# Patient Record
Sex: MALE | Race: WHITE | Employment: OTHER | URBAN - NONMETROPOLITAN AREA
[De-identification: names, ages, dates, MRNs, and addresses within clinical notes are randomized per-mention and may not be internally consistent; named-entity substitution may affect disease eponyms.]

---

## 2017-01-11 ENCOUNTER — TELEPHONE (OUTPATIENT)
Dept: UROLOGY | Age: 27
End: 2017-01-11

## 2017-01-11 ENCOUNTER — TELEPHONE (OUTPATIENT)
Dept: PHYSICAL MEDICINE AND REHAB | Age: 27
End: 2017-01-11

## 2017-01-11 DIAGNOSIS — Z01.818 PRE-OP TESTING: ICD-10-CM

## 2017-01-11 DIAGNOSIS — N31.9 NEUROGENIC BLADDER: Primary | ICD-10-CM

## 2017-01-12 ENCOUNTER — TELEPHONE (OUTPATIENT)
Dept: UROLOGY | Age: 27
End: 2017-01-12

## 2017-01-26 ENCOUNTER — OFFICE VISIT (OUTPATIENT)
Dept: UROLOGY | Age: 27
End: 2017-01-26

## 2017-01-26 VITALS
SYSTOLIC BLOOD PRESSURE: 130 MMHG | WEIGHT: 160 LBS | DIASTOLIC BLOOD PRESSURE: 74 MMHG | HEIGHT: 71 IN | BODY MASS INDEX: 22.4 KG/M2

## 2017-01-26 DIAGNOSIS — N31.9 NEUROGENIC BLADDER DISORDER: Primary | ICD-10-CM

## 2017-01-26 DIAGNOSIS — N39.0 RECURRENT UTI: ICD-10-CM

## 2017-01-26 PROCEDURE — 99024 POSTOP FOLLOW-UP VISIT: CPT | Performed by: UROLOGY

## 2017-01-26 ASSESSMENT — ENCOUNTER SYMPTOMS
VOMITING: 0
CHEST TIGHTNESS: 0
SHORTNESS OF BREATH: 0
BACK PAIN: 0
COLOR CHANGE: 0
EYE REDNESS: 0
EYE PAIN: 0
DIARRHEA: 0

## 2017-02-13 ENCOUNTER — NURSE TRIAGE (OUTPATIENT)
Dept: ADMINISTRATIVE | Age: 27
End: 2017-02-13

## 2017-02-16 ENCOUNTER — OFFICE VISIT (OUTPATIENT)
Dept: UROLOGY | Age: 27
End: 2017-02-16

## 2017-02-16 VITALS
WEIGHT: 176 LBS | SYSTOLIC BLOOD PRESSURE: 112 MMHG | DIASTOLIC BLOOD PRESSURE: 62 MMHG | HEIGHT: 71 IN | BODY MASS INDEX: 24.64 KG/M2

## 2017-02-16 DIAGNOSIS — N31.9 NEUROGENIC BLADDER: Primary | ICD-10-CM

## 2017-02-16 PROCEDURE — 99024 POSTOP FOLLOW-UP VISIT: CPT | Performed by: UROLOGY

## 2017-02-16 RX ORDER — BACLOFEN 10 MG/1
20 TABLET ORAL 4 TIMES DAILY
COMMUNITY

## 2017-02-23 ENCOUNTER — OFFICE VISIT (OUTPATIENT)
Dept: PHYSICAL MEDICINE AND REHAB | Age: 27
End: 2017-02-23

## 2017-02-23 VITALS
DIASTOLIC BLOOD PRESSURE: 76 MMHG | SYSTOLIC BLOOD PRESSURE: 120 MMHG | HEART RATE: 94 BPM | WEIGHT: 176 LBS | BODY MASS INDEX: 24.64 KG/M2 | HEIGHT: 71 IN

## 2017-02-23 DIAGNOSIS — R56.9 PARTIAL SEIZURE (HCC): Primary | ICD-10-CM

## 2017-02-23 PROCEDURE — 99213 OFFICE O/P EST LOW 20 MIN: CPT | Performed by: PSYCHIATRY & NEUROLOGY

## 2017-02-23 RX ORDER — LAMOTRIGINE 150 MG/1
150 TABLET ORAL DAILY
Qty: 30 TABLET | Refills: 5 | Status: SHIPPED | OUTPATIENT
Start: 2017-02-23 | End: 2018-02-20 | Stop reason: CLARIF

## 2017-05-25 ENCOUNTER — OFFICE VISIT (OUTPATIENT)
Dept: PHYSICAL MEDICINE AND REHAB | Age: 27
End: 2017-05-25

## 2017-05-25 VITALS
SYSTOLIC BLOOD PRESSURE: 132 MMHG | DIASTOLIC BLOOD PRESSURE: 85 MMHG | BODY MASS INDEX: 24.38 KG/M2 | HEART RATE: 90 BPM | WEIGHT: 174.16 LBS | HEIGHT: 71 IN

## 2017-05-25 DIAGNOSIS — R56.9 PARTIAL SEIZURE (HCC): Primary | ICD-10-CM

## 2017-05-25 PROCEDURE — 99213 OFFICE O/P EST LOW 20 MIN: CPT | Performed by: PSYCHIATRY & NEUROLOGY

## 2017-05-25 RX ORDER — AMOXICILLIN 500 MG/1
500 CAPSULE ORAL 3 TIMES DAILY
COMMUNITY
Start: 2017-05-23 | End: 2017-11-23

## 2017-05-25 RX ORDER — TRAZODONE HYDROCHLORIDE 50 MG/1
50 TABLET ORAL NIGHTLY
Refills: 0 | COMMUNITY
Start: 2017-04-19 | End: 2017-08-23 | Stop reason: ALTCHOICE

## 2017-06-29 ENCOUNTER — OFFICE VISIT (OUTPATIENT)
Dept: CARDIOLOGY | Age: 27
End: 2017-06-29

## 2017-06-29 VITALS — DIASTOLIC BLOOD PRESSURE: 75 MMHG | SYSTOLIC BLOOD PRESSURE: 135 MMHG | HEART RATE: 106 BPM

## 2017-06-29 DIAGNOSIS — R00.0 TACHYCARDIA: Primary | ICD-10-CM

## 2017-06-29 PROCEDURE — 99213 OFFICE O/P EST LOW 20 MIN: CPT | Performed by: NUCLEAR MEDICINE

## 2017-08-23 ENCOUNTER — HOSPITAL ENCOUNTER (OUTPATIENT)
Dept: WOUND CARE | Age: 27
Discharge: HOME | End: 2017-08-23
Payer: COMMERCIAL

## 2017-08-23 VITALS
WEIGHT: 200 LBS | RESPIRATION RATE: 20 BRPM | DIASTOLIC BLOOD PRESSURE: 72 MMHG | OXYGEN SATURATION: 97 % | HEART RATE: 73 BPM | TEMPERATURE: 98 F | BODY MASS INDEX: 27.89 KG/M2 | SYSTOLIC BLOOD PRESSURE: 134 MMHG

## 2017-08-23 PROCEDURE — 17250 CHEM CAUT OF GRANLTJ TISSUE: CPT

## 2017-08-23 PROCEDURE — 6370000000 HC RX 637 (ALT 250 FOR IP): Performed by: INTERNAL MEDICINE

## 2017-08-23 RX ADMIN — SILVER NITRATE APPLICATORS 1 EACH: 25; 75 STICK TOPICAL at 09:49

## 2017-08-23 ASSESSMENT — PAIN DESCRIPTION - LOCATION: LOCATION: BACK

## 2017-08-23 ASSESSMENT — PAIN SCALES - GENERAL
PAINLEVEL_OUTOF10: 9
PAINLEVEL_OUTOF10: 0

## 2017-08-23 ASSESSMENT — PAIN DESCRIPTION - PAIN TYPE: TYPE: CHRONIC PAIN

## 2017-09-06 ENCOUNTER — HOSPITAL ENCOUNTER (OUTPATIENT)
Age: 27
Discharge: HOME | End: 2017-09-06
Payer: COMMERCIAL

## 2017-09-06 LAB
ALBUMIN SERPL-MCNC: 4.3 G/DL (ref 3.5–5.1)
ALP BLD-CCNC: 191 U/L (ref 38–126)
ALT SERPL-CCNC: 37 U/L (ref 11–66)
ANION GAP SERPL CALCULATED.3IONS-SCNC: 13 MEQ/L (ref 8–16)
AST SERPL-CCNC: 20 U/L (ref 5–40)
BASOPHILS # BLD: 0.5 %
BASOPHILS ABSOLUTE: 0.1 THOU/MM3 (ref 0–0.1)
BILIRUB SERPL-MCNC: 0.2 MG/DL (ref 0.3–1.2)
BUN BLDV-MCNC: 15 MG/DL (ref 7–22)
CALCIUM SERPL-MCNC: 9.6 MG/DL (ref 8.5–10.5)
CHLORIDE BLD-SCNC: 101 MEQ/L (ref 98–111)
CO2: 25 MEQ/L (ref 23–33)
CREAT SERPL-MCNC: 0.9 MG/DL (ref 0.4–1.2)
EOSINOPHIL # BLD: 1.7 %
EOSINOPHILS ABSOLUTE: 0.2 THOU/MM3 (ref 0–0.4)
GFR SERPL CREATININE-BSD FRML MDRD: > 90 ML/MIN/1.73M2
GLUCOSE BLD-MCNC: 80 MG/DL (ref 70–108)
HCT VFR BLD CALC: 48.9 % (ref 42–52)
HEMOGLOBIN: 16.3 GM/DL (ref 14–18)
LYMPHOCYTES # BLD: 18.2 %
LYMPHOCYTES ABSOLUTE: 2 THOU/MM3 (ref 1–4.8)
MCH RBC QN AUTO: 29.3 PG (ref 27–31)
MCHC RBC AUTO-ENTMCNC: 33.2 GM/DL (ref 33–37)
MCV RBC AUTO: 88.1 FL (ref 80–94)
MONOCYTES # BLD: 9.5 %
MONOCYTES ABSOLUTE: 1 THOU/MM3 (ref 0.4–1.3)
NUCLEATED RED BLOOD CELLS: 0 /100 WBC
PDW BLD-RTO: 14.5 % (ref 11.5–14.5)
PLATELET # BLD: 272 THOU/MM3 (ref 130–400)
PMV BLD AUTO: 10.1 MCM (ref 7.4–10.4)
POTASSIUM SERPL-SCNC: 4.3 MEQ/L (ref 3.5–5.2)
RBC # BLD: 5.55 MILL/MM3 (ref 4.7–6.1)
RBC # BLD: NORMAL 10*6/UL
SEG NEUTROPHILS: 70.1 %
SEGMENTED NEUTROPHILS ABSOLUTE COUNT: 7.6 THOU/MM3 (ref 1.8–7.7)
SODIUM BLD-SCNC: 139 MEQ/L (ref 135–145)
TOTAL PROTEIN: 7.7 G/DL (ref 6.1–8)
TSH SERPL DL<=0.05 MIU/L-ACNC: 1.39 UIU/ML (ref 0.4–4.2)
WBC # BLD: 10.8 THOU/MM3 (ref 4.8–10.8)

## 2017-09-06 PROCEDURE — 36415 COLL VENOUS BLD VENIPUNCTURE: CPT

## 2017-09-06 PROCEDURE — 80050 GENERAL HEALTH PANEL: CPT

## 2017-09-07 ENCOUNTER — HOSPITAL ENCOUNTER (OUTPATIENT)
Dept: WOUND CARE | Age: 27
Discharge: HOME | End: 2017-09-07
Payer: MEDICAID

## 2017-09-07 VITALS
TEMPERATURE: 97.9 F | SYSTOLIC BLOOD PRESSURE: 128 MMHG | OXYGEN SATURATION: 98 % | HEART RATE: 81 BPM | WEIGHT: 203 LBS | BODY MASS INDEX: 28.42 KG/M2 | RESPIRATION RATE: 16 BRPM | DIASTOLIC BLOOD PRESSURE: 59 MMHG | HEIGHT: 71 IN

## 2017-09-07 DIAGNOSIS — Z43.3 COLOSTOMY CARE (HCC): ICD-10-CM

## 2017-09-07 PROCEDURE — 99204 OFFICE O/P NEW MOD 45 MIN: CPT | Performed by: NURSE PRACTITIONER

## 2017-09-07 PROCEDURE — 99214 OFFICE O/P EST MOD 30 MIN: CPT

## 2017-10-13 ENCOUNTER — TELEPHONE (OUTPATIENT)
Dept: SURGERY | Age: 27
End: 2017-10-13

## 2017-10-13 NOTE — TELEPHONE ENCOUNTER
Relayed message to pt's mom. If anything of concern, such as blood in the stool or if ostomy not functioning she will call the office back.

## 2017-10-20 ENCOUNTER — HOSPITAL ENCOUNTER (OUTPATIENT)
Age: 27
Discharge: HOME | End: 2017-10-20
Payer: MEDICAID

## 2017-10-20 ENCOUNTER — HOSPITAL ENCOUNTER (OUTPATIENT)
Dept: GENERAL RADIOLOGY | Age: 27
Discharge: HOME | End: 2017-10-20
Payer: MEDICAID

## 2017-10-20 DIAGNOSIS — T14.90XA INJURY: ICD-10-CM

## 2017-10-20 PROCEDURE — 72072 X-RAY EXAM THORAC SPINE 3VWS: CPT

## 2017-10-25 ENCOUNTER — HOSPITAL ENCOUNTER (OUTPATIENT)
Dept: WOUND CARE | Age: 27
Discharge: HOME | End: 2017-10-25
Payer: MEDICAID

## 2017-10-25 VITALS
BODY MASS INDEX: 35.98 KG/M2 | OXYGEN SATURATION: 98 % | HEIGHT: 71 IN | DIASTOLIC BLOOD PRESSURE: 55 MMHG | HEART RATE: 99 BPM | WEIGHT: 257 LBS | TEMPERATURE: 98.1 F | RESPIRATION RATE: 18 BRPM | SYSTOLIC BLOOD PRESSURE: 116 MMHG

## 2017-10-25 DIAGNOSIS — L89.154 DECUBITUS ULCER OF COCCYX, STAGE IV (HCC): ICD-10-CM

## 2017-10-25 DIAGNOSIS — L89.153 DECUBITUS ULCER OF COCCYGEAL REGION, STAGE 3 (HCC): ICD-10-CM

## 2017-10-25 DIAGNOSIS — L89.154 DECUBITUS ULCER OF COCCYGEAL REGION, STAGE 4 (HCC): Primary | ICD-10-CM

## 2017-10-25 PROCEDURE — 99213 OFFICE O/P EST LOW 20 MIN: CPT

## 2017-10-25 ASSESSMENT — PAIN SCALES - GENERAL: PAINLEVEL_OUTOF10: 0

## 2017-10-25 NOTE — PLAN OF CARE
Problem: Wound:  Goal: Will show signs of wound healing; wound closure and no evidence of infection  Will show signs of wound healing; wound closure and no evidence of infection  Outcome: Ongoing  Pt presents to clinic with ongoing care of coccyx wound. NO s/s of infection, afebrile. Wound measures about the same size. Pt accompanied by mother. Pt mother stated pt had a spurt of liquid stool oozing from anus recently. Dr. Jeff Alonso states that can happen. Pt wound packed with gauze per Dr. Jeff Alonso. Pt to continue collagen, gauze to wound bed daily. Order for low air loss mattress and lab work given. Pt to have lab work done ASAP so low airloss mattress can be ordered. Pt to follow up in 3 months. Comments: Care plan reviewed with patient and pt mother. Patient and pt mother verbalize understanding of the plan of care and contribute to goal setting.

## 2017-10-25 NOTE — PROGRESS NOTES
100 10/25/2017  8:31 AM   Red%Wound Bed 100 6/28/2017  7:53 AM   Yellow%Wound Bed 10 12/7/2016 10:16 AM   Black%Wound Bed 10 10/26/2016 10:54 AM   Drainage Amount Scant 10/25/2017  8:31 AM   Drainage Description Tan 10/25/2017  8:31 AM   Odor None 10/25/2017  8:31 AM   Op First Treatment Date 09/14/16 9/14/2016 11:40 AM   Number of days: 405             LABS      Lab Results   Component Value Date    BC No growth-preliminary  No growth   12/25/2016       Assessment:   Paraplegic. Stage IV coccyx wound the wound is slowly getting better  Will schedule patient with Maeve to discuss about his colostomy  Follow-up will be scheduled with me in 4 months. I discuss it with his mother to call if there are any other issues         Patient Active Problem List   Diagnosis Code    Urinary tract infection associated with catheterization of urinary tract ([address] T83.511A, N39.0    Moderate single current episode of major depressive disorder ([address] F32.1    Severe malnutrition ([address] E43    Sepsis with metabolic encephalopathy ([address] A41.9, R65.20, G93.41    Decubitus ulcer of coccygeal region, stage 3 (HCC) L89.153    Adjustment Disorder with Mixed Anxiety and Depressed Mood     Colostomy status ([address] Z93.3    Urinary tract infection N39.0    Sepsis ([address] A41.9    Neurogenic bladder N31.9    Colostomy care ([address] Z43.3    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 Low air loss mattress  Wound is stable: follow up will be scheduled in three months. Plan:     Patient examined and evaluated    Treatment: No orders of the defined types were placed in this encounter. Please see attached Discharge Instructions    Written patient dismissal instructions given to patient and signed by patient or POA.          Discharge Instructions       Visit Discharge/Physician Orders: Order for low air loss mattress sent through [address] [place] [address].    - Have serum

## 2017-11-01 ENCOUNTER — HOSPITAL ENCOUNTER (OUTPATIENT)
Age: 27
Discharge: HOME | End: 2017-11-01
Payer: MEDICAID

## 2017-11-01 DIAGNOSIS — L89.153 DECUBITUS ULCER OF COCCYGEAL REGION, STAGE 3 (HCC): ICD-10-CM

## 2017-11-01 LAB
ALBUMIN SERPL-MCNC: 4.2 G/DL (ref 3.5–5.1)
ALP BLD-CCNC: 177 U/L (ref 38–126)
ALT SERPL-CCNC: 30 U/L (ref 11–66)
AST SERPL-CCNC: 25 U/L (ref 5–40)
BASOPHILS # BLD: 0.9 %
BASOPHILS ABSOLUTE: 0.1 THOU/MM3 (ref 0–0.1)
BILIRUB SERPL-MCNC: 0.3 MG/DL (ref 0.3–1.2)
BILIRUBIN DIRECT: < 0.2 MG/DL (ref 0–0.3)
EOSINOPHIL # BLD: 0.7 %
EOSINOPHILS ABSOLUTE: 0 THOU/MM3 (ref 0–0.4)
HCT VFR BLD CALC: 49.2 % (ref 42–52)
HEMOGLOBIN: 16.7 GM/DL (ref 14–18)
LYMPHOCYTES # BLD: 24.2 %
LYMPHOCYTES ABSOLUTE: 1.5 THOU/MM3 (ref 1–4.8)
MCH RBC QN AUTO: 29.6 PG (ref 27–31)
MCHC RBC AUTO-ENTMCNC: 34 GM/DL (ref 33–37)
MCV RBC AUTO: 87.2 FL (ref 80–94)
MONOCYTES # BLD: 8.9 %
MONOCYTES ABSOLUTE: 0.5 THOU/MM3 (ref 0.4–1.3)
NUCLEATED RED BLOOD CELLS: 0 /100 WBC
PDW BLD-RTO: 13.5 % (ref 11.5–14.5)
PLATELET # BLD: 240 THOU/MM3 (ref 130–400)
PMV BLD AUTO: 10.6 MCM (ref 7.4–10.4)
PREALBUMIN: 47 MG/DL (ref 20–40)
RBC # BLD: 5.64 MILL/MM3 (ref 4.7–6.1)
SEG NEUTROPHILS: 65.3 %
SEGMENTED NEUTROPHILS ABSOLUTE COUNT: 4 THOU/MM3 (ref 1.8–7.7)
TOTAL PROTEIN: 7.6 G/DL (ref 6.1–8)
WBC # BLD: 6.1 THOU/MM3 (ref 4.8–10.8)

## 2017-11-01 PROCEDURE — 80076 HEPATIC FUNCTION PANEL: CPT

## 2017-11-01 PROCEDURE — 36415 COLL VENOUS BLD VENIPUNCTURE: CPT

## 2017-11-01 PROCEDURE — 85025 COMPLETE CBC W/AUTO DIFF WBC: CPT

## 2017-11-01 PROCEDURE — 84134 ASSAY OF PREALBUMIN: CPT

## 2017-11-10 ENCOUNTER — TELEPHONE (OUTPATIENT)
Dept: WOUND CARE | Age: 27
End: 2017-11-10

## 2017-11-28 ENCOUNTER — HOSPITAL ENCOUNTER (OUTPATIENT)
Dept: PHYSICAL THERAPY | Age: 27
Setting detail: THERAPIES SERIES
Discharge: HOME | End: 2017-11-28
Payer: MEDICAID

## 2017-11-28 PROCEDURE — 97163 PT EVAL HIGH COMPLEX 45 MIN: CPT

## 2017-11-28 ASSESSMENT — PAIN SCALES - GENERAL: PAINLEVEL_OUTOF10: 5

## 2017-11-28 ASSESSMENT — PAIN DESCRIPTION - LOCATION: LOCATION: SHOULDER

## 2017-11-28 ASSESSMENT — PAIN DESCRIPTION - ORIENTATION: ORIENTATION: LEFT

## 2017-11-28 NOTE — PROGRESS NOTES
I certify that I have examined the patient below and determined that Physical Medicine and Rehabilitation service is necessary; that the secondary diagnosis for the provision of rehabilitation services is consistent with identified needs; that service will be furnished on an outpatient basis while the patient is in my care; that I approve the above plan of care for up to 90 days or as specifically noted above and will review it within that time frame or more often if the patients condition requires. Attestation, signature or co-signature of physician indicates approval of certification requirements.    ________________________ ____________ __________  Physician Signature   Date   Time       [address]     Time In: 1115  Time Out: 6349  Minutes: 55  Timed Code Treatment Minutes: 0 Minutes     Date: 2017  Patient Name: Faiza Hwang,  Gender:  male        CSN: [id number]   : 1990  (33 y.o.)  Referral Date : 10/17/17     Referring Practitioner: Dr. Jonathan Ramirez      Diagnosis: complete spinal cord injury at T1-6, complete lesion of T2-6  Treatment Diagnosis: complete SCI, difficulty with transfers, difficulty with use of wheelchair, difficulty with bed mobility  Additional Pertinent Hx: See Medical History Questionnaire. Complete T1-T6 SCI with complete paralysis. General:  PT Visit Information  Onset Date: 17  PT Insurance Information: Medicaid- pays at 100%- unlimited PT/OC, pays at 100%, modalities covered except MHP/CP not covered  Total # of Visits to Date: 1  Plan of Care/Certification Expiration Date: 18  Progress Note Due Date: 18  Progress Note Counter: 1/10                        See Medical History Questionnaire for information related to allergies and medications.     Subjective:  Chart Reviewed: Yes  Patient assessed for rehabilitation services?: Yes  Response To Previous Treatment: Propulsion 1  Propulsion: Manual  Level: Level Tile  Level of Assistance: Modified independent     Balance  Comments: Sitting balance difficulty reaching 4 inches to left or right                                      Activity Tolerance:  Activity Tolerance: Patient Tolerated treatment well    Assessment: Body structures, Functions, Activity limitations: Decreased functional mobility , Decreased ROM, Decreased strength, Decreased safe awareness  Assessment: Patient demostrates high complexity evaluation due to complete SCI T2-6 level, catheter, colostomy, pressure ulcer currently recieving wound care, MRSA  Prognosis: Good  REQUIRES PT FOLLOW UP: Yes    Patient Education:  Patient Education: PT feels that patient would be best served in [facility] clinic which has wider mat tables for safety with transfers and to work on bed mobility, which has variety of surfaces to work on wheelchair mobility  Barriers to Learning: none                   Plan:  Times per week: 2 x per week  Plan weeks: 8 weeks  Specific instructions for Next Treatment: Will consult CM as patient will need transportation to/from [facility] clinic, PT to progress with transfers, bed mobility, wheelchair mobility and progress with AROM neck, shoulder, strengthening, LE PROM and HEP  Current Treatment Recommendations: Strengthening, ROM, Transfer Training, Balance Training, Wheelchair Mobility Training, Home Exercise Program, Equipment Evaluation, Education, & procurement, Safety Education & Training    History: Personal factors or comorbidities that impact plan of care - High Complexity: 3 or more personal factors or comorbidities. See history section above for details. Examination: Body structures and functions, activity limitations, participation restrictions; using standardized tests and measures - High Complexity: 4 or more body structures and functional, activity limitations and/or participation restrictions.   See restrictions and objective section above for details. Clinical Presentation: High - Unstable with Unpredictable Characteristics:  complete SCI T2-6 level, catheter, colostomy, pressure ulcer currently recieving wound care, MRSA:    Decision Making: High Complexity due to  complete SCI T2-6 level, catheter, colostomy, pressure ulcer currently recieving wound care, MRSA. Decision making was based on patient assessment and decision making process in determining plan of care and establishing reasonable expectations for measurable functional outcomes. Evaluation Complexity: Based on the findings of patient history, examination, clinical presentation, and decision making during this evaluation, the evaluation of [place]  is of high complexity. Goals:  Patient goals : I do not want to lift weights, I want practice sitting balance, exercises I would like to do, I want to be able to work on sliding board transfers- I want to be able to I want to be able to transfer without slide board transfer. I want to learn floor transfers.     Short term goals  Time Frame for Short term goals: 4 weeks  Short term goal 1: increase AROM B hamstring flexibility to 80 degrees, DF to 10 degrees to improve mobility for transfers  Short term goal 2: increase L shoulder AROM flexion and abduction to 170 degrees to reduce pain getting dressed in shoulder to 3/10  Short term goal 3: patient to transfer mod I with slide board on various surface heights with proper technique and safety without verbal cues through session  Short term goal 4: bed mobility supine <->sit with proper technique SBA x 1 without use of HR to allow improved I with getting in/out of bed at home  Short term goal 5: I with HEP as prescribed to allow patient mod I with all bed mobility, rolling to assist with getting in/out of bed    Long term goals  Time Frame for Long term goals : 8 weeks  Long term goal 1: Mod I with use of wheelchair for community distances with use of wheelies, on

## 2017-12-05 NOTE — PROGRESS NOTES
CM has been working with patient to obtain transportation to and from therapy. Patient given resource information for [employer] and [employer]. Patient is contacting agencies to research best option for him. Today CM discussed family assistance for transportation until agencies are able to transport in order to allow patient to begin therapy sooner. Patient stated he will need later appts if family brings him. Will continue to follow up. Patient is paraplegic and requires some assistance with transportation - at this time [address] is not an option.   Luda Yarbrough RN 12/5/2017

## 2017-12-13 ENCOUNTER — HOSPITAL ENCOUNTER (OUTPATIENT)
Dept: PHYSICAL THERAPY | Age: 27
Setting detail: THERAPIES SERIES
Discharge: HOME | End: 2017-12-13
Payer: MEDICAID

## 2017-12-13 PROCEDURE — 97110 THERAPEUTIC EXERCISES: CPT

## 2017-12-13 PROCEDURE — 97112 NEUROMUSCULAR REEDUCATION: CPT

## 2017-12-13 PROCEDURE — 97530 THERAPEUTIC ACTIVITIES: CPT

## 2017-12-13 ASSESSMENT — PAIN DESCRIPTION - LOCATION: LOCATION: BACK

## 2017-12-13 ASSESSMENT — PAIN DESCRIPTION - PAIN TYPE: TYPE: CHRONIC PAIN

## 2017-12-13 ASSESSMENT — PAIN SCALES - GENERAL: PAINLEVEL_OUTOF10: 8

## 2017-12-13 NOTE — PROGRESS NOTES
Kishan Hancock              Date: 2017  Patient Name: Sury Farrell,  Gender:  male        CSN: [id number]   : 1990  (33 y.o.)       Referring Practitioner: Dr. Kyra Milton      Diagnosis: complete spinal cord injury at T1-6, complete lesion of T2-6  Treatment Diagnosis: complete SCI, difficulty with transfers, difficulty with use of wheelchair, difficulty with bed mobility   Additional Pertinent Hx: See Medical History Questionnaire. Complete T1-T6 SCI with complete paralysis. General:  PT Visit Information  PT Insurance Information: Medicaid- pays at 100%- unlimited PT/OC, pays at 100%, modalities covered except MHP/CP not covered  Total # of Visits to Date: 2  Plan of Care/Certification Expiration Date: 18  Progress Note Counter: 2/10               Subjective:  Chart Reviewed: Yes  Patient assessed for rehabilitation services?: Yes  Family / Caregiver Present: No  Comments: Cert due  .  f/u with Dr. Canda Goodell 18. no f/u with family MD, Dr. Altagracia Huggins     Subjective: Patient reports ready to start therapy to work on transfers. Reports uses [employer] to figure out how to do things around his home.  has a very hard time getting into his grandfather's truck due to the height of it.  smokes medical marijuana for his pain and just stopped drinking a month or so ago. Pain:  Patient Currently in Pain: Yes  Pain Assessment: 0-10  Pain Level: 8  Pain Type: Chronic pain  Pain Location: Back      Objective    Exercises  Exercise 1: Patient likes to do things on his own. States does not mind having gait belt on but does not like people holding on to it - it throws off his balance. Exercise 2: Practiced transfers, without sliding board, from wheelchair to mat table from both diretions. Patient with smoother motion going to his right.  Patient tends to hurry up and just thrown himself

## 2017-12-21 ENCOUNTER — HOSPITAL ENCOUNTER (OUTPATIENT)
Dept: PHYSICAL THERAPY | Age: 27
Setting detail: THERAPIES SERIES
Discharge: HOME | End: 2017-12-21
Payer: MEDICAID

## 2017-12-21 PROCEDURE — 97110 THERAPEUTIC EXERCISES: CPT

## 2017-12-21 PROCEDURE — 97530 THERAPEUTIC ACTIVITIES: CPT

## 2017-12-21 NOTE — PROGRESS NOTES
and moves onto his butt and then onto his back them throws both if his legs down onto the table. States that if he lowers them slowly or one by one they get caught and he cannot get them straight. Exercise 4: Supine to sit mod I - educated patient on trying to use log rolling technique to protect his back. Patient was open to the idea of trying but states its very time comsuming for him. Exercise 8: Pushups seated in wheelchair x10 for UE strength and pressure relief. Exercise 9: Seated EOB working on sitting balance: Sitting with erect posture and trying to engage core muscles x4-5 minutes. Bilateral shoulder flexion up to 90 degrees x10 working on maintaining upright posture. Trunk rotation holing small ball x5. Exercise 10: PROM LE stretches: SKTC, hamstring, LTR, piriformis, Hip IR/ER, calf 3x 20 seconds bilateral   Exercise 11: Seated EOB for Cervical AROM: rotation and lateral flexion x10 each        Activity Tolerance:  Activity Tolerance: Patient Tolerated treatment well    Assessment: Body structures, Functions, Activity limitations: Decreased functional mobility , Decreased ROM, Decreased strength, Decreased safe awareness  Assessment: Discussed importance with patient of working on smoother transfers, slowing down with mobility, and improved technique for safety, less back pain, and to decrease stress on his body. Patient was open to new ideas with encouragement and thorough explanation. Patient admits fatigue at end of session. Difficulty noted with maintaining core stability sitting edge of bed. Prognosis: Good  Discharge Recommendations: Continue to assess pending progress    Patient Education:  Patient Education: Should be doing pressure relief at home, safety and protecting back, work on sitting posture.      Plan:  Times per week: 2 x per week  Plan weeks: 8 weeks  Specific instructions for Next Treatment: PT to progress with transfers, bed mobility, wheelchair mobility and progress with allow patient to improved independence and compliance with pressure relief and transfers  Long term goal 5: I with HEP as prescribed to allow patient to propel wheelchair community distance, up and down ramp in/out of therapy clinic without assistance       Mary Cates.  Rommel Mcdermott, [address], 12/21/2017

## 2017-12-28 ENCOUNTER — HOSPITAL ENCOUNTER (OUTPATIENT)
Dept: PHYSICAL THERAPY | Age: 27
Setting detail: THERAPIES SERIES
Discharge: HOME | End: 2017-12-28
Payer: MEDICAID

## 2017-12-28 ENCOUNTER — HOSPITAL ENCOUNTER (OUTPATIENT)
Age: 27
End: 2017-12-28
Payer: MEDICAID

## 2018-01-08 ENCOUNTER — HOSPITAL ENCOUNTER (OUTPATIENT)
Dept: PHYSICAL THERAPY | Age: 28
Setting detail: THERAPIES SERIES
Discharge: HOME | End: 2018-01-08
Payer: MEDICAID

## 2018-01-08 PROCEDURE — 97110 THERAPEUTIC EXERCISES: CPT

## 2018-01-08 PROCEDURE — 97530 THERAPEUTIC ACTIVITIES: CPT

## 2018-01-08 PROCEDURE — 97112 NEUROMUSCULAR REEDUCATION: CPT

## 2018-01-08 ASSESSMENT — PAIN DESCRIPTION - PAIN TYPE: TYPE: CHRONIC PAIN

## 2018-01-08 ASSESSMENT — PAIN SCALES - GENERAL: PAINLEVEL_OUTOF10: 8

## 2018-01-08 ASSESSMENT — PAIN DESCRIPTION - LOCATION: LOCATION: BACK

## 2018-01-11 ENCOUNTER — HOSPITAL ENCOUNTER (OUTPATIENT)
Dept: PHYSICAL THERAPY | Age: 28
Setting detail: THERAPIES SERIES
Discharge: HOME | End: 2018-01-11
Payer: MEDICAID

## 2018-01-11 PROCEDURE — 97530 THERAPEUTIC ACTIVITIES: CPT

## 2018-01-11 PROCEDURE — 97110 THERAPEUTIC EXERCISES: CPT

## 2018-01-11 NOTE — PROGRESS NOTES
[place]     Time In: 0161  Time Out: 1801  Minutes: 50  Timed Code Treatment Minutes: 50 Minutes     Date: 2018  Patient Name: Radha Queen,  Gender:  male        CSN: [id number]   : 1990  (33 y.o.)       Referring Practitioner: Dr. Verla Gottron      Diagnosis: complete spinal cord injury at T1-6, complete lesion of T2-6  Treatment Diagnosis: complete SCI, difficulty with transfers, difficulty with use of wheelchair, difficulty with bed mobility   Additional Pertinent Hx: See Medical History Questionnaire. Complete T1-T6 SCI with complete paralysis. General:  PT Visit Information  PT Insurance Information: Medicaid- pays at 100%- unlimited PT/OC, pays at 100%, modalities covered except MHP/CP not covered  Total # of Visits to Date: 5  Plan of Care/Certification Expiration Date: 18  Progress Note Counter: 5/10 for PN             Subjective:  Chart Reviewed: Yes  Patient assessed for rehabilitation services?: Yes  Family / Caregiver Present: No  Comments: Cert due  3/20/17.  f/u with Dr. Rossi Perez 18. no f/u with family Dr. NEGRITO Governor Angle: Patient came in without arm rest on wheelchair and states he hasn't had them on his chair for a few days. Pain:  Patient Currently in Pain: Denies     Objective         Exercises  Exercise 2: Patient transferring from wheelchair to mat table mod I and no use of sliding board. Patient rolling and laying onto back with much smoother motion but still throwing legs out to get them straight. Exercise 3: Sitting EOM for core strengthening and balance (tech bracing from behind and PTA bracing in front): Sitting EOM for 5 minutes working on upright posture and keeping hands in lap then x1 minute with hands clasped in front of abdomin. Abdominal bracing 10x5 seconds.  Shoulder flexion 1 UE at a time with opposite hand on lap x5 bilateral working on holding abdominal brace. Bilateral shoulder flexion working on maintaining abdominal brace x5 but patient making quick UE movements and unable to maintain abdominal brace. Leaning to each side 5x. Exercise 7: NuStep level 2 x6 minutes (new machine, seat 10/arms 10) with feet strapped in, towel roll at lower back, and gait belt around knees to prevent legs from falling into hip abduction. Patient mod I to transfer wheelchair to/from [facility]. Patient encouraged to hold abdominal brace throughout. Activity Tolerance:  Activity Tolerance: Patient Tolerated treatment well    Assessment: Body structures, Functions, Activity limitations: Decreased functional mobility , Decreased ROM, Decreased strength, Decreased safe awareness  Assessment: Patient continues to demonstrate significant weakness at core. Discussed safety issues with not having arm rests on wheelchair. Added NuStep today for cardio endurance and core muscle strength. Mild fatigue noted at end of session. Prognosis: Good  Discharge Recommendations: Continue to assess pending progress    Patient Education:  Patient Education: Needs to be working on sitting upright in chair without back support and doing abdominal bracing throughout the day.     Plan:  Times per week: 2 x per week  Plan weeks: 8 weeks  Specific instructions for Next Treatment: PT to progress with transfers, bed mobility, wheelchair mobility and progress with AROM neck, shoulder, strengthening, LE PROM and HEP  Current Treatment Recommendations: Strengthening, ROM, Transfer Training, Balance Training, Wheelchair Mobility Training, Home Exercise Program, Equipment Evaluation, Education, & procurement, Safety Education & Training  Plan Comment: Continue with HEP    Goals:  Patient goals : I do not want to lift weights, I want practice sitting balance, exercises I would like to do, I want to be able to work on sliding board transfers- I want to be able to I want to be able to transfer without slide board transfer. I want to learn floor transfers. Short term goals  Time Frame for Short term goals: 4 weeks  Short term goal 1: increase AROM B hamstring flexibility to 80 degrees, DF to 10 degrees to improve mobility for transfers  Short term goal 2: increase L shoulder AROM flexion and abduction to 170 degrees to reduce pain getting dressed in shoulder to 3/10  Short term goal 3: patient to transfer mod I with slide board on various surface heights with proper technique and safety without verbal cues through session  Short term goal 4: bed mobility supine <->sit with proper technique SBA x 1 without use of HR to allow improved I with getting in/out of bed at home  Short term goal 5: I with HEP as prescribed to allow patient mod I with all bed mobility, rolling to assist with getting in/out of bed    Long term goals  Time Frame for Long term goals : 8 weeks  Long term goal 1: Mod I with use of wheelchair for community distances with use of wheelies, on uneven surfaces to allow patient to manuever in community without assistance  Long term goal 2: CGA for floor transfer to allow patient to safely get on/off floor in case of fall to improve safety  Long term goal 3: sit pivot transfer mod I without use of slide board to allow patient to transfer without equipment in variety of enviroments for community re-integration  Long term goal 4: increase strength L shoulder to 4/5 to allow patient to improved independence and compliance with pressure relief and transfers  Long term goal 5: I with HEP as prescribed to allow patient to propel wheelchair community distance, up and down ramp in/out of therapy clinic without assistance       Shante Sprague.  Christopher, [address], 1/11/2018

## 2018-01-15 ENCOUNTER — HOSPITAL ENCOUNTER (OUTPATIENT)
Dept: PHYSICAL THERAPY | Age: 28
Setting detail: THERAPIES SERIES
Discharge: HOME | End: 2018-01-15
Payer: MEDICAID

## 2018-01-15 PROCEDURE — 97112 NEUROMUSCULAR REEDUCATION: CPT

## 2018-01-15 PROCEDURE — 97110 THERAPEUTIC EXERCISES: CPT

## 2018-01-15 ASSESSMENT — PAIN SCALES - GENERAL: PAINLEVEL_OUTOF10: 8

## 2018-01-15 ASSESSMENT — PAIN DESCRIPTION - PAIN TYPE: TYPE: CHRONIC PAIN

## 2018-01-15 ASSESSMENT — PAIN DESCRIPTION - LOCATION: LOCATION: BACK

## 2018-01-15 NOTE — PROGRESS NOTES
[facility] [facility]     Time In: 1472  Time Out: 4546  Minutes: 50  Timed Code Treatment Minutes: 50 Minutes     Date: 1/15/2018  Patient Name: Svetlana Novoa,  Gender:  male        CSN: [id number]   : 1990  (33 y.o.)       Referring Practitioner: Dr. Gerardo Self      Diagnosis: complete spinal cord injury at T1-6, complete lesion of T2-6  Treatment Diagnosis: complete SCI, difficulty with transfers, difficulty with use of wheelchair, difficulty with bed mobility   Additional Pertinent Hx: See Medical History Questionnaire. Complete T1-T6 SCI with complete paralysis. General:  PT Visit Information  PT Insurance Information: Medicaid- pays at 100%- unlimited PT/OC, pays at 100%, modalities covered except MHP/CP not covered  Total # of Visits to Date: 6  Plan of Care/Certification Expiration Date: 18  Progress Note Counter: 6/10 for PN               Subjective:  Chart Reviewed: Yes  Patient assessed for rehabilitation services?: Yes  Family / Caregiver Present: No  Comments: Cert due  .  f/u with Dr. Chon Armando 18. no f/u with family MD, Dr. Lakeisha Ernst: Patient reports going fine without arm rests on his wheelchair but makes him nervous when he gets to the end of his ramp at home because he had the ramp built to steep. States doing well today. Pain:  Patient Currently in Pain: Yes  Pain Assessment: 0-10  Pain Level: 8  Pain Type: Chronic pain  Pain Location: Back      Objective    Exercises  Exercise 1: Supine in hooklying: PROM for LTR 10x each way then 5x pelvic tilts then 10x pulling on knees to get patient to try and do a bridge. Crunches 10x. Exercise 2: Patient transferring from wheelchair to mat table mod I and no use of sliding board. Patient rolling and laying onto back with much smoother motion but still throwing legs out to get them straight.  Patient practicing transfer allow patient to propel wheelchair community distance, up and down ramp in/out of therapy clinic without assistance         [address], [address]

## 2018-01-18 ENCOUNTER — HOSPITAL ENCOUNTER (OUTPATIENT)
Dept: NEUROLOGY | Age: 28
Discharge: HOME | End: 2018-01-18
Payer: MEDICAID

## 2018-01-18 ENCOUNTER — HOSPITAL ENCOUNTER (OUTPATIENT)
Dept: PHYSICAL THERAPY | Age: 28
Setting detail: THERAPIES SERIES
Discharge: HOME | End: 2018-01-18
Payer: MEDICAID

## 2018-01-18 ENCOUNTER — APPOINTMENT (OUTPATIENT)
Dept: PHYSICAL THERAPY | Age: 28
End: 2018-01-18
Payer: MEDICAID

## 2018-01-18 ENCOUNTER — HOSPITAL ENCOUNTER (OUTPATIENT)
Dept: MRI IMAGING | Age: 28
Discharge: HOME | End: 2018-01-18
Payer: MEDICAID

## 2018-01-18 DIAGNOSIS — S06.9X3S TRAUMATIC BRAIN INJURY, WITH LOSS OF CONSCIOUSNESS OF 1 HOUR TO 5 HOURS 59 MINUTES, SEQUELA (HCC): ICD-10-CM

## 2018-01-18 PROCEDURE — 95819 EEG AWAKE AND ASLEEP: CPT

## 2018-01-18 PROCEDURE — 70551 MRI BRAIN STEM W/O DYE: CPT

## 2018-01-18 NOTE — PROGRESS NOTES
Certification of necessity for Transportation by wheelchair [place] completed, signed by physician , and faxed to [employer] @ [phone]. Per [employer], patient is to notify them 48 hours prior to needed transportation and patient is eligible to begin use of their service. Patient called and notified of above - patient voiced understanding.   Margaux Cash RN 1/18/2018

## 2018-01-22 ENCOUNTER — HOSPITAL ENCOUNTER (OUTPATIENT)
Dept: PHYSICAL THERAPY | Age: 28
Setting detail: THERAPIES SERIES
Discharge: HOME | End: 2018-01-22
Payer: MEDICAID

## 2018-01-22 PROCEDURE — 97110 THERAPEUTIC EXERCISES: CPT

## 2018-01-22 PROCEDURE — 97112 NEUROMUSCULAR REEDUCATION: CPT

## 2018-01-22 ASSESSMENT — PAIN DESCRIPTION - PAIN TYPE: TYPE: CHRONIC PAIN

## 2018-01-22 ASSESSMENT — PAIN SCALES - GENERAL: PAINLEVEL_OUTOF10: 8

## 2018-01-22 ASSESSMENT — PAIN DESCRIPTION - LOCATION: LOCATION: BACK

## 2018-01-22 NOTE — PROGRESS NOTES
could come pick him up. Also discussed use of leg slings to help with getting his legs out straight without throwing them out and he states he has some at home in a drawer and he does not like them and he does not use them. Exercise 3: Sitting EOM for core strengthening and balance (tech bracing from behind and PT standing in front): Sitting EOM for 5 minutes working on upright posture and not arching back and keeping hands to sides then progressing to on lap. Shoulder flexion 1 UE at a time with opposite hand on lap 2x5 bilateral working on holding abdominal brace. Leaning to each side 10x. Leaning back and sitting up 10x. Exercise 4: Patient with questions on how to strengthen back of arms and shoulders. PT explaining to patient how you need to have good trunk control to be able to focus on those muscles. Patient demonstrated how he wants to just lay his chest on his lap and do the exercises from there and PT explained on how much stress that is on the back. Patient reported does not lay on his stomach so cannot use the end of the bed. Had patient demonstrate putting a chair in front of him and bracing with one arm while worked on rhomboids of the other. Patient needed a lot of cueing to make sure working the correct muscle group. Patient's arms fatigued quickly. Tried supporting patient's back and using thera-band but he did not have the trunk stability to kepp himslef upright and focus on the correct muscles. Activity Tolerance:  Activity Tolerance: Patient Tolerated treatment well    Assessment: Body structures, Functions, Activity limitations: Decreased functional mobility , Decreased ROM, Decreased strength, Decreased safe awareness  Assessment: Patient still very stubborn and does not like to hear therapist and tech's ideas for activities because he wants to follow everything he sees on You Tube. Patient does not have a good support system at home to help him.  Patient is overusing his shoulders,

## 2018-01-24 ENCOUNTER — HOSPITAL ENCOUNTER (OUTPATIENT)
Dept: WOUND CARE | Age: 28
Discharge: HOME | End: 2018-01-24
Payer: MEDICAID

## 2018-01-24 VITALS
RESPIRATION RATE: 16 BRPM | SYSTOLIC BLOOD PRESSURE: 118 MMHG | BODY MASS INDEX: 26.96 KG/M2 | WEIGHT: 192.6 LBS | TEMPERATURE: 97.7 F | DIASTOLIC BLOOD PRESSURE: 54 MMHG | OXYGEN SATURATION: 95 % | HEIGHT: 71 IN | HEART RATE: 99 BPM

## 2018-01-24 PROCEDURE — 17250 CHEM CAUT OF GRANLTJ TISSUE: CPT

## 2018-01-24 PROCEDURE — 6370000000 HC RX 637 (ALT 250 FOR IP): Performed by: INTERNAL MEDICINE

## 2018-01-24 RX ADMIN — SILVER NITRATE APPLICATORS 1 EACH: 25; 75 STICK TOPICAL at 08:28

## 2018-01-24 NOTE — PROGRESS NOTES
Ang    COLOSTOMY  07/16/2016    Debridement of sacral ulcer and diverting colostomy by Dr Capo Aguirre  01/16/2017    by Dr Ankita Higgins, [address]      back, right hand, left shoulder    HAND SURGERY Right 06/2016    [facility]    MYRINGOTOMY AND TYMPANOSTOMY TUBE PLACEMENT Bilateral     as child    SHOULDER SURGERY Left 06/2016    Dr. Zehra Pennington, [facility]     FAMILY HISTORY         Family History   Problem Relation Age of Onset    Other Sister      cystic fibrosis    Asthma Sister     Diabetes Maternal Grandfather     High Blood Pressure Maternal Grandfather     Kidney Disease Neg Hx     Stroke Neg Hx      SOCIAL HISTORY       Social History   Substance Use Topics    Smoking status: Former Smoker     Packs/day: 1.00     Types: Cigarettes    Smokeless tobacco: Former User     Types: Chew    Alcohol use 0.6 oz/week     1 Cans of beer per week      Comment: rarely     ALLERGIES         Allergies   Allergen Reactions    Bee Venom Swelling    Doxycycline Calcium Rash     MEDICATIONS         Current Outpatient Prescriptions on File Prior to Encounter   Medication Sig Dispense Refill    sertraline (ZOLOFT) 50 MG tablet Take 50 mg by mouth daily  0    lamoTRIgine (LAMICTAL) 150 MG tablet Take 1 tablet by mouth daily 30 tablet 5    baclofen (LIORESAL) 10 MG tablet Take 20 mg by mouth every morning (before breakfast) 20 mg AM  15 mg  lunch and supper   20 mg HS      ibuprofen (ADVIL;MOTRIN) 400 MG tablet Take 400 mg by mouth every 8 hours as needed for Pain      omeprazole (PRILOSEC) 20 MG delayed release capsule Take 20 mg by mouth daily      gabapentin (NEURONTIN) 300 MG capsule Take 300 mg by mouth 3 times daily      aspirin 325 MG tablet Take 325 mg by mouth daily      oxyCODONE-acetaminophen (PERCOCET) 5-325 MG per tablet Take 1 tablet by mouth every 4 hours as needed for Pain .       acetaminophen (TYLENOL) 325 MG tablet Take 650 mg by mouth every 4 hours as needed for Pain       No current facility-administered medications on file prior to encounter. REVIEW OF SYSTEMS:     No new issues. PHYSICAL EXAM      height is 5' 11\" (1.803 m) and weight is 192 lb 9.6 oz (87.4 kg). His oral temperature is 97.7 °F (36.5 °C). His blood pressure is 118/54 (abnormal) and his pulse is 99. His respiration is 16 and oxygen saturation is 95%. Wt Readings from Last 3 Encounters:   01/24/18 192 lb 9.6 oz (87.4 kg)   10/25/17 257 lb (116.6 kg)   09/07/17 203 lb (92.1 kg)        General Appearance  Awake, alert, oriented,  not  In acute distress  HEENT - normocephalic, atraumatic, pink conjunctiva,  anicteric sclera  Neck - Supple, no mass   Abdomen - he has suprapubic catheter And colostomy bag.   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 (Active)   Wound Type Wound 10/25/2017  8:31 AM   Dressing Status Intact 1/24/2018  8:05 AM   Dressing Changed Changed/New 1/24/2018  8:05 AM   Dressing/Treatment Packing 4/26/2017  8:34 AM   Wound Cleansed Rinsed/Irrigated with saline 1/24/2018  8:05 AM   Wound Length (cm) 0.4 cm 1/24/2018  8:05 AM   Wound Width (cm) 0.2 cm 1/24/2018  8:05 AM   Wound Depth (cm)  .5 1/24/2018  8:05 AM   Calculated Wound Size (cm^2) (l*w) 0.08 cm^2 1/24/2018  8:05 AM   Change in Wound Size % (l*w) 99.8 1/24/2018  8:05 AM   Tunneling Position ___ O'Clock 3 oclock 10/26/2016 10:54 AM   Undermining Starts ___ O'Clock 9 4/26/2017  8:34 AM   Undermining Ends___ O'Clock 12 4/26/2017  8:34 AM   Undermining Maxium Distance (cm) 2.7 4/26/2017  8:34 AM   Wound Assessment Pink 10/25/2017  8:31 AM   Drainage Amount Scant 1/24/2018  8:05 AM   Drainage Description Yellow;Serous 1/24/2018  8:05 AM   Odor None 1/24/2018  8:05 AM   Margins Attached edges 1/24/2018  8:05 AM   Exposed structure Bone 10/26/2016 10:54 AM   Julianne-wound Assessment Clean;Dry;Pink

## 2018-01-24 NOTE — PLAN OF CARE
Problem: Wound:  Goal: Will show signs of wound healing; wound closure and no evidence of infection  Will show signs of wound healing; wound closure and no evidence of infection   Outcome: Ongoing  Follow up visit. Wound improving - measures smaller. Afebrile. No s/s infection noted. Supra pubic cath in place but clamped closed with end left open to air. Pt states he is beginning to self cath regularly. Advised per MD to keep system closed due to infection risk, discussed in detail. Pt voices understanding. Silver nitrate applied to coccyx wound per MD. Dry dressing applied per MD. Low air mattress ordered at last visit, states he does not want / need it at this time. Advised to call clinic before next appointment for any concerns. Comments: Care plan reviewed with patient. Patient verbalize understanding of the plan of care and contribute to goal setting.

## 2018-01-25 ENCOUNTER — HOSPITAL ENCOUNTER (OUTPATIENT)
Dept: PHYSICAL THERAPY | Age: 28
Setting detail: THERAPIES SERIES
Discharge: HOME | End: 2018-01-25
Payer: MEDICAID

## 2018-01-30 ENCOUNTER — HOSPITAL ENCOUNTER (OUTPATIENT)
Dept: PHYSICAL THERAPY | Age: 28
Setting detail: THERAPIES SERIES
Discharge: HOME | End: 2018-01-30
Payer: MEDICAID

## 2018-01-30 PROCEDURE — 97110 THERAPEUTIC EXERCISES: CPT

## 2018-01-30 ASSESSMENT — PAIN DESCRIPTION - PAIN TYPE: TYPE: CHRONIC PAIN;ACUTE PAIN

## 2018-01-30 ASSESSMENT — PAIN DESCRIPTION - LOCATION: LOCATION: BACK

## 2018-01-30 ASSESSMENT — PAIN SCALES - GENERAL: PAINLEVEL_OUTOF10: 8

## 2018-01-30 NOTE — PROGRESS NOTES
well which is affecting how he is able to function the next day.  forgot his glasses today. Reports has not been a good last 4 days.  does not feel therapy is making a huge difference but since his bed broke it is worse becuase it hurts to practice balance at home.  feels if can get his balance better than everything will improve so is agreeable to a few mor weeks of therapy to work on transfers and practicing getting up onto a curb.  gets a new back for his chair this week. Pain:  Patient Currently in Pain: Yes  Pain Assessment: 0-10  Pain Level: 8  Pain Type: Chronic pain, Acute pain  Pain Location: Back      Objective    Exercises  Exercise 2: Patient transferring back and forth from wheelchair to mat table. No use of sliding board. Patient trying new method out, that saw on You Tube, by changing the angle of his head for more momentum and to help him getting better lift so as to get his rear end further back into his chair. Exercise 6: Patient performing sit to supine by rolling onto his back and then throwing his legs out in front. Tried discussing several different ways that patient could get his legs out straight but not uhurt his low back by throwing his legs. Exercise 12: Spent part of session discussing patient's plans for the future with changing his wheelchair, how plans on getting it in and out of a car, the importance of having core strength for activities he would like to perform in the future and what he is doing as far as his catheter is concerned. Activity Tolerance:  Activity Tolerance: Patient Tolerated treatment well    Assessment: Body structures, Functions, Activity limitations: Decreased functional mobility , Decreased ROM, Decreased strength, Decreased safe awareness  Assessment: Patient very stubborn when it comes to taking advice from therapists regarding proper methods for transfers and activities.  Patient likes to watch You Tube videos and do his

## 2018-02-09 ENCOUNTER — HOSPITAL ENCOUNTER (OUTPATIENT)
Dept: PHYSICAL THERAPY | Age: 28
Setting detail: THERAPIES SERIES
Discharge: HOME | End: 2018-02-09
Payer: MEDICAID

## 2018-02-09 PROCEDURE — 97110 THERAPEUTIC EXERCISES: CPT

## 2018-02-09 PROCEDURE — 97530 THERAPEUTIC ACTIVITIES: CPT

## 2018-02-09 ASSESSMENT — PAIN SCALES - GENERAL: PAINLEVEL_OUTOF10: 5

## 2018-02-13 ENCOUNTER — HOSPITAL ENCOUNTER (OUTPATIENT)
Dept: PHYSICAL THERAPY | Age: 28
Setting detail: THERAPIES SERIES
Discharge: HOME | End: 2018-02-13
Payer: MEDICAID

## 2018-02-13 PROCEDURE — 97530 THERAPEUTIC ACTIVITIES: CPT

## 2018-02-13 PROCEDURE — 97542 WHEELCHAIR MNGMENT TRAINING: CPT

## 2018-02-13 PROCEDURE — 97110 THERAPEUTIC EXERCISES: CPT

## 2018-02-13 ASSESSMENT — PAIN DESCRIPTION - LOCATION: LOCATION: BACK

## 2018-02-13 ASSESSMENT — PAIN SCALES - GENERAL: PAINLEVEL_OUTOF10: 5

## 2018-02-15 ENCOUNTER — OFFICE VISIT (OUTPATIENT)
Dept: UROLOGY | Age: 28
End: 2018-02-15
Payer: MEDICAID

## 2018-02-15 VITALS
DIASTOLIC BLOOD PRESSURE: 64 MMHG | BODY MASS INDEX: 26.6 KG/M2 | SYSTOLIC BLOOD PRESSURE: 94 MMHG | HEIGHT: 71 IN | WEIGHT: 190 LBS

## 2018-02-15 DIAGNOSIS — G82.20 PARAPLEGIA (HCC): ICD-10-CM

## 2018-02-15 DIAGNOSIS — N31.9 NEUROGENIC BLADDER: Primary | ICD-10-CM

## 2018-02-15 DIAGNOSIS — Z87.440 HISTORY OF RECURRENT UTI (URINARY TRACT INFECTION): ICD-10-CM

## 2018-02-15 PROCEDURE — 99214 OFFICE O/P EST MOD 30 MIN: CPT | Performed by: NURSE PRACTITIONER

## 2018-02-15 ASSESSMENT — ENCOUNTER SYMPTOMS
VOMITING: 0
NAUSEA: 0
ABDOMINAL PAIN: 0

## 2018-02-16 ENCOUNTER — HOSPITAL ENCOUNTER (OUTPATIENT)
Dept: PHYSICAL THERAPY | Age: 28
Setting detail: THERAPIES SERIES
Discharge: HOME | End: 2018-02-16
Payer: MEDICAID

## 2018-02-16 PROCEDURE — 97110 THERAPEUTIC EXERCISES: CPT

## 2018-02-16 PROCEDURE — 97530 THERAPEUTIC ACTIVITIES: CPT

## 2018-02-16 NOTE — PROGRESS NOTES
[facility]     Time In: 1300  Time Out: 1400 [facility]  Minutes: 45  Timed Code Treatment Minutes: 45 Minutes     Date: 2018  Patient Name: Sincere Gramajo,  Gender:  male        CSN: [id number]   : 1990  (33 y.o.)       Referring Practitioner: Dr. Carbajal       Diagnosis: complete spinal cord injury at T1-6, complete lesion of T2-6  Treatment Diagnosis: complete SCI, difficulty with transfers, difficulty with use of wheelchair, difficulty with bed mobility   Additional Pertinent Hx: See Medical History Questionnaire. Complete T1-T6 SCI with complete paralysis. General:  PT Visit Information  PT Insurance Information: Medicaid- pays at 100%- unlimited PT/OC, pays at 100%, modalities covered except MHP/CP not covered  Total # of Visits to Date: 6  Plan of Care/Certification Expiration Date: 18  Progress Note Counter: 3/4 for PN              Subjective:  Chart Reviewed: Yes  Patient assessed for rehabilitation services?: Yes  Family / Caregiver Present: No  Comments: Cert due 95.  f/u with Dr. Ozzie Corona 18. no f/u with family MD, Dr. Alvarez Leblanc     Subjective: Patient reports he is having discomfort in his low back today. States he has been working on somethings at home. Pain:  Patient Currently in Pain: Denies     Objective         Exercises  Exercise 4: Practiced popping wheelies with wheelchair and also putting front two wheels onto 2\", 4\" and 6\" steps. PT held gait belt strap attached under chair to control patient from going backwards. Had patient practice balancing on 2 wheels. Patient not able to fully control chair and leaned too far backwards multiple times needing therapist to catch him. Exercise 8: Pushups seated in wheelchair x10 for UE strength and pressure relief.  Triceps overhead dropping behind and extending up, seated tricep extensions, and tricep push downs all with 5# hand weights

## 2018-02-20 ENCOUNTER — TELEPHONE (OUTPATIENT)
Dept: NEUROLOGY | Age: 28
End: 2018-02-20

## 2018-02-20 ENCOUNTER — HOSPITAL ENCOUNTER (OUTPATIENT)
Age: 28
Discharge: HOME | End: 2018-02-20
Payer: MEDICAID

## 2018-02-20 DIAGNOSIS — R56.9 PARTIAL SEIZURE (HCC): Primary | ICD-10-CM

## 2018-02-20 DIAGNOSIS — R56.9 PARTIAL SEIZURE (HCC): ICD-10-CM

## 2018-02-20 PROCEDURE — 36415 COLL VENOUS BLD VENIPUNCTURE: CPT

## 2018-02-20 PROCEDURE — 80175 DRUG SCREEN QUAN LAMOTRIGINE: CPT

## 2018-02-20 RX ORDER — LAMOTRIGINE 100 MG/1
200 TABLET ORAL DAILY
Qty: 60 TABLET | Refills: 3 | Status: SHIPPED | OUTPATIENT
Start: 2018-02-20 | End: 2018-02-28

## 2018-02-21 ENCOUNTER — HOSPITAL ENCOUNTER (OUTPATIENT)
Dept: PHYSICAL THERAPY | Age: 28
Setting detail: THERAPIES SERIES
Discharge: HOME | End: 2018-02-21
Payer: MEDICAID

## 2018-02-21 PROCEDURE — 97110 THERAPEUTIC EXERCISES: CPT

## 2018-02-21 ASSESSMENT — PAIN DESCRIPTION - LOCATION: LOCATION: BACK;SHOULDER

## 2018-02-21 ASSESSMENT — PAIN SCALES - GENERAL: PAINLEVEL_OUTOF10: 5

## 2018-02-21 ASSESSMENT — PAIN DESCRIPTION - ORIENTATION: ORIENTATION: LEFT;LOWER

## 2018-02-21 NOTE — PROGRESS NOTES
I certify that I have examined the patient below and determined that Physical Medicine and Rehabilitation service is necessary; that the secondary diagnosis for the provision of rehabilitation services is consistent with identified needs; that service will be furnished on an outpatient basis while the patient is in my care; that I approve the above plan of care for up to 90 days or as specifically noted above and will review it within that time frame or more often if the patients condition requires. Attestation, signature or co-signature of physician indicates approval of certification requirements.    ________________________ ____________ __________  Physician Signature   Date   Time     [facility]     Time In: 1300  Time Out: 1350  Minutes: 50  Timed Code Treatment Minutes: 50 Minutes     Date: 2018  Patient Name: Bishop Butcher,  Gender:  male        CSN: [id number]   : 1990  (33 y.o.)       Referring Practitioner: Dr. Jackson aGrces      Diagnosis: complete spinal cord injury at T1-6, complete lesion of T2-6  Treatment Diagnosis: complete SCI, difficulty with transfers, difficulty with use of wheelchair, difficulty with bed mobility   Additional Pertinent Hx: See Medical History Questionnaire. Complete T1-T6 SCI with complete paralysis. General:  PT Visit Information  PT Insurance Information: Medicaid- pays at 100%- unlimited PT/OC, pays at 100%, modalities covered except MHP/CP not covered  Total # of Visits to Date: 15  Plan of Care/Certification Expiration Date: 18  Progress Note Counter: 4/4 for PN on 18. Start 0/1 next session.                Subjective:  Chart Reviewed: Yes  Patient assessed for rehabilitation services?: Yes  Family / Caregiver Present: No     Subjective: Patient reports saw the neurologist yesterday and was told has some nerve damage from incision in left shoulder which is causing his shoulder and back pain along with his tingling in his toes. States they are looking at doing nerve blocks and a possible nerve ablation. 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 States feels like needs to keep working on making transfers stonger and working on floor transfers. States is kind of bummed out today due to all the bad news. Pain:  Patient Currently in Pain: Yes  Pain Assessment: 0-10  Pain Level: 5  Pain Location: Back, Shoulder  Pain Orientation: Left, Lower      Objective    Exercises  Exercise 13: Spent time discussing what has been achieved in therapy and what needs to continue to be worked on. Educated patient on what therapy could offer and what patient has to do for his part ot make things go more smoothly. Exercise 14: Patient transferred from w/c to mat table independently without sliding board. Placed sliding baord under rear end and hand holds on top of dycem on sliding board and patient performed lifts for tricep strength with PT behind due to patient losing balance backwards  Exercise 15: Educated patient on strengthening that can do at home for upper body and core. Activity Tolerance:  Activity Tolerance: Patient Tolerated treatment well    Assessment: Body structures, Functions, Activity limitations: Decreased functional mobility , Decreased ROM, Decreased strength, Decreased safe awareness  Assessment: Therapists have had difficulty working with patient due to patient very stubborn and does not want to accept any education or advice on how to properly perform things. Patient has made it difficult to work on and progress things due to his obstinance and refusal to follow through with instructions.  Patient continued to watch You tube videos and wanst to perform his transfers how they show him and not how is the safest and better way to save his body as he ages. Patient is very weak with his core and upper body and needs to build more strength plus be more open to instructions before can continue with any more therapy. Patient would benefit from one month of strengthening at home with re-asses by PT to see if ready to progress with activity. Prognosis: Good  Discharge Recommendations: Continue to assess pending progress    Patient Education:  Patient Education: Importance of working on core. Continued POC. Working on tricep and arm strength but making sure has good trunk support. Plan:  Plan Comment: One month re-check. Goals:  Patient goals : I do not want to lift weights, I want practice sitting balance, exercises I would like to do, I want to be able to work on sliding board transfers- I want to be able to I want to be able to transfer without slide board transfer. I want to learn floor transfers. Short term goals  Time Frame for Short term goals: 6 weeks  Short term goal 1: See LTG    Long term goals  Time Frame for Long term goals : 6 weeks  Long term goal 1: Mod I with use of wheelchair for community distances with use of wheelies, on uneven surfaces to allow patient to manuever in community without assistance - MET  Long term goal 2: CGA for floor transfer to allow patient to safely get on/off floor in case of fall to improve safety - NOT MET Patient needing max assist x2.   Long term goal 3: sit pivot transfer mod I without use of slide board to allow patient to transfer without equipment in variety of enviroments for community re-integration - MET  Long term goal 4: increase strength L shoulder to 4/5 to allow patient to improved independence and compliance with pressure relief and transfers - NOT MET  Long term goal 5: I with HEP as prescribed to allow patient to propel wheelchair community distance, up and down ramp in/out of therapy clinic without assistance - NOT MET  Long term goal 6: increase AROM B hamstring

## 2018-02-22 LAB — LAMOTRIGINE LEVEL: 4.7 UG/ML (ref 2.5–15)

## 2018-02-28 ENCOUNTER — OFFICE VISIT (OUTPATIENT)
Dept: NEUROLOGY | Age: 28
End: 2018-02-28
Payer: MEDICAID

## 2018-02-28 VITALS
WEIGHT: 175 LBS | BODY MASS INDEX: 25.05 KG/M2 | HEART RATE: 80 BPM | HEIGHT: 70 IN | DIASTOLIC BLOOD PRESSURE: 62 MMHG | SYSTOLIC BLOOD PRESSURE: 122 MMHG

## 2018-02-28 DIAGNOSIS — R56.9 PARTIAL SEIZURE (HCC): Primary | ICD-10-CM

## 2018-02-28 PROCEDURE — 99213 OFFICE O/P EST LOW 20 MIN: CPT | Performed by: PSYCHIATRY & NEUROLOGY

## 2018-02-28 RX ORDER — LAMOTRIGINE 150 MG/1
150 TABLET ORAL 2 TIMES DAILY
Qty: 60 TABLET | Refills: 3 | Status: SHIPPED | OUTPATIENT
Start: 2018-02-28 | End: 2018-03-08 | Stop reason: SDUPTHER

## 2018-02-28 NOTE — PROGRESS NOTES
HUAN notified patient of his scheduled PT apt on 3/21/18 @1300  @ [email or web address] Patient informed to notify [facility] for transportation arrangements.   Aurelio Gorman RN 2/28/2018

## 2018-02-28 NOTE — PROGRESS NOTES
NEUROLOGY OUT PATIENT FOLLOW UP NOTE:  2/28/20189:25 AM    Melanie Schofield is here for follow up for  Partial seizure. He is doing well. He denies any seizures. His mood is better per his family. He is less irritable. No reported seizure. He is on Lamictal 150 mg daily tolerated well. He is doing better since being started on Zoloft 50 mg daily. No side effects. He had MRI brain. He had testing performed and is here to go over the results. He comes in with family. He feels there is room for improvement. He did not see neuropsychology. ROS:  Respiratory : no cough, no hemoptysis. Cardiac: no chest pain. No palpitations. Renal : no flank pain, no hematuria    Skin: no rash  Reviewed labs since last evaluation and discussed with patient. Allergies   Allergen Reactions    Bee Venom Swelling    Doxycycline Calcium Rash       Current Outpatient Prescriptions:     lamoTRIgine (LAMICTAL) 100 MG tablet, Take 2 tablets by mouth daily, Disp: 60 tablet, Rfl: 3    sertraline (ZOLOFT) 50 MG tablet, Take 50 mg by mouth daily, Disp: , Rfl: 0    aspirin 325 MG tablet, Take 325 mg by mouth daily, Disp: , Rfl:     baclofen (LIORESAL) 10 MG tablet, Take 20 mg by mouth every morning (before breakfast) 20 mg AM 15 mg  lunch and supper  20 mg HS, Disp: , Rfl:     omeprazole (PRILOSEC) 20 MG delayed release capsule, Take 20 mg by mouth daily, Disp: , Rfl:     gabapentin (NEURONTIN) 300 MG capsule, Take 300 mg by mouth 3 times daily, Disp: , Rfl:       PE:   Vitals:    02/28/18 0851   BP: 122/62   Site: Left Arm   Position: Sitting   Cuff Size: Medium Adult   Pulse: 80   Weight: 175 lb (79.4 kg)   Height: 5' 10\" (1.778 m)     General Appearance:  alert and cooperative  Gen: AO3, NAD, Language is Intact. Head: PERRL, EOMI, no icterus  Neck: There is no carotid bruits. The Neck is supple.   Neuro: Cranial nerve XII: Tongue midline   neck supple without rigidity  DTR's are +2 in the upper extremities, he has absent reflexes in

## 2018-03-08 ENCOUNTER — TELEPHONE (OUTPATIENT)
Dept: NEUROLOGY | Age: 28
End: 2018-03-08

## 2018-03-08 RX ORDER — LAMOTRIGINE 100 MG/1
100 TABLET ORAL 2 TIMES DAILY
Qty: 60 TABLET | Refills: 3 | Status: SHIPPED | OUTPATIENT
Start: 2018-03-08 | End: 2018-08-03 | Stop reason: SDUPTHER

## 2018-03-23 ENCOUNTER — TELEPHONE (OUTPATIENT)
Dept: UROLOGY | Age: 28
End: 2018-03-23

## 2018-03-23 ENCOUNTER — OFFICE VISIT (OUTPATIENT)
Dept: UROLOGY | Age: 28
End: 2018-03-23
Payer: MEDICAID

## 2018-03-23 VITALS
WEIGHT: 200 LBS | HEIGHT: 71 IN | DIASTOLIC BLOOD PRESSURE: 70 MMHG | BODY MASS INDEX: 28 KG/M2 | SYSTOLIC BLOOD PRESSURE: 114 MMHG

## 2018-03-23 DIAGNOSIS — N31.9 NEUROGENIC BLADDER: Primary | ICD-10-CM

## 2018-03-23 DIAGNOSIS — N32.89 BLADDER SPASM: ICD-10-CM

## 2018-03-23 PROCEDURE — 99213 OFFICE O/P EST LOW 20 MIN: CPT | Performed by: NURSE PRACTITIONER

## 2018-03-23 ASSESSMENT — ENCOUNTER SYMPTOMS
VOMITING: 0
ABDOMINAL PAIN: 0
NAUSEA: 0

## 2018-03-23 NOTE — TELEPHONE ENCOUNTER
SURGERY [address]  [address] [place] [address] [address] [facility] [address] [place], [address]      Phone *[phone] *[phone]   Surgical Scheduling Direct Line Phone *[phone] Fax *[phone]      Chris Baltazar 1990 male    [address]  [place]   Marital Status: Single         Home Phone: [phone]      Cell Phone:    Telephone Information:   Mobile [phone]          Surgeon: Dr. Esperanza Moreno  Surgery Date: 04/24/2018  Time: 12:30pm    Procedure: Cystoscopy with injection Botox 200 units    Diagnosis: Neurogenic bladder; paraplagia     Important Medical History: In Epic    Special Inst/Equip:     CPT Codes:    93453  Latex Allergy:  No     Cardiac Device:  No     Anesthesia:  No sedation necessary          Admission Type:  Same Day                             Admit Prior to Day of Surgery: No    Case Location:  Main OR           Preadmission Testing:Phone Call              PAT Date and Time:______________________________________________________    PAT Confirmation #: ______________________________________________________    Post Op Visit: ___________________________________________________________    Need Preop Cardiac Clearance: No    Does Patient have Cardiologist/physician?      No    Surgery Confirmation #: __________________________________________________    : ________________________   Date: __________________________     [employer] Name: Medicaid

## 2018-03-28 NOTE — PROGRESS NOTES
[address] [facility]    Patient Name: Shae Emmanuel        CSN: [id number]   YOB: 1990  Gender: male  Referring Practitioner: Dr. Jocelin Del Rosario  Diagnosis: complete spinal cord injury at T1-6, complete lesion of T2-6    Patient is discharged from Physical Therapy services at this time. See last note for details related to results of therapy and goal achievement. Reason for discharge:  Patient placed on hold by therapist to work on strengthening program and then was to have a one month follow up. Patient cancelled follow up due to have a block done for his back and wanted to see how did with it.  spoke with patient today and he reported waiting on MRI to be done and so will no be able to come back to therapy for awhile.  informed patient that would need to discharge him at this time and then he can get a new script if would like to return to therapy in the future. See last PN for goal update. Patient discharged as of 3-28-18[place] [id number]: 3/28/2018

## 2018-03-30 ENCOUNTER — HOSPITAL ENCOUNTER (OUTPATIENT)
Dept: MRI IMAGING | Age: 28
Discharge: HOME | End: 2018-03-30
Payer: MEDICAID

## 2018-03-30 DIAGNOSIS — S24.112D: ICD-10-CM

## 2018-03-30 PROCEDURE — A9579 GAD-BASE MR CONTRAST NOS,1ML: HCPCS | Performed by: PHYSICAL MEDICINE & REHABILITATION

## 2018-03-30 PROCEDURE — 72157 MRI CHEST SPINE W/O & W/DYE: CPT

## 2018-03-30 PROCEDURE — 6360000004 HC RX CONTRAST MEDICATION: Performed by: PHYSICAL MEDICINE & REHABILITATION

## 2018-03-30 RX ADMIN — GADOTERIDOL 15 ML: 279.3 INJECTION, SOLUTION INTRAVENOUS at 16:52

## 2018-04-04 ENCOUNTER — HOSPITAL ENCOUNTER (OUTPATIENT)
Age: 28
Discharge: HOME | End: 2018-04-04
Payer: MEDICAID

## 2018-04-04 ENCOUNTER — TELEPHONE (OUTPATIENT)
Dept: UROLOGY | Age: 28
End: 2018-04-04

## 2018-04-04 LAB
ALBUMIN SERPL-MCNC: 4.4 G/DL (ref 3.5–5.1)
ALP BLD-CCNC: 164 U/L (ref 38–126)
ALT SERPL-CCNC: 14 U/L (ref 11–66)
ANION GAP SERPL CALCULATED.3IONS-SCNC: 14 MEQ/L (ref 8–16)
ANISOCYTOSIS: ABNORMAL
AST SERPL-CCNC: 14 U/L (ref 5–40)
BACTERIA: ABNORMAL /HPF
BASOPHILS # BLD: 0.8 %
BASOPHILS ABSOLUTE: 0.1 THOU/MM3 (ref 0–0.1)
BILIRUB SERPL-MCNC: 0.3 MG/DL (ref 0.3–1.2)
BILIRUBIN URINE: NEGATIVE
BLOOD, URINE: ABNORMAL
BUN BLDV-MCNC: 9 MG/DL (ref 7–22)
CALCIUM SERPL-MCNC: 9.9 MG/DL (ref 8.5–10.5)
CASTS UA: ABNORMAL /LPF
CHARACTER, URINE: ABNORMAL
CHLORIDE BLD-SCNC: 98 MEQ/L (ref 98–111)
CLOSTRIDIUM DIFFICILE, PCR: NORMAL
CO2: 28 MEQ/L (ref 23–33)
COLOR: YELLOW
CREAT SERPL-MCNC: 0.9 MG/DL (ref 0.4–1.2)
CRYSTALS, UA: ABNORMAL
EOSINOPHIL # BLD: 0.5 %
EOSINOPHILS ABSOLUTE: 0.1 THOU/MM3 (ref 0–0.4)
EPITHELIAL CELLS, UA: ABNORMAL /HPF
GFR SERPL CREATININE-BSD FRML MDRD: > 90 ML/MIN/1.73M2
GLUCOSE BLD-MCNC: 92 MG/DL (ref 70–108)
GLUCOSE URINE: NEGATIVE MG/DL
HCT VFR BLD CALC: 47.8 % (ref 42–52)
HEMOGLOBIN: 16.3 GM/DL (ref 14–18)
KETONES, URINE: NEGATIVE
LEUKOCYTE ESTERASE, URINE: ABNORMAL
LYMPHOCYTES # BLD: 17.8 %
LYMPHOCYTES ABSOLUTE: 1.9 THOU/MM3 (ref 1–4.8)
MCH RBC QN AUTO: 29.8 PG (ref 27–31)
MCHC RBC AUTO-ENTMCNC: 34 GM/DL (ref 33–37)
MCV RBC AUTO: 87.6 FL (ref 80–94)
MONOCYTES # BLD: 6.9 %
MONOCYTES ABSOLUTE: 0.8 THOU/MM3 (ref 0.4–1.3)
NITRITE, URINE: NEGATIVE
NUCLEATED RED BLOOD CELLS: 0 /100 WBC
PDW BLD-RTO: 14.6 % (ref 11.5–14.5)
PH UA: 6
PLATELET # BLD: 330 THOU/MM3 (ref 130–400)
PMV BLD AUTO: 9.8 FL (ref 7.4–10.4)
POTASSIUM SERPL-SCNC: 4.4 MEQ/L (ref 3.5–5.2)
PROTEIN UA: ABNORMAL
RBC # BLD: 5.46 MILL/MM3 (ref 4.7–6.1)
RBC URINE: ABNORMAL /HPF
SEG NEUTROPHILS: 74 %
SEGMENTED NEUTROPHILS ABSOLUTE COUNT: 8.1 THOU/MM3 (ref 1.8–7.7)
SODIUM BLD-SCNC: 140 MEQ/L (ref 135–145)
SPECIFIC GRAVITY, URINE: 1.02 (ref 1–1.03)
TOTAL PROTEIN: 8.3 G/DL (ref 6.1–8)
TSH SERPL DL<=0.05 MIU/L-ACNC: 0.79 UIU/ML (ref 0.4–4.2)
UROBILINOGEN, URINE: 0.2 EU/DL
WBC # BLD: 10.9 THOU/MM3 (ref 4.8–10.8)
WBC UA: ABNORMAL /HPF

## 2018-04-04 PROCEDURE — 87081 CULTURE SCREEN ONLY: CPT

## 2018-04-04 PROCEDURE — 87077 CULTURE AEROBIC IDENTIFY: CPT

## 2018-04-04 PROCEDURE — 85025 COMPLETE CBC W/AUTO DIFF WBC: CPT

## 2018-04-04 PROCEDURE — 84443 ASSAY THYROID STIM HORMONE: CPT

## 2018-04-04 PROCEDURE — 87086 URINE CULTURE/COLONY COUNT: CPT

## 2018-04-04 PROCEDURE — 81001 URINALYSIS AUTO W/SCOPE: CPT

## 2018-04-04 PROCEDURE — 36415 COLL VENOUS BLD VENIPUNCTURE: CPT

## 2018-04-04 PROCEDURE — 87070 CULTURE OTHR SPECIMN AEROBIC: CPT

## 2018-04-04 PROCEDURE — 87338 HPYLORI STOOL AG IA: CPT

## 2018-04-04 PROCEDURE — 87147 CULTURE TYPE IMMUNOLOGIC: CPT

## 2018-04-04 PROCEDURE — 80053 COMPREHEN METABOLIC PANEL: CPT

## 2018-04-04 PROCEDURE — 87493 C DIFF AMPLIFIED PROBE: CPT

## 2018-04-04 PROCEDURE — 87045 FECES CULTURE AEROBIC BACT: CPT

## 2018-04-04 PROCEDURE — 87186 SC STD MICRODIL/AGAR DIL: CPT

## 2018-04-04 PROCEDURE — 87427 SHIGA-LIKE TOXIN AG IA: CPT

## 2018-04-04 PROCEDURE — 87205 SMEAR GRAM STAIN: CPT

## 2018-04-04 PROCEDURE — 87184 SC STD DISK METHOD PER PLATE: CPT

## 2018-04-06 LAB
AEROBIC CULTURE: NORMAL
CULTURE, STOOL: NORMAL
GRAM STAIN RESULT: NORMAL
HELICOBACTER PYLORI ANTIGEN, STOOL: NORMAL

## 2018-04-07 LAB
MRSA SCREEN: NORMAL
ORGANISM: ABNORMAL
URINE CULTURE REFLEX: ABNORMAL

## 2018-04-19 ENCOUNTER — HOSPITAL ENCOUNTER (OUTPATIENT)
Dept: GENERAL RADIOLOGY | Age: 28
Discharge: HOME | End: 2018-04-19
Payer: MEDICAID

## 2018-04-19 VITALS
SYSTOLIC BLOOD PRESSURE: 133 MMHG | RESPIRATION RATE: 16 BRPM | DIASTOLIC BLOOD PRESSURE: 70 MMHG | OXYGEN SATURATION: 99 % | HEART RATE: 109 BPM

## 2018-04-19 RX ORDER — TRAZODONE HYDROCHLORIDE 50 MG/1
50 TABLET ORAL NIGHTLY
COMMUNITY
End: 2018-11-09

## 2018-04-19 RX ORDER — ONDANSETRON 4 MG/1
4 TABLET, ORALLY DISINTEGRATING ORAL EVERY 8 HOURS PRN
COMMUNITY
End: 2018-11-09

## 2018-04-19 RX ORDER — M-VIT,TX,IRON,MINS/CALC/FOLIC 27MG-0.4MG
1 TABLET ORAL DAILY
Status: ON HOLD | COMMUNITY
End: 2019-11-29 | Stop reason: ALTCHOICE

## 2018-04-20 ENCOUNTER — HOSPITAL ENCOUNTER (OUTPATIENT)
Age: 28
Discharge: HOME | End: 2018-04-20
Payer: MEDICAID

## 2018-04-20 LAB
BACTERIA: ABNORMAL /HPF
BILIRUBIN URINE: NEGATIVE
BLOOD, URINE: ABNORMAL
CASTS 2: ABNORMAL /LPF
CASTS UA: ABNORMAL /LPF
CHARACTER, URINE: ABNORMAL
COLOR: YELLOW
CRYSTALS, UA: ABNORMAL
EPITHELIAL CELLS, UA: ABNORMAL /HPF
GLUCOSE URINE: NEGATIVE MG/DL
KETONES, URINE: NEGATIVE
LEUKOCYTE ESTERASE, URINE: ABNORMAL
MISCELLANEOUS 2: ABNORMAL
NITRITE, URINE: NEGATIVE
PH UA: 6
PROTEIN UA: 30
RBC URINE: ABNORMAL /HPF
RENAL EPITHELIAL, UA: ABNORMAL
SPECIFIC GRAVITY, URINE: 1.02 (ref 1–1.03)
UROBILINOGEN, URINE: 0.2 EU/DL
WBC UA: ABNORMAL /HPF
YEAST: ABNORMAL

## 2018-04-20 PROCEDURE — 87147 CULTURE TYPE IMMUNOLOGIC: CPT

## 2018-04-20 PROCEDURE — 87086 URINE CULTURE/COLONY COUNT: CPT

## 2018-04-20 PROCEDURE — 87186 SC STD MICRODIL/AGAR DIL: CPT

## 2018-04-20 PROCEDURE — 87077 CULTURE AEROBIC IDENTIFY: CPT

## 2018-04-20 PROCEDURE — 81001 URINALYSIS AUTO W/SCOPE: CPT

## 2018-04-22 LAB
ORGANISM: ABNORMAL
URINE CULTURE REFLEX: ABNORMAL

## 2018-04-23 ENCOUNTER — TELEPHONE (OUTPATIENT)
Dept: UROLOGY | Age: 28
End: 2018-04-23

## 2018-04-24 ENCOUNTER — HOSPITAL ENCOUNTER (OUTPATIENT)
Age: 28
Setting detail: OUTPATIENT SURGERY
Discharge: HOME | End: 2018-04-24
Attending: UROLOGY | Admitting: UROLOGY
Payer: MEDICAID

## 2018-04-24 VITALS
TEMPERATURE: 98.5 F | DIASTOLIC BLOOD PRESSURE: 87 MMHG | OXYGEN SATURATION: 98 % | SYSTOLIC BLOOD PRESSURE: 135 MMHG | HEART RATE: 80 BPM | RESPIRATION RATE: 16 BRPM

## 2018-04-24 PROCEDURE — 6360000002 HC RX W HCPCS

## 2018-04-24 PROCEDURE — 52287 CYSTOSCOPY CHEMODENERVATION: CPT | Performed by: UROLOGY

## 2018-04-24 PROCEDURE — 2580000003 HC RX 258: Performed by: UROLOGY

## 2018-04-24 PROCEDURE — 3600000003 HC SURGERY LEVEL 3 BASE: Performed by: UROLOGY

## 2018-04-24 PROCEDURE — 7100000011 HC PHASE II RECOVERY - ADDTL 15 MIN: Performed by: UROLOGY

## 2018-04-24 PROCEDURE — 7100000010 HC PHASE II RECOVERY - FIRST 15 MIN: Performed by: UROLOGY

## 2018-04-24 PROCEDURE — 3600000013 HC SURGERY LEVEL 3 ADDTL 15MIN: Performed by: UROLOGY

## 2018-04-24 RX ORDER — SODIUM CHLORIDE 9 MG/ML
INJECTION, SOLUTION INTRAVENOUS CONTINUOUS
Status: DISCONTINUED | OUTPATIENT
Start: 2018-04-24 | End: 2018-04-24 | Stop reason: HOSPADM

## 2018-04-24 RX ORDER — CIPROFLOXACIN 2 MG/ML
400 INJECTION, SOLUTION INTRAVENOUS
Status: DISCONTINUED | OUTPATIENT
Start: 2018-04-24 | End: 2018-04-24 | Stop reason: HOSPADM

## 2018-04-24 RX ORDER — CIPROFLOXACIN 500 MG/1
500 TABLET, FILM COATED ORAL 2 TIMES DAILY
Qty: 6 TABLET | Refills: 0 | Status: SHIPPED | OUTPATIENT
Start: 2018-04-24 | End: 2018-04-27

## 2018-04-24 RX ADMIN — SODIUM CHLORIDE: 9 INJECTION, SOLUTION INTRAVENOUS at 11:50

## 2018-04-24 ASSESSMENT — PAIN SCALES - GENERAL: PAINLEVEL_OUTOF10: 0

## 2018-04-25 ENCOUNTER — HOSPITAL ENCOUNTER (OUTPATIENT)
Dept: WOUND CARE | Age: 28
Discharge: HOME | End: 2018-04-25
Payer: MEDICAID

## 2018-04-25 VITALS
TEMPERATURE: 98 F | WEIGHT: 182.2 LBS | SYSTOLIC BLOOD PRESSURE: 129 MMHG | HEIGHT: 71 IN | BODY MASS INDEX: 25.51 KG/M2 | OXYGEN SATURATION: 96 % | RESPIRATION RATE: 18 BRPM | HEART RATE: 102 BPM | DIASTOLIC BLOOD PRESSURE: 55 MMHG

## 2018-04-25 PROCEDURE — 17250 CHEM CAUT OF GRANLTJ TISSUE: CPT

## 2018-04-25 PROCEDURE — 6370000000 HC RX 637 (ALT 250 FOR IP): Performed by: INTERNAL MEDICINE

## 2018-04-25 RX ADMIN — SILVER NITRATE APPLICATORS 1 EACH: 25; 75 STICK TOPICAL at 08:46

## 2018-04-25 ASSESSMENT — PAIN DESCRIPTION - FREQUENCY: FREQUENCY: CONTINUOUS

## 2018-04-25 ASSESSMENT — PAIN DESCRIPTION - PROGRESSION: CLINICAL_PROGRESSION: NOT CHANGED

## 2018-04-25 ASSESSMENT — PAIN DESCRIPTION - PAIN TYPE: TYPE: CHRONIC PAIN

## 2018-04-25 ASSESSMENT — PAIN DESCRIPTION - ONSET: ONSET: ON-GOING

## 2018-04-25 ASSESSMENT — PAIN SCALES - GENERAL: PAINLEVEL_OUTOF10: 7

## 2018-04-25 ASSESSMENT — PAIN DESCRIPTION - ORIENTATION: ORIENTATION: LOWER

## 2018-04-25 ASSESSMENT — PAIN DESCRIPTION - DESCRIPTORS: DESCRIPTORS: CONSTANT

## 2018-04-25 ASSESSMENT — PAIN DESCRIPTION - LOCATION: LOCATION: BACK

## 2018-04-26 RX ORDER — SODIUM CHLORIDE 450 MG/100ML
INJECTION, SOLUTION INTRAVENOUS CONTINUOUS
Status: CANCELLED | OUTPATIENT
Start: 2018-04-26

## 2018-04-27 ENCOUNTER — HOSPITAL ENCOUNTER (OUTPATIENT)
Dept: CT IMAGING | Age: 28
Discharge: HOME | End: 2018-04-27
Payer: MEDICAID

## 2018-04-27 ENCOUNTER — TELEPHONE (OUTPATIENT)
Dept: UROLOGY | Age: 28
End: 2018-04-27

## 2018-04-27 ENCOUNTER — HOSPITAL ENCOUNTER (OUTPATIENT)
Dept: GENERAL RADIOLOGY | Age: 28
Discharge: HOME | End: 2018-04-27
Payer: MEDICAID

## 2018-04-27 VITALS
DIASTOLIC BLOOD PRESSURE: 72 MMHG | TEMPERATURE: 98.2 F | BODY MASS INDEX: 25.48 KG/M2 | HEIGHT: 71 IN | SYSTOLIC BLOOD PRESSURE: 106 MMHG | WEIGHT: 182 LBS | OXYGEN SATURATION: 99 % | RESPIRATION RATE: 16 BRPM | HEART RATE: 82 BPM

## 2018-04-27 PROCEDURE — 62303 MYELOGRAPHY LUMBAR INJECTION: CPT

## 2018-04-27 PROCEDURE — 2580000003 HC RX 258: Performed by: RADIOLOGY

## 2018-04-27 PROCEDURE — 6360000004 HC RX CONTRAST MEDICATION: Performed by: PHYSICAL MEDICINE & REHABILITATION

## 2018-04-27 PROCEDURE — 72129 CT CHEST SPINE W/DYE: CPT

## 2018-04-27 RX ORDER — SODIUM CHLORIDE 450 MG/100ML
INJECTION, SOLUTION INTRAVENOUS CONTINUOUS
Status: DISCONTINUED | OUTPATIENT
Start: 2018-04-27 | End: 2018-04-28 | Stop reason: HOSPADM

## 2018-04-27 RX ORDER — OXYBUTYNIN CHLORIDE 5 MG/1
5 TABLET ORAL 3 TIMES DAILY PRN
Qty: 30 TABLET | Refills: 2 | Status: SHIPPED | OUTPATIENT
Start: 2018-04-27 | End: 2018-11-09

## 2018-04-27 RX ADMIN — SODIUM CHLORIDE: 4.5 INJECTION, SOLUTION INTRAVENOUS at 09:16

## 2018-04-27 RX ADMIN — IOHEXOL 10 ML: 240 INJECTION, SOLUTION INTRATHECAL; INTRAVASCULAR; INTRAVENOUS; ORAL at 10:09

## 2018-04-27 ASSESSMENT — PAIN SCALES - GENERAL: PAINLEVEL_OUTOF10: 4

## 2018-04-27 ASSESSMENT — PAIN DESCRIPTION - DESCRIPTORS: DESCRIPTORS: CONSTANT

## 2018-04-27 ASSESSMENT — PAIN - FUNCTIONAL ASSESSMENT: PAIN_FUNCTIONAL_ASSESSMENT: 0-10

## 2018-04-27 ASSESSMENT — PAIN DESCRIPTION - PAIN TYPE: TYPE: CHRONIC PAIN

## 2018-05-04 ENCOUNTER — OFFICE VISIT (OUTPATIENT)
Dept: UROLOGY | Age: 28
End: 2018-05-04
Payer: MEDICAID

## 2018-05-04 VITALS
BODY MASS INDEX: 25.48 KG/M2 | DIASTOLIC BLOOD PRESSURE: 64 MMHG | WEIGHT: 182 LBS | SYSTOLIC BLOOD PRESSURE: 102 MMHG | HEIGHT: 71 IN

## 2018-05-04 DIAGNOSIS — N32.89 BLADDER SPASM: ICD-10-CM

## 2018-05-04 DIAGNOSIS — N31.9 NEUROGENIC BLADDER: Primary | ICD-10-CM

## 2018-05-04 PROCEDURE — 99213 OFFICE O/P EST LOW 20 MIN: CPT | Performed by: NURSE PRACTITIONER

## 2018-05-04 ASSESSMENT — ENCOUNTER SYMPTOMS
ABDOMINAL DISTENTION: 0
ABDOMINAL PAIN: 0
NAUSEA: 0
VOMITING: 0

## 2018-05-14 ENCOUNTER — HOSPITAL ENCOUNTER (OUTPATIENT)
Dept: INTERVENTIONAL RADIOLOGY/VASCULAR | Age: 28
Discharge: HOME | End: 2018-05-14
Payer: MEDICAID

## 2018-05-14 ENCOUNTER — HOSPITAL ENCOUNTER (OUTPATIENT)
Dept: CT IMAGING | Age: 28
Discharge: HOME | End: 2018-05-14
Payer: MEDICAID

## 2018-05-14 VITALS
SYSTOLIC BLOOD PRESSURE: 117 MMHG | DIASTOLIC BLOOD PRESSURE: 62 MMHG | OXYGEN SATURATION: 98 % | HEART RATE: 73 BPM | WEIGHT: 182 LBS | TEMPERATURE: 98.3 F | BODY MASS INDEX: 25.48 KG/M2 | HEIGHT: 71 IN | RESPIRATION RATE: 16 BRPM

## 2018-05-14 DIAGNOSIS — S24.112D: ICD-10-CM

## 2018-05-14 PROCEDURE — 2580000003 HC RX 258: Performed by: RADIOLOGY

## 2018-05-14 PROCEDURE — 6360000004 HC RX CONTRAST MEDICATION: Performed by: RADIOLOGY

## 2018-05-14 PROCEDURE — 72129 CT CHEST SPINE W/DYE: CPT

## 2018-05-14 PROCEDURE — 2500000003 HC RX 250 WO HCPCS

## 2018-05-14 PROCEDURE — 61055 INJECTION INTO BRAIN CANAL: CPT | Performed by: RADIOLOGY

## 2018-05-14 PROCEDURE — 6370000000 HC RX 637 (ALT 250 FOR IP): Performed by: RADIOLOGY

## 2018-05-14 PROCEDURE — 72126 CT NECK SPINE W/DYE: CPT

## 2018-05-14 RX ORDER — SODIUM CHLORIDE 450 MG/100ML
INJECTION, SOLUTION INTRAVENOUS CONTINUOUS
Status: DISCONTINUED | OUTPATIENT
Start: 2018-05-14 | End: 2018-05-15 | Stop reason: HOSPADM

## 2018-05-14 RX ORDER — HYDROCODONE BITARTRATE AND ACETAMINOPHEN 5; 325 MG/1; MG/1
1 TABLET ORAL EVERY 6 HOURS PRN
Status: COMPLETED | OUTPATIENT
Start: 2018-05-14 | End: 2018-05-14

## 2018-05-14 RX ADMIN — SODIUM CHLORIDE: 4.5 INJECTION, SOLUTION INTRAVENOUS at 09:49

## 2018-05-14 RX ADMIN — HYDROCODONE BITARTRATE AND ACETAMINOPHEN 1 TABLET: 5; 325 TABLET ORAL at 13:29

## 2018-05-14 RX ADMIN — IOHEXOL 15 ML: 240 INJECTION, SOLUTION INTRATHECAL; INTRAVASCULAR; INTRAVENOUS; ORAL at 11:12

## 2018-05-14 ASSESSMENT — PAIN DESCRIPTION - LOCATION
LOCATION: BACK

## 2018-05-14 ASSESSMENT — PAIN DESCRIPTION - ORIENTATION: ORIENTATION: LOWER

## 2018-05-14 ASSESSMENT — PAIN SCALES - GENERAL
PAINLEVEL_OUTOF10: 8
PAINLEVEL_OUTOF10: 8
PAINLEVEL_OUTOF10: 9
PAINLEVEL_OUTOF10: 9

## 2018-05-14 ASSESSMENT — PAIN DESCRIPTION - PAIN TYPE
TYPE: CHRONIC PAIN

## 2018-05-18 ENCOUNTER — APPOINTMENT (OUTPATIENT)
Dept: CT IMAGING | Age: 28
End: 2018-05-18
Payer: MEDICAID

## 2018-05-18 ENCOUNTER — APPOINTMENT (OUTPATIENT)
Dept: GENERAL RADIOLOGY | Age: 28
End: 2018-05-18
Payer: MEDICAID

## 2018-05-18 ENCOUNTER — HOSPITAL ENCOUNTER (EMERGENCY)
Age: 28
Discharge: HOME | End: 2018-05-18
Payer: MEDICAID

## 2018-05-18 VITALS
DIASTOLIC BLOOD PRESSURE: 78 MMHG | SYSTOLIC BLOOD PRESSURE: 118 MMHG | HEART RATE: 80 BPM | TEMPERATURE: 97.9 F | RESPIRATION RATE: 17 BRPM | OXYGEN SATURATION: 99 %

## 2018-05-18 DIAGNOSIS — G82.20 PARAPLEGIA FOLLOWING SPINAL CORD INJURY (HCC): ICD-10-CM

## 2018-05-18 DIAGNOSIS — M54.50 ACUTE EXACERBATION OF CHRONIC LOW BACK PAIN: Primary | ICD-10-CM

## 2018-05-18 DIAGNOSIS — R10.84 GENERALIZED ABDOMINAL PAIN: ICD-10-CM

## 2018-05-18 DIAGNOSIS — G89.29 ACUTE EXACERBATION OF CHRONIC LOW BACK PAIN: Primary | ICD-10-CM

## 2018-05-18 DIAGNOSIS — G44.209 TENSION HEADACHE: ICD-10-CM

## 2018-05-18 LAB
ALBUMIN SERPL-MCNC: 4.5 G/DL (ref 3.5–5.1)
ALP BLD-CCNC: 135 U/L (ref 38–126)
ALT SERPL-CCNC: 11 U/L (ref 11–66)
AMPHETAMINE+METHAMPHETAMINE URINE SCREEN: NEGATIVE
ANION GAP SERPL CALCULATED.3IONS-SCNC: 13 MEQ/L (ref 8–16)
AST SERPL-CCNC: 12 U/L (ref 5–40)
BACTERIA: ABNORMAL /HPF
BARBITURATE QUANTITATIVE URINE: NEGATIVE
BASOPHILS # BLD: 0.7 %
BASOPHILS ABSOLUTE: 0.1 THOU/MM3 (ref 0–0.1)
BENZODIAZEPINE QUANTITATIVE URINE: NEGATIVE
BILIRUB SERPL-MCNC: 1 MG/DL (ref 0.3–1.2)
BILIRUBIN URINE: NEGATIVE
BLOOD, URINE: ABNORMAL
BUN BLDV-MCNC: 12 MG/DL (ref 7–22)
C-REACTIVE PROTEIN: 1.31 MG/DL (ref 0–1)
CALCIUM SERPL-MCNC: 9.9 MG/DL (ref 8.5–10.5)
CANNABINOID QUANTITATIVE URINE: POSITIVE
CASTS 2: ABNORMAL /LPF
CASTS UA: ABNORMAL /LPF
CHARACTER, URINE: CLEAR
CHLORIDE BLD-SCNC: 98 MEQ/L (ref 98–111)
CO2: 28 MEQ/L (ref 23–33)
COCAINE METABOLITE QUANTITATIVE URINE: NEGATIVE
COLOR: ABNORMAL
CREAT SERPL-MCNC: 0.9 MG/DL (ref 0.4–1.2)
CRYSTALS, UA: ABNORMAL
EOSINOPHIL # BLD: 0.9 %
EOSINOPHILS ABSOLUTE: 0.1 THOU/MM3 (ref 0–0.4)
EPITHELIAL CELLS, UA: ABNORMAL /HPF
GFR SERPL CREATININE-BSD FRML MDRD: > 90 ML/MIN/1.73M2
GLUCOSE BLD-MCNC: 88 MG/DL (ref 70–108)
GLUCOSE URINE: NEGATIVE MG/DL
HCT VFR BLD CALC: 47.9 % (ref 42–52)
HEMOGLOBIN: 16.1 GM/DL (ref 14–18)
KETONES, URINE: ABNORMAL
LACTIC ACID: 1.1 MMOL/L (ref 0.5–2.2)
LEUKOCYTE ESTERASE, URINE: ABNORMAL
LYMPHOCYTES # BLD: 20.9 %
LYMPHOCYTES ABSOLUTE: 1.7 THOU/MM3 (ref 1–4.8)
MCH RBC QN AUTO: 29.4 PG (ref 27–31)
MCHC RBC AUTO-ENTMCNC: 33.6 GM/DL (ref 33–37)
MCV RBC AUTO: 87.5 FL (ref 80–94)
MISCELLANEOUS 2: ABNORMAL
MONOCYTES # BLD: 10.5 %
MONOCYTES ABSOLUTE: 0.8 THOU/MM3 (ref 0.4–1.3)
NITRITE, URINE: POSITIVE
NUCLEATED RED BLOOD CELLS: 0 /100 WBC
OPIATES, URINE: NEGATIVE
OSMOLALITY CALCULATION: 276.7 MOSMOL/KG (ref 275–300)
OXYCODONE: NEGATIVE
PDW BLD-RTO: 14.4 % (ref 11.5–14.5)
PH UA: 6.5
PHENCYCLIDINE QUANTITATIVE URINE: NEGATIVE
PLATELET # BLD: 253 THOU/MM3 (ref 130–400)
PMV BLD AUTO: 9.6 FL (ref 7.4–10.4)
POTASSIUM SERPL-SCNC: 4.2 MEQ/L (ref 3.5–5.2)
PROTEIN UA: NEGATIVE
RBC # BLD: 5.48 MILL/MM3 (ref 4.7–6.1)
RBC URINE: ABNORMAL /HPF
RENAL EPITHELIAL, UA: ABNORMAL
SEDIMENTATION RATE, ERYTHROCYTE: 8 MM/HR (ref 0–10)
SEG NEUTROPHILS: 67 %
SEGMENTED NEUTROPHILS ABSOLUTE COUNT: 5.3 THOU/MM3 (ref 1.8–7.7)
SODIUM BLD-SCNC: 139 MEQ/L (ref 135–145)
SPECIFIC GRAVITY, URINE: 1.01 (ref 1–1.03)
TOTAL PROTEIN: 8 G/DL (ref 6.1–8)
UROBILINOGEN, URINE: 0.2 EU/DL
WBC # BLD: 7.9 THOU/MM3 (ref 4.8–10.8)
WBC UA: ABNORMAL /HPF
YEAST: ABNORMAL

## 2018-05-18 PROCEDURE — 80053 COMPREHEN METABOLIC PANEL: CPT

## 2018-05-18 PROCEDURE — 87184 SC STD DISK METHOD PER PLATE: CPT

## 2018-05-18 PROCEDURE — 6360000002 HC RX W HCPCS: Performed by: PHYSICIAN ASSISTANT

## 2018-05-18 PROCEDURE — 86140 C-REACTIVE PROTEIN: CPT

## 2018-05-18 PROCEDURE — 87077 CULTURE AEROBIC IDENTIFY: CPT

## 2018-05-18 PROCEDURE — 70450 CT HEAD/BRAIN W/O DYE: CPT

## 2018-05-18 PROCEDURE — 85651 RBC SED RATE NONAUTOMATED: CPT

## 2018-05-18 PROCEDURE — 74177 CT ABD & PELVIS W/CONTRAST: CPT

## 2018-05-18 PROCEDURE — 2580000003 HC RX 258: Performed by: PHYSICIAN ASSISTANT

## 2018-05-18 PROCEDURE — 83605 ASSAY OF LACTIC ACID: CPT

## 2018-05-18 PROCEDURE — 87186 SC STD MICRODIL/AGAR DIL: CPT

## 2018-05-18 PROCEDURE — 99285 EMERGENCY DEPT VISIT HI MDM: CPT

## 2018-05-18 PROCEDURE — 71046 X-RAY EXAM CHEST 2 VIEWS: CPT

## 2018-05-18 PROCEDURE — 85025 COMPLETE CBC W/AUTO DIFF WBC: CPT

## 2018-05-18 PROCEDURE — 96361 HYDRATE IV INFUSION ADD-ON: CPT

## 2018-05-18 PROCEDURE — 80307 DRUG TEST PRSMV CHEM ANLYZR: CPT

## 2018-05-18 PROCEDURE — 81001 URINALYSIS AUTO W/SCOPE: CPT

## 2018-05-18 PROCEDURE — 6360000004 HC RX CONTRAST MEDICATION: Performed by: PHYSICIAN ASSISTANT

## 2018-05-18 PROCEDURE — 36415 COLL VENOUS BLD VENIPUNCTURE: CPT

## 2018-05-18 PROCEDURE — 96374 THER/PROPH/DIAG INJ IV PUSH: CPT

## 2018-05-18 PROCEDURE — 87086 URINE CULTURE/COLONY COUNT: CPT

## 2018-05-18 RX ORDER — 0.9 % SODIUM CHLORIDE 0.9 %
1000 INTRAVENOUS SOLUTION INTRAVENOUS ONCE
Status: COMPLETED | OUTPATIENT
Start: 2018-05-18 | End: 2018-05-18

## 2018-05-18 RX ORDER — METOCLOPRAMIDE 10 MG/1
10 TABLET ORAL 4 TIMES DAILY
Qty: 15 TABLET | Refills: 0 | Status: ON HOLD | OUTPATIENT
Start: 2018-05-18 | End: 2019-11-29 | Stop reason: ALTCHOICE

## 2018-05-18 RX ORDER — KETOROLAC TROMETHAMINE 10 MG/1
10 TABLET, FILM COATED ORAL EVERY 6 HOURS PRN
Qty: 10 TABLET | Refills: 0 | Status: SHIPPED | OUTPATIENT
Start: 2018-05-18 | End: 2018-11-09

## 2018-05-18 RX ORDER — METOCLOPRAMIDE HYDROCHLORIDE 5 MG/ML
10 INJECTION INTRAMUSCULAR; INTRAVENOUS ONCE
Status: COMPLETED | OUTPATIENT
Start: 2018-05-18 | End: 2018-05-18

## 2018-05-18 RX ORDER — KETOROLAC TROMETHAMINE 30 MG/ML
30 INJECTION, SOLUTION INTRAMUSCULAR; INTRAVENOUS ONCE
Status: COMPLETED | OUTPATIENT
Start: 2018-05-18 | End: 2018-05-18

## 2018-05-18 RX ADMIN — KETOROLAC TROMETHAMINE 30 MG: 30 INJECTION, SOLUTION INTRAMUSCULAR at 14:55

## 2018-05-18 RX ADMIN — SODIUM CHLORIDE 1000 ML: 9 INJECTION, SOLUTION INTRAVENOUS at 13:00

## 2018-05-18 RX ADMIN — METOCLOPRAMIDE 10 MG: 5 INJECTION, SOLUTION INTRAMUSCULAR; INTRAVENOUS at 14:55

## 2018-05-18 RX ADMIN — IOPAMIDOL 80 ML: 755 INJECTION, SOLUTION INTRAVENOUS at 14:03

## 2018-05-18 ASSESSMENT — PAIN SCALES - GENERAL: PAINLEVEL_OUTOF10: 10

## 2018-05-18 ASSESSMENT — ENCOUNTER SYMPTOMS
EYE DISCHARGE: 0
SORE THROAT: 0
ABDOMINAL PAIN: 1
COUGH: 1
VOMITING: 1
SHORTNESS OF BREATH: 0
BACK PAIN: 1
WHEEZING: 0
EYE REDNESS: 0
DIARRHEA: 1
RHINORRHEA: 0
NAUSEA: 1

## 2018-05-18 ASSESSMENT — PAIN DESCRIPTION - PAIN TYPE: TYPE: ACUTE PAIN

## 2018-05-18 ASSESSMENT — PAIN DESCRIPTION - LOCATION: LOCATION: BACK

## 2018-05-20 LAB
ORGANISM: ABNORMAL
URINE CULTURE REFLEX: ABNORMAL

## 2018-08-03 RX ORDER — LAMOTRIGINE 100 MG/1
TABLET ORAL
Qty: 60 TABLET | Refills: 3 | Status: SHIPPED | OUTPATIENT
Start: 2018-08-03 | End: 2018-12-04 | Stop reason: SDUPTHER

## 2018-11-09 ENCOUNTER — OFFICE VISIT (OUTPATIENT)
Dept: UROLOGY | Age: 28
End: 2018-11-09
Payer: MEDICARE

## 2018-11-09 VITALS
DIASTOLIC BLOOD PRESSURE: 62 MMHG | HEIGHT: 72 IN | WEIGHT: 182 LBS | BODY MASS INDEX: 24.65 KG/M2 | SYSTOLIC BLOOD PRESSURE: 118 MMHG

## 2018-11-09 DIAGNOSIS — N32.89 BLADDER SPASM: ICD-10-CM

## 2018-11-09 DIAGNOSIS — R33.9 URINARY RETENTION: ICD-10-CM

## 2018-11-09 DIAGNOSIS — N31.9 NEUROGENIC BLADDER: Primary | ICD-10-CM

## 2018-11-09 PROCEDURE — G8484 FLU IMMUNIZE NO ADMIN: HCPCS | Performed by: NURSE PRACTITIONER

## 2018-11-09 PROCEDURE — G8427 DOCREV CUR MEDS BY ELIG CLIN: HCPCS | Performed by: NURSE PRACTITIONER

## 2018-11-09 PROCEDURE — G8420 CALC BMI NORM PARAMETERS: HCPCS | Performed by: NURSE PRACTITIONER

## 2018-11-09 PROCEDURE — 99213 OFFICE O/P EST LOW 20 MIN: CPT | Performed by: NURSE PRACTITIONER

## 2018-11-09 PROCEDURE — 1036F TOBACCO NON-USER: CPT | Performed by: NURSE PRACTITIONER

## 2018-11-09 RX ORDER — TIZANIDINE 2 MG/1
2 TABLET ORAL EVERY 6 HOURS PRN
Status: ON HOLD | COMMUNITY
End: 2019-11-29 | Stop reason: ALTCHOICE

## 2018-11-09 RX ORDER — METHOCARBAMOL 100 MG/ML
1500 INJECTION, SOLUTION INTRAMUSCULAR; INTRAVENOUS ONCE
Status: ON HOLD | COMMUNITY
End: 2019-11-29 | Stop reason: ALTCHOICE

## 2018-11-09 RX ORDER — METHENAMINE HIPPURATE 1000 MG/1
1 TABLET ORAL 2 TIMES DAILY WITH MEALS
COMMUNITY

## 2018-11-09 ASSESSMENT — ENCOUNTER SYMPTOMS
NAUSEA: 0
ABDOMINAL DISTENTION: 0
VOMITING: 0
ABDOMINAL PAIN: 0

## 2018-12-04 DIAGNOSIS — G40.909 SEIZURE DISORDER (HCC): Primary | ICD-10-CM

## 2018-12-05 RX ORDER — LAMOTRIGINE 100 MG/1
TABLET ORAL
Qty: 60 TABLET | Refills: 3 | Status: SHIPPED | OUTPATIENT
Start: 2018-12-05

## 2018-12-05 NOTE — TELEPHONE ENCOUNTER
Spoke with patient regarding need to schedule appointment. Patient stated he will call office back to reschedule.

## 2019-01-08 ENCOUNTER — HOSPITAL ENCOUNTER (OUTPATIENT)
Age: 29
Setting detail: SPECIMEN
Discharge: HOME | End: 2019-01-08
Payer: MEDICARE

## 2019-01-08 LAB
BACTERIA: ABNORMAL /HPF
BILIRUBIN URINE: NEGATIVE
BLOOD, URINE: ABNORMAL
CASTS 2: ABNORMAL /LPF
CASTS UA: ABNORMAL /LPF
CHARACTER, URINE: CLEAR
COLOR: YELLOW
CRYSTALS, UA: ABNORMAL
EPITHELIAL CELLS, UA: ABNORMAL /HPF
GLUCOSE URINE: NEGATIVE MG/DL
KETONES, URINE: NEGATIVE
LEUKOCYTE ESTERASE, URINE: ABNORMAL
MISCELLANEOUS 2: ABNORMAL
NITRITE, URINE: POSITIVE
PH UA: 7
PROTEIN UA: NEGATIVE
RBC URINE: ABNORMAL /HPF
RENAL EPITHELIAL, UA: ABNORMAL
SPECIFIC GRAVITY, URINE: 1.02 (ref 1–1.03)
UROBILINOGEN, URINE: 0.2 EU/DL
WBC UA: ABNORMAL /HPF
YEAST: ABNORMAL

## 2019-01-08 PROCEDURE — 87186 SC STD MICRODIL/AGAR DIL: CPT

## 2019-01-08 PROCEDURE — 87184 SC STD DISK METHOD PER PLATE: CPT

## 2019-01-08 PROCEDURE — 81001 URINALYSIS AUTO W/SCOPE: CPT

## 2019-01-08 PROCEDURE — 87086 URINE CULTURE/COLONY COUNT: CPT

## 2019-01-08 PROCEDURE — 87077 CULTURE AEROBIC IDENTIFY: CPT

## 2019-01-10 LAB
ORGANISM: ABNORMAL
URINE CULTURE REFLEX: ABNORMAL

## 2019-01-25 ENCOUNTER — TELEPHONE (OUTPATIENT)
Dept: UROLOGY | Age: 29
End: 2019-01-25

## 2019-05-24 ENCOUNTER — HOSPITAL ENCOUNTER (OUTPATIENT)
Age: 29
Discharge: HOME | End: 2019-05-24
Payer: MEDICARE

## 2019-05-24 LAB
ALBUMIN SERPL-MCNC: 3.8 G/DL (ref 3.5–5.1)
ALP BLD-CCNC: 100 U/L (ref 38–126)
ALT SERPL-CCNC: 16 U/L (ref 11–66)
ANION GAP SERPL CALCULATED.3IONS-SCNC: 14 MEQ/L (ref 8–16)
AST SERPL-CCNC: 14 U/L (ref 5–40)
BASOPHILS # BLD: 0.6 %
BASOPHILS ABSOLUTE: 0.1 THOU/MM3 (ref 0–0.1)
BILIRUB SERPL-MCNC: 0.3 MG/DL (ref 0.3–1.2)
BUN BLDV-MCNC: 10 MG/DL (ref 7–22)
C-REACTIVE PROTEIN: 4.43 MG/DL (ref 0–1)
CALCIUM SERPL-MCNC: 10 MG/DL (ref 8.5–10.5)
CHLORIDE BLD-SCNC: 101 MEQ/L (ref 98–111)
CO2: 28 MEQ/L (ref 23–33)
CREAT SERPL-MCNC: 0.8 MG/DL (ref 0.4–1.2)
EOSINOPHIL # BLD: 0.7 %
EOSINOPHILS ABSOLUTE: 0.1 THOU/MM3 (ref 0–0.4)
ERYTHROCYTE [DISTWIDTH] IN BLOOD BY AUTOMATED COUNT: 13.5 % (ref 11.5–14.5)
ERYTHROCYTE [DISTWIDTH] IN BLOOD BY AUTOMATED COUNT: 43.9 FL (ref 35–45)
GFR SERPL CREATININE-BSD FRML MDRD: > 90 ML/MIN/1.73M2
GLUCOSE BLD-MCNC: 95 MG/DL (ref 70–108)
HCT VFR BLD CALC: 42.3 % (ref 42–52)
HEMOGLOBIN: 13.5 GM/DL (ref 14–18)
IMMATURE GRANS (ABS): 0.1 THOU/MM3 (ref 0–0.07)
IMMATURE GRANULOCYTES: 1.2 %
LYMPHOCYTES # BLD: 23.6 %
LYMPHOCYTES ABSOLUTE: 2 THOU/MM3 (ref 1–4.8)
MCH RBC QN AUTO: 28.5 PG (ref 26–33)
MCHC RBC AUTO-ENTMCNC: 31.9 GM/DL (ref 32.2–35.5)
MCV RBC AUTO: 89.2 FL (ref 80–94)
MONOCYTES # BLD: 6.9 %
MONOCYTES ABSOLUTE: 0.6 THOU/MM3 (ref 0.4–1.3)
NUCLEATED RED BLOOD CELLS: 0 /100 WBC
PLATELET # BLD: 481 THOU/MM3 (ref 130–400)
PMV BLD AUTO: 10.5 FL (ref 9.4–12.4)
POTASSIUM SERPL-SCNC: 4.1 MEQ/L (ref 3.5–5.2)
RBC # BLD: 4.74 MILL/MM3 (ref 4.7–6.1)
SEDIMENTATION RATE, ERYTHROCYTE: 70 MM/HR (ref 0–10)
SEG NEUTROPHILS: 67 %
SEGMENTED NEUTROPHILS ABSOLUTE COUNT: 5.7 THOU/MM3 (ref 1.8–7.7)
SODIUM BLD-SCNC: 143 MEQ/L (ref 135–145)
TOTAL PROTEIN: 8.4 G/DL (ref 6.1–8)
WBC # BLD: 8.5 THOU/MM3 (ref 4.8–10.8)

## 2019-05-24 PROCEDURE — 36415 COLL VENOUS BLD VENIPUNCTURE: CPT

## 2019-05-24 PROCEDURE — 85651 RBC SED RATE NONAUTOMATED: CPT

## 2019-05-24 PROCEDURE — 80053 COMPREHEN METABOLIC PANEL: CPT

## 2019-05-24 PROCEDURE — 86140 C-REACTIVE PROTEIN: CPT

## 2019-05-24 PROCEDURE — 85025 COMPLETE CBC W/AUTO DIFF WBC: CPT

## 2019-07-26 ENCOUNTER — HOSPITAL ENCOUNTER (OUTPATIENT)
Age: 29
Discharge: HOME | End: 2019-07-26
Payer: MEDICARE

## 2019-07-26 LAB
BACTERIA: ABNORMAL /HPF
BASOPHILS # BLD: 0.7 %
BASOPHILS ABSOLUTE: 0.1 THOU/MM3 (ref 0–0.1)
BILIRUBIN URINE: NEGATIVE
BLOOD, URINE: ABNORMAL
CASTS 2: ABNORMAL /LPF
CASTS UA: ABNORMAL /LPF
CHARACTER, URINE: CLEAR
COLOR: YELLOW
CRYSTALS, UA: ABNORMAL
EOSINOPHIL # BLD: 0.7 %
EOSINOPHILS ABSOLUTE: 0.1 THOU/MM3 (ref 0–0.4)
EPITHELIAL CELLS, UA: ABNORMAL /HPF
ERYTHROCYTE [DISTWIDTH] IN BLOOD BY AUTOMATED COUNT: 15.8 % (ref 11.5–14.5)
ERYTHROCYTE [DISTWIDTH] IN BLOOD BY AUTOMATED COUNT: 50.4 FL (ref 35–45)
GLUCOSE URINE: NEGATIVE MG/DL
HCT VFR BLD CALC: 39.7 % (ref 42–52)
HEMOGLOBIN: 12.1 GM/DL (ref 14–18)
IMMATURE GRANS (ABS): 0.1 THOU/MM3 (ref 0–0.07)
IMMATURE GRANULOCYTES: 1 %
KETONES, URINE: NEGATIVE
LEUKOCYTE ESTERASE, URINE: ABNORMAL
LYMPHOCYTES # BLD: 17.4 %
LYMPHOCYTES ABSOLUTE: 1.7 THOU/MM3 (ref 1–4.8)
MCH RBC QN AUTO: 27.1 PG (ref 26–33)
MCHC RBC AUTO-ENTMCNC: 30.5 GM/DL (ref 32.2–35.5)
MCV RBC AUTO: 88.8 FL (ref 80–94)
MISCELLANEOUS 2: ABNORMAL
MONOCYTES # BLD: 4.5 %
MONOCYTES ABSOLUTE: 0.4 THOU/MM3 (ref 0.4–1.3)
NITRITE, URINE: POSITIVE
NUCLEATED RED BLOOD CELLS: 0 /100 WBC
PH UA: 6.5 (ref 5–9)
PLATELET # BLD: 364 THOU/MM3 (ref 130–400)
PMV BLD AUTO: 10.9 FL (ref 9.4–12.4)
PROTEIN UA: NEGATIVE
RBC # BLD: 4.47 MILL/MM3 (ref 4.7–6.1)
RBC URINE: ABNORMAL /HPF
RENAL EPITHELIAL, UA: ABNORMAL
SEG NEUTROPHILS: 75.7 %
SEGMENTED NEUTROPHILS ABSOLUTE COUNT: 7.3 THOU/MM3 (ref 1.8–7.7)
SPECIFIC GRAVITY, URINE: 1.01 (ref 1–1.03)
UROBILINOGEN, URINE: 0.2 EU/DL (ref 0–1)
WBC # BLD: 9.6 THOU/MM3 (ref 4.8–10.8)
WBC UA: ABNORMAL /HPF
YEAST: ABNORMAL

## 2019-07-26 PROCEDURE — 85025 COMPLETE CBC W/AUTO DIFF WBC: CPT

## 2019-07-26 PROCEDURE — 87077 CULTURE AEROBIC IDENTIFY: CPT

## 2019-07-26 PROCEDURE — 36415 COLL VENOUS BLD VENIPUNCTURE: CPT

## 2019-07-26 PROCEDURE — 87086 URINE CULTURE/COLONY COUNT: CPT

## 2019-07-26 PROCEDURE — 81001 URINALYSIS AUTO W/SCOPE: CPT

## 2019-07-26 PROCEDURE — 87186 SC STD MICRODIL/AGAR DIL: CPT

## 2019-07-26 PROCEDURE — 87106 FUNGI IDENTIFICATION YEAST: CPT

## 2019-07-29 LAB
ORGANISM: ABNORMAL
ORGANISM: ABNORMAL
URINE CULTURE REFLEX: ABNORMAL
URINE CULTURE REFLEX: ABNORMAL

## 2019-10-17 ENCOUNTER — HOSPITAL ENCOUNTER (OUTPATIENT)
Dept: MRI IMAGING | Age: 29
Discharge: HOME | End: 2019-10-17
Payer: MEDICARE

## 2019-10-17 DIAGNOSIS — L02.31 ABSCESS OF BUTTOCK, LEFT: ICD-10-CM

## 2019-10-17 PROCEDURE — 6360000004 HC RX CONTRAST MEDICATION: Performed by: NURSE PRACTITIONER

## 2019-10-17 PROCEDURE — 72197 MRI PELVIS W/O & W/DYE: CPT

## 2019-10-17 PROCEDURE — A9579 GAD-BASE MR CONTRAST NOS,1ML: HCPCS | Performed by: NURSE PRACTITIONER

## 2019-10-17 RX ADMIN — GADOTERIDOL 15 ML: 279.3 INJECTION, SOLUTION INTRAVENOUS at 13:34

## 2019-10-23 ENCOUNTER — APPOINTMENT (OUTPATIENT)
Dept: GENERAL RADIOLOGY | Age: 29
End: 2019-10-23
Payer: MEDICARE

## 2019-10-23 ENCOUNTER — HOSPITAL ENCOUNTER (EMERGENCY)
Age: 29
Discharge: HOME | End: 2019-10-23
Attending: EMERGENCY MEDICINE
Payer: MEDICARE

## 2019-10-23 VITALS
RESPIRATION RATE: 18 BRPM | BODY MASS INDEX: 23.8 KG/M2 | HEIGHT: 71 IN | HEART RATE: 86 BPM | SYSTOLIC BLOOD PRESSURE: 120 MMHG | WEIGHT: 170 LBS | OXYGEN SATURATION: 99 % | TEMPERATURE: 97.7 F | DIASTOLIC BLOOD PRESSURE: 71 MMHG

## 2019-10-23 DIAGNOSIS — N39.0 URINARY TRACT INFECTION IN MALE: ICD-10-CM

## 2019-10-23 DIAGNOSIS — M62.838 SPASM OF MUSCLE: Primary | ICD-10-CM

## 2019-10-23 DIAGNOSIS — G82.20 PARAPLEGIA (HCC): ICD-10-CM

## 2019-10-23 LAB
ALBUMIN SERPL-MCNC: 3.8 G/DL (ref 3.5–5.1)
ALP BLD-CCNC: 122 U/L (ref 38–126)
ALT SERPL-CCNC: 15 U/L (ref 11–66)
ANION GAP SERPL CALCULATED.3IONS-SCNC: 15 MEQ/L (ref 8–16)
APTT: 36.8 SECONDS (ref 22–38)
AST SERPL-CCNC: 15 U/L (ref 5–40)
BACTERIA: ABNORMAL /HPF
BASOPHILS # BLD: 0.6 %
BASOPHILS ABSOLUTE: 0.1 THOU/MM3 (ref 0–0.1)
BILIRUB SERPL-MCNC: 0.3 MG/DL (ref 0.3–1.2)
BILIRUBIN URINE: NEGATIVE
BLOOD, URINE: NEGATIVE
BUN BLDV-MCNC: 19 MG/DL (ref 7–22)
CALCIUM IONIZED: 1.14 MMOL/L (ref 1.12–1.32)
CALCIUM SERPL-MCNC: 9.6 MG/DL (ref 8.5–10.5)
CASTS 2: ABNORMAL /LPF
CASTS UA: ABNORMAL /LPF
CHARACTER, URINE: ABNORMAL
CHLORIDE BLD-SCNC: 99 MEQ/L (ref 98–111)
CO2: 25 MEQ/L (ref 23–33)
COLOR: YELLOW
CREAT SERPL-MCNC: 1 MG/DL (ref 0.4–1.2)
CRYSTALS, UA: ABNORMAL
EOSINOPHIL # BLD: 0.7 %
EOSINOPHILS ABSOLUTE: 0.1 THOU/MM3 (ref 0–0.4)
EPITHELIAL CELLS, UA: ABNORMAL /HPF
ERYTHROCYTE [DISTWIDTH] IN BLOOD BY AUTOMATED COUNT: 15.9 % (ref 11.5–14.5)
ERYTHROCYTE [DISTWIDTH] IN BLOOD BY AUTOMATED COUNT: 48.7 FL (ref 35–45)
GFR SERPL CREATININE-BSD FRML MDRD: 89 ML/MIN/1.73M2
GLUCOSE BLD-MCNC: 74 MG/DL (ref 70–108)
GLUCOSE URINE: NEGATIVE MG/DL
HCT VFR BLD CALC: 46.3 % (ref 42–52)
HEMOGLOBIN: 13.9 GM/DL (ref 14–18)
IMMATURE GRANS (ABS): 0.06 THOU/MM3 (ref 0–0.07)
IMMATURE GRANULOCYTES: 0.6 %
INR BLD: 1.11 (ref 0.85–1.13)
KETONES, URINE: NEGATIVE
LACTIC ACID: 0.8 MMOL/L (ref 0.5–2.2)
LEUKOCYTE ESTERASE, URINE: ABNORMAL
LYMPHOCYTES # BLD: 11.6 %
LYMPHOCYTES ABSOLUTE: 1.2 THOU/MM3 (ref 1–4.8)
MAGNESIUM: 1.8 MG/DL (ref 1.6–2.4)
MCH RBC QN AUTO: 25.6 PG (ref 26–33)
MCHC RBC AUTO-ENTMCNC: 30 GM/DL (ref 32.2–35.5)
MCV RBC AUTO: 85.1 FL (ref 80–94)
MISCELLANEOUS 2: ABNORMAL
MONOCYTES # BLD: 6.9 %
MONOCYTES ABSOLUTE: 0.7 THOU/MM3 (ref 0.4–1.3)
NITRITE, URINE: POSITIVE
NUCLEATED RED BLOOD CELLS: 0 /100 WBC
OSMOLALITY CALCULATION: 278.4 MOSMOL/KG (ref 275–300)
PH UA: 6 (ref 5–9)
PLATELET # BLD: 373 THOU/MM3 (ref 130–400)
PMV BLD AUTO: 10.4 FL (ref 9.4–12.4)
POTASSIUM SERPL-SCNC: 4.1 MEQ/L (ref 3.5–5.2)
PROTEIN UA: NEGATIVE
RBC # BLD: 5.44 MILL/MM3 (ref 4.7–6.1)
RBC URINE: ABNORMAL /HPF
RENAL EPITHELIAL, UA: ABNORMAL
SEG NEUTROPHILS: 79.6 %
SEGMENTED NEUTROPHILS ABSOLUTE COUNT: 8.4 THOU/MM3 (ref 1.8–7.7)
SODIUM BLD-SCNC: 139 MEQ/L (ref 135–145)
SPECIFIC GRAVITY, URINE: 1.02 (ref 1–1.03)
TOTAL CK: 137 U/L (ref 55–170)
TOTAL PROTEIN: 8.8 G/DL (ref 6.1–8)
UROBILINOGEN, URINE: 0.2 EU/DL (ref 0–1)
WBC # BLD: 10.5 THOU/MM3 (ref 4.8–10.8)
WBC UA: > 100 /HPF
YEAST: PRESENT

## 2019-10-23 PROCEDURE — 99283 EMERGENCY DEPT VISIT LOW MDM: CPT

## 2019-10-23 PROCEDURE — 87086 URINE CULTURE/COLONY COUNT: CPT

## 2019-10-23 PROCEDURE — 85025 COMPLETE CBC W/AUTO DIFF WBC: CPT

## 2019-10-23 PROCEDURE — 81001 URINALYSIS AUTO W/SCOPE: CPT

## 2019-10-23 PROCEDURE — 82330 ASSAY OF CALCIUM: CPT

## 2019-10-23 PROCEDURE — 2580000003 HC RX 258: Performed by: EMERGENCY MEDICINE

## 2019-10-23 PROCEDURE — 83735 ASSAY OF MAGNESIUM: CPT

## 2019-10-23 PROCEDURE — 6370000000 HC RX 637 (ALT 250 FOR IP): Performed by: EMERGENCY MEDICINE

## 2019-10-23 PROCEDURE — 71045 X-RAY EXAM CHEST 1 VIEW: CPT

## 2019-10-23 PROCEDURE — 87077 CULTURE AEROBIC IDENTIFY: CPT

## 2019-10-23 PROCEDURE — 85610 PROTHROMBIN TIME: CPT

## 2019-10-23 PROCEDURE — 83605 ASSAY OF LACTIC ACID: CPT

## 2019-10-23 PROCEDURE — 87186 SC STD MICRODIL/AGAR DIL: CPT

## 2019-10-23 PROCEDURE — 82550 ASSAY OF CK (CPK): CPT

## 2019-10-23 PROCEDURE — 36415 COLL VENOUS BLD VENIPUNCTURE: CPT

## 2019-10-23 PROCEDURE — 87040 BLOOD CULTURE FOR BACTERIA: CPT

## 2019-10-23 PROCEDURE — 80053 COMPREHEN METABOLIC PANEL: CPT

## 2019-10-23 PROCEDURE — 85730 THROMBOPLASTIN TIME PARTIAL: CPT

## 2019-10-23 RX ORDER — SULFAMETHOXAZOLE AND TRIMETHOPRIM 800; 160 MG/1; MG/1
1 TABLET ORAL 2 TIMES DAILY
Qty: 20 TABLET | Refills: 0 | Status: SHIPPED | OUTPATIENT
Start: 2019-10-23 | End: 2019-11-02

## 2019-10-23 RX ORDER — DIAZEPAM 5 MG/1
5 TABLET ORAL ONCE
Status: COMPLETED | OUTPATIENT
Start: 2019-10-23 | End: 2019-10-23

## 2019-10-23 RX ORDER — SODIUM CHLORIDE 9 MG/ML
INJECTION, SOLUTION INTRAVENOUS CONTINUOUS
Status: DISCONTINUED | OUTPATIENT
Start: 2019-10-23 | End: 2019-10-23 | Stop reason: HOSPADM

## 2019-10-23 RX ORDER — DIAZEPAM 5 MG/1
5 TABLET ORAL EVERY 8 HOURS PRN
Qty: 10 TABLET | Refills: 0 | Status: SHIPPED | OUTPATIENT
Start: 2019-10-23 | End: 2019-10-28

## 2019-10-23 RX ADMIN — SODIUM CHLORIDE: 9 INJECTION, SOLUTION INTRAVENOUS at 18:29

## 2019-10-23 RX ADMIN — DIAZEPAM 5 MG: 5 TABLET ORAL at 19:12

## 2019-10-23 ASSESSMENT — ENCOUNTER SYMPTOMS
EYE DISCHARGE: 0
SHORTNESS OF BREATH: 0
EYE ITCHING: 0
NAUSEA: 0
RHINORRHEA: 0
DIARRHEA: 0
WHEEZING: 0
COUGH: 0
VOMITING: 0
ABDOMINAL DISTENTION: 0
ABDOMINAL PAIN: 0

## 2019-10-23 ASSESSMENT — PAIN DESCRIPTION - PAIN TYPE: TYPE: ACUTE PAIN

## 2019-10-23 ASSESSMENT — PAIN DESCRIPTION - LOCATION: LOCATION: BACK

## 2019-10-23 ASSESSMENT — PAIN SCALES - GENERAL: PAINLEVEL_OUTOF10: 7

## 2019-10-23 ASSESSMENT — PAIN DESCRIPTION - ORIENTATION: ORIENTATION: LOWER

## 2019-10-29 LAB
BLOOD CULTURE, ROUTINE: NORMAL
BLOOD CULTURE, ROUTINE: NORMAL

## 2019-11-07 ENCOUNTER — HOSPITAL ENCOUNTER (OUTPATIENT)
Dept: GENERAL RADIOLOGY | Age: 29
Discharge: HOME | End: 2019-11-07
Payer: MEDICARE

## 2019-11-07 ENCOUNTER — HOSPITAL ENCOUNTER (OUTPATIENT)
Dept: OTHER | Age: 29
Discharge: HOME | End: 2019-11-07
Payer: MEDICARE

## 2019-11-07 VITALS
WEIGHT: 160 LBS | BODY MASS INDEX: 22.32 KG/M2 | OXYGEN SATURATION: 100 % | HEART RATE: 88 BPM | RESPIRATION RATE: 18 BRPM | TEMPERATURE: 98.2 F | SYSTOLIC BLOOD PRESSURE: 137 MMHG | DIASTOLIC BLOOD PRESSURE: 73 MMHG

## 2019-11-07 LAB
CREAT SERPL-MCNC: 0.9 MG/DL (ref 0.4–1.2)
GFR SERPL CREATININE-BSD FRML MDRD: > 90 ML/MIN/1.73M2

## 2019-11-07 PROCEDURE — 36569 INSJ PICC 5 YR+ W/O IMAGING: CPT

## 2019-11-07 PROCEDURE — C1751 CATH, INF, PER/CENT/MIDLINE: HCPCS

## 2019-11-07 PROCEDURE — 82565 ASSAY OF CREATININE: CPT

## 2019-11-07 PROCEDURE — 71045 X-RAY EXAM CHEST 1 VIEW: CPT

## 2019-11-07 PROCEDURE — 36415 COLL VENOUS BLD VENIPUNCTURE: CPT

## 2019-11-07 PROCEDURE — 76937 US GUIDE VASCULAR ACCESS: CPT

## 2019-11-07 RX ORDER — SODIUM CHLORIDE 0.9 % (FLUSH) 0.9 %
10 SYRINGE (ML) INJECTION EVERY 12 HOURS SCHEDULED
Status: DISCONTINUED | OUTPATIENT
Start: 2019-11-07 | End: 2019-11-08 | Stop reason: HOSPADM

## 2019-11-07 RX ORDER — SODIUM CHLORIDE 0.9 % (FLUSH) 0.9 %
10 SYRINGE (ML) INJECTION PRN
Status: DISCONTINUED | OUTPATIENT
Start: 2019-11-07 | End: 2019-11-08 | Stop reason: HOSPADM

## 2019-11-07 RX ORDER — LIDOCAINE HYDROCHLORIDE 10 MG/ML
5 INJECTION, SOLUTION EPIDURAL; INFILTRATION; INTRACAUDAL; PERINEURAL ONCE
Status: DISCONTINUED | OUTPATIENT
Start: 2019-11-07 | End: 2019-11-08 | Stop reason: HOSPADM

## 2019-11-07 ASSESSMENT — PAIN - FUNCTIONAL ASSESSMENT: PAIN_FUNCTIONAL_ASSESSMENT: 0-10

## 2019-11-29 ENCOUNTER — HOSPITAL ENCOUNTER (INPATIENT)
Age: 29
LOS: 1 days | Discharge: HOME HEALTH | DRG: 539 | End: 2019-12-02
Attending: FAMILY MEDICINE | Admitting: INTERNAL MEDICINE
Payer: MEDICARE

## 2019-11-29 ENCOUNTER — APPOINTMENT (OUTPATIENT)
Dept: GENERAL RADIOLOGY | Age: 29
DRG: 539 | End: 2019-11-29
Payer: MEDICARE

## 2019-11-29 DIAGNOSIS — D70.9 NEUTROPENIA, UNSPECIFIED TYPE (HCC): Primary | ICD-10-CM

## 2019-11-29 DIAGNOSIS — M86.9 OSTEOMYELITIS OF OTHER SITE, UNSPECIFIED TYPE (HCC): ICD-10-CM

## 2019-11-29 LAB
ALBUMIN SERPL-MCNC: 4 G/DL (ref 3.5–5.1)
ALP BLD-CCNC: 107 U/L (ref 38–126)
ALT SERPL-CCNC: 18 U/L (ref 11–66)
ANION GAP SERPL CALCULATED.3IONS-SCNC: 15 MEQ/L (ref 8–16)
AST SERPL-CCNC: 20 U/L (ref 5–40)
BACTERIA: ABNORMAL /HPF
BASOPHILS # BLD: 0.9 %
BASOPHILS ABSOLUTE: 0 THOU/MM3 (ref 0–0.1)
BILIRUB SERPL-MCNC: 0.3 MG/DL (ref 0.3–1.2)
BILIRUBIN DIRECT: < 0.2 MG/DL (ref 0–0.3)
BILIRUBIN URINE: NEGATIVE
BLOOD, URINE: ABNORMAL
BUN BLDV-MCNC: 9 MG/DL (ref 7–22)
CALCIUM SERPL-MCNC: 9.2 MG/DL (ref 8.5–10.5)
CASTS 2: ABNORMAL /LPF
CASTS UA: ABNORMAL /LPF
CHARACTER, URINE: CLEAR
CHLORIDE BLD-SCNC: 99 MEQ/L (ref 98–111)
CO2: 24 MEQ/L (ref 23–33)
COLOR: YELLOW
CREAT SERPL-MCNC: 1 MG/DL (ref 0.4–1.2)
CRYSTALS, UA: ABNORMAL
EKG ATRIAL RATE: 87 BPM
EKG P AXIS: 13 DEGREES
EKG P-R INTERVAL: 146 MS
EKG Q-T INTERVAL: 342 MS
EKG QRS DURATION: 78 MS
EKG QTC CALCULATION (BAZETT): 411 MS
EKG R AXIS: 41 DEGREES
EKG T AXIS: 26 DEGREES
EKG VENTRICULAR RATE: 87 BPM
EOSINOPHIL # BLD: 1.9 %
EOSINOPHILS ABSOLUTE: 0 THOU/MM3 (ref 0–0.4)
EPITHELIAL CELLS, UA: ABNORMAL /HPF
ERYTHROCYTE [DISTWIDTH] IN BLOOD BY AUTOMATED COUNT: 17.5 % (ref 11.5–14.5)
ERYTHROCYTE [DISTWIDTH] IN BLOOD BY AUTOMATED COUNT: 53.4 FL (ref 35–45)
GFR SERPL CREATININE-BSD FRML MDRD: 89 ML/MIN/1.73M2
GLUCOSE BLD-MCNC: 119 MG/DL (ref 70–108)
GLUCOSE URINE: NEGATIVE MG/DL
HCT VFR BLD CALC: 41.4 % (ref 42–52)
HEMOGLOBIN: 12.8 GM/DL (ref 14–18)
IMMATURE GRANS (ABS): 0.02 THOU/MM3 (ref 0–0.07)
IMMATURE GRANULOCYTES: 0.9 %
KETONES, URINE: NEGATIVE
LACTIC ACID: 1.4 MMOL/L (ref 0.5–2.2)
LEUKOCYTE ESTERASE, URINE: ABNORMAL
LIPASE: 15.9 U/L (ref 5.6–51.3)
LYMPHOCYTES # BLD: 36.3 %
LYMPHOCYTES ABSOLUTE: 0.8 THOU/MM3 (ref 1–4.8)
MAGNESIUM: 1.7 MG/DL (ref 1.6–2.4)
MCH RBC QN AUTO: 25.9 PG (ref 26–33)
MCHC RBC AUTO-ENTMCNC: 30.9 GM/DL (ref 32.2–35.5)
MCV RBC AUTO: 83.6 FL (ref 80–94)
MISCELLANEOUS 2: ABNORMAL
MONOCYTES # BLD: 13.2 %
MONOCYTES ABSOLUTE: 0.3 THOU/MM3 (ref 0.4–1.3)
NITRITE, URINE: NEGATIVE
NUCLEATED RED BLOOD CELLS: 0 /100 WBC
OSMOLALITY CALCULATION: 275.5 MOSMOL/KG (ref 275–300)
PH UA: 6 (ref 5–9)
PLATELET # BLD: 199 THOU/MM3 (ref 130–400)
PLATELET ESTIMATE: ADEQUATE
PMV BLD AUTO: 9.9 FL (ref 9.4–12.4)
POTASSIUM SERPL-SCNC: 3.2 MEQ/L (ref 3.5–5.2)
PROCALCITONIN: 0.16 NG/ML (ref 0.01–0.09)
PROTEIN UA: ABNORMAL
RBC # BLD: 4.95 MILL/MM3 (ref 4.7–6.1)
RBC URINE: ABNORMAL /HPF
RENAL EPITHELIAL, UA: ABNORMAL
SCAN OF BLOOD SMEAR: NORMAL
SEG NEUTROPHILS: 46.8 %
SEGMENTED NEUTROPHILS ABSOLUTE COUNT: 1 THOU/MM3 (ref 1.8–7.7)
SODIUM BLD-SCNC: 138 MEQ/L (ref 135–145)
SPECIFIC GRAVITY, URINE: 1.02 (ref 1–1.03)
TOTAL PROTEIN: 7.8 G/DL (ref 6.1–8)
TROPONIN T: < 0.01 NG/ML
UROBILINOGEN, URINE: 0.2 EU/DL (ref 0–1)
WBC # BLD: 2.1 THOU/MM3 (ref 4.8–10.8)
WBC UA: ABNORMAL /HPF
YEAST: ABNORMAL

## 2019-11-29 PROCEDURE — 80053 COMPREHEN METABOLIC PANEL: CPT

## 2019-11-29 PROCEDURE — G0378 HOSPITAL OBSERVATION PER HR: HCPCS

## 2019-11-29 PROCEDURE — 71045 X-RAY EXAM CHEST 1 VIEW: CPT

## 2019-11-29 PROCEDURE — 87040 BLOOD CULTURE FOR BACTERIA: CPT

## 2019-11-29 PROCEDURE — 87086 URINE CULTURE/COLONY COUNT: CPT

## 2019-11-29 PROCEDURE — 87106 FUNGI IDENTIFICATION YEAST: CPT

## 2019-11-29 PROCEDURE — 84484 ASSAY OF TROPONIN QUANT: CPT

## 2019-11-29 PROCEDURE — 83690 ASSAY OF LIPASE: CPT

## 2019-11-29 PROCEDURE — 83735 ASSAY OF MAGNESIUM: CPT

## 2019-11-29 PROCEDURE — 81001 URINALYSIS AUTO W/SCOPE: CPT

## 2019-11-29 PROCEDURE — 82248 BILIRUBIN DIRECT: CPT

## 2019-11-29 PROCEDURE — 99284 EMERGENCY DEPT VISIT MOD MDM: CPT

## 2019-11-29 PROCEDURE — 83605 ASSAY OF LACTIC ACID: CPT

## 2019-11-29 PROCEDURE — 85025 COMPLETE CBC W/AUTO DIFF WBC: CPT

## 2019-11-29 PROCEDURE — 99223 1ST HOSP IP/OBS HIGH 75: CPT | Performed by: FAMILY MEDICINE

## 2019-11-29 PROCEDURE — 36415 COLL VENOUS BLD VENIPUNCTURE: CPT

## 2019-11-29 PROCEDURE — 84145 PROCALCITONIN (PCT): CPT

## 2019-11-29 PROCEDURE — 93005 ELECTROCARDIOGRAM TRACING: CPT | Performed by: NURSE PRACTITIONER

## 2019-11-29 RX ORDER — QUETIAPINE FUMARATE 25 MG/1
25 TABLET, FILM COATED ORAL NIGHTLY
Status: DISCONTINUED | OUTPATIENT
Start: 2019-11-29 | End: 2019-12-02 | Stop reason: HOSPADM

## 2019-11-29 RX ORDER — POTASSIUM CHLORIDE 20 MEQ/1
40 TABLET, EXTENDED RELEASE ORAL ONCE
Status: COMPLETED | OUTPATIENT
Start: 2019-11-29 | End: 2019-11-30

## 2019-11-29 RX ORDER — METHOCARBAMOL 500 MG/1
500 TABLET, FILM COATED ORAL 4 TIMES DAILY
Status: DISCONTINUED | OUTPATIENT
Start: 2019-11-30 | End: 2019-12-02 | Stop reason: HOSPADM

## 2019-11-29 RX ORDER — ONDANSETRON 2 MG/ML
4 INJECTION INTRAMUSCULAR; INTRAVENOUS EVERY 6 HOURS PRN
Status: DISCONTINUED | OUTPATIENT
Start: 2019-11-29 | End: 2019-12-02 | Stop reason: HOSPADM

## 2019-11-29 RX ORDER — PANTOPRAZOLE SODIUM 40 MG/1
40 TABLET, DELAYED RELEASE ORAL
Status: DISCONTINUED | OUTPATIENT
Start: 2019-11-30 | End: 2019-12-02 | Stop reason: HOSPADM

## 2019-11-29 RX ORDER — 0.9 % SODIUM CHLORIDE 0.9 %
30 INTRAVENOUS SOLUTION INTRAVENOUS ONCE
Status: DISCONTINUED | OUTPATIENT
Start: 2019-11-29 | End: 2019-11-29

## 2019-11-29 RX ORDER — SODIUM CHLORIDE 0.9 % (FLUSH) 0.9 %
10 SYRINGE (ML) INJECTION PRN
Status: DISCONTINUED | OUTPATIENT
Start: 2019-11-29 | End: 2019-12-02 | Stop reason: HOSPADM

## 2019-11-29 RX ORDER — LAMOTRIGINE 100 MG/1
100 TABLET ORAL 2 TIMES DAILY
Status: DISCONTINUED | OUTPATIENT
Start: 2019-11-30 | End: 2019-12-02 | Stop reason: HOSPADM

## 2019-11-29 RX ORDER — SODIUM CHLORIDE 0.9 % (FLUSH) 0.9 %
10 SYRINGE (ML) INJECTION EVERY 12 HOURS SCHEDULED
Status: DISCONTINUED | OUTPATIENT
Start: 2019-11-29 | End: 2019-12-02 | Stop reason: HOSPADM

## 2019-11-29 RX ORDER — DIAZEPAM 5 MG/1
5 TABLET ORAL EVERY 6 HOURS PRN
Status: DISCONTINUED | OUTPATIENT
Start: 2019-11-29 | End: 2019-11-30

## 2019-11-29 RX ORDER — BACLOFEN 10 MG/1
20 TABLET ORAL 4 TIMES DAILY
Status: DISCONTINUED | OUTPATIENT
Start: 2019-11-29 | End: 2019-12-02 | Stop reason: HOSPADM

## 2019-11-29 RX ORDER — METHENAMINE HIPPURATE 1000 MG/1
1 TABLET ORAL 2 TIMES DAILY WITH MEALS
Status: DISCONTINUED | OUTPATIENT
Start: 2019-11-30 | End: 2019-12-02 | Stop reason: HOSPADM

## 2019-11-29 RX ORDER — GABAPENTIN 300 MG/1
900 CAPSULE ORAL 4 TIMES DAILY
Status: DISCONTINUED | OUTPATIENT
Start: 2019-11-29 | End: 2019-12-02 | Stop reason: HOSPADM

## 2019-11-29 RX ORDER — 0.9 % SODIUM CHLORIDE 0.9 %
1000 INTRAVENOUS SOLUTION INTRAVENOUS ONCE
Status: DISCONTINUED | OUTPATIENT
Start: 2019-11-29 | End: 2019-11-29

## 2019-11-29 RX ORDER — METHOCARBAMOL 500 MG/1
500 TABLET, FILM COATED ORAL 4 TIMES DAILY
COMMUNITY

## 2019-11-29 ASSESSMENT — ENCOUNTER SYMPTOMS
SHORTNESS OF BREATH: 0
SORE THROAT: 0
VOMITING: 0
NAUSEA: 0
EYE DISCHARGE: 0
EYE REDNESS: 0
WHEEZING: 0
RHINORRHEA: 0
ABDOMINAL PAIN: 0
DIARRHEA: 0
COUGH: 0
BACK PAIN: 0

## 2019-11-29 ASSESSMENT — PAIN DESCRIPTION - DESCRIPTORS
DESCRIPTORS: CONSTANT
DESCRIPTORS: SQUEEZING;TIGHTNESS

## 2019-11-29 ASSESSMENT — PAIN DESCRIPTION - ORIENTATION: ORIENTATION: MID

## 2019-11-29 ASSESSMENT — PAIN SCALES - GENERAL
PAINLEVEL_OUTOF10: 7
PAINLEVEL_OUTOF10: 7

## 2019-11-29 ASSESSMENT — PAIN DESCRIPTION - LOCATION: LOCATION: BUTTOCKS

## 2019-11-29 ASSESSMENT — PAIN DESCRIPTION - PROGRESSION: CLINICAL_PROGRESSION: NOT CHANGED

## 2019-11-29 ASSESSMENT — PAIN DESCRIPTION - PAIN TYPE
TYPE: ACUTE PAIN
TYPE: ACUTE PAIN

## 2019-11-29 ASSESSMENT — PAIN DESCRIPTION - FREQUENCY: FREQUENCY: CONTINUOUS

## 2019-11-29 ASSESSMENT — PAIN - FUNCTIONAL ASSESSMENT: PAIN_FUNCTIONAL_ASSESSMENT: ACTIVITIES ARE NOT PREVENTED

## 2019-11-30 ENCOUNTER — APPOINTMENT (OUTPATIENT)
Dept: MRI IMAGING | Age: 29
DRG: 539 | End: 2019-11-30
Payer: MEDICARE

## 2019-11-30 LAB
ANION GAP SERPL CALCULATED.3IONS-SCNC: 13 MEQ/L (ref 8–16)
ANION GAP SERPL CALCULATED.3IONS-SCNC: 19 MEQ/L (ref 8–16)
BASOPHILS # BLD: 1.6 %
BASOPHILS ABSOLUTE: 0 THOU/MM3 (ref 0–0.1)
BUN BLDV-MCNC: 7 MG/DL (ref 7–22)
BUN BLDV-MCNC: 9 MG/DL (ref 7–22)
CALCIUM SERPL-MCNC: 9.3 MG/DL (ref 8.5–10.5)
CALCIUM SERPL-MCNC: 9.5 MG/DL (ref 8.5–10.5)
CHLORIDE BLD-SCNC: 102 MEQ/L (ref 98–111)
CHLORIDE BLD-SCNC: 107 MEQ/L (ref 98–111)
CO2: 20 MEQ/L (ref 23–33)
CO2: 25 MEQ/L (ref 23–33)
CREAT SERPL-MCNC: 1 MG/DL (ref 0.4–1.2)
CREAT SERPL-MCNC: 1 MG/DL (ref 0.4–1.2)
DIFFERENTIAL TYPE: ABNORMAL
EOSINOPHIL # BLD: 3.2 %
EOSINOPHILS ABSOLUTE: 0.1 THOU/MM3 (ref 0–0.4)
ERYTHROCYTE [DISTWIDTH] IN BLOOD BY AUTOMATED COUNT: 17.8 % (ref 11.5–14.5)
ERYTHROCYTE [DISTWIDTH] IN BLOOD BY AUTOMATED COUNT: 54 FL (ref 35–45)
FLU A ANTIGEN: NEGATIVE
FLU B ANTIGEN: NEGATIVE
FOLATE: > 20 NG/ML (ref 4.8–24.2)
GFR SERPL CREATININE-BSD FRML MDRD: 89 ML/MIN/1.73M2
GFR SERPL CREATININE-BSD FRML MDRD: 89 ML/MIN/1.73M2
GLUCOSE BLD-MCNC: 101 MG/DL (ref 70–108)
GLUCOSE BLD-MCNC: 105 MG/DL (ref 70–108)
HCT VFR BLD CALC: 41 % (ref 42–52)
HEMOGLOBIN: 12.8 GM/DL (ref 14–18)
IMMATURE GRANS (ABS): 0.01 THOU/MM3 (ref 0–0.07)
IMMATURE GRANULOCYTES: 0.5 %
LYMPHOCYTES # BLD: 58.1 %
LYMPHOCYTES ABSOLUTE: 1.1 THOU/MM3 (ref 1–4.8)
MCH RBC QN AUTO: 26.2 PG (ref 26–33)
MCHC RBC AUTO-ENTMCNC: 31.2 GM/DL (ref 32.2–35.5)
MCV RBC AUTO: 84 FL (ref 80–94)
MONOCYTES # BLD: 17.2 %
MONOCYTES ABSOLUTE: 0.3 THOU/MM3 (ref 0.4–1.3)
NUCLEATED RED BLOOD CELLS: 0 /100 WBC
PATHOLOGIST REVIEW: ABNORMAL
PLATELET # BLD: 188 THOU/MM3 (ref 130–400)
PLATELET ESTIMATE: ADEQUATE
PMV BLD AUTO: 10 FL (ref 9.4–12.4)
POTASSIUM REFLEX MAGNESIUM: 4 MEQ/L (ref 3.5–5.2)
POTASSIUM SERPL-SCNC: 3.6 MEQ/L (ref 3.5–5.2)
PREALBUMIN: 23.5 MG/DL (ref 20–40)
RBC # BLD: 4.88 MILL/MM3 (ref 4.7–6.1)
SCAN OF BLOOD SMEAR: NORMAL
SEG NEUTROPHILS: 19.4 %
SEGMENTED NEUTROPHILS ABSOLUTE COUNT: 0.4 THOU/MM3 (ref 1.8–7.7)
SODIUM BLD-SCNC: 140 MEQ/L (ref 135–145)
SODIUM BLD-SCNC: 146 MEQ/L (ref 135–145)
VITAMIN B-12: 671 PG/ML (ref 211–911)
WBC # BLD: 1.9 THOU/MM3 (ref 4.8–10.8)

## 2019-11-30 PROCEDURE — 96367 TX/PROPH/DG ADDL SEQ IV INF: CPT

## 2019-11-30 PROCEDURE — 72197 MRI PELVIS W/O & W/DYE: CPT

## 2019-11-30 PROCEDURE — 2580000003 HC RX 258: Performed by: FAMILY MEDICINE

## 2019-11-30 PROCEDURE — 6360000002 HC RX W HCPCS: Performed by: NURSE PRACTITIONER

## 2019-11-30 PROCEDURE — G0378 HOSPITAL OBSERVATION PER HR: HCPCS

## 2019-11-30 PROCEDURE — 96365 THER/PROPH/DIAG IV INF INIT: CPT

## 2019-11-30 PROCEDURE — 6370000000 HC RX 637 (ALT 250 FOR IP): Performed by: FAMILY MEDICINE

## 2019-11-30 PROCEDURE — 80175 DRUG SCREEN QUAN LAMOTRIGINE: CPT

## 2019-11-30 PROCEDURE — 80048 BASIC METABOLIC PNL TOTAL CA: CPT

## 2019-11-30 PROCEDURE — 96366 THER/PROPH/DIAG IV INF ADDON: CPT

## 2019-11-30 PROCEDURE — 6360000002 HC RX W HCPCS: Performed by: FAMILY MEDICINE

## 2019-11-30 PROCEDURE — 82746 ASSAY OF FOLIC ACID SERUM: CPT

## 2019-11-30 PROCEDURE — 2580000003 HC RX 258: Performed by: NURSE PRACTITIONER

## 2019-11-30 PROCEDURE — 96375 TX/PRO/DX INJ NEW DRUG ADDON: CPT

## 2019-11-30 PROCEDURE — A9579 GAD-BASE MR CONTRAST NOS,1ML: HCPCS | Performed by: INTERNAL MEDICINE

## 2019-11-30 PROCEDURE — 87804 INFLUENZA ASSAY W/OPTIC: CPT

## 2019-11-30 PROCEDURE — 82607 VITAMIN B-12: CPT

## 2019-11-30 PROCEDURE — 97161 PT EVAL LOW COMPLEX 20 MIN: CPT

## 2019-11-30 PROCEDURE — 84134 ASSAY OF PREALBUMIN: CPT

## 2019-11-30 PROCEDURE — 36415 COLL VENOUS BLD VENIPUNCTURE: CPT

## 2019-11-30 PROCEDURE — 97530 THERAPEUTIC ACTIVITIES: CPT

## 2019-11-30 PROCEDURE — 85025 COMPLETE CBC W/AUTO DIFF WBC: CPT

## 2019-11-30 PROCEDURE — 6360000004 HC RX CONTRAST MEDICATION: Performed by: INTERNAL MEDICINE

## 2019-11-30 PROCEDURE — 99226 PR SBSQ OBSERVATION CARE/DAY 35 MINUTES: CPT | Performed by: NURSE PRACTITIONER

## 2019-11-30 RX ORDER — SODIUM CHLORIDE, SODIUM LACTATE, POTASSIUM CHLORIDE, CALCIUM CHLORIDE 600; 310; 30; 20 MG/100ML; MG/100ML; MG/100ML; MG/100ML
INJECTION, SOLUTION INTRAVENOUS CONTINUOUS
Status: DISCONTINUED | OUTPATIENT
Start: 2019-11-30 | End: 2019-11-30

## 2019-11-30 RX ORDER — FLUCONAZOLE 2 MG/ML
400 INJECTION, SOLUTION INTRAVENOUS ONCE
Status: COMPLETED | OUTPATIENT
Start: 2019-11-30 | End: 2019-12-01

## 2019-11-30 RX ORDER — FLUCONAZOLE, SODIUM CHLORIDE 2 MG/ML
100 INJECTION INTRAVENOUS EVERY 24 HOURS
Status: DISCONTINUED | OUTPATIENT
Start: 2019-11-30 | End: 2019-11-30

## 2019-11-30 RX ORDER — GUAIFENESIN/DEXTROMETHORPHAN 100-10MG/5
5 SYRUP ORAL EVERY 4 HOURS PRN
Status: DISCONTINUED | OUTPATIENT
Start: 2019-11-30 | End: 2019-12-01

## 2019-11-30 RX ORDER — FLUCONAZOLE 2 MG/ML
400 INJECTION, SOLUTION INTRAVENOUS ONCE
Status: COMPLETED | OUTPATIENT
Start: 2019-11-30 | End: 2019-11-30

## 2019-11-30 RX ORDER — DIAZEPAM 5 MG/1
5 TABLET ORAL NIGHTLY PRN
Status: DISCONTINUED | OUTPATIENT
Start: 2019-11-30 | End: 2019-12-02 | Stop reason: HOSPADM

## 2019-11-30 RX ORDER — FLUCONAZOLE 2 MG/ML
400 INJECTION, SOLUTION INTRAVENOUS EVERY 24 HOURS
Status: DISCONTINUED | OUTPATIENT
Start: 2019-12-01 | End: 2019-12-02

## 2019-11-30 RX ADMIN — MEROPENEM 1 G: 1 INJECTION, POWDER, FOR SOLUTION INTRAVENOUS at 21:20

## 2019-11-30 RX ADMIN — GUAIFENESIN AND DEXTROMETHORPHAN 5 ML: 100; 10 SYRUP ORAL at 02:16

## 2019-11-30 RX ADMIN — METHOCARBAMOL TABLETS 500 MG: 500 TABLET, COATED ORAL at 08:49

## 2019-11-30 RX ADMIN — PANTOPRAZOLE SODIUM 40 MG: 40 TABLET, DELAYED RELEASE ORAL at 07:52

## 2019-11-30 RX ADMIN — PIPERACILLIN AND TAZOBACTAM 3.38 G: 3; .375 INJECTION, POWDER, LYOPHILIZED, FOR SOLUTION INTRAVENOUS at 00:09

## 2019-11-30 RX ADMIN — GABAPENTIN 900 MG: 300 CAPSULE ORAL at 17:07

## 2019-11-30 RX ADMIN — DIAZEPAM 5 MG: 5 TABLET ORAL at 00:03

## 2019-11-30 RX ADMIN — DIAZEPAM 5 MG: 5 TABLET ORAL at 07:51

## 2019-11-30 RX ADMIN — VANCOMYCIN HYDROCHLORIDE 1500 MG: 5 INJECTION, POWDER, LYOPHILIZED, FOR SOLUTION INTRAVENOUS at 07:52

## 2019-11-30 RX ADMIN — METHENAMINE HIPPURATE 1 G: 1 TABLET ORAL at 17:06

## 2019-11-30 RX ADMIN — FLUCONAZOLE 400 MG: 400 INJECTION, SOLUTION INTRAVENOUS at 18:58

## 2019-11-30 RX ADMIN — SODIUM CHLORIDE, PRESERVATIVE FREE 10 ML: 5 INJECTION INTRAVENOUS at 00:12

## 2019-11-30 RX ADMIN — GADOTERIDOL 20 ML: 279.3 INJECTION, SOLUTION INTRAVENOUS at 14:23

## 2019-11-30 RX ADMIN — LAMOTRIGINE 100 MG: 100 TABLET ORAL at 08:49

## 2019-11-30 RX ADMIN — BACLOFEN 20 MG: 10 TABLET ORAL at 08:49

## 2019-11-30 RX ADMIN — METHOCARBAMOL TABLETS 500 MG: 500 TABLET, COATED ORAL at 12:57

## 2019-11-30 RX ADMIN — GUAIFENESIN AND DEXTROMETHORPHAN 5 ML: 100; 10 SYRUP ORAL at 12:57

## 2019-11-30 RX ADMIN — GABAPENTIN 900 MG: 300 CAPSULE ORAL at 12:56

## 2019-11-30 RX ADMIN — QUETIAPINE FUMARATE 25 MG: 25 TABLET ORAL at 21:22

## 2019-11-30 RX ADMIN — BACLOFEN 20 MG: 10 TABLET ORAL at 17:07

## 2019-11-30 RX ADMIN — BACLOFEN 20 MG: 10 TABLET ORAL at 00:06

## 2019-11-30 RX ADMIN — PIPERACILLIN SODIUM,TAZOBACTAM SODIUM 3.38 G: 3; .375 INJECTION, POWDER, FOR SOLUTION INTRAVENOUS at 10:13

## 2019-11-30 RX ADMIN — SODIUM CHLORIDE, PRESERVATIVE FREE 10 ML: 5 INJECTION INTRAVENOUS at 21:22

## 2019-11-30 RX ADMIN — VANCOMYCIN HYDROCHLORIDE 1500 MG: 5 INJECTION, POWDER, LYOPHILIZED, FOR SOLUTION INTRAVENOUS at 18:58

## 2019-11-30 RX ADMIN — DIAZEPAM 5 MG: 5 TABLET ORAL at 22:14

## 2019-11-30 RX ADMIN — BACLOFEN 20 MG: 10 TABLET ORAL at 12:57

## 2019-11-30 RX ADMIN — METHOCARBAMOL TABLETS 500 MG: 500 TABLET, COATED ORAL at 17:07

## 2019-11-30 RX ADMIN — POTASSIUM CHLORIDE 40 MEQ: 1500 TABLET, EXTENDED RELEASE ORAL at 00:03

## 2019-11-30 RX ADMIN — GABAPENTIN 900 MG: 300 CAPSULE ORAL at 00:06

## 2019-11-30 RX ADMIN — MEROPENEM 1 G: 1 INJECTION, POWDER, FOR SOLUTION INTRAVENOUS at 15:48

## 2019-11-30 RX ADMIN — GABAPENTIN 900 MG: 300 CAPSULE ORAL at 08:49

## 2019-11-30 RX ADMIN — METHENAMINE HIPPURATE 1 G: 1 TABLET ORAL at 08:49

## 2019-11-30 RX ADMIN — LAMOTRIGINE 100 MG: 100 TABLET ORAL at 21:22

## 2019-11-30 ASSESSMENT — PAIN DESCRIPTION - DESCRIPTORS
DESCRIPTORS: TIGHTNESS;BURNING
DESCRIPTORS: SQUEEZING;TIGHTNESS

## 2019-11-30 ASSESSMENT — PAIN DESCRIPTION - FREQUENCY
FREQUENCY: CONTINUOUS
FREQUENCY: CONTINUOUS

## 2019-11-30 ASSESSMENT — PAIN DESCRIPTION - ONSET
ONSET: ON-GOING
ONSET: ON-GOING

## 2019-11-30 ASSESSMENT — PAIN DESCRIPTION - PAIN TYPE
TYPE: ACUTE PAIN

## 2019-11-30 ASSESSMENT — PAIN DESCRIPTION - PROGRESSION
CLINICAL_PROGRESSION: NOT CHANGED
CLINICAL_PROGRESSION: NOT CHANGED

## 2019-11-30 ASSESSMENT — PAIN DESCRIPTION - LOCATION
LOCATION: BUTTOCKS

## 2019-11-30 ASSESSMENT — PAIN DESCRIPTION - ORIENTATION: ORIENTATION: MID

## 2019-11-30 ASSESSMENT — PAIN - FUNCTIONAL ASSESSMENT
PAIN_FUNCTIONAL_ASSESSMENT: ACTIVITIES ARE NOT PREVENTED
PAIN_FUNCTIONAL_ASSESSMENT: ACTIVITIES ARE NOT PREVENTED

## 2019-11-30 ASSESSMENT — PAIN SCALES - GENERAL
PAINLEVEL_OUTOF10: 6
PAINLEVEL_OUTOF10: 4
PAINLEVEL_OUTOF10: 7
PAINLEVEL_OUTOF10: 5
PAINLEVEL_OUTOF10: 6

## 2019-12-01 PROBLEM — B37.49 CANDIDURIA: Status: ACTIVE | Noted: 2019-12-01

## 2019-12-01 LAB
BASOPHILS # BLD: 1.1 %
BASOPHILS ABSOLUTE: 0 THOU/MM3 (ref 0–0.1)
EOSINOPHIL # BLD: 3.1 %
EOSINOPHILS ABSOLUTE: 0.1 THOU/MM3 (ref 0–0.4)
ERYTHROCYTE [DISTWIDTH] IN BLOOD BY AUTOMATED COUNT: 18.4 % (ref 11.5–14.5)
ERYTHROCYTE [DISTWIDTH] IN BLOOD BY AUTOMATED COUNT: 54.4 FL (ref 35–45)
HCT VFR BLD CALC: 41.6 % (ref 42–52)
HEMOGLOBIN: 13 GM/DL (ref 14–18)
IMMATURE GRANS (ABS): 0.02 THOU/MM3 (ref 0–0.07)
IMMATURE GRANULOCYTES: 0.6 %
LAMOTRIGINE LEVEL: 1.7 UG/ML (ref 2.5–15)
LYMPHOCYTES # BLD: 45.2 %
LYMPHOCYTES ABSOLUTE: 1.6 THOU/MM3 (ref 1–4.8)
MCH RBC QN AUTO: 26.1 PG (ref 26–33)
MCHC RBC AUTO-ENTMCNC: 31.3 GM/DL (ref 32.2–35.5)
MCV RBC AUTO: 83.4 FL (ref 80–94)
MONOCYTES # BLD: 16.3 %
MONOCYTES ABSOLUTE: 0.6 THOU/MM3 (ref 0.4–1.3)
NUCLEATED RED BLOOD CELLS: 0 /100 WBC
PLATELET # BLD: 188 THOU/MM3 (ref 130–400)
PMV BLD AUTO: 10.3 FL (ref 9.4–12.4)
RBC # BLD: 4.99 MILL/MM3 (ref 4.7–6.1)
SEG NEUTROPHILS: 33.7 %
SEGMENTED NEUTROPHILS ABSOLUTE COUNT: 1.2 THOU/MM3 (ref 1.8–7.7)
VANCOMYCIN TROUGH: 15.4 UG/ML (ref 5–15)
WBC # BLD: 3.6 THOU/MM3 (ref 4.8–10.8)

## 2019-12-01 PROCEDURE — 6370000000 HC RX 637 (ALT 250 FOR IP): Performed by: FAMILY MEDICINE

## 2019-12-01 PROCEDURE — 6360000002 HC RX W HCPCS: Performed by: NURSE PRACTITIONER

## 2019-12-01 PROCEDURE — 2580000003 HC RX 258: Performed by: FAMILY MEDICINE

## 2019-12-01 PROCEDURE — 80202 ASSAY OF VANCOMYCIN: CPT

## 2019-12-01 PROCEDURE — 85025 COMPLETE CBC W/AUTO DIFF WBC: CPT

## 2019-12-01 PROCEDURE — 99232 SBSQ HOSP IP/OBS MODERATE 35: CPT | Performed by: NURSE PRACTITIONER

## 2019-12-01 PROCEDURE — 6360000002 HC RX W HCPCS: Performed by: FAMILY MEDICINE

## 2019-12-01 PROCEDURE — 1200000003 HC TELEMETRY R&B

## 2019-12-01 PROCEDURE — 96366 THER/PROPH/DIAG IV INF ADDON: CPT

## 2019-12-01 PROCEDURE — 2580000003 HC RX 258: Performed by: NURSE PRACTITIONER

## 2019-12-01 PROCEDURE — 99202 OFFICE O/P NEW SF 15 MIN: CPT | Performed by: SURGERY

## 2019-12-01 PROCEDURE — 36415 COLL VENOUS BLD VENIPUNCTURE: CPT

## 2019-12-01 PROCEDURE — 80175 DRUG SCREEN QUAN LAMOTRIGINE: CPT

## 2019-12-01 RX ORDER — GUAIFENESIN/DEXTROMETHORPHAN 100-10MG/5
10 SYRUP ORAL EVERY 6 HOURS
Status: DISCONTINUED | OUTPATIENT
Start: 2019-12-01 | End: 2019-12-02 | Stop reason: HOSPADM

## 2019-12-01 RX ADMIN — QUETIAPINE FUMARATE 25 MG: 25 TABLET ORAL at 20:01

## 2019-12-01 RX ADMIN — Medication 10 ML: at 22:22

## 2019-12-01 RX ADMIN — BACLOFEN 20 MG: 10 TABLET ORAL at 01:00

## 2019-12-01 RX ADMIN — SODIUM CHLORIDE, PRESERVATIVE FREE 10 ML: 5 INJECTION INTRAVENOUS at 22:22

## 2019-12-01 RX ADMIN — LAMOTRIGINE 100 MG: 100 TABLET ORAL at 18:16

## 2019-12-01 RX ADMIN — VANCOMYCIN HYDROCHLORIDE 1500 MG: 5 INJECTION, POWDER, LYOPHILIZED, FOR SOLUTION INTRAVENOUS at 10:14

## 2019-12-01 RX ADMIN — METHOCARBAMOL TABLETS 500 MG: 500 TABLET, COATED ORAL at 06:25

## 2019-12-01 RX ADMIN — GABAPENTIN 900 MG: 300 CAPSULE ORAL at 14:18

## 2019-12-01 RX ADMIN — METHENAMINE HIPPURATE 1 G: 1 TABLET ORAL at 18:16

## 2019-12-01 RX ADMIN — METHENAMINE HIPPURATE 1 G: 1 TABLET ORAL at 06:25

## 2019-12-01 RX ADMIN — METHOCARBAMOL TABLETS 500 MG: 500 TABLET, COATED ORAL at 01:00

## 2019-12-01 RX ADMIN — GABAPENTIN 900 MG: 300 CAPSULE ORAL at 06:26

## 2019-12-01 RX ADMIN — GABAPENTIN 900 MG: 300 CAPSULE ORAL at 01:00

## 2019-12-01 RX ADMIN — SODIUM CHLORIDE, PRESERVATIVE FREE 10 ML: 5 INJECTION INTRAVENOUS at 10:15

## 2019-12-01 RX ADMIN — BACLOFEN 20 MG: 10 TABLET ORAL at 06:26

## 2019-12-01 RX ADMIN — BACLOFEN 20 MG: 10 TABLET ORAL at 14:18

## 2019-12-01 RX ADMIN — METHOCARBAMOL TABLETS 500 MG: 500 TABLET, COATED ORAL at 14:18

## 2019-12-01 RX ADMIN — MEROPENEM 1 G: 1 INJECTION, POWDER, FOR SOLUTION INTRAVENOUS at 06:13

## 2019-12-01 RX ADMIN — MEROPENEM 1 G: 1 INJECTION, POWDER, FOR SOLUTION INTRAVENOUS at 15:04

## 2019-12-01 RX ADMIN — LAMOTRIGINE 100 MG: 100 TABLET ORAL at 06:25

## 2019-12-01 RX ADMIN — METHOCARBAMOL TABLETS 500 MG: 500 TABLET, COATED ORAL at 20:01

## 2019-12-01 RX ADMIN — BACLOFEN 20 MG: 10 TABLET ORAL at 18:16

## 2019-12-01 RX ADMIN — FLUCONAZOLE 400 MG: 400 INJECTION, SOLUTION INTRAVENOUS at 18:23

## 2019-12-01 RX ADMIN — MEROPENEM 1 G: 1 INJECTION, POWDER, FOR SOLUTION INTRAVENOUS at 22:11

## 2019-12-01 RX ADMIN — DIAZEPAM 5 MG: 5 TABLET ORAL at 20:01

## 2019-12-01 RX ADMIN — PANTOPRAZOLE SODIUM 40 MG: 40 TABLET, DELAYED RELEASE ORAL at 06:25

## 2019-12-01 RX ADMIN — GABAPENTIN 900 MG: 300 CAPSULE ORAL at 18:15

## 2019-12-01 ASSESSMENT — PAIN SCALES - GENERAL
PAINLEVEL_OUTOF10: 5
PAINLEVEL_OUTOF10: 6
PAINLEVEL_OUTOF10: 4
PAINLEVEL_OUTOF10: 6

## 2019-12-01 ASSESSMENT — PAIN DESCRIPTION - PROGRESSION: CLINICAL_PROGRESSION: NOT CHANGED

## 2019-12-01 ASSESSMENT — ENCOUNTER SYMPTOMS
CHEST TIGHTNESS: 0
BLOOD IN STOOL: 0
DIARRHEA: 0
SHORTNESS OF BREATH: 0
BACK PAIN: 0
NAUSEA: 0
ABDOMINAL PAIN: 0
VOMITING: 0
COUGH: 0
SORE THROAT: 0

## 2019-12-01 ASSESSMENT — PAIN DESCRIPTION - ORIENTATION: ORIENTATION: MID

## 2019-12-01 ASSESSMENT — PAIN DESCRIPTION - DESCRIPTORS: DESCRIPTORS: BURNING;TIGHTNESS

## 2019-12-01 ASSESSMENT — PAIN DESCRIPTION - ONSET: ONSET: ON-GOING

## 2019-12-01 ASSESSMENT — PAIN DESCRIPTION - FREQUENCY: FREQUENCY: CONTINUOUS

## 2019-12-01 ASSESSMENT — PAIN DESCRIPTION - PAIN TYPE
TYPE: ACUTE PAIN
TYPE: ACUTE PAIN

## 2019-12-01 ASSESSMENT — PAIN - FUNCTIONAL ASSESSMENT: PAIN_FUNCTIONAL_ASSESSMENT: ACTIVITIES ARE NOT PREVENTED

## 2019-12-01 ASSESSMENT — PAIN DESCRIPTION - LOCATION: LOCATION: BUTTOCKS

## 2019-12-02 VITALS
OXYGEN SATURATION: 95 % | WEIGHT: 160 LBS | HEART RATE: 101 BPM | BODY MASS INDEX: 22.4 KG/M2 | RESPIRATION RATE: 16 BRPM | TEMPERATURE: 98.3 F | HEIGHT: 71 IN | SYSTOLIC BLOOD PRESSURE: 107 MMHG | DIASTOLIC BLOOD PRESSURE: 64 MMHG

## 2019-12-02 LAB
ANION GAP SERPL CALCULATED.3IONS-SCNC: 12 MEQ/L (ref 8–16)
BASOPHILS # BLD: 0.8 %
BASOPHILS ABSOLUTE: 0 THOU/MM3 (ref 0–0.1)
BUN BLDV-MCNC: 12 MG/DL (ref 7–22)
CALCIUM SERPL-MCNC: 9.4 MG/DL (ref 8.5–10.5)
CHLORIDE BLD-SCNC: 105 MEQ/L (ref 98–111)
CO2: 26 MEQ/L (ref 23–33)
CREAT SERPL-MCNC: 0.9 MG/DL (ref 0.4–1.2)
EOSINOPHIL # BLD: 3.3 %
EOSINOPHILS ABSOLUTE: 0.1 THOU/MM3 (ref 0–0.4)
ERYTHROCYTE [DISTWIDTH] IN BLOOD BY AUTOMATED COUNT: 18 % (ref 11.5–14.5)
ERYTHROCYTE [DISTWIDTH] IN BLOOD BY AUTOMATED COUNT: 55.2 FL (ref 35–45)
GFR SERPL CREATININE-BSD FRML MDRD: > 90 ML/MIN/1.73M2
GLUCOSE BLD-MCNC: 90 MG/DL (ref 70–108)
HCT VFR BLD CALC: 42.1 % (ref 42–52)
HEMOGLOBIN: 13.2 GM/DL (ref 14–18)
IMMATURE GRANS (ABS): 0.04 THOU/MM3 (ref 0–0.07)
IMMATURE GRANULOCYTES: 1.1 %
LYMPHOCYTES # BLD: 44.8 %
LYMPHOCYTES ABSOLUTE: 1.6 THOU/MM3 (ref 1–4.8)
MCH RBC QN AUTO: 26.6 PG (ref 26–33)
MCHC RBC AUTO-ENTMCNC: 31.4 GM/DL (ref 32.2–35.5)
MCV RBC AUTO: 84.9 FL (ref 80–94)
MONOCYTES # BLD: 14.5 %
MONOCYTES ABSOLUTE: 0.5 THOU/MM3 (ref 0.4–1.3)
NUCLEATED RED BLOOD CELLS: 0 /100 WBC
ORGANISM: ABNORMAL
PLATELET # BLD: 167 THOU/MM3 (ref 130–400)
PMV BLD AUTO: 10.5 FL (ref 9.4–12.4)
POTASSIUM REFLEX MAGNESIUM: 3.6 MEQ/L (ref 3.5–5.2)
RBC # BLD: 4.96 MILL/MM3 (ref 4.7–6.1)
SEG NEUTROPHILS: 35.5 %
SEGMENTED NEUTROPHILS ABSOLUTE COUNT: 1.3 THOU/MM3 (ref 1.8–7.7)
SODIUM BLD-SCNC: 143 MEQ/L (ref 135–145)
URINE CULTURE REFLEX: ABNORMAL
WBC # BLD: 3.6 THOU/MM3 (ref 4.8–10.8)

## 2019-12-02 PROCEDURE — 2580000003 HC RX 258: Performed by: FAMILY MEDICINE

## 2019-12-02 PROCEDURE — 36415 COLL VENOUS BLD VENIPUNCTURE: CPT

## 2019-12-02 PROCEDURE — 6370000000 HC RX 637 (ALT 250 FOR IP): Performed by: NURSE PRACTITIONER

## 2019-12-02 PROCEDURE — 80048 BASIC METABOLIC PNL TOTAL CA: CPT

## 2019-12-02 PROCEDURE — 2580000003 HC RX 258: Performed by: NURSE PRACTITIONER

## 2019-12-02 PROCEDURE — 85025 COMPLETE CBC W/AUTO DIFF WBC: CPT

## 2019-12-02 PROCEDURE — 6360000002 HC RX W HCPCS: Performed by: NURSE PRACTITIONER

## 2019-12-02 PROCEDURE — 6370000000 HC RX 637 (ALT 250 FOR IP): Performed by: FAMILY MEDICINE

## 2019-12-02 PROCEDURE — 99239 HOSP IP/OBS DSCHRG MGMT >30: CPT | Performed by: NURSE PRACTITIONER

## 2019-12-02 RX ORDER — DIAZEPAM 5 MG/1
5 TABLET ORAL EVERY 8 HOURS PRN
Status: DISCONTINUED | OUTPATIENT
Start: 2019-12-02 | End: 2019-12-02 | Stop reason: HOSPADM

## 2019-12-02 RX ADMIN — BACLOFEN 20 MG: 10 TABLET ORAL at 00:55

## 2019-12-02 RX ADMIN — Medication 10 ML: at 04:09

## 2019-12-02 RX ADMIN — LAMOTRIGINE 100 MG: 100 TABLET ORAL at 06:40

## 2019-12-02 RX ADMIN — GABAPENTIN 900 MG: 300 CAPSULE ORAL at 13:58

## 2019-12-02 RX ADMIN — MEROPENEM 1 G: 1 INJECTION, POWDER, FOR SOLUTION INTRAVENOUS at 16:16

## 2019-12-02 RX ADMIN — BACLOFEN 20 MG: 10 TABLET ORAL at 06:40

## 2019-12-02 RX ADMIN — BACLOFEN 20 MG: 10 TABLET ORAL at 13:59

## 2019-12-02 RX ADMIN — Medication 10 ML: at 09:03

## 2019-12-02 RX ADMIN — GABAPENTIN 900 MG: 300 CAPSULE ORAL at 06:40

## 2019-12-02 RX ADMIN — METHOCARBAMOL TABLETS 500 MG: 500 TABLET, COATED ORAL at 13:59

## 2019-12-02 RX ADMIN — PANTOPRAZOLE SODIUM 40 MG: 40 TABLET, DELAYED RELEASE ORAL at 09:00

## 2019-12-02 RX ADMIN — METHOCARBAMOL TABLETS 500 MG: 500 TABLET, COATED ORAL at 06:40

## 2019-12-02 RX ADMIN — GABAPENTIN 900 MG: 300 CAPSULE ORAL at 00:55

## 2019-12-02 RX ADMIN — VANCOMYCIN HYDROCHLORIDE 1500 MG: 500 INJECTION, POWDER, LYOPHILIZED, FOR SOLUTION INTRAVENOUS at 13:58

## 2019-12-02 RX ADMIN — SODIUM CHLORIDE, PRESERVATIVE FREE 10 ML: 5 INJECTION INTRAVENOUS at 09:02

## 2019-12-02 RX ADMIN — DIAZEPAM 5 MG: 5 TABLET ORAL at 09:02

## 2019-12-02 RX ADMIN — MEROPENEM 1 G: 1 INJECTION, POWDER, FOR SOLUTION INTRAVENOUS at 06:29

## 2019-12-02 RX ADMIN — METHOCARBAMOL TABLETS 500 MG: 500 TABLET, COATED ORAL at 00:55

## 2019-12-02 RX ADMIN — Medication 10 ML: at 16:16

## 2019-12-02 RX ADMIN — METHENAMINE HIPPURATE 1 G: 1 TABLET ORAL at 09:00

## 2019-12-02 ASSESSMENT — PAIN DESCRIPTION - DESCRIPTORS
DESCRIPTORS: SPASM
DESCRIPTORS: BURNING;TIGHTNESS

## 2019-12-02 ASSESSMENT — PAIN DESCRIPTION - ONSET
ONSET: ON-GOING
ONSET: ON-GOING

## 2019-12-02 ASSESSMENT — PAIN DESCRIPTION - LOCATION
LOCATION: BUTTOCKS
LOCATION: BUTTOCKS

## 2019-12-02 ASSESSMENT — PAIN DESCRIPTION - FREQUENCY
FREQUENCY: CONTINUOUS
FREQUENCY: CONTINUOUS

## 2019-12-02 ASSESSMENT — PAIN DESCRIPTION - PROGRESSION
CLINICAL_PROGRESSION: NOT CHANGED
CLINICAL_PROGRESSION: GRADUALLY WORSENING

## 2019-12-02 ASSESSMENT — PAIN DESCRIPTION - PAIN TYPE
TYPE: ACUTE PAIN
TYPE: ACUTE PAIN

## 2019-12-02 ASSESSMENT — PAIN - FUNCTIONAL ASSESSMENT
PAIN_FUNCTIONAL_ASSESSMENT: PREVENTS OR INTERFERES WITH MANY ACTIVE NOT PASSIVE ACTIVITIES
PAIN_FUNCTIONAL_ASSESSMENT: ACTIVITIES ARE NOT PREVENTED

## 2019-12-02 ASSESSMENT — PAIN DESCRIPTION - ORIENTATION
ORIENTATION: MID
ORIENTATION: MID

## 2019-12-02 ASSESSMENT — PAIN SCALES - GENERAL
PAINLEVEL_OUTOF10: 0
PAINLEVEL_OUTOF10: 7
PAINLEVEL_OUTOF10: 6

## 2019-12-02 ASSESSMENT — PAIN DESCRIPTION - DIRECTION: RADIATING_TOWARDS: TO CHEST

## 2019-12-03 LAB — LAMOTRIGINE LEVEL: 1.9 UG/ML (ref 2.5–15)

## 2019-12-05 LAB
BLOOD CULTURE, ROUTINE: NORMAL
BLOOD CULTURE, ROUTINE: NORMAL

## 2019-12-12 ENCOUNTER — HOSPITAL ENCOUNTER (OUTPATIENT)
Dept: WOUND CARE | Age: 29
Discharge: HOME | End: 2019-12-12
Payer: MEDICARE

## 2019-12-12 VITALS
OXYGEN SATURATION: 98 % | RESPIRATION RATE: 16 BRPM | SYSTOLIC BLOOD PRESSURE: 134 MMHG | HEART RATE: 105 BPM | DIASTOLIC BLOOD PRESSURE: 77 MMHG | TEMPERATURE: 97.8 F

## 2019-12-12 DIAGNOSIS — M46.28 CHRONIC OSTEOMYELITIS OF COCCYX (HCC): ICD-10-CM

## 2019-12-12 DIAGNOSIS — L89.324 PRESSURE ULCER OF ISCHIUM, LEFT, STAGE IV (HCC): ICD-10-CM

## 2019-12-12 DIAGNOSIS — M86.652 CHRONIC OSTEOMYELITIS, PELVIS, LEFT (HCC): ICD-10-CM

## 2019-12-12 PROCEDURE — 99214 OFFICE O/P EST MOD 30 MIN: CPT | Performed by: NURSE PRACTITIONER

## 2019-12-12 PROCEDURE — 2709999900 HC NON-CHARGEABLE SUPPLY

## 2019-12-12 PROCEDURE — 99213 OFFICE O/P EST LOW 20 MIN: CPT

## 2019-12-12 ASSESSMENT — PAIN DESCRIPTION - LOCATION: LOCATION: ABDOMEN;BUTTOCKS

## 2019-12-12 ASSESSMENT — PAIN DESCRIPTION - PAIN TYPE: TYPE: CHRONIC PAIN

## 2019-12-12 ASSESSMENT — PAIN SCALES - GENERAL: PAINLEVEL_OUTOF10: 6

## 2019-12-26 ENCOUNTER — HOSPITAL ENCOUNTER (OUTPATIENT)
Dept: HYPERBARIC MEDICINE | Age: 29
Setting detail: THERAPIES SERIES
Discharge: HOME | End: 2019-12-26
Payer: MEDICARE

## 2019-12-30 ENCOUNTER — HOSPITAL ENCOUNTER (OUTPATIENT)
Dept: HYPERBARIC MEDICINE | Age: 29
Setting detail: THERAPIES SERIES
Discharge: HOME | End: 2019-12-30
Payer: MEDICARE

## 2020-01-06 ENCOUNTER — HOSPITAL ENCOUNTER (OUTPATIENT)
Dept: HYPERBARIC MEDICINE | Age: 30
Setting detail: THERAPIES SERIES
Discharge: HOME | End: 2020-01-06
Payer: MEDICARE

## 2020-01-06 VITALS
DIASTOLIC BLOOD PRESSURE: 73 MMHG | SYSTOLIC BLOOD PRESSURE: 126 MMHG | TEMPERATURE: 98.6 F | HEART RATE: 81 BPM | RESPIRATION RATE: 16 BRPM | OXYGEN SATURATION: 98 %

## 2020-01-06 PROCEDURE — G0277 HBOT, FULL BODY CHAMBER, 30M: HCPCS

## 2020-01-06 PROCEDURE — 99183 HYPERBARIC OXYGEN THERAPY: CPT | Performed by: NURSE PRACTITIONER

## 2020-01-06 ASSESSMENT — PAIN SCALES - GENERAL
PAINLEVEL_OUTOF10: 0
PAINLEVEL_OUTOF10: 0
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
## 2020-01-07 ENCOUNTER — HOSPITAL ENCOUNTER (OUTPATIENT)
Dept: HYPERBARIC MEDICINE | Age: 30
Setting detail: THERAPIES SERIES
Discharge: HOME | End: 2020-01-07
Payer: MEDICARE

## 2020-01-07 VITALS
HEIGHT: 71 IN | SYSTOLIC BLOOD PRESSURE: 125 MMHG | TEMPERATURE: 97.6 F | DIASTOLIC BLOOD PRESSURE: 76 MMHG | WEIGHT: 160 LBS | RESPIRATION RATE: 16 BRPM | BODY MASS INDEX: 22.4 KG/M2 | HEART RATE: 77 BPM | OXYGEN SATURATION: 96 %

## 2020-01-07 PROCEDURE — G0277 HBOT, FULL BODY CHAMBER, 30M: HCPCS

## 2020-01-07 ASSESSMENT — PAIN SCALES - GENERAL
PAINLEVEL_OUTOF10: 0
PAINLEVEL_OUTOF10: 0

## 2020-01-07 NOTE — PROGRESS NOTES
[address] [address]  Hyperbaric Oxygen Therapy   Progress Note      NAME: Lizz 43 RECORD NUMBER:  [id number]  AGE: 30 y.o. GENDER: male  : 1990  EPISODE DATE:  2020     Subjective     HBO Treatment Number: 2 out of Total Treatments: 40    HBO Diagnosis:               Indications: Chronic Refractory Osteomyelitis to ___ (site)(Chronic osteomyelitis of coccyx and pelvis )    Safety checks performed prior to treatment. See doc flowsheets for documentation. Objective        Patient Active Problem List   Diagnosis Code    Urinary tract infection associated with catheterization of urinary tract ([address] T83.511A, N39.0    Moderate single current episode of major depressive disorder ([address] F32.1    Severe malnutrition ([address] E43    Sepsis with metabolic encephalopathy ([address] A41.9, R65.20, G93.41    Decubitus ulcer of coccygeal region, stage 3 ([facility]) L89.153    Adjustment Disorder with Mixed Anxiety and Depressed Mood     Colostomy status ([address] Z93.3    Sepsis ([address] A41.9    Neurogenic bladder N31.9    Colostomy care ([address] Z43.3    Peristomal skin complication Z05.8    Overactive bladder N32.81    Osteomyelitis ([address] M86.9    Neutropenia ([facility]) D70.9    Hypernatremia E87.0    Candidemia ([facility]) B37.7    Candiduria B37.49    Pressure ulcer of ischium, left, stage IV ([facility]) L89.324    Chronic osteomyelitis of coccyx ([facility]) M46.28    Chronic osteomyelitis, pelvis, left ([facility]) M86.652          No results for input(s): POCGLU in the last 72 hours.     Pre treatment Vital Signs       Temp: 97.9 °F (36.6 °C)     Pulse: 85     Resp: 16     BP: 118/66       Post treatment Vital Signs  Temp: 97.6 °F (36.4 °C)  Pulse: 77  Resp: 16  BP: 125/76      Assessment        Physical Exam:  General Appearance:  alert and oriented to person, place and time, well-developed and well-nourished, in no acute distress    Pre Tympanic Membrane Assessment:  tympanic membranes intact bilaterally    Post

## 2020-01-08 ENCOUNTER — HOSPITAL ENCOUNTER (OUTPATIENT)
Dept: HYPERBARIC MEDICINE | Age: 30
Setting detail: THERAPIES SERIES
Discharge: HOME | End: 2020-01-08
Payer: MEDICARE

## 2020-01-08 VITALS
TEMPERATURE: 98.6 F | HEART RATE: 72 BPM | OXYGEN SATURATION: 95 % | RESPIRATION RATE: 16 BRPM | DIASTOLIC BLOOD PRESSURE: 64 MMHG | SYSTOLIC BLOOD PRESSURE: 115 MMHG

## 2020-01-08 PROCEDURE — G0277 HBOT, FULL BODY CHAMBER, 30M: HCPCS

## 2020-01-08 ASSESSMENT — PAIN SCALES - GENERAL: PAINLEVEL_OUTOF10: 0

## 2020-01-08 NOTE — PROGRESS NOTES
[address] [address]  Hyperbaric Oxygen Therapy   Progress Note      NAME: Lizz 43 RECORD NUMBER:  [id number]  AGE: 30 y.o. GENDER: male  : 1990  EPISODE DATE:  2020     Subjective     HBO Treatment Number: 3 out of Total Treatments: 40    HBO Diagnosis:               Indications: Chronic Refractory Osteomyelitis to ___ (site)(Chronic osteomyelitis of coccyx and pelvis )    Safety checks performed prior to treatment. See doc flowsheets for documentation. Objective        Patient Active Problem List   Diagnosis Code    Urinary tract infection associated with catheterization of urinary tract ([address] T83.511A, N39.0    Moderate single current episode of major depressive disorder ([address] F32.1    Severe malnutrition ([address] E43    Sepsis with metabolic encephalopathy ([address] A41.9, R65.20, G93.41    Decubitus ulcer of coccygeal region, stage 3 ([facility]) L89.153    Adjustment Disorder with Mixed Anxiety and Depressed Mood     Colostomy status ([address] Z93.3    Sepsis ([address] A41.9    Neurogenic bladder N31.9    Colostomy care ([address] Z43.3    Peristomal skin complication L81.0    Overactive bladder N32.81    Osteomyelitis ([address] M86.9    Neutropenia ([facility]) D70.9    Hypernatremia E87.0    Candidemia ([facility]) B37.7    Candiduria B37.49    Pressure ulcer of ischium, left, stage IV ([facility]) L89.324    Chronic osteomyelitis of coccyx ([facility]) M46.28    Chronic osteomyelitis, pelvis, left ([facility]) M86.652          No results for input(s): POCGLU in the last 72 hours.     Pre treatment Vital Signs       Temp: 97.8 °F (36.6 °C)     Pulse: 86     Resp: 16     BP: 137/79       Post treatment Vital Signs  Temp: 98.6 °F (37 °C)  Pulse: 72  Resp: 16  BP: 115/64      Assessment        Physical Exam:  General Appearance:  alert and oriented to person, place and time, well-developed and well-nourished, in no acute distress    Pre Tympanic Membrane Assessment:  tympanic membranes intact bilaterally    Post

## 2020-01-08 NOTE — PROGRESS NOTES
[address] [address]  Hyperbaric Oxygen Therapy   Progress Note      NAME: Lizz 43 RECORD NUMBER:  [id number]  AGE: 30 y.o. GENDER: male  : 1990  EPISODE DATE:  2020     Subjective     HBO Treatment Number: 1 out of Total Treatments: 40    HBO Diagnosis:               Indications: Chronic Refractory Osteomyelitis to ___ (site)(Chronic osteomyelitis of coccyx and pelvis )    Safety checks performed prior to treatment. See doc flowsheets for documentation. Objective        Patient Active Problem List   Diagnosis Code    Urinary tract infection associated with catheterization of urinary tract ([address] T83.511A, N39.0    Moderate single current episode of major depressive disorder ([address] F32.1    Severe malnutrition ([address] E43    Sepsis with metabolic encephalopathy ([address] A41.9, R65.20, G93.41    Decubitus ulcer of coccygeal region, stage 3 ([facility]) L89.153    Adjustment Disorder with Mixed Anxiety and Depressed Mood     Colostomy status ([address] Z93.3    Sepsis ([address] A41.9    Neurogenic bladder N31.9    Colostomy care ([address] Z43.3    Peristomal skin complication X50.9    Overactive bladder N32.81    Osteomyelitis ([address] M86.9    Neutropenia ([facility]) D70.9    Hypernatremia E87.0    Candidemia ([facility]) B37.7    Candiduria B37.49    Pressure ulcer of ischium, left, stage IV ([facility]) L89.324    Chronic osteomyelitis of coccyx ([facility]) M46.28    Chronic osteomyelitis, pelvis, left ([facility]) M86.652          No results for input(s): POCGLU in the last 72 hours.     Pre treatment Vital Signs       Temp: 98.6 °F (37 °C)     Pulse: 103     Resp: 16     BP: 134/77       Post treatment Vital Signs  Temp: 98.6 °F (37 °C)  Pulse: 81  Resp: 16  BP: 126/73      Assessment        Physical Exam:  General Appearance:  alert and oriented to person, place and time    Pre Tympanic Membrane Assessment:  tympanic membranes intact bilaterally, normal color    Post Tympanic Membrane Assessment:  Right:

## 2020-01-09 ENCOUNTER — HOSPITAL ENCOUNTER (OUTPATIENT)
Dept: HYPERBARIC MEDICINE | Age: 30
Setting detail: THERAPIES SERIES
Discharge: HOME | End: 2020-01-09
Payer: MEDICARE

## 2020-01-09 VITALS
OXYGEN SATURATION: 97 % | DIASTOLIC BLOOD PRESSURE: 57 MMHG | SYSTOLIC BLOOD PRESSURE: 119 MMHG | RESPIRATION RATE: 18 BRPM | HEART RATE: 81 BPM | TEMPERATURE: 97.9 F

## 2020-01-09 PROCEDURE — 99183 HYPERBARIC OXYGEN THERAPY: CPT | Performed by: NURSE PRACTITIONER

## 2020-01-09 PROCEDURE — G0277 HBOT, FULL BODY CHAMBER, 30M: HCPCS

## 2020-01-09 ASSESSMENT — PAIN SCALES - GENERAL
PAINLEVEL_OUTOF10: 0
PAINLEVEL_OUTOF10: 0

## 2020-01-09 NOTE — PROGRESS NOTES
Normal  Left: Normal    Pulmonary/Chest:  clear to auscultation bilaterally- no wheezes, rales or rhonchi, normal air movement, no respiratory distress    Cardiovascular:  normal, regular rate and rhythm       Treatment Start Time: 1056     Pressure Reached Time: 1106  ABEL : 2  Treatment Status: No Air break      Decompression Time: 1236   Treatment End Time: 1246    Symptoms Noted During Treatment: None    Adverse Event: no      I was present on these premises and immediately available to furnish assistance & direction throughout the procedure. Plan          Tremaine Salgado is a 30 y.o. male  successfully completed today's hyperbaric oxygen treatment at [address] [place] [address]. In my clinical judgement, ongoing HBO therapy is necessary at this time, given a threat to patient function, limb or life from the current condition. Supervision and attendance of Hyperbaric Oxygen Therapy provided. Continue HBO treatment as outlined in the treatment plan. Hyperbaric Oxygen:  Pt tolerated Treatment Number: 4 well today without complications.      Electronically signed by RADHA Colin CNP on 1/9/2020 at 1:31 PM

## 2020-01-10 ENCOUNTER — HOSPITAL ENCOUNTER (OUTPATIENT)
Dept: HYPERBARIC MEDICINE | Age: 30
Setting detail: THERAPIES SERIES
Discharge: HOME | End: 2020-01-10
Payer: MEDICARE

## 2020-01-10 VITALS
RESPIRATION RATE: 18 BRPM | TEMPERATURE: 98.1 F | HEART RATE: 78 BPM | OXYGEN SATURATION: 98 % | SYSTOLIC BLOOD PRESSURE: 138 MMHG | DIASTOLIC BLOOD PRESSURE: 81 MMHG

## 2020-01-10 PROCEDURE — G0277 HBOT, FULL BODY CHAMBER, 30M: HCPCS

## 2020-01-13 ENCOUNTER — HOSPITAL ENCOUNTER (OUTPATIENT)
Dept: HYPERBARIC MEDICINE | Age: 30
Setting detail: THERAPIES SERIES
Discharge: HOME | End: 2020-01-13
Payer: MEDICARE

## 2020-01-13 VITALS
HEART RATE: 96 BPM | OXYGEN SATURATION: 98 % | TEMPERATURE: 97.9 F | DIASTOLIC BLOOD PRESSURE: 63 MMHG | SYSTOLIC BLOOD PRESSURE: 125 MMHG | RESPIRATION RATE: 18 BRPM

## 2020-01-13 PROCEDURE — G0277 HBOT, FULL BODY CHAMBER, 30M: HCPCS

## 2020-01-13 ASSESSMENT — PAIN SCALES - GENERAL
PAINLEVEL_OUTOF10: 7
PAINLEVEL_OUTOF10: 6

## 2020-01-13 ASSESSMENT — PAIN DESCRIPTION - PROGRESSION
CLINICAL_PROGRESSION: GRADUALLY IMPROVING
CLINICAL_PROGRESSION: NOT CHANGED

## 2020-01-13 ASSESSMENT — PAIN DESCRIPTION - ONSET
ONSET: ON-GOING
ONSET: ON-GOING

## 2020-01-13 ASSESSMENT — PAIN DESCRIPTION - LOCATION
LOCATION: BACK
LOCATION: BACK

## 2020-01-13 ASSESSMENT — PAIN DESCRIPTION - FREQUENCY
FREQUENCY: CONTINUOUS
FREQUENCY: CONTINUOUS

## 2020-01-13 ASSESSMENT — PAIN DESCRIPTION - ORIENTATION
ORIENTATION: LOWER
ORIENTATION: LOWER

## 2020-01-13 ASSESSMENT — PAIN DESCRIPTION - PAIN TYPE
TYPE: CHRONIC PAIN
TYPE: CHRONIC PAIN

## 2020-01-13 ASSESSMENT — PAIN DESCRIPTION - DESCRIPTORS
DESCRIPTORS: ACHING
DESCRIPTORS: ACHING

## 2020-01-14 ENCOUNTER — HOSPITAL ENCOUNTER (OUTPATIENT)
Dept: HYPERBARIC MEDICINE | Age: 30
Setting detail: THERAPIES SERIES
Discharge: HOME | End: 2020-01-14
Payer: MEDICARE

## 2020-01-14 VITALS
RESPIRATION RATE: 16 BRPM | DIASTOLIC BLOOD PRESSURE: 90 MMHG | SYSTOLIC BLOOD PRESSURE: 142 MMHG | HEART RATE: 86 BPM | OXYGEN SATURATION: 98 % | TEMPERATURE: 98.9 F

## 2020-01-14 PROCEDURE — G0277 HBOT, FULL BODY CHAMBER, 30M: HCPCS

## 2020-01-14 ASSESSMENT — PAIN SCALES - GENERAL: PAINLEVEL_OUTOF10: 0

## 2020-01-14 NOTE — PROGRESS NOTES
[address] [address]  Hyperbaric Oxygen Therapy   Progress Note      NAME: Lizz 43 RECORD NUMBER:  [id number]  AGE: [address] y.o. GENDER: male  : 1990  EPISODE DATE:  2020     Subjective     HBO Treatment Number: 6 out of Total Treatments: 40    HBO Diagnosis:               Indications: Chronic Refractory Osteomyelitis to ___ (site)(Chronic osteomyelitis of coccyx and pelvis )    Safety checks performed prior to treatment. See doc flowsheets for documentation. Objective        Patient Active Problem List   Diagnosis Code    Urinary tract infection associated with catheterization of urinary tract ([address] T83.511A, N39.0    Moderate single current episode of major depressive disorder ([address] F32.1    Severe malnutrition ([address] E43    Sepsis with metabolic encephalopathy ([address] A41.9, R65.20, G93.41    Decubitus ulcer of coccygeal region, stage 3 ([facility]) L89.153    Adjustment Disorder with Mixed Anxiety and Depressed Mood     Colostomy status ([address] Z93.3    Sepsis ([address] A41.9    Neurogenic bladder N31.9    Colostomy care ([address] Z43.3    Peristomal skin complication D79.5    Overactive bladder N32.81    Osteomyelitis ([address] M86.9    Neutropenia ([facility]) D70.9    Hypernatremia E87.0    Candidemia ([facility]) B37.7    Candiduria B37.49    Pressure ulcer of ischium, left, stage IV ([facility]) L89.324    Chronic osteomyelitis of coccyx ([facility]) M46.28    Chronic osteomyelitis, pelvis, left ([facility]) M86.652          No results for input(s): POCGLU in the last 72 hours.     Pre treatment Vital Signs       Temp: 98.2 °F (36.8 °C)     Pulse: 96     Resp: 18     BP: (!) 141/77       Post treatment Vital Signs  Temp: 97.9 °F (36.6 °C)  Pulse: 96  Resp: 18  BP: 125/63      Assessment        Physical Exam:  General Appearance:  alert and oriented to person, place and time, well-developed and well-nourished, in no acute distress    Pre Tympanic Membrane Assessment:  tympanic membranes intact

## 2020-01-15 ENCOUNTER — HOSPITAL ENCOUNTER (OUTPATIENT)
Dept: HYPERBARIC MEDICINE | Age: 30
Setting detail: THERAPIES SERIES
Discharge: HOME | End: 2020-01-15
Payer: MEDICARE

## 2020-01-15 VITALS
RESPIRATION RATE: 18 BRPM | TEMPERATURE: 98.6 F | HEART RATE: 77 BPM | OXYGEN SATURATION: 97 % | DIASTOLIC BLOOD PRESSURE: 71 MMHG | SYSTOLIC BLOOD PRESSURE: 130 MMHG

## 2020-01-15 PROCEDURE — G0277 HBOT, FULL BODY CHAMBER, 30M: HCPCS

## 2020-01-15 NOTE — PROGRESS NOTES
[address] [address]  Hyperbaric Oxygen Therapy   Progress Note      NAME: Lizz 43 RECORD NUMBER:  [id number]  AGE: 30 y.o. GENDER: male  : 1990  EPISODE DATE:  2020     Subjective     HBO Treatment Number: 7 out of Total Treatments: 40    HBO Diagnosis:               Indications: Chronic Refractory Osteomyelitis to ___ (site)(Chronic osteomyelitis of coccyx and pelvis )    Safety checks performed prior to treatment. See doc flowsheets for documentation. Objective        Patient Active Problem List   Diagnosis Code    Urinary tract infection associated with catheterization of urinary tract ([address] T83.511A, N39.0    Moderate single current episode of major depressive disorder ([address] F32.1    Severe malnutrition ([address] E43    Sepsis with metabolic encephalopathy ([address] A41.9, R65.20, G93.41    Decubitus ulcer of coccygeal region, stage 3 ([facility]) L89.153    Adjustment Disorder with Mixed Anxiety and Depressed Mood     Colostomy status ([address] Z93.3    Sepsis ([address] A41.9    Neurogenic bladder N31.9    Colostomy care ([address] Z43.3    Peristomal skin complication A56.4    Overactive bladder N32.81    Osteomyelitis ([address] M86.9    Neutropenia ([facility]) D70.9    Hypernatremia E87.0    Candidemia ([facility]) B37.7    Candiduria B37.49    Pressure ulcer of ischium, left, stage IV ([facility]) L89.324    Chronic osteomyelitis of coccyx ([facility]) M46.28    Chronic osteomyelitis, pelvis, left ([facility]) M86.652          No results for input(s): POCGLU in the last 72 hours.     Pre treatment Vital Signs       Temp: 97.3 °F (36.3 °C)     Pulse: 90     Resp: 16     BP: 132/65       Post treatment Vital Signs  Temp: 98.9 °F (37.2 °C)  Pulse: 86  Resp: 16  BP: (!) 142/90      Assessment        Physical Exam:  General Appearance:  alert and oriented to person, place and time, well-developed and well-nourished, in no acute distress    Pre Tympanic Membrane Assessment:  tympanic membranes intact

## 2020-01-15 NOTE — PROGRESS NOTES
Tympanic Membrane Assessment:  Right: Normal  Left: Normal    Pulmonary/Chest:  clear to auscultation bilaterally- no wheezes, rales or rhonchi, normal air movement, no respiratory distress    Cardiovascular:  normal       Treatment Start Time: 1044     Pressure Reached Time: 1054  ABEL : 2  Treatment Status: No Air break      Decompression Time: 1224   Treatment End Time: 1234    Symptoms Noted During Treatment: None    Adverse Event: no      I was present on these premises and immediately available to furnish assistance & direction throughout the procedure. Plan          Scar Sotelo is a 30 y.o. male  successfully completed today's hyperbaric oxygen treatment at [address] [place] [address]. In my clinical judgement, ongoing HBO therapy is necessary at this time, given a threat to patient function, limb or life from the current condition. Supervision and attendance of Hyperbaric Oxygen Therapy provided. Continue HBO treatment as outlined in the treatment plan. Hyperbaric Oxygen:  Pt tolerated Treatment Number: 8 well today without complications.      Electronically signed by Jean-Paul Leblanc MD on 1/15/2020 at 3:59 PM

## 2020-01-16 ENCOUNTER — HOSPITAL ENCOUNTER (OUTPATIENT)
Dept: HYPERBARIC MEDICINE | Age: 30
Setting detail: THERAPIES SERIES
Discharge: HOME | End: 2020-01-16
Payer: MEDICARE

## 2020-01-16 VITALS
HEART RATE: 79 BPM | OXYGEN SATURATION: 100 % | TEMPERATURE: 97.9 F | DIASTOLIC BLOOD PRESSURE: 62 MMHG | SYSTOLIC BLOOD PRESSURE: 125 MMHG | RESPIRATION RATE: 16 BRPM

## 2020-01-16 PROCEDURE — G0277 HBOT, FULL BODY CHAMBER, 30M: HCPCS

## 2020-01-16 PROCEDURE — 99183 HYPERBARIC OXYGEN THERAPY: CPT | Performed by: NURSE PRACTITIONER

## 2020-01-16 ASSESSMENT — PAIN SCALES - GENERAL
PAINLEVEL_OUTOF10: 0
PAINLEVEL_OUTOF10: 0

## 2020-01-17 ENCOUNTER — HOSPITAL ENCOUNTER (OUTPATIENT)
Dept: HYPERBARIC MEDICINE | Age: 30
Setting detail: THERAPIES SERIES
End: 2020-01-17
Payer: MEDICARE

## 2020-01-17 NOTE — PROGRESS NOTES
Normal       Pulmonary/Chest:  clear to auscultation bilaterally- no wheezes, rales or rhonchi, normal air movement, no respiratory distress    Cardiovascular:  normal, regular rate and rhythm       Treatment Start Time: 1050     Pressure Reached Time: 1100  ABEL : 2  Treatment Status: No Air break      Decompression Time: 1230   Treatment End Time: 1240    Symptoms Noted During Treatment: None    Adverse Event: no      I was present on these premises and immediately available to furnish assistance & direction throughout the procedure. Plan          Arden Alpers is a 30 y.o. male  successfully completed today's hyperbaric oxygen treatment at [address] [place] [address]. In my clinical judgement, ongoing HBO therapy is necessary at this time, given a threat to patient function, limb or life from the current condition. Supervision and attendance of Hyperbaric Oxygen Therapy provided. Continue HBO treatment as outlined in the treatment plan. Hyperbaric Oxygen:  Pt tolerated Treatment Number: 9 well today without complications.      Electronically signed by RADHA Patten CNP on 1/16/2020 at 9:35 PM

## 2020-01-20 ENCOUNTER — HOSPITAL ENCOUNTER (OUTPATIENT)
Dept: HYPERBARIC MEDICINE | Age: 30
Setting detail: THERAPIES SERIES
Discharge: HOME | End: 2020-01-20
Payer: MEDICARE

## 2020-01-20 VITALS
SYSTOLIC BLOOD PRESSURE: 143 MMHG | TEMPERATURE: 97.6 F | HEART RATE: 88 BPM | RESPIRATION RATE: 16 BRPM | DIASTOLIC BLOOD PRESSURE: 74 MMHG | OXYGEN SATURATION: 96 %

## 2020-01-20 PROCEDURE — G0277 HBOT, FULL BODY CHAMBER, 30M: HCPCS

## 2020-01-20 ASSESSMENT — PAIN SCALES - GENERAL
PAINLEVEL_OUTOF10: 0
PAINLEVEL_OUTOF10: 0

## 2020-01-21 ENCOUNTER — HOSPITAL ENCOUNTER (OUTPATIENT)
Dept: HYPERBARIC MEDICINE | Age: 30
Setting detail: THERAPIES SERIES
Discharge: HOME | End: 2020-01-21
Payer: MEDICARE

## 2020-01-21 VITALS
TEMPERATURE: 97.8 F | DIASTOLIC BLOOD PRESSURE: 71 MMHG | SYSTOLIC BLOOD PRESSURE: 117 MMHG | HEART RATE: 76 BPM | OXYGEN SATURATION: 100 % | RESPIRATION RATE: 16 BRPM

## 2020-01-21 PROCEDURE — G0277 HBOT, FULL BODY CHAMBER, 30M: HCPCS

## 2020-01-21 ASSESSMENT — PAIN SCALES - GENERAL: PAINLEVEL_OUTOF10: 0

## 2020-01-22 ENCOUNTER — HOSPITAL ENCOUNTER (OUTPATIENT)
Dept: HYPERBARIC MEDICINE | Age: 30
Setting detail: THERAPIES SERIES
Discharge: HOME | End: 2020-01-22
Payer: MEDICARE

## 2020-01-22 VITALS
RESPIRATION RATE: 16 BRPM | HEART RATE: 79 BPM | OXYGEN SATURATION: 96 % | SYSTOLIC BLOOD PRESSURE: 128 MMHG | DIASTOLIC BLOOD PRESSURE: 61 MMHG | TEMPERATURE: 97.6 F

## 2020-01-22 PROCEDURE — G0277 HBOT, FULL BODY CHAMBER, 30M: HCPCS

## 2020-01-22 ASSESSMENT — PAIN SCALES - GENERAL
PAINLEVEL_OUTOF10: 0
PAINLEVEL_OUTOF10: 0

## 2020-01-22 NOTE — PROGRESS NOTES
Tympanic Membrane Assessment:  Right: Normal  Left: Normal    Pulmonary/Chest:  clear to auscultation bilaterally- no wheezes, rales or rhonchi, normal air movement, no respiratory distress    Cardiovascular:  normal       Treatment Start Time: 1052     Pressure Reached Time: 1102  ABEL : 2  Treatment Status: No Air break      Decompression Time: 1232   Treatment End Time: 1242         Adverse Event: no      I was present on these premises and immediately available to furnish assistance & direction throughout the procedure. Plan          Shelley Ross is a 30 y.o. male  successfully completed today's hyperbaric oxygen treatment at [address] [place] [address]. In my clinical judgement, ongoing HBO therapy is necessary at this time, given a threat to patient function, limb or life from the current condition. Supervision and attendance of Hyperbaric Oxygen Therapy provided. Continue HBO treatment as outlined in the treatment plan. Hyperbaric Oxygen:  Pt tolerated Treatment Number: 12 well today without complications.      Electronically signed by Donna Ponce MD on 1/22/2020 at 1:08 PM

## 2020-01-23 ENCOUNTER — HOSPITAL ENCOUNTER (OUTPATIENT)
Dept: HYPERBARIC MEDICINE | Age: 30
Setting detail: THERAPIES SERIES
Discharge: HOME | End: 2020-01-23
Payer: MEDICARE

## 2020-01-23 VITALS
TEMPERATURE: 98.5 F | SYSTOLIC BLOOD PRESSURE: 111 MMHG | DIASTOLIC BLOOD PRESSURE: 67 MMHG | RESPIRATION RATE: 18 BRPM | OXYGEN SATURATION: 97 % | HEART RATE: 88 BPM

## 2020-01-23 PROCEDURE — G0277 HBOT, FULL BODY CHAMBER, 30M: HCPCS

## 2020-01-23 PROCEDURE — 99183 HYPERBARIC OXYGEN THERAPY: CPT | Performed by: NURSE PRACTITIONER

## 2020-01-23 ASSESSMENT — PAIN SCALES - GENERAL
PAINLEVEL_OUTOF10: 0
PAINLEVEL_OUTOF10: 0

## 2020-01-24 ENCOUNTER — HOSPITAL ENCOUNTER (OUTPATIENT)
Dept: HYPERBARIC MEDICINE | Age: 30
Setting detail: THERAPIES SERIES
Discharge: HOME | End: 2020-01-24
Payer: MEDICARE

## 2020-01-24 VITALS
HEART RATE: 85 BPM | RESPIRATION RATE: 16 BRPM | SYSTOLIC BLOOD PRESSURE: 114 MMHG | OXYGEN SATURATION: 96 % | DIASTOLIC BLOOD PRESSURE: 55 MMHG | TEMPERATURE: 97.7 F

## 2020-01-24 PROCEDURE — G0277 HBOT, FULL BODY CHAMBER, 30M: HCPCS

## 2020-01-24 PROCEDURE — 99183 HYPERBARIC OXYGEN THERAPY: CPT | Performed by: NURSE PRACTITIONER

## 2020-01-24 ASSESSMENT — PAIN SCALES - GENERAL: PAINLEVEL_OUTOF10: 0

## 2020-01-24 NOTE — PROGRESS NOTES
[address] [address]  Hyperbaric Oxygen Therapy   Progress Note      NAME: Lizz 43 RECORD NUMBER:  [id number]  AGE: 30 y.o. GENDER: male  : 1990  EPISODE DATE:  2020     Subjective     HBO Treatment Number: 14 out of Total Treatments: 40    HBO Diagnosis:               Indications: (Chronic osteomyelitis of coccyx and pelvis )    Safety checks performed prior to treatment. See doc flowsheets for documentation. Objective        Patient Active Problem List   Diagnosis Code    Urinary tract infection associated with catheterization of urinary tract ([address] T83.511A, N39.0    Moderate single current episode of major depressive disorder ([address] F32.1    Severe malnutrition ([address] E43    Sepsis with metabolic encephalopathy ([address] A41.9, R65.20, G93.41    Decubitus ulcer of coccygeal region, stage 3 ([facility]) L89.153    Adjustment Disorder with Mixed Anxiety and Depressed Mood     Colostomy status ([address] Z93.3    Sepsis ([address] A41.9    Neurogenic bladder N31.9    Colostomy care ([address] Z43.3    Peristomal skin complication R10.8    Overactive bladder N32.81    Osteomyelitis ([address] M86.9    Neutropenia ([facility]) D70.9    Hypernatremia E87.0    Candidemia ([facility]) B37.7    Candiduria B37.49    Pressure ulcer of ischium, left, stage IV ([facility]) L89.324    Chronic osteomyelitis of coccyx ([facility]) M46.28    Chronic osteomyelitis, pelvis, left ([facility]) M86.652          No results for input(s): POCGLU in the last 72 hours.     Pre treatment Vital Signs       Temp: 97.8 °F (36.6 °C)     Pulse: 89     Resp: 16     BP: (!) 117/55       Post treatment Vital Signs  Temp: 97.7 °F (36.5 °C)  Pulse: 85  Resp: 16  BP: (!) 114/55      Assessment        Physical Exam:  General Appearance:  alert and oriented to person, place and time    Pre Tympanic Membrane Assessment:  tympanic membranes intact bilaterally, normal color    Post Tympanic Membrane Assessment:  Right: Normal  Left: Normal    Pulmonary/Chest: clear to auscultation bilaterally- no wheezes, rales or rhonchi, normal air movement, no respiratory distress    Cardiovascular:  normal, regular rate and rhythm       Treatment Start Time: 1050     Pressure Reached Time: 1100  ABEL : 2  Treatment Status: No Air break      Decompression Time: 1230   Treatment End Time: 1240    Symptoms Noted During Treatment: None    Adverse Event: no      I was present on these premises and immediately available to furnish assistance & direction throughout the procedure. Plan          Kushal Mulligan is a 30 y.o. male  successfully completed today's hyperbaric oxygen treatment at [address] [place] [address]. In my clinical judgement, ongoing HBO therapy is necessary at this time, given a threat to patient function, limb or life from the current condition. Supervision and attendance of Hyperbaric Oxygen Therapy provided. Continue HBO treatment as outlined in the treatment plan. Hyperbaric Oxygen:  Pt tolerated Treatment Number: 14 well today without complications.      Electronically signed by RADHA Bergman CNP on 1/24/2020 at 1:38 PM

## 2020-01-27 ENCOUNTER — HOSPITAL ENCOUNTER (OUTPATIENT)
Dept: HYPERBARIC MEDICINE | Age: 30
Setting detail: THERAPIES SERIES
Discharge: HOME | End: 2020-01-27
Payer: MEDICARE

## 2020-01-27 VITALS
TEMPERATURE: 97.2 F | DIASTOLIC BLOOD PRESSURE: 59 MMHG | OXYGEN SATURATION: 100 % | SYSTOLIC BLOOD PRESSURE: 101 MMHG | HEART RATE: 80 BPM | RESPIRATION RATE: 16 BRPM

## 2020-01-27 PROCEDURE — G0277 HBOT, FULL BODY CHAMBER, 30M: HCPCS

## 2020-01-27 ASSESSMENT — PAIN SCALES - GENERAL
PAINLEVEL_OUTOF10: 0
PAINLEVEL_OUTOF10: 0

## 2020-01-28 ENCOUNTER — APPOINTMENT (OUTPATIENT)
Dept: HYPERBARIC MEDICINE | Age: 30
End: 2020-01-28
Payer: MEDICARE

## 2020-01-28 NOTE — PROGRESS NOTES
bilaterally    Post Tympanic Membrane Assessment:  Right: Normal  Left: Normal    Pulmonary/Chest:  clear to auscultation bilaterally- no wheezes, rales or rhonchi, normal air movement, no respiratory distress    Cardiovascular:  normal       Treatment Start Time: 1046     Pressure Reached Time: 1056  ABEL : 2  Treatment Status: No Air break      Decompression Time: 1226   Treatment End Time: 1236    Symptoms Noted During Treatment: None    Adverse Event: no      I was present on these premises and immediately available to furnish assistance & direction throughout the procedure. Plan          Arden Alpers is a 30 y.o. male  successfully completed today's hyperbaric oxygen treatment at [address] [place] [address]. In my clinical judgement, ongoing HBO therapy is necessary at this time, given a threat to patient function, limb or life from the current condition. Supervision and attendance of Hyperbaric Oxygen Therapy provided. Continue HBO treatment as outlined in the treatment plan. Hyperbaric Oxygen:  Pt tolerated Treatment Number: 15 well today without complications.      Electronically signed by Parveen Andre MD on 1/27/2020 at 7:38 PM

## 2020-01-29 ENCOUNTER — HOSPITAL ENCOUNTER (OUTPATIENT)
Dept: HYPERBARIC MEDICINE | Age: 30
Setting detail: THERAPIES SERIES
Discharge: HOME | End: 2020-01-29
Payer: MEDICARE

## 2020-01-29 VITALS
DIASTOLIC BLOOD PRESSURE: 67 MMHG | TEMPERATURE: 97.3 F | RESPIRATION RATE: 16 BRPM | OXYGEN SATURATION: 98 % | HEART RATE: 73 BPM | SYSTOLIC BLOOD PRESSURE: 124 MMHG

## 2020-01-29 PROCEDURE — G0277 HBOT, FULL BODY CHAMBER, 30M: HCPCS

## 2020-01-29 ASSESSMENT — PAIN SCALES - GENERAL: PAINLEVEL_OUTOF10: 0

## 2020-01-29 NOTE — PLAN OF CARE
Patient is asked to monitor BP at home or work, several times per month and return with written values at next office visit. Care plan reviewed with patient. Patient  verbalize understanding of the plan of care and contribute to goal setting.

## 2020-01-30 ENCOUNTER — HOSPITAL ENCOUNTER (OUTPATIENT)
Dept: HYPERBARIC MEDICINE | Age: 30
Setting detail: THERAPIES SERIES
Discharge: HOME | End: 2020-01-30
Payer: MEDICARE

## 2020-01-30 VITALS
SYSTOLIC BLOOD PRESSURE: 105 MMHG | DIASTOLIC BLOOD PRESSURE: 52 MMHG | TEMPERATURE: 97.9 F | RESPIRATION RATE: 16 BRPM | OXYGEN SATURATION: 96 % | HEART RATE: 77 BPM

## 2020-01-30 PROCEDURE — 99183 HYPERBARIC OXYGEN THERAPY: CPT | Performed by: NURSE PRACTITIONER

## 2020-01-30 PROCEDURE — G0277 HBOT, FULL BODY CHAMBER, 30M: HCPCS

## 2020-01-30 ASSESSMENT — PAIN SCALES - GENERAL
PAINLEVEL_OUTOF10: 0
PAINLEVEL_OUTOF10: 0

## 2020-01-30 NOTE — PROGRESS NOTES
[address] [address]  Hyperbaric Oxygen Therapy   Progress Note      NAME: Lizz Bearden RECORD NUMBER:  [id number]  AGE: 30 y.o. GENDER: male  : 1990  EPISODE DATE:  2020     Subjective     HBO Treatment Number: 17 out of Total Treatments: 40    HBO Diagnosis:               Indications: Chronic Refractory Osteomyelitis to ___ (site)(Chronic osteomyelitis of coccyx and pelvis)    Safety checks performed prior to treatment. See doc flowsheets for documentation. Objective        Patient Active Problem List   Diagnosis Code    Urinary tract infection associated with catheterization of urinary tract ([address] T83.511A, N39.0    Moderate single current episode of major depressive disorder ([address] F32.1    Severe malnutrition ([address] E43    Sepsis with metabolic encephalopathy ([address] A41.9, R65.20, G93.41    Decubitus ulcer of coccygeal region, stage 3 ([facility]) L89.153    Adjustment Disorder with Mixed Anxiety and Depressed Mood     Colostomy status ([address] Z93.3    Sepsis ([address] A41.9    Neurogenic bladder N31.9    Colostomy care ([address] Z43.3    Peristomal skin complication Z99.9    Overactive bladder N32.81    Osteomyelitis ([address] M86.9    Neutropenia ([facility]) D70.9    Hypernatremia E87.0    Candidemia ([facility]) B37.7    Candiduria B37.49    Pressure ulcer of ischium, left, stage IV ([facility]) L89.324    Chronic osteomyelitis of coccyx ([facility]) M46.28    Chronic osteomyelitis, pelvis, left ([facility]) M86.652          No results for input(s): POCGLU in the last 72 hours.     Pre treatment Vital Signs       Temp: 97.8 °F (36.6 °C)     Pulse: 85     Resp: 16     BP: (!) 122/57       Post treatment Vital Signs  Temp: 97.9 °F (36.6 °C)  Pulse: 77  Resp: 16  BP: (!) 105/52      Assessment        Physical Exam:  General Appearance:  alert and oriented to person, place and time    Pre Tympanic Membrane Assessment:  tympanic membranes intact bilaterally, normal color    Post Tympanic Membrane Assessment:  Right: Normal  Left: Normal    Pulmonary/Chest:  clear to auscultation bilaterally- no wheezes, rales or rhonchi, normal air movement, no respiratory distress    Cardiovascular:  normal, regular rate and rhythm       Treatment Start Time: 1100     Pressure Reached Time: 1110  ABEL : 2  Treatment Status: No Air break      Decompression Time: 1240   Treatment End Time: 1250    Symptoms Noted During Treatment: None    Adverse Event: no      I was present on these premises and immediately available to furnish assistance & direction throughout the procedure. Plan          Lamine Desir is a 30 y.o. male  successfully completed today's hyperbaric oxygen treatment at [address] [place] [address]. In my clinical judgement, ongoing HBO therapy is necessary at this time, given a threat to patient function, limb or life from the current condition. Supervision and attendance of Hyperbaric Oxygen Therapy provided. Continue HBO treatment as outlined in the treatment plan. Hyperbaric Oxygen:  Pt tolerated Treatment Number: 57 well today without complications.      Electronically signed by RADHA Waterman CNP on 1/30/2020 at 1:39 PM

## 2020-01-30 NOTE — PROGRESS NOTES
[facility]  Hyperbaric Oxygen Therapy   Progress Note      NAME: Lizz 44 RECORD NUMBER:  [id number]  AGE: 30 y.o. GENDER: male  : 1990  EPISODE DATE:  2020     Subjective     HBO Treatment Number: 13 out of Total Treatments: 40    HBO Diagnosis:               Indications: Chronic Refractory Osteomyelitis to ___ (site)(Chronic osteomyelitis of coccyx and pelvis )    Safety checks performed prior to treatment. See doc flowsheets for documentation. Objective        Patient Active Problem List   Diagnosis Code    Urinary tract infection associated with catheterization of urinary tract ([address] T83.511A, N39.0    Moderate single current episode of major depressive disorder ([address] F32.1    Severe malnutrition ([address] E43    Sepsis with metabolic encephalopathy ([address] A41.9, R65.20, G93.41    Decubitus ulcer of coccygeal region, stage 3 ([facility]) L89.153    Adjustment Disorder with Mixed Anxiety and Depressed Mood     Colostomy status ([address] Z93.3    Sepsis ([address] A41.9    Neurogenic bladder N31.9    Colostomy care ([address] Z43.3    Peristomal skin complication Z68.1    Overactive bladder N32.81    Osteomyelitis ([address] M86.9    Neutropenia ([facility]) D70.9    Hypernatremia E87.0    Candidemia ([facility]) B37.7    Candiduria B37.49    Pressure ulcer of ischium, left, stage IV ([facility]) L89.324    Chronic osteomyelitis of coccyx ([facility]) M46.28    Chronic osteomyelitis, pelvis, left ([facility]) M86.652          No results for input(s): POCGLU in the last 72 hours.     Pre treatment Vital Signs       Temp: 97.1 °F (36.2 °C)     Pulse: 102     Resp: 16     BP: 118/64       Post treatment Vital Signs  Temp: 98.5 °F (36.9 °C)  Pulse: 88  Resp: 18  BP: 111/67      Assessment        Physical Exam:  General Appearance:  alert and oriented to person, place and time    Pre Tympanic Membrane Assessment:  tympanic membranes intact bilaterally, normal color    Post Tympanic Membrane Assessment:  Right: Normal  Left: Normal    Pulmonary/Chest:  clear to auscultation bilaterally- no wheezes, rales or rhonchi, normal air movement, no respiratory distress    Cardiovascular:  normal, regular rate and rhythm       Treatment Start Time: 1045     Pressure Reached Time: 1055  ABEL : 2  Treatment Status: No Air break      Decompression Time: 1225   Treatment End Time: 1235    Symptoms Noted During Treatment: None    Adverse Event: no      I was present on these premises and immediately available to furnish assistance & direction throughout the procedure. Plan          Scar Sotelo is a 30 y.o. male  successfully completed today's hyperbaric oxygen treatment at [address] [place] [address]. In my clinical judgement, ongoing HBO therapy is necessary at this time, given a threat to patient function, limb or life from the current condition. Supervision and attendance of Hyperbaric Oxygen Therapy provided. Continue HBO treatment as outlined in the treatment plan. Hyperbaric Oxygen:  Pt tolerated Treatment Number: 13 well today without complications.      Electronically signed by RADHA Gunderson CNP on 1/23/2020 at 8:47 PM

## 2020-01-30 NOTE — PROGRESS NOTES
Tympanic Membrane Assessment:  Right: Normal  Left: Normal    Pulmonary/Chest:  clear to auscultation bilaterally- no wheezes, rales or rhonchi, normal air movement, no respiratory distress    Cardiovascular:  normal       Treatment Start Time: 1106     Pressure Reached Time: 1116  ABEL : 2  Treatment Status: No Air break      Decompression Time: 1236   Treatment End Time: 1246    Symptoms Noted During Treatment: None    Adverse Event: no      I was present on these premises and immediately available to furnish assistance & direction throughout the procedure. Plan          Adama Pedersen is a 30 y.o. male  successfully completed today's hyperbaric oxygen treatment at [address] [facility]. In my clinical judgement, ongoing HBO therapy is necessary at this time, given a threat to patient function, limb or life from the current condition. Supervision and attendance of Hyperbaric Oxygen Therapy provided. Continue HBO treatment as outlined in the treatment plan. Hyperbaric Oxygen:  Pt tolerated Treatment Number: 16 well today without complications.      Electronically signed by Ellie Mcleod MD on 1/29/2020 at 8:18 AM

## 2020-01-31 ENCOUNTER — APPOINTMENT (OUTPATIENT)
Dept: HYPERBARIC MEDICINE | Age: 30
End: 2020-01-31
Payer: MEDICARE

## 2020-02-03 ENCOUNTER — TELEPHONE (OUTPATIENT)
Dept: HYPERBARIC MEDICINE | Age: 30
End: 2020-02-03

## 2020-02-03 NOTE — TELEPHONE ENCOUNTER
Patient called to cancel remaining treatments. [facility] states needing surgical intervention and HBOT to be held at this time.

## 2020-03-27 ENCOUNTER — HOSPITAL ENCOUNTER (OUTPATIENT)
Dept: MRI IMAGING | Age: 30
Discharge: HOME | End: 2020-03-27
Payer: MEDICARE

## 2020-03-27 PROCEDURE — A9579 GAD-BASE MR CONTRAST NOS,1ML: HCPCS | Performed by: NURSE PRACTITIONER

## 2020-03-27 PROCEDURE — 72197 MRI PELVIS W/O & W/DYE: CPT

## 2020-03-27 PROCEDURE — 6360000004 HC RX CONTRAST MEDICATION: Performed by: NURSE PRACTITIONER

## 2020-03-27 RX ADMIN — GADOTERIDOL 15 ML: 279.3 INJECTION, SOLUTION INTRAVENOUS at 17:23

## 2020-04-28 RX ORDER — FENTANYL CITRATE 50 UG/ML
50 INJECTION, SOLUTION INTRAMUSCULAR; INTRAVENOUS ONCE
Status: CANCELLED | OUTPATIENT
Start: 2020-04-28 | End: 2020-04-28

## 2020-04-28 RX ORDER — SODIUM CHLORIDE 450 MG/100ML
INJECTION, SOLUTION INTRAVENOUS CONTINUOUS
Status: CANCELLED | OUTPATIENT
Start: 2020-04-28

## 2020-04-28 RX ORDER — MIDAZOLAM HYDROCHLORIDE 1 MG/ML
1 INJECTION INTRAMUSCULAR; INTRAVENOUS ONCE
Status: CANCELLED | OUTPATIENT
Start: 2020-04-28 | End: 2020-04-28

## 2020-04-29 ENCOUNTER — HOSPITAL ENCOUNTER (OUTPATIENT)
Dept: CT IMAGING | Age: 30
Discharge: HOME | End: 2020-04-29
Payer: MEDICARE

## 2020-04-29 VITALS
RESPIRATION RATE: 18 BRPM | HEART RATE: 80 BPM | OXYGEN SATURATION: 96 % | WEIGHT: 190 LBS | SYSTOLIC BLOOD PRESSURE: 122 MMHG | DIASTOLIC BLOOD PRESSURE: 78 MMHG | BODY MASS INDEX: 26.5 KG/M2 | TEMPERATURE: 97.6 F

## 2020-04-29 LAB
BASOPHILS # BLD: 0.8 %
BASOPHILS ABSOLUTE: 0.1 THOU/MM3 (ref 0–0.1)
EOSINOPHIL # BLD: 1.4 %
EOSINOPHILS ABSOLUTE: 0.1 THOU/MM3 (ref 0–0.4)
ERYTHROCYTE [DISTWIDTH] IN BLOOD BY AUTOMATED COUNT: 14.2 % (ref 11.5–14.5)
ERYTHROCYTE [DISTWIDTH] IN BLOOD BY AUTOMATED COUNT: 44.6 FL (ref 35–45)
HCT VFR BLD CALC: 47.2 % (ref 42–52)
HEMOGLOBIN: 14.7 GM/DL (ref 14–18)
IMMATURE GRANS (ABS): 0.05 THOU/MM3 (ref 0–0.07)
IMMATURE GRANULOCYTES: 0.7 %
INR BLD: 1.07 (ref 0.85–1.13)
LYMPHOCYTES # BLD: 20.5 %
LYMPHOCYTES ABSOLUTE: 1.5 THOU/MM3 (ref 1–4.8)
MCH RBC QN AUTO: 26.8 PG (ref 26–33)
MCHC RBC AUTO-ENTMCNC: 31.1 GM/DL (ref 32.2–35.5)
MCV RBC AUTO: 86.1 FL (ref 80–94)
MONOCYTES # BLD: 10.1 %
MONOCYTES ABSOLUTE: 0.7 THOU/MM3 (ref 0.4–1.3)
NUCLEATED RED BLOOD CELLS: 0 /100 WBC
PLATELET # BLD: 282 THOU/MM3 (ref 130–400)
PMV BLD AUTO: 10.8 FL (ref 9.4–12.4)
RBC # BLD: 5.48 MILL/MM3 (ref 4.7–6.1)
SEG NEUTROPHILS: 66.5 %
SEGMENTED NEUTROPHILS ABSOLUTE COUNT: 4.9 THOU/MM3 (ref 1.8–7.7)
WBC # BLD: 7.3 THOU/MM3 (ref 4.8–10.8)

## 2020-04-29 PROCEDURE — 77012 CT SCAN FOR NEEDLE BIOPSY: CPT

## 2020-04-29 PROCEDURE — 20220 BONE BIOPSY TROCAR/NDL SUPFC: CPT

## 2020-04-29 PROCEDURE — 88307 TISSUE EXAM BY PATHOLOGIST: CPT

## 2020-04-29 PROCEDURE — 6370000000 HC RX 637 (ALT 250 FOR IP)

## 2020-04-29 PROCEDURE — 2709999900 HC NON-CHARGEABLE SUPPLY

## 2020-04-29 PROCEDURE — 6360000002 HC RX W HCPCS

## 2020-04-29 PROCEDURE — 85610 PROTHROMBIN TIME: CPT

## 2020-04-29 PROCEDURE — 88311 DECALCIFY TISSUE: CPT

## 2020-04-29 PROCEDURE — 2580000003 HC RX 258: Performed by: RADIOLOGY

## 2020-04-29 PROCEDURE — 85025 COMPLETE CBC W/AUTO DIFF WBC: CPT

## 2020-04-29 PROCEDURE — 36415 COLL VENOUS BLD VENIPUNCTURE: CPT

## 2020-04-29 RX ORDER — SODIUM CHLORIDE 450 MG/100ML
INJECTION, SOLUTION INTRAVENOUS CONTINUOUS
Status: DISCONTINUED | OUTPATIENT
Start: 2020-04-29 | End: 2020-04-30 | Stop reason: HOSPADM

## 2020-04-29 RX ADMIN — SODIUM CHLORIDE: 4.5 INJECTION, SOLUTION INTRAVENOUS at 07:25

## 2020-04-29 ASSESSMENT — PAIN DESCRIPTION - PAIN TYPE
TYPE: CHRONIC PAIN
TYPE: CHRONIC PAIN

## 2020-04-29 ASSESSMENT — PAIN SCALES - GENERAL
PAINLEVEL_OUTOF10: 6
PAINLEVEL_OUTOF10: 6

## 2020-04-29 ASSESSMENT — PAIN DESCRIPTION - LOCATION
LOCATION: BACK;BUTTOCKS
LOCATION: BACK;BUTTOCKS

## 2020-04-29 ASSESSMENT — PAIN DESCRIPTION - ORIENTATION: ORIENTATION: LOWER

## 2020-04-29 NOTE — PROGRESS NOTES
1020 pt arrives via wheelchair with mother for bone bx. Procedure explained and questions answered. PT RIGHTS AND RESPONSIBILITIES OFFERED TO PT. Pt denies taking blood thinners or aspirin in past 5 days. Pt has been NPO since 4/28/20.  0720 labs drawn and sent down as ordered. 4263 pt requesting no sedation due to pt being paralyzed from bilateral nipples down to BLE. Mallorie RN notified. 6949 pt to CT via bed.   0930 pt back from CT. Vitals stable. bandaid clean, dry and intact. Pt and pts mother state they will replace wound dressing at home with appropriate supplies. 0945 vitals stable. Pt denies new pain. bandaid unchanged. 1000 vitals stable . bandaid unchanged. 1020 pt discharged via self wheelchair with mother with instructions with no complaints.        _m___ Safety:       (Environmental)   Maria Stein to environment   Ensure ID band is correct and in place/ allergy band as needed   Assess for fall risk   Initiate fall precautions as applicable (fall band, side rails, etc.)   Call light within reach   Bed in low position/ wheels locked    __m__ Pain:        Assess pain level and characteristics   Administer analgesics as ordered   Assess effectiveness of pain management and report to MD as needed    __m__ Knowledge Deficit:   Assess baseline knowledge   Provide teaching at level of understanding   Provide teaching via preferred learning method   Evaluate teaching effectiveness    __m__ Hemodynamic/Respiratory Status:       (Pre and Post Procedure Monitoring)   Assess/Monitor vital signs and LOC   Assess Baseline SpO2 prior to any sedation   Obtain weight/height   Assess vital signs/ LOC until patient meets discharge criteria   Monitor procedure site and notify MD of any issues
MD   Pseudoephedrine-DM-GG (ROBITUSSIN CF PO) Take 2 capsules by mouth 4 times daily Indications: Pain    Yes Historical Provider, MD   methenamine (HIPREX) 1 g tablet Take 1 g by mouth 2 times daily (with meals)   Yes Historical Provider, MD   baclofen (LIORESAL) 10 MG tablet Take 20 mg by mouth 4 times daily    Yes Historical Provider, MD   omeprazole (PRILOSEC) 20 MG delayed release capsule Take 20 mg by mouth daily   Yes Historical Provider, MD   gabapentin (NEURONTIN) 300 MG capsule Take 900 mg by mouth 4 times daily. Yes Historical Provider, MD     Additional information:       VITAL SIGNS   Vitals:    04/29/20 1000   BP: 122/78   Pulse: 80   Resp: 18   Temp:    SpO2:        PHYSICAL:   Heart:  [x]Regular rate and rhythm  []Other:    Lungs:  [x]Clear    []Other:    Abdomen: [x]Soft    []Other:    Mental Status: []Alert & Oriented  []Other:      PLANNED PROCEDURE   [x]Biospy []Arteriogram              []Drainage   []Mediport Insertion  []Fistulogram []IV access       []Vertebroplasty / Augmentation  []IVC filter []Dialysis catheter []Biliary drainage  []Other: []CAPD Catheter []Nephrostomy Tube / Stent  SEDATION  Planned agent:[]Midazolam []Meperidine []Sublimaze []Dilaudid []Morphine     []Diazepam  []Other:     ASA Classification:  []1 [x]2 []3 []4 []5  Class 1: A normal healthy patient  Class 2: Pt with mild to moderate systemic disease  Class 3: Severe systemic disease or disturbance  Class 4: Severe systemic disorders that are already life threatening. Class 5: Moribund pt with little chances of survival, for more than 24 hours. Mallampati I Airway Classification:   []1 []2 []3 [x]4    [x]Pre-procedure diagnostic studies complete and results available. Comment:    [x]Previous sedation/anesthesia experiences assessed. Comment:    [x]The patient is an appropriate candidate to undergo the planned procedure sedation and anesthesia.  (Refer to nursing sedation/analgesia documentation
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
## 2020-05-12 ENCOUNTER — OFFICE VISIT (OUTPATIENT)
Dept: SURGERY | Age: 30
End: 2020-05-12
Payer: MEDICARE

## 2020-05-12 VITALS
HEIGHT: 71 IN | BODY MASS INDEX: 25.9 KG/M2 | WEIGHT: 185 LBS | SYSTOLIC BLOOD PRESSURE: 130 MMHG | OXYGEN SATURATION: 97 % | TEMPERATURE: 97.6 F | DIASTOLIC BLOOD PRESSURE: 80 MMHG | RESPIRATION RATE: 18 BRPM | HEART RATE: 106 BPM

## 2020-05-12 PROCEDURE — 99214 OFFICE O/P EST MOD 30 MIN: CPT | Performed by: SURGERY

## 2020-05-12 PROCEDURE — G8427 DOCREV CUR MEDS BY ELIG CLIN: HCPCS | Performed by: SURGERY

## 2020-05-12 PROCEDURE — G8419 CALC BMI OUT NRM PARAM NOF/U: HCPCS | Performed by: SURGERY

## 2020-05-12 RX ORDER — ASPIRIN 325 MG
325 TABLET ORAL DAILY
Status: ON HOLD | COMMUNITY
End: 2020-06-03

## 2020-05-12 ASSESSMENT — ENCOUNTER SYMPTOMS
BACK PAIN: 0
SINUS PRESSURE: 0
CHOKING: 0
NAUSEA: 0
RHINORRHEA: 0
COUGH: 0
APNEA: 0
RECTAL PAIN: 0
SORE THROAT: 0
VOICE CHANGE: 0
TROUBLE SWALLOWING: 0
BLOOD IN STOOL: 0
ABDOMINAL DISTENTION: 0
FACIAL SWELLING: 0
VOMITING: 0
EYE DISCHARGE: 0
WHEEZING: 0
EYE REDNESS: 0
PHOTOPHOBIA: 0
EYE PAIN: 0
EYE ITCHING: 0
DIARRHEA: 0
ABDOMINAL PAIN: 0
COLOR CHANGE: 0
ANAL BLEEDING: 0
CONSTIPATION: 0
STRIDOR: 0
CHEST TIGHTNESS: 0
SHORTNESS OF BREATH: 0

## 2020-05-12 NOTE — PROGRESS NOTES
Psychiatric/Behavioral: Negative for agitation, behavioral problems, confusion, decreased concentration, dysphoric mood, hallucinations, self-injury, sleep disturbance and suicidal ideas. The patient is not nervous/anxious and is not hyperactive.       Pulse 106   Temp 97.6 °F (36.4 °C) (Temporal)   Resp 18   Ht 5' 11\" (1.803 m)   Wt 185 lb (83.9 kg)   SpO2 97%   BMI 25.80 kg/m²     Objective:   Physical Exam    Assessment:            Plan:              Lisa Vivar LPN

## 2020-05-12 NOTE — LETTER
infection specifically  was discussed, and the possible treatment options were discussed. The possibility of inadvertent injury to other structures/organs was also covered. The patient was given opportunity to ask questions. Once answered, the patient has agreed to proceed with surgery. 3. The risks, options and alternatives were discussed in the office. Bleeding, infection, reoperation and recurrence were discussed. Injury to other intra abdominal structures/organs and possible conversion to an open procedure as well as the risks of mesh infection were covered. The patient was given the opportunity to ask questions. Once answered, the patient was agreeable to proceed with the surgery. (L/S)  4. Status: outpatient  5. Planned anesthesia: general  6. He will undergo pre-operative clearance per anesthesia guidelines with risk factors listed under the past medical history diagnosis & problem list.    If I can provide any additional assistance or you have any concerns, please feel free to contact me. Thank you for allowing to participate in the care of your patients. Sincerely,      Bonnie Pham.  Amanda Morales
2

## 2020-05-13 ENCOUNTER — HOSPITAL ENCOUNTER (OUTPATIENT)
Dept: OTHER | Age: 30
Discharge: HOME | End: 2020-05-13
Payer: MEDICARE

## 2020-05-13 VITALS
HEIGHT: 71 IN | WEIGHT: 185 LBS | TEMPERATURE: 97.5 F | HEART RATE: 91 BPM | RESPIRATION RATE: 18 BRPM | BODY MASS INDEX: 25.9 KG/M2 | DIASTOLIC BLOOD PRESSURE: 60 MMHG | OXYGEN SATURATION: 97 % | SYSTOLIC BLOOD PRESSURE: 121 MMHG

## 2020-05-13 LAB
CREAT SERPL-MCNC: 0.8 MG/DL (ref 0.4–1.2)
EKG ATRIAL RATE: 85 BPM
EKG P AXIS: 49 DEGREES
EKG P-R INTERVAL: 140 MS
EKG Q-T INTERVAL: 344 MS
EKG QRS DURATION: 88 MS
EKG QTC CALCULATION (BAZETT): 409 MS
EKG R AXIS: 85 DEGREES
EKG T AXIS: -9 DEGREES
EKG VENTRICULAR RATE: 85 BPM
GFR SERPL CREATININE-BSD FRML MDRD: > 90 ML/MIN/1.73M2

## 2020-05-13 PROCEDURE — 93005 ELECTROCARDIOGRAM TRACING: CPT

## 2020-05-13 PROCEDURE — 36569 INSJ PICC 5 YR+ W/O IMAGING: CPT

## 2020-05-13 PROCEDURE — 99211 OFF/OP EST MAY X REQ PHY/QHP: CPT

## 2020-05-13 PROCEDURE — 93010 ELECTROCARDIOGRAM REPORT: CPT | Performed by: INTERNAL MEDICINE

## 2020-05-13 PROCEDURE — C1751 CATH, INF, PER/CENT/MIDLINE: HCPCS

## 2020-05-13 PROCEDURE — 2709999900 HC NON-CHARGEABLE SUPPLY

## 2020-05-13 PROCEDURE — 82565 ASSAY OF CREATININE: CPT

## 2020-05-13 PROCEDURE — 76937 US GUIDE VASCULAR ACCESS: CPT

## 2020-05-13 PROCEDURE — 36415 COLL VENOUS BLD VENIPUNCTURE: CPT

## 2020-05-13 RX ORDER — SODIUM CHLORIDE 0.9 % (FLUSH) 0.9 %
10 SYRINGE (ML) INJECTION EVERY 12 HOURS SCHEDULED
Status: DISCONTINUED | OUTPATIENT
Start: 2020-05-13 | End: 2020-05-14 | Stop reason: HOSPADM

## 2020-05-13 RX ORDER — SODIUM CHLORIDE 0.9 % (FLUSH) 0.9 %
10 SYRINGE (ML) INJECTION PRN
Status: DISCONTINUED | OUTPATIENT
Start: 2020-05-13 | End: 2020-05-14 | Stop reason: HOSPADM

## 2020-05-13 RX ORDER — LIDOCAINE HYDROCHLORIDE 10 MG/ML
5 INJECTION, SOLUTION EPIDURAL; INFILTRATION; INTRACAUDAL; PERINEURAL ONCE
Status: DISCONTINUED | OUTPATIENT
Start: 2020-05-13 | End: 2020-05-14 | Stop reason: HOSPADM

## 2020-05-13 ASSESSMENT — PAIN - FUNCTIONAL ASSESSMENT: PAIN_FUNCTIONAL_ASSESSMENT: 0-10

## 2020-05-13 NOTE — PROGRESS NOTES
1145 OK TO DISCHARGE PER PICC RN  1147 WRITTEN DISCHARGE INSTRUCTIONS GIVEN TO PT-VERBALIZES UNDERSTANDING  1155   PT DISCHARGED PER WHEEL CHAIR IN SATISFACTORY CONDITION

## 2020-05-21 RX ORDER — FENTANYL CITRATE 50 UG/ML
50 INJECTION, SOLUTION INTRAMUSCULAR; INTRAVENOUS ONCE
Status: CANCELLED | OUTPATIENT
Start: 2020-05-21 | End: 2020-05-21

## 2020-05-21 RX ORDER — SODIUM CHLORIDE 450 MG/100ML
INJECTION, SOLUTION INTRAVENOUS CONTINUOUS
Status: CANCELLED | OUTPATIENT
Start: 2020-05-21

## 2020-05-21 RX ORDER — MIDAZOLAM HYDROCHLORIDE 1 MG/ML
1 INJECTION INTRAMUSCULAR; INTRAVENOUS ONCE
Status: CANCELLED | OUTPATIENT
Start: 2020-05-21 | End: 2020-05-21

## 2020-05-22 ENCOUNTER — HOSPITAL ENCOUNTER (OUTPATIENT)
Dept: CT IMAGING | Age: 30
Discharge: HOME | End: 2020-05-22
Payer: MEDICARE

## 2020-05-22 VITALS
TEMPERATURE: 98.9 F | DIASTOLIC BLOOD PRESSURE: 79 MMHG | WEIGHT: 185 LBS | HEART RATE: 74 BPM | RESPIRATION RATE: 18 BRPM | BODY MASS INDEX: 25.8 KG/M2 | SYSTOLIC BLOOD PRESSURE: 138 MMHG | OXYGEN SATURATION: 97 %

## 2020-05-22 PROCEDURE — 87102 FUNGUS ISOLATION CULTURE: CPT

## 2020-05-22 PROCEDURE — 77012 CT SCAN FOR NEEDLE BIOPSY: CPT

## 2020-05-22 PROCEDURE — 6360000002 HC RX W HCPCS

## 2020-05-22 PROCEDURE — 87116 MYCOBACTERIA CULTURE: CPT

## 2020-05-22 PROCEDURE — 20225 BONE BIOPSY TROCAR/NDL DEEP: CPT

## 2020-05-22 PROCEDURE — 87070 CULTURE OTHR SPECIMN AEROBIC: CPT

## 2020-05-22 PROCEDURE — 6370000000 HC RX 637 (ALT 250 FOR IP)

## 2020-05-22 PROCEDURE — 87075 CULTR BACTERIA EXCEPT BLOOD: CPT

## 2020-05-22 PROCEDURE — 2580000003 HC RX 258: Performed by: RADIOLOGY

## 2020-05-22 PROCEDURE — 87205 SMEAR GRAM STAIN: CPT

## 2020-05-22 PROCEDURE — 2709999900 HC NON-CHARGEABLE SUPPLY

## 2020-05-22 RX ORDER — DIAPER,BRIEF,INFANT-TODD,DISP
EACH MISCELLANEOUS ONCE
Status: COMPLETED | OUTPATIENT
Start: 2020-05-22 | End: 2020-05-22

## 2020-05-22 RX ORDER — SODIUM CHLORIDE 450 MG/100ML
INJECTION, SOLUTION INTRAVENOUS CONTINUOUS
Status: DISCONTINUED | OUTPATIENT
Start: 2020-05-22 | End: 2020-05-23 | Stop reason: HOSPADM

## 2020-05-22 RX ORDER — FENTANYL CITRATE 50 UG/ML
50 INJECTION, SOLUTION INTRAMUSCULAR; INTRAVENOUS ONCE
Status: DISCONTINUED | OUTPATIENT
Start: 2020-05-22 | End: 2020-05-23 | Stop reason: HOSPADM

## 2020-05-22 RX ORDER — MIDAZOLAM HYDROCHLORIDE 1 MG/ML
1 INJECTION INTRAMUSCULAR; INTRAVENOUS ONCE
Status: DISCONTINUED | OUTPATIENT
Start: 2020-05-22 | End: 2020-05-23 | Stop reason: HOSPADM

## 2020-05-22 RX ADMIN — SODIUM CHLORIDE: 4.5 INJECTION, SOLUTION INTRAVENOUS at 09:23

## 2020-05-22 RX ADMIN — Medication: at 11:05

## 2020-05-22 ASSESSMENT — PAIN - FUNCTIONAL ASSESSMENT: PAIN_FUNCTIONAL_ASSESSMENT: 0-10

## 2020-05-22 ASSESSMENT — PAIN DESCRIPTION - DESCRIPTORS
DESCRIPTORS: BURNING;CONSTANT
DESCRIPTORS: ACHING

## 2020-05-22 ASSESSMENT — PAIN SCALES - GENERAL
PAINLEVEL_OUTOF10: 6
PAINLEVEL_OUTOF10: 8
PAINLEVEL_OUTOF10: 8

## 2020-05-22 ASSESSMENT — PAIN DESCRIPTION - PAIN TYPE: TYPE: CHRONIC PAIN

## 2020-05-22 ASSESSMENT — PAIN DESCRIPTION - ORIENTATION: ORIENTATION: RIGHT;LEFT;LOWER

## 2020-05-26 NOTE — PROGRESS NOTES
Jessica Benjamin 7301 doing a follow up phone call from bone biopsy on 5/22/20. Pt is a paraplegic and has a left buttock wound dressing being changed and stated they did not say that the biopsy site looks bad. Pt. voiced that he wanted to know if we had any results back yet.   I instructed him to call Dr. Talat Jones, the doctor that ordered the test.

## 2020-05-27 LAB
AEROBIC CULTURE: NORMAL
ANAEROBIC CULTURE: NORMAL
GRAM STAIN RESULT: NORMAL

## 2020-06-01 ENCOUNTER — HOSPITAL ENCOUNTER (OUTPATIENT)
Age: 30
Discharge: HOME | End: 2020-06-01
Payer: MEDICARE

## 2020-06-01 PROCEDURE — U0002 COVID-19 LAB TEST NON-CDC: HCPCS

## 2020-06-03 ENCOUNTER — ANESTHESIA EVENT (OUTPATIENT)
Dept: OPERATING ROOM | Age: 30
End: 2020-06-03
Payer: MEDICARE

## 2020-06-03 ENCOUNTER — HOSPITAL ENCOUNTER (OUTPATIENT)
Age: 30
Setting detail: OUTPATIENT SURGERY
Discharge: HOME | End: 2020-06-03
Attending: SURGERY | Admitting: SURGERY
Payer: MEDICARE

## 2020-06-03 ENCOUNTER — ANESTHESIA (OUTPATIENT)
Dept: OPERATING ROOM | Age: 30
End: 2020-06-03
Payer: MEDICARE

## 2020-06-03 VITALS
TEMPERATURE: 98.1 F | HEART RATE: 75 BPM | DIASTOLIC BLOOD PRESSURE: 77 MMHG | BODY MASS INDEX: 28 KG/M2 | RESPIRATION RATE: 16 BRPM | HEIGHT: 71 IN | SYSTOLIC BLOOD PRESSURE: 125 MMHG | WEIGHT: 200 LBS | OXYGEN SATURATION: 95 %

## 2020-06-03 VITALS — OXYGEN SATURATION: 100 % | DIASTOLIC BLOOD PRESSURE: 99 MMHG | SYSTOLIC BLOOD PRESSURE: 173 MMHG

## 2020-06-03 LAB
PERFORMING LAB: NORMAL
REPORT: NORMAL
SARS-COV-2, PCR: NOT DETECTED
SARS-COV-2: NOT DETECTED

## 2020-06-03 PROCEDURE — 2500000003 HC RX 250 WO HCPCS: Performed by: NURSE ANESTHETIST, CERTIFIED REGISTERED

## 2020-06-03 PROCEDURE — 6360000002 HC RX W HCPCS: Performed by: NURSE ANESTHETIST, CERTIFIED REGISTERED

## 2020-06-03 PROCEDURE — 2580000003 HC RX 258: Performed by: SURGERY

## 2020-06-03 PROCEDURE — 6360000002 HC RX W HCPCS

## 2020-06-03 PROCEDURE — 3600000002 HC SURGERY LEVEL 2 BASE: Performed by: SURGERY

## 2020-06-03 PROCEDURE — 2720000010 HC SURG SUPPLY STERILE: Performed by: SURGERY

## 2020-06-03 PROCEDURE — 6360000002 HC RX W HCPCS: Performed by: SURGERY

## 2020-06-03 PROCEDURE — 2500000003 HC RX 250 WO HCPCS: Performed by: SURGERY

## 2020-06-03 PROCEDURE — 7100000011 HC PHASE II RECOVERY - ADDTL 15 MIN: Performed by: SURGERY

## 2020-06-03 PROCEDURE — 7100000010 HC PHASE II RECOVERY - FIRST 15 MIN: Performed by: SURGERY

## 2020-06-03 PROCEDURE — 3600000012 HC SURGERY LEVEL 2 ADDTL 15MIN: Performed by: SURGERY

## 2020-06-03 PROCEDURE — 7100000001 HC PACU RECOVERY - ADDTL 15 MIN: Performed by: SURGERY

## 2020-06-03 PROCEDURE — 6370000000 HC RX 637 (ALT 250 FOR IP): Performed by: SURGERY

## 2020-06-03 PROCEDURE — 7100000000 HC PACU RECOVERY - FIRST 15 MIN: Performed by: SURGERY

## 2020-06-03 PROCEDURE — 3700000000 HC ANESTHESIA ATTENDED CARE: Performed by: SURGERY

## 2020-06-03 PROCEDURE — 2709999900 HC NON-CHARGEABLE SUPPLY: Performed by: SURGERY

## 2020-06-03 PROCEDURE — 3700000001 HC ADD 15 MINUTES (ANESTHESIA): Performed by: SURGERY

## 2020-06-03 PROCEDURE — U0002 COVID-19 LAB TEST NON-CDC: HCPCS

## 2020-06-03 PROCEDURE — C1781 MESH (IMPLANTABLE): HCPCS | Performed by: SURGERY

## 2020-06-03 DEVICE — MESH SURG W15XL12CM SEPRA TECHNOLOGY RECT PHASIX: Type: IMPLANTABLE DEVICE | Site: ABDOMEN | Status: FUNCTIONAL

## 2020-06-03 RX ORDER — MORPHINE SULFATE 2 MG/ML
2 INJECTION, SOLUTION INTRAMUSCULAR; INTRAVENOUS EVERY 5 MIN PRN
Status: DISCONTINUED | OUTPATIENT
Start: 2020-06-03 | End: 2020-06-03 | Stop reason: HOSPADM

## 2020-06-03 RX ORDER — HYDRALAZINE HYDROCHLORIDE 20 MG/ML
5 INJECTION INTRAMUSCULAR; INTRAVENOUS EVERY 10 MIN PRN
Status: DISCONTINUED | OUTPATIENT
Start: 2020-06-03 | End: 2020-06-03 | Stop reason: HOSPADM

## 2020-06-03 RX ORDER — METRONIDAZOLE 500 MG/1
500 TABLET ORAL 3 TIMES DAILY
COMMUNITY
End: 2020-07-07 | Stop reason: ALTCHOICE

## 2020-06-03 RX ORDER — SODIUM CHLORIDE 0.9 % (FLUSH) 0.9 %
10 SYRINGE (ML) INJECTION EVERY 12 HOURS SCHEDULED
Status: DISCONTINUED | OUTPATIENT
Start: 2020-06-03 | End: 2020-06-03 | Stop reason: HOSPADM

## 2020-06-03 RX ORDER — SODIUM CHLORIDE 9 MG/ML
INJECTION, SOLUTION INTRAVENOUS CONTINUOUS
Status: DISCONTINUED | OUTPATIENT
Start: 2020-06-03 | End: 2020-06-03 | Stop reason: HOSPADM

## 2020-06-03 RX ORDER — FENTANYL CITRATE 50 UG/ML
50 INJECTION, SOLUTION INTRAMUSCULAR; INTRAVENOUS EVERY 5 MIN PRN
Status: DISCONTINUED | OUTPATIENT
Start: 2020-06-03 | End: 2020-06-03 | Stop reason: HOSPADM

## 2020-06-03 RX ORDER — PROMETHAZINE HYDROCHLORIDE 25 MG/ML
12.5 INJECTION, SOLUTION INTRAMUSCULAR; INTRAVENOUS ONCE
Status: COMPLETED | OUTPATIENT
Start: 2020-06-03 | End: 2020-06-03

## 2020-06-03 RX ORDER — ONDANSETRON 2 MG/ML
4 INJECTION INTRAMUSCULAR; INTRAVENOUS EVERY 6 HOURS PRN
Status: DISCONTINUED | OUTPATIENT
Start: 2020-06-03 | End: 2020-06-03 | Stop reason: HOSPADM

## 2020-06-03 RX ORDER — LIDOCAINE HYDROCHLORIDE 20 MG/ML
INJECTION, SOLUTION INTRAVENOUS PRN
Status: DISCONTINUED | OUTPATIENT
Start: 2020-06-03 | End: 2020-06-03 | Stop reason: SDUPTHER

## 2020-06-03 RX ORDER — HYDROCODONE BITARTRATE AND ACETAMINOPHEN 5; 325 MG/1; MG/1
1 TABLET ORAL EVERY 4 HOURS PRN
Qty: 40 TABLET | Refills: 0 | Status: ON HOLD | OUTPATIENT
Start: 2020-06-03 | End: 2020-06-14

## 2020-06-03 RX ORDER — PROMETHAZINE HYDROCHLORIDE 25 MG/ML
INJECTION, SOLUTION INTRAMUSCULAR; INTRAVENOUS
Status: COMPLETED
Start: 2020-06-03 | End: 2020-06-03

## 2020-06-03 RX ORDER — ONDANSETRON 2 MG/ML
INJECTION INTRAMUSCULAR; INTRAVENOUS PRN
Status: DISCONTINUED | OUTPATIENT
Start: 2020-06-03 | End: 2020-06-03 | Stop reason: SDUPTHER

## 2020-06-03 RX ORDER — KETOROLAC TROMETHAMINE 30 MG/ML
INJECTION, SOLUTION INTRAMUSCULAR; INTRAVENOUS
Status: COMPLETED
Start: 2020-06-03 | End: 2020-06-03

## 2020-06-03 RX ORDER — PROMETHAZINE HYDROCHLORIDE 25 MG/ML
INJECTION, SOLUTION INTRAMUSCULAR; INTRAVENOUS
Status: DISCONTINUED
Start: 2020-06-03 | End: 2020-06-03 | Stop reason: HOSPADM

## 2020-06-03 RX ORDER — SODIUM CHLORIDE 0.9 % (FLUSH) 0.9 %
10 SYRINGE (ML) INJECTION PRN
Status: DISCONTINUED | OUTPATIENT
Start: 2020-06-03 | End: 2020-06-03 | Stop reason: HOSPADM

## 2020-06-03 RX ORDER — DIPHENHYDRAMINE HYDROCHLORIDE 50 MG/ML
12.5 INJECTION INTRAMUSCULAR; INTRAVENOUS
Status: DISCONTINUED | OUTPATIENT
Start: 2020-06-03 | End: 2020-06-03 | Stop reason: HOSPADM

## 2020-06-03 RX ORDER — ONDANSETRON 2 MG/ML
4 INJECTION INTRAMUSCULAR; INTRAVENOUS
Status: DISCONTINUED | OUTPATIENT
Start: 2020-06-03 | End: 2020-06-03 | Stop reason: HOSPADM

## 2020-06-03 RX ORDER — MEPERIDINE HYDROCHLORIDE 25 MG/ML
12.5 INJECTION INTRAMUSCULAR; INTRAVENOUS; SUBCUTANEOUS EVERY 5 MIN PRN
Status: DISCONTINUED | OUTPATIENT
Start: 2020-06-03 | End: 2020-06-03 | Stop reason: HOSPADM

## 2020-06-03 RX ORDER — PROPOFOL 10 MG/ML
INJECTION, EMULSION INTRAVENOUS PRN
Status: DISCONTINUED | OUTPATIENT
Start: 2020-06-03 | End: 2020-06-03 | Stop reason: SDUPTHER

## 2020-06-03 RX ORDER — HYDROCODONE BITARTRATE AND ACETAMINOPHEN 5; 325 MG/1; MG/1
1 TABLET ORAL EVERY 4 HOURS PRN
Status: DISCONTINUED | OUTPATIENT
Start: 2020-06-03 | End: 2020-06-03 | Stop reason: HOSPADM

## 2020-06-03 RX ORDER — BUPIVACAINE HYDROCHLORIDE 5 MG/ML
INJECTION, SOLUTION EPIDURAL; INTRACAUDAL PRN
Status: DISCONTINUED | OUTPATIENT
Start: 2020-06-03 | End: 2020-06-03 | Stop reason: ALTCHOICE

## 2020-06-03 RX ORDER — ACETAMINOPHEN 325 MG/1
650 TABLET ORAL ONCE
Status: DISCONTINUED | OUTPATIENT
Start: 2020-06-03 | End: 2020-06-03 | Stop reason: HOSPADM

## 2020-06-03 RX ORDER — FENTANYL CITRATE 50 UG/ML
INJECTION, SOLUTION INTRAMUSCULAR; INTRAVENOUS PRN
Status: DISCONTINUED | OUTPATIENT
Start: 2020-06-03 | End: 2020-06-03 | Stop reason: SDUPTHER

## 2020-06-03 RX ORDER — KETOROLAC TROMETHAMINE 30 MG/ML
30 INJECTION, SOLUTION INTRAMUSCULAR; INTRAVENOUS ONCE
Status: COMPLETED | OUTPATIENT
Start: 2020-06-03 | End: 2020-06-03

## 2020-06-03 RX ORDER — LABETALOL 20 MG/4 ML (5 MG/ML) INTRAVENOUS SYRINGE
5 EVERY 5 MIN PRN
Status: DISCONTINUED | OUTPATIENT
Start: 2020-06-03 | End: 2020-06-03 | Stop reason: HOSPADM

## 2020-06-03 RX ORDER — ROCURONIUM BROMIDE 10 MG/ML
INJECTION, SOLUTION INTRAVENOUS PRN
Status: DISCONTINUED | OUTPATIENT
Start: 2020-06-03 | End: 2020-06-03 | Stop reason: SDUPTHER

## 2020-06-03 RX ORDER — ACETAMINOPHEN 325 MG/1
TABLET ORAL
Status: DISCONTINUED
Start: 2020-06-03 | End: 2020-06-03 | Stop reason: WASHOUT

## 2020-06-03 RX ADMIN — HYDROMORPHONE HYDROCHLORIDE 0.5 MG: 1 INJECTION, SOLUTION INTRAMUSCULAR; INTRAVENOUS; SUBCUTANEOUS at 13:59

## 2020-06-03 RX ADMIN — HYDROMORPHONE HYDROCHLORIDE 0.5 MG: 1 INJECTION, SOLUTION INTRAMUSCULAR; INTRAVENOUS; SUBCUTANEOUS at 13:54

## 2020-06-03 RX ADMIN — ONDANSETRON HYDROCHLORIDE 4 MG: 4 INJECTION, SOLUTION INTRAMUSCULAR; INTRAVENOUS at 13:00

## 2020-06-03 RX ADMIN — CEFAZOLIN 2 G: 10 INJECTION, POWDER, FOR SOLUTION INTRAVENOUS at 12:05

## 2020-06-03 RX ADMIN — ROCURONIUM BROMIDE 50 MG: 10 INJECTION INTRAVENOUS at 12:00

## 2020-06-03 RX ADMIN — SODIUM CHLORIDE: 9 INJECTION, SOLUTION INTRAVENOUS at 10:59

## 2020-06-03 RX ADMIN — PHENYLEPHRINE HYDROCHLORIDE 100 MCG: 10 INJECTION INTRAVENOUS at 12:09

## 2020-06-03 RX ADMIN — PROMETHAZINE HYDROCHLORIDE 12.5 MG: 25 INJECTION, SOLUTION INTRAMUSCULAR; INTRAVENOUS at 13:39

## 2020-06-03 RX ADMIN — LIDOCAINE HYDROCHLORIDE 100 MG: 20 INJECTION, SOLUTION INTRAVENOUS at 12:00

## 2020-06-03 RX ADMIN — HYDROCODONE BITARTRATE AND ACETAMINOPHEN 1 TABLET: 5; 325 TABLET ORAL at 13:29

## 2020-06-03 RX ADMIN — FENTANYL CITRATE 100 MCG: 50 INJECTION, SOLUTION INTRAMUSCULAR; INTRAVENOUS at 12:00

## 2020-06-03 RX ADMIN — PROMETHAZINE HYDROCHLORIDE 12.5 MG: 25 INJECTION INTRAMUSCULAR; INTRAVENOUS at 13:39

## 2020-06-03 RX ADMIN — SODIUM CHLORIDE: 9 INJECTION, SOLUTION INTRAVENOUS at 11:58

## 2020-06-03 RX ADMIN — PROPOFOL 30 MG: 10 INJECTION, EMULSION INTRAVENOUS at 13:05

## 2020-06-03 RX ADMIN — KETOROLAC TROMETHAMINE 30 MG: 30 INJECTION, SOLUTION INTRAMUSCULAR at 13:37

## 2020-06-03 RX ADMIN — SUGAMMADEX 181 MG: 100 INJECTION, SOLUTION INTRAVENOUS at 13:05

## 2020-06-03 RX ADMIN — PROPOFOL 200 MG: 10 INJECTION, EMULSION INTRAVENOUS at 12:00

## 2020-06-03 RX ADMIN — KETOROLAC TROMETHAMINE 30 MG: 30 INJECTION, SOLUTION INTRAMUSCULAR; INTRAVENOUS at 13:37

## 2020-06-03 ASSESSMENT — PULMONARY FUNCTION TESTS
PIF_VALUE: 23
PIF_VALUE: 23
PIF_VALUE: 17
PIF_VALUE: 19
PIF_VALUE: 17
PIF_VALUE: 20
PIF_VALUE: 17
PIF_VALUE: 0
PIF_VALUE: 0
PIF_VALUE: 1
PIF_VALUE: 16
PIF_VALUE: 0
PIF_VALUE: 0
PIF_VALUE: 17
PIF_VALUE: 25
PIF_VALUE: 27
PIF_VALUE: 17
PIF_VALUE: 16
PIF_VALUE: 17
PIF_VALUE: 19
PIF_VALUE: 23
PIF_VALUE: 1
PIF_VALUE: 13
PIF_VALUE: 17
PIF_VALUE: 0
PIF_VALUE: 17
PIF_VALUE: 16
PIF_VALUE: 17
PIF_VALUE: 17
PIF_VALUE: 1
PIF_VALUE: 17
PIF_VALUE: 4
PIF_VALUE: 0
PIF_VALUE: 2
PIF_VALUE: 16
PIF_VALUE: 17
PIF_VALUE: 18
PIF_VALUE: 18
PIF_VALUE: 16
PIF_VALUE: 16
PIF_VALUE: 17
PIF_VALUE: 45
PIF_VALUE: 0
PIF_VALUE: 17
PIF_VALUE: 1
PIF_VALUE: 19
PIF_VALUE: 17
PIF_VALUE: 18
PIF_VALUE: 45
PIF_VALUE: 17
PIF_VALUE: 0
PIF_VALUE: 2
PIF_VALUE: 17
PIF_VALUE: 17
PIF_VALUE: 0
PIF_VALUE: 17
PIF_VALUE: 17
PIF_VALUE: 23
PIF_VALUE: 16
PIF_VALUE: 1
PIF_VALUE: 17
PIF_VALUE: 19
PIF_VALUE: 0
PIF_VALUE: 3
PIF_VALUE: 24
PIF_VALUE: 0
PIF_VALUE: 12
PIF_VALUE: 1
PIF_VALUE: 17
PIF_VALUE: 25
PIF_VALUE: 17
PIF_VALUE: 17
PIF_VALUE: 10
PIF_VALUE: 17
PIF_VALUE: 4
PIF_VALUE: 17
PIF_VALUE: 17
PIF_VALUE: 3
PIF_VALUE: 1
PIF_VALUE: 17

## 2020-06-03 ASSESSMENT — PAIN SCALES - GENERAL
PAINLEVEL_OUTOF10: 10
PAINLEVEL_OUTOF10: 8
PAINLEVEL_OUTOF10: 4
PAINLEVEL_OUTOF10: 4
PAINLEVEL_OUTOF10: 8
PAINLEVEL_OUTOF10: 10
PAINLEVEL_OUTOF10: 4

## 2020-06-03 NOTE — PROGRESS NOTES
476 1626 Pt transferred to PACU, see flow sheet for assessment. Pt is awake, breathing even/unlabored. Pt complaining of a bad headache. Pt states he usually takes tylenol at home for headaches. 1329 Pt tolerated swallowing pill and water with no difficulties. 1337 D. Wisser ok'd to give Toradol. 1338 Pt vomited once, unsure if Norco was in vomitus. 1339 Phenergan given. 46 Pt states he still has a headache and his abdomen is starting to hurt. Dressing to abdomen is unchanged. See MAR.  6022 Pt's pain is unchanged, see MAR.   1406 Pt rating his pain 4/10 and nausea a subsided. 1419 Pt has been resting, breathing even/unlabored.    1420 Pt transferred to [facility], report given to [employer], visitor in room

## 2020-06-03 NOTE — BRIEF OP NOTE
Brief Postoperative Note      Patient: Ari Campos  YOB: 1990  MRN: [id number]    Date of Procedure: 6/3/2020    Pre-Op Diagnosis: PARASTOMAL HERNIA    Post-Op Diagnosis: Same       Procedure(s):  PARASTOMAL HERNIA REPAIR WITH MESH    Surgeon(s):  Yana Arango DO    Assistant:  Surgical Assistant: Neeta Conner    Anesthesia: General    Estimated Blood Loss (mL): 15    Complications: None    Specimens:   * No specimens in log *    Implants:  Implant Name Type Inv. Item Serial No.  Lot No. LRB No. Used Action   MESH PHASIX ST [id number] Mesh MESH PHASIX ST [id number]  CR [employer] [id number] N/A 1 Implanted         Drains:   Colostomy LLQ (Active)   Peristomal Assessment Clean; Intact 6/3/2020 10:52 AM   Stool Appearance Loose 6/3/2020 10:52 AM   Stool Color Arden Merles 6/3/2020 10:52 AM       Suprapubic Catheter Non-latex 16 fr (Active)   Dressing Status Clean;Dry; Intact 6/3/2020 10:51 AM   Urine Color Mercedes 6/3/2020 10:51 AM   Urine Appearance Clear 6/3/2020 10:51 AM       Suprapubic Catheter (Active)       Electronically signed by Yana Arango DO on 6/3/2020 at 12:49 PM
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
## 2020-06-03 NOTE — PROGRESS NOTES
ADMITTED TO [facility] AND ORIENTED TO UNIT. SCDS ON. FALL AND ALLERGY BANDS ON. PT VERBALIZED APPROVAL FOR FIRST NAME, LAST INITIAL AND PHYSICIAN NAME ON UNIT WHITEBOARD. Patient family member, Yuliet Neumann is with him today. Her phone number is [phone]. The patient has a olguin that is draining dark clear urine. The patient also uses a wheelchair due to the loss of the use of his lower extremities. The patient has a colostomy with dark brown loose stool. The patient has a wound on his Left buttock area that is covered with an adhesive dressing. The patient is receiving home infusions of antibiotics through a PICC line in the left arm. The patient states that they manage the PICC line at home. The patient family member [facility] will return later as she is going to go to the patient's home to care for his service dog. [facility] homecare was given a message to return a phone call the [facility] regarding the heparin dose that the patient instills at home for his PICC line.

## 2020-06-03 NOTE — ANESTHESIA PRE PROCEDURE
Department of Anesthesiology  Preprocedure Note       Name:  Nash George   Age:  30 y.o.  :  1990                                          MRN:  [id number]         Date:  6/3/2020      Surgeon: Laura Duran):  Venkat Gonzalez DO    Procedure: Procedure(s):  PARASTOMAL HERNIA REPAIR POSS MESH    Medications prior to admission:   Prior to Admission medications    Medication Sig Start Date End Date Taking? Authorizing Provider   sodium chloride 0.9 % SOLN 50 mL with ceFEPIme 2 g SOLR 2 g Infuse 2 g intravenously 2 times daily    Historical Provider, MD   aspirin 325 MG tablet Take 325 mg by mouth daily    Historical Provider, MD   Capsaicin (ZOSTRIX) 0.035 % CREA cream Apply topically 3 times daily    Historical Provider, MD   methocarbamol (ROBAXIN) 500 MG tablet Take 500 mg by mouth 4 times daily    Historical Provider, MD   QUEtiapine Fumarate (SEROQUEL PO) Take 25 mg by mouth    Historical Provider, MD   lamoTRIgine (LAMICTAL) 100 MG tablet take 1 tablet by mouth twice a day 18   Lisa Adrian MD   Pseudoephedrine-DM-GG (ROBITUSSIN CF PO) Take 2 capsules by mouth 4 times daily Indications: Pain     Historical Provider, MD   methenamine (HIPREX) 1 g tablet Take 1 g by mouth 2 times daily (with meals)    Historical Provider, MD   baclofen (LIORESAL) 10 MG tablet Take 20 mg by mouth 4 times daily     Historical Provider, MD   omeprazole (PRILOSEC) 20 MG delayed release capsule Take 20 mg by mouth daily    Historical Provider, MD   gabapentin (NEURONTIN) 300 MG capsule Take 900 mg by mouth 4 times daily.      Historical Provider, MD       Current medications:    Current Facility-Administered Medications   Medication Dose Route Frequency Provider Last Rate Last Dose    0.9 % sodium chloride infusion   Intravenous Continuous Josefina Morales DO        sodium chloride flush 0.9 % injection 10 mL  10 mL Intravenous 2 times per day Josefina Morales DO        sodium chloride flush 0.9 % injection 10 mL 10 mL Intravenous PRN Bonnie Morales,         ceFAZolin (ANCEF) 2 g in dextrose 5 % 50 mL IVPB  2 g Intravenous On Call to [address],          Facility-Administered Medications Ordered in Other Encounters   Medication Dose Route Frequency Provider Last Rate Last Dose    0.45 % sodium chloride infusion   Intravenous Continuous Amara Trammell MD        fentaNYL (SUBLIMAZE) injection 50 mcg  50 mcg Intravenous Once Amara Trammell MD        midazolam (VERSED) injection 1 mg  1 mg Intravenous Once Amara Trammell MD        0.45 % sodium chloride infusion   Intravenous Continuous Nadia Stephens MD           Allergies:     Allergies   Allergen Reactions    Bee Venom Swelling    Zosyn [Piperacillin Sod-Tazobactam So] Other (See Comments)     Muscle spasms and headache     Doxycycline Calcium Rash       Problem List:    Patient Active Problem List   Diagnosis Code    Urinary tract infection associated with catheterization of urinary tract ([facility]) T83.511A, N39.0    Moderate single current episode of major depressive disorder ([facility] F32.1    Severe malnutrition ([facility] E43    Sepsis with metabolic encephalopathy ([facility]) A41.9, R65.20, G93.41    Decubitus ulcer of coccygeal region, stage 3 ([facility]) L89.153    Adjustment Disorder with Mixed Anxiety and Depressed Mood     Colostomy status ([facility]) Z93.3    Sepsis ([facility] A41.9    Neurogenic bladder N31.9    Colostomy care ([facility] Z43.3    Peristomal skin complication A62.7    Overactive bladder N32.81    Osteomyelitis ([facility]) M86.9    Neutropenia ([facility]) D70.9    Hypernatremia E87.0    Candidemia ([facility]) B37.7    Candiduria B37.49    Pressure ulcer of ischium, left, stage IV ([facility]) L89.324    Chronic osteomyelitis of coccyx ([facility]) M46.28    Chronic osteomyelitis, pelvis, left ([facility]) M19.706       Past Medical History:        Diagnosis Date    Acute kidney failure ([facility])     Acute respiratory failure with hypoxia ([facility])     Bladder retention    

## 2020-06-03 NOTE — PROGRESS NOTES
Another message was left for devsisters at [address] [place] [address] about heparin dose for the patient's heparin for the PICC line.

## 2020-06-03 NOTE — PROGRESS NOTES
Message left with Abi Treviño, supervisor at [facility] health in [employer] in regards to heparin dosage for PICC line. Unit number provided, call back requested.

## 2020-06-04 ENCOUNTER — TELEPHONE (OUTPATIENT)
Dept: SURGERY | Age: 30
End: 2020-06-04

## 2020-06-05 ENCOUNTER — TELEPHONE (OUTPATIENT)
Dept: SURGERY | Age: 30
End: 2020-06-05

## 2020-06-06 ENCOUNTER — HOSPITAL ENCOUNTER (INPATIENT)
Age: 30
LOS: 7 days | Discharge: HOME HEALTH | DRG: 335 | End: 2020-06-14
Attending: EMERGENCY MEDICINE | Admitting: SURGERY
Payer: MEDICARE

## 2020-06-06 ENCOUNTER — APPOINTMENT (OUTPATIENT)
Dept: CT IMAGING | Age: 30
DRG: 335 | End: 2020-06-06
Payer: MEDICARE

## 2020-06-06 LAB
ALBUMIN SERPL-MCNC: 3.1 G/DL (ref 3.5–5.1)
ALP BLD-CCNC: 99 U/L (ref 38–126)
ALT SERPL-CCNC: 16 U/L (ref 11–66)
ANION GAP SERPL CALCULATED.3IONS-SCNC: 13 MEQ/L (ref 8–16)
AST SERPL-CCNC: 18 U/L (ref 5–40)
BASOPHILS # BLD: 0.4 %
BASOPHILS ABSOLUTE: 0.1 THOU/MM3 (ref 0–0.1)
BILIRUB SERPL-MCNC: 0.4 MG/DL (ref 0.3–1.2)
BUN BLDV-MCNC: 19 MG/DL (ref 7–22)
CALCIUM SERPL-MCNC: 9.6 MG/DL (ref 8.5–10.5)
CHLORIDE BLD-SCNC: 97 MEQ/L (ref 98–111)
CO2: 22 MEQ/L (ref 23–33)
CREAT SERPL-MCNC: 0.7 MG/DL (ref 0.4–1.2)
EOSINOPHIL # BLD: 0.3 %
EOSINOPHILS ABSOLUTE: 0 THOU/MM3 (ref 0–0.4)
ERYTHROCYTE [DISTWIDTH] IN BLOOD BY AUTOMATED COUNT: 15.2 % (ref 11.5–14.5)
ERYTHROCYTE [DISTWIDTH] IN BLOOD BY AUTOMATED COUNT: 47.5 FL (ref 35–45)
GFR SERPL CREATININE-BSD FRML MDRD: > 90 ML/MIN/1.73M2
GLUCOSE BLD-MCNC: 115 MG/DL (ref 70–108)
HCT VFR BLD CALC: 45.9 % (ref 42–52)
HEMOGLOBIN: 14.7 GM/DL (ref 14–18)
IMMATURE GRANS (ABS): 0.19 THOU/MM3 (ref 0–0.07)
IMMATURE GRANULOCYTES: 1.3 %
LACTIC ACID, SEPSIS: 0.9 MMOL/L (ref 0.5–1.9)
LYMPHOCYTES # BLD: 11.1 %
LYMPHOCYTES ABSOLUTE: 1.6 THOU/MM3 (ref 1–4.8)
MCH RBC QN AUTO: 27.6 PG (ref 26–33)
MCHC RBC AUTO-ENTMCNC: 32 GM/DL (ref 32.2–35.5)
MCV RBC AUTO: 86.1 FL (ref 80–94)
MONOCYTES # BLD: 11.4 %
MONOCYTES ABSOLUTE: 1.6 THOU/MM3 (ref 0.4–1.3)
NUCLEATED RED BLOOD CELLS: 0 /100 WBC
OSMOLALITY CALCULATION: 267.7 MOSMOL/KG (ref 275–300)
PLATELET # BLD: 275 THOU/MM3 (ref 130–400)
PMV BLD AUTO: 10.6 FL (ref 9.4–12.4)
POTASSIUM REFLEX MAGNESIUM: 4.4 MEQ/L (ref 3.5–5.2)
RBC # BLD: 5.33 MILL/MM3 (ref 4.7–6.1)
SEG NEUTROPHILS: 75.5 %
SEGMENTED NEUTROPHILS ABSOLUTE COUNT: 10.7 THOU/MM3 (ref 1.8–7.7)
SODIUM BLD-SCNC: 132 MEQ/L (ref 135–145)
TOTAL PROTEIN: 7.5 G/DL (ref 6.1–8)
WBC # BLD: 14.2 THOU/MM3 (ref 4.8–10.8)

## 2020-06-06 PROCEDURE — 74177 CT ABD & PELVIS W/CONTRAST: CPT

## 2020-06-06 PROCEDURE — 83605 ASSAY OF LACTIC ACID: CPT

## 2020-06-06 PROCEDURE — 99284 EMERGENCY DEPT VISIT MOD MDM: CPT

## 2020-06-06 PROCEDURE — 96372 THER/PROPH/DIAG INJ SC/IM: CPT

## 2020-06-06 PROCEDURE — 36415 COLL VENOUS BLD VENIPUNCTURE: CPT

## 2020-06-06 PROCEDURE — 85025 COMPLETE CBC W/AUTO DIFF WBC: CPT

## 2020-06-06 PROCEDURE — 6360000002 HC RX W HCPCS: Performed by: EMERGENCY MEDICINE

## 2020-06-06 PROCEDURE — 96374 THER/PROPH/DIAG INJ IV PUSH: CPT

## 2020-06-06 PROCEDURE — 80053 COMPREHEN METABOLIC PANEL: CPT

## 2020-06-06 PROCEDURE — 87040 BLOOD CULTURE FOR BACTERIA: CPT

## 2020-06-06 PROCEDURE — 2580000003 HC RX 258: Performed by: EMERGENCY MEDICINE

## 2020-06-06 RX ORDER — 0.9 % SODIUM CHLORIDE 0.9 %
1000 INTRAVENOUS SOLUTION INTRAVENOUS ONCE
Status: COMPLETED | OUTPATIENT
Start: 2020-06-06 | End: 2020-06-07

## 2020-06-06 RX ORDER — PROMETHAZINE HYDROCHLORIDE 25 MG/ML
25 INJECTION, SOLUTION INTRAMUSCULAR; INTRAVENOUS ONCE
Status: COMPLETED | OUTPATIENT
Start: 2020-06-06 | End: 2020-06-06

## 2020-06-06 RX ORDER — KETOROLAC TROMETHAMINE 30 MG/ML
30 INJECTION, SOLUTION INTRAMUSCULAR; INTRAVENOUS ONCE
Status: COMPLETED | OUTPATIENT
Start: 2020-06-06 | End: 2020-06-06

## 2020-06-06 RX ORDER — SODIUM CHLORIDE 9 MG/ML
INJECTION, SOLUTION INTRAVENOUS CONTINUOUS
Status: DISCONTINUED | OUTPATIENT
Start: 2020-06-06 | End: 2020-06-07 | Stop reason: HOSPADM

## 2020-06-06 RX ADMIN — PROMETHAZINE HYDROCHLORIDE 25 MG: 25 INJECTION INTRAMUSCULAR; INTRAVENOUS at 23:03

## 2020-06-06 RX ADMIN — SODIUM CHLORIDE 1000 ML: 9 INJECTION, SOLUTION INTRAVENOUS at 23:00

## 2020-06-06 RX ADMIN — KETOROLAC TROMETHAMINE 30 MG: 30 INJECTION, SOLUTION INTRAMUSCULAR at 23:02

## 2020-06-06 ASSESSMENT — ENCOUNTER SYMPTOMS
BLOOD IN STOOL: 0
SHORTNESS OF BREATH: 0
NAUSEA: 1
VOMITING: 1
COUGH: 0
SORE THROAT: 1
DIARRHEA: 0
ABDOMINAL PAIN: 1
WHEEZING: 0
BACK PAIN: 0

## 2020-06-06 ASSESSMENT — PAIN DESCRIPTION - DESCRIPTORS: DESCRIPTORS: BURNING

## 2020-06-06 ASSESSMENT — PAIN DESCRIPTION - ORIENTATION: ORIENTATION: MID

## 2020-06-06 ASSESSMENT — PAIN DESCRIPTION - ONSET: ONSET: AWAKENED FROM SLEEP

## 2020-06-06 ASSESSMENT — PAIN DESCRIPTION - FREQUENCY: FREQUENCY: CONTINUOUS

## 2020-06-06 ASSESSMENT — PAIN DESCRIPTION - LOCATION: LOCATION: ABDOMEN

## 2020-06-06 ASSESSMENT — PAIN SCALES - GENERAL: PAINLEVEL_OUTOF10: 7

## 2020-06-07 ENCOUNTER — APPOINTMENT (OUTPATIENT)
Dept: GENERAL RADIOLOGY | Age: 30
DRG: 335 | End: 2020-06-07
Payer: MEDICARE

## 2020-06-07 PROBLEM — K56.609 SMALL BOWEL OBSTRUCTION (HCC): Status: ACTIVE | Noted: 2020-06-07

## 2020-06-07 LAB
ANION GAP SERPL CALCULATED.3IONS-SCNC: 12 MEQ/L (ref 8–16)
BUN BLDV-MCNC: 17 MG/DL (ref 7–22)
CALCIUM SERPL-MCNC: 9.1 MG/DL (ref 8.5–10.5)
CHLORIDE BLD-SCNC: 101 MEQ/L (ref 98–111)
CO2: 22 MEQ/L (ref 23–33)
CREAT SERPL-MCNC: 0.6 MG/DL (ref 0.4–1.2)
ERYTHROCYTE [DISTWIDTH] IN BLOOD BY AUTOMATED COUNT: 15.1 % (ref 11.5–14.5)
ERYTHROCYTE [DISTWIDTH] IN BLOOD BY AUTOMATED COUNT: 48.2 FL (ref 35–45)
GFR SERPL CREATININE-BSD FRML MDRD: > 90 ML/MIN/1.73M2
GLUCOSE BLD-MCNC: 100 MG/DL (ref 70–108)
HCT VFR BLD CALC: 43.4 % (ref 42–52)
HEMOGLOBIN: 13.6 GM/DL (ref 14–18)
MCH RBC QN AUTO: 27.3 PG (ref 26–33)
MCHC RBC AUTO-ENTMCNC: 31.3 GM/DL (ref 32.2–35.5)
MCV RBC AUTO: 87.1 FL (ref 80–94)
PLATELET # BLD: 262 THOU/MM3 (ref 130–400)
PMV BLD AUTO: 11.5 FL (ref 9.4–12.4)
POTASSIUM SERPL-SCNC: 4.1 MEQ/L (ref 3.5–5.2)
RBC # BLD: 4.98 MILL/MM3 (ref 4.7–6.1)
SODIUM BLD-SCNC: 135 MEQ/L (ref 135–145)
WBC # BLD: 13.1 THOU/MM3 (ref 4.8–10.8)

## 2020-06-07 PROCEDURE — 74018 RADEX ABDOMEN 1 VIEW: CPT

## 2020-06-07 PROCEDURE — 6360000002 HC RX W HCPCS: Performed by: PHYSICIAN ASSISTANT

## 2020-06-07 PROCEDURE — 6370000000 HC RX 637 (ALT 250 FOR IP): Performed by: PHYSICIAN ASSISTANT

## 2020-06-07 PROCEDURE — 2580000003 HC RX 258: Performed by: EMERGENCY MEDICINE

## 2020-06-07 PROCEDURE — 1200000000 HC SEMI PRIVATE

## 2020-06-07 PROCEDURE — 6360000002 HC RX W HCPCS

## 2020-06-07 PROCEDURE — 6370000000 HC RX 637 (ALT 250 FOR IP): Performed by: SURGERY

## 2020-06-07 PROCEDURE — 6360000002 HC RX W HCPCS: Performed by: EMERGENCY MEDICINE

## 2020-06-07 PROCEDURE — 85027 COMPLETE CBC AUTOMATED: CPT

## 2020-06-07 PROCEDURE — 36415 COLL VENOUS BLD VENIPUNCTURE: CPT

## 2020-06-07 PROCEDURE — 2580000003 HC RX 258: Performed by: SURGERY

## 2020-06-07 PROCEDURE — 99024 POSTOP FOLLOW-UP VISIT: CPT | Performed by: SURGERY

## 2020-06-07 PROCEDURE — 80048 BASIC METABOLIC PNL TOTAL CA: CPT

## 2020-06-07 PROCEDURE — 6360000002 HC RX W HCPCS: Performed by: SURGERY

## 2020-06-07 PROCEDURE — 6360000004 HC RX CONTRAST MEDICATION: Performed by: EMERGENCY MEDICINE

## 2020-06-07 PROCEDURE — APPSS60 APP SPLIT SHARED TIME 46-60 MINUTES: Performed by: PHYSICIAN ASSISTANT

## 2020-06-07 PROCEDURE — 2580000003 HC RX 258: Performed by: PHYSICIAN ASSISTANT

## 2020-06-07 PROCEDURE — 71045 X-RAY EXAM CHEST 1 VIEW: CPT

## 2020-06-07 PROCEDURE — C9113 INJ PANTOPRAZOLE SODIUM, VIA: HCPCS | Performed by: PHYSICIAN ASSISTANT

## 2020-06-07 PROCEDURE — 2500000003 HC RX 250 WO HCPCS: Performed by: SURGERY

## 2020-06-07 RX ORDER — BACLOFEN 10 MG/1
10 TABLET ORAL 3 TIMES DAILY
Status: DISCONTINUED | OUTPATIENT
Start: 2020-06-07 | End: 2020-06-08

## 2020-06-07 RX ORDER — QUETIAPINE FUMARATE 25 MG/1
25 TABLET, FILM COATED ORAL NIGHTLY
Status: DISCONTINUED | OUTPATIENT
Start: 2020-06-07 | End: 2020-06-14 | Stop reason: HOSPADM

## 2020-06-07 RX ORDER — LORAZEPAM 2 MG/ML
0.5 INJECTION INTRAMUSCULAR EVERY 6 HOURS PRN
Status: DISCONTINUED | OUTPATIENT
Start: 2020-06-07 | End: 2020-06-14 | Stop reason: HOSPADM

## 2020-06-07 RX ORDER — MORPHINE SULFATE 4 MG/ML
4 INJECTION, SOLUTION INTRAMUSCULAR; INTRAVENOUS
Status: DISCONTINUED | OUTPATIENT
Start: 2020-06-07 | End: 2020-06-14 | Stop reason: HOSPADM

## 2020-06-07 RX ORDER — BACLOFEN 10 MG/1
20 TABLET ORAL 4 TIMES DAILY
Status: DISCONTINUED | OUTPATIENT
Start: 2020-06-07 | End: 2020-06-07

## 2020-06-07 RX ORDER — METHENAMINE HIPPURATE 1000 MG/1
1 TABLET ORAL 2 TIMES DAILY WITH MEALS
Status: DISCONTINUED | OUTPATIENT
Start: 2020-06-07 | End: 2020-06-07

## 2020-06-07 RX ORDER — METHOCARBAMOL 500 MG/1
500 TABLET, FILM COATED ORAL 4 TIMES DAILY
Status: DISCONTINUED | OUTPATIENT
Start: 2020-06-07 | End: 2020-06-14 | Stop reason: HOSPADM

## 2020-06-07 RX ORDER — PROMETHAZINE HYDROCHLORIDE 25 MG/ML
INJECTION, SOLUTION INTRAMUSCULAR; INTRAVENOUS
Status: COMPLETED
Start: 2020-06-07 | End: 2020-06-07

## 2020-06-07 RX ORDER — GABAPENTIN 300 MG/1
900 CAPSULE ORAL 4 TIMES DAILY
Status: DISCONTINUED | OUTPATIENT
Start: 2020-06-07 | End: 2020-06-14 | Stop reason: HOSPADM

## 2020-06-07 RX ORDER — ONDANSETRON 2 MG/ML
4 INJECTION INTRAMUSCULAR; INTRAVENOUS EVERY 6 HOURS PRN
Status: DISCONTINUED | OUTPATIENT
Start: 2020-06-07 | End: 2020-06-14 | Stop reason: HOSPADM

## 2020-06-07 RX ORDER — SODIUM CHLORIDE 9 MG/ML
INJECTION, SOLUTION INTRAVENOUS CONTINUOUS
Status: DISCONTINUED | OUTPATIENT
Start: 2020-06-07 | End: 2020-06-14 | Stop reason: HOSPADM

## 2020-06-07 RX ORDER — SODIUM CHLORIDE 0.9 % (FLUSH) 0.9 %
10 SYRINGE (ML) INJECTION EVERY 12 HOURS SCHEDULED
Status: DISCONTINUED | OUTPATIENT
Start: 2020-06-07 | End: 2020-06-14 | Stop reason: HOSPADM

## 2020-06-07 RX ORDER — SODIUM CHLORIDE, SODIUM LACTATE, POTASSIUM CHLORIDE, CALCIUM CHLORIDE 600; 310; 30; 20 MG/100ML; MG/100ML; MG/100ML; MG/100ML
INJECTION, SOLUTION INTRAVENOUS CONTINUOUS
Status: DISCONTINUED | OUTPATIENT
Start: 2020-06-07 | End: 2020-06-07

## 2020-06-07 RX ORDER — SODIUM CHLORIDE 0.9 % (FLUSH) 0.9 %
10 SYRINGE (ML) INJECTION PRN
Status: DISCONTINUED | OUTPATIENT
Start: 2020-06-07 | End: 2020-06-14 | Stop reason: HOSPADM

## 2020-06-07 RX ORDER — MORPHINE SULFATE 2 MG/ML
2 INJECTION, SOLUTION INTRAMUSCULAR; INTRAVENOUS
Status: DISCONTINUED | OUTPATIENT
Start: 2020-06-07 | End: 2020-06-14 | Stop reason: HOSPADM

## 2020-06-07 RX ORDER — LAMOTRIGINE 100 MG/1
100 TABLET ORAL DAILY
Status: DISCONTINUED | OUTPATIENT
Start: 2020-06-07 | End: 2020-06-07

## 2020-06-07 RX ORDER — LAMOTRIGINE 100 MG/1
100 TABLET ORAL 2 TIMES DAILY
Status: DISCONTINUED | OUTPATIENT
Start: 2020-06-07 | End: 2020-06-14 | Stop reason: HOSPADM

## 2020-06-07 RX ORDER — PROMETHAZINE HYDROCHLORIDE 25 MG/ML
6.25 INJECTION, SOLUTION INTRAMUSCULAR; INTRAVENOUS EVERY 6 HOURS PRN
Status: DISCONTINUED | OUTPATIENT
Start: 2020-06-07 | End: 2020-06-14 | Stop reason: HOSPADM

## 2020-06-07 RX ORDER — QUETIAPINE FUMARATE 25 MG/1
25 TABLET, FILM COATED ORAL 2 TIMES DAILY
Status: DISCONTINUED | OUTPATIENT
Start: 2020-06-07 | End: 2020-06-07

## 2020-06-07 RX ORDER — SODIUM CHLORIDE 9 MG/ML
10 INJECTION INTRAVENOUS DAILY
Status: DISCONTINUED | OUTPATIENT
Start: 2020-06-07 | End: 2020-06-14 | Stop reason: HOSPADM

## 2020-06-07 RX ORDER — PANTOPRAZOLE SODIUM 40 MG/10ML
40 INJECTION, POWDER, LYOPHILIZED, FOR SOLUTION INTRAVENOUS DAILY
Status: DISCONTINUED | OUTPATIENT
Start: 2020-06-07 | End: 2020-06-14 | Stop reason: HOSPADM

## 2020-06-07 RX ORDER — GABAPENTIN 300 MG/1
900 CAPSULE ORAL 4 TIMES DAILY
Status: DISCONTINUED | OUTPATIENT
Start: 2020-06-07 | End: 2020-06-07

## 2020-06-07 RX ADMIN — GABAPENTIN 900 MG: 300 CAPSULE ORAL at 20:06

## 2020-06-07 RX ADMIN — PHENOL 1 SPRAY: 1.5 LIQUID ORAL at 04:59

## 2020-06-07 RX ADMIN — HYDROMORPHONE HYDROCHLORIDE 0.5 MG: 1 INJECTION, SOLUTION INTRAMUSCULAR; INTRAVENOUS; SUBCUTANEOUS at 15:18

## 2020-06-07 RX ADMIN — LORAZEPAM 0.5 MG: 2 INJECTION INTRAMUSCULAR; INTRAVENOUS at 18:22

## 2020-06-07 RX ADMIN — MORPHINE SULFATE 4 MG: 4 INJECTION, SOLUTION INTRAMUSCULAR; INTRAVENOUS at 03:42

## 2020-06-07 RX ADMIN — ONDANSETRON 4 MG: 2 INJECTION INTRAMUSCULAR; INTRAVENOUS at 17:52

## 2020-06-07 RX ADMIN — VANCOMYCIN HYDROCHLORIDE 1250 MG: 5 INJECTION, POWDER, LYOPHILIZED, FOR SOLUTION INTRAVENOUS at 14:16

## 2020-06-07 RX ADMIN — HYDROMORPHONE HYDROCHLORIDE 0.5 MG: 1 INJECTION, SOLUTION INTRAMUSCULAR; INTRAVENOUS; SUBCUTANEOUS at 23:48

## 2020-06-07 RX ADMIN — MORPHINE SULFATE 4 MG: 4 INJECTION, SOLUTION INTRAMUSCULAR; INTRAVENOUS at 05:44

## 2020-06-07 RX ADMIN — SODIUM CHLORIDE: 9 INJECTION, SOLUTION INTRAVENOUS at 03:37

## 2020-06-07 RX ADMIN — Medication 10 ML: at 11:03

## 2020-06-07 RX ADMIN — CEFEPIME HYDROCHLORIDE 2 G: 2 INJECTION, POWDER, FOR SOLUTION INTRAVENOUS at 02:58

## 2020-06-07 RX ADMIN — LORAZEPAM 0.5 MG: 2 INJECTION INTRAMUSCULAR; INTRAVENOUS at 12:16

## 2020-06-07 RX ADMIN — GABAPENTIN 900 MG: 300 CAPSULE ORAL at 17:51

## 2020-06-07 RX ADMIN — VANCOMYCIN HYDROCHLORIDE 1250 MG: 5 INJECTION, POWDER, LYOPHILIZED, FOR SOLUTION INTRAVENOUS at 23:48

## 2020-06-07 RX ADMIN — BACLOFEN 10 MG: 10 TABLET ORAL at 20:06

## 2020-06-07 RX ADMIN — IOPAMIDOL 80 ML: 755 INJECTION, SOLUTION INTRAVENOUS at 00:44

## 2020-06-07 RX ADMIN — PROMETHAZINE HYDROCHLORIDE 6.25 MG: 25 INJECTION INTRAMUSCULAR; INTRAVENOUS at 02:55

## 2020-06-07 RX ADMIN — METHOCARBAMOL TABLETS 500 MG: 500 TABLET, COATED ORAL at 20:06

## 2020-06-07 RX ADMIN — HYDROMORPHONE HYDROCHLORIDE 0.5 MG: 1 INJECTION, SOLUTION INTRAMUSCULAR; INTRAVENOUS; SUBCUTANEOUS at 10:57

## 2020-06-07 RX ADMIN — ONDANSETRON 4 MG: 2 INJECTION INTRAMUSCULAR; INTRAVENOUS at 11:00

## 2020-06-07 RX ADMIN — PROMETHAZINE HYDROCHLORIDE 6.25 MG: 25 INJECTION INTRAMUSCULAR; INTRAVENOUS at 12:13

## 2020-06-07 RX ADMIN — MEROPENEM 1 G: 1 INJECTION, POWDER, FOR SOLUTION INTRAVENOUS at 10:56

## 2020-06-07 RX ADMIN — METHOCARBAMOL TABLETS 500 MG: 500 TABLET, COATED ORAL at 17:52

## 2020-06-07 RX ADMIN — HYDROMORPHONE HYDROCHLORIDE 0.5 MG: 1 INJECTION, SOLUTION INTRAMUSCULAR; INTRAVENOUS; SUBCUTANEOUS at 19:15

## 2020-06-07 RX ADMIN — METHOCARBAMOL TABLETS 500 MG: 500 TABLET, COATED ORAL at 12:24

## 2020-06-07 RX ADMIN — QUETIAPINE FUMARATE 25 MG: 25 TABLET ORAL at 20:06

## 2020-06-07 RX ADMIN — LAMOTRIGINE 100 MG: 100 TABLET ORAL at 12:24

## 2020-06-07 RX ADMIN — PROMETHAZINE HYDROCHLORIDE 6.25 MG: 25 INJECTION INTRAMUSCULAR; INTRAVENOUS at 05:37

## 2020-06-07 RX ADMIN — FAMOTIDINE 20 MG: 10 INJECTION INTRAVENOUS at 20:08

## 2020-06-07 RX ADMIN — ONDANSETRON 4 MG: 2 INJECTION INTRAMUSCULAR; INTRAVENOUS at 23:48

## 2020-06-07 RX ADMIN — MEROPENEM 1 G: 1 INJECTION, POWDER, FOR SOLUTION INTRAVENOUS at 20:02

## 2020-06-07 RX ADMIN — PANTOPRAZOLE SODIUM 40 MG: 40 INJECTION, POWDER, FOR SOLUTION INTRAVENOUS at 10:57

## 2020-06-07 RX ADMIN — SODIUM CHLORIDE: 9 INJECTION, SOLUTION INTRAVENOUS at 12:18

## 2020-06-07 RX ADMIN — PROMETHAZINE HYDROCHLORIDE 6.25 MG: 25 INJECTION INTRAMUSCULAR; INTRAVENOUS at 18:21

## 2020-06-07 RX ADMIN — BACLOFEN 10 MG: 10 TABLET ORAL at 14:17

## 2020-06-07 RX ADMIN — GABAPENTIN 900 MG: 300 CAPSULE ORAL at 12:24

## 2020-06-07 RX ADMIN — PHENOL 1 SPRAY: 1.5 LIQUID ORAL at 07:32

## 2020-06-07 RX ADMIN — FAMOTIDINE 20 MG: 10 INJECTION INTRAVENOUS at 14:16

## 2020-06-07 RX ADMIN — LAMOTRIGINE 100 MG: 100 TABLET ORAL at 20:06

## 2020-06-07 ASSESSMENT — PAIN DESCRIPTION - ORIENTATION
ORIENTATION: MID

## 2020-06-07 ASSESSMENT — PAIN DESCRIPTION - LOCATION
LOCATION: ABDOMEN

## 2020-06-07 ASSESSMENT — PAIN DESCRIPTION - PROGRESSION
CLINICAL_PROGRESSION: NOT CHANGED
CLINICAL_PROGRESSION: GRADUALLY WORSENING
CLINICAL_PROGRESSION: GRADUALLY IMPROVING

## 2020-06-07 ASSESSMENT — PAIN DESCRIPTION - ONSET
ONSET: ON-GOING

## 2020-06-07 ASSESSMENT — PAIN - FUNCTIONAL ASSESSMENT
PAIN_FUNCTIONAL_ASSESSMENT: ACTIVITIES ARE NOT PREVENTED

## 2020-06-07 ASSESSMENT — PAIN DESCRIPTION - DESCRIPTORS
DESCRIPTORS: ACHING

## 2020-06-07 ASSESSMENT — PAIN DESCRIPTION - FREQUENCY
FREQUENCY: CONTINUOUS

## 2020-06-07 ASSESSMENT — PAIN DESCRIPTION - PAIN TYPE
TYPE: ACUTE PAIN

## 2020-06-07 ASSESSMENT — PAIN SCALES - GENERAL
PAINLEVEL_OUTOF10: 6
PAINLEVEL_OUTOF10: 4
PAINLEVEL_OUTOF10: 6
PAINLEVEL_OUTOF10: 9
PAINLEVEL_OUTOF10: 5
PAINLEVEL_OUTOF10: 5
PAINLEVEL_OUTOF10: 0
PAINLEVEL_OUTOF10: 8
PAINLEVEL_OUTOF10: 5
PAINLEVEL_OUTOF10: 5
PAINLEVEL_OUTOF10: 4
PAINLEVEL_OUTOF10: 7
PAINLEVEL_OUTOF10: 6
PAINLEVEL_OUTOF10: 7
PAINLEVEL_OUTOF10: 9
PAINLEVEL_OUTOF10: 8

## 2020-06-07 NOTE — PROGRESS NOTES
This RN spoke to the primary RN to ensure that an XR is completed to verify PICC placement, as the patient was arrived on 6/7 with the PICC.

## 2020-06-07 NOTE — PROGRESS NOTES
Pt admitted to  5K27 via via cart/stretcher from ED. Complaints: abdominal pain. IV normal saline infusing into the left upper arm PICC at a rate of 125 mls/ hour with about 600 mls in the bag still. IV site free of s/s of infection or infiltration. Vital signs obtained. Assessment and data collection initiated. Two nurse skin assessment performed by Vini King and Sade Dumont RN. Oriented to room. Policies and procedures for 5 explained. All questions answered with no further questions at this time. Fall prevention and safety brochure discussed with patient. Bed alarm on. Call light in reach. Oriented to room. Gwendolyn Li RN 6/7/2020 3:30AM     Explained patients right to have family, representative or physician notified of their admission. Patient has Declined for physician to be notified. Patient has Declined for family/representative to be notified.

## 2020-06-07 NOTE — PLAN OF CARE
Problem: Pain:  Goal: Pain level will decrease  Description: Pain level will decrease  Outcome: Ongoing  Note: Pain Assessment: 0-10  Pain Level: 8   Patient's Stated Pain Goal: 5   Is pain goal met at this time? No     Non-Pharmaceutical Pain Intervention(s): Rest, Repositioned       Problem: Falls - Risk of:  Goal: Will remain free from falls  Description: Will remain free from falls  Outcome: Ongoing  Note: Pt remains free from falls this shift. Bed exit alarm activated. Bed in lowest position with brakes on. 2/4 side rails raised for increased safety. Pt does not ambulate as pt is paraplegic. Pathway clear and possessions within reach. Call light within reach. Pt rounded on hourly. Problem: Discharge Planning:  Goal: Discharged to appropriate level of care  Description: Discharged to appropriate level of care  Outcome: Ongoing  Note: Pt's discharge plan reviewed with pt. Pt is from private residence with mother as caregiver. Upon discharge, pt plans to return to private residence with mother. Problem: Bowel/Gastric:  Goal: Ability to achieve a regular elimination pattern will improve  Description: Ability to achieve a regular elimination pattern will improve  Outcome: Ongoing  Note: Pt had hernia repair 6/5 and is now constipated. Pt has not had any output in colostomy for 4 days. Pt has SBO and NG tube was placed. Pt NPO. Pt is tolerating treatment plan. Bowel sounds are active to RUQ, but hypoactive to LUQ. Will continue to monitor. Problem: Physical Regulation:  Goal: Prevent transmision of infection  Description: Prevent transmision of infection  Outcome: Ongoing  Note: Pt is in contact isolation for history of MRSA in 2016. Pt educated on isolation. Visitors educated as needed. Proper sign hanging outside door. Staff to utilize gown and gloves when caring for pt. Care plan reviewed with patient. Patient verbalize understanding of the plan of care and contribute to goal setting.

## 2020-06-07 NOTE — PROGRESS NOTES
presumed postop infection  Patient s/p hernia repair on 6/3/2020    Thank you for the consult. Will continue to follow.

## 2020-06-07 NOTE — H&P
MG tablet Take 500 mg by mouth 3 times daily    Historical Provider, MD   HYDROcodone-acetaminophen (NORCO) 5-325 MG per tablet Take 1 tablet by mouth every 4 hours as needed for Pain for up to 7 days. 6/3/20 6/10/20  Jennie Morales, DO   sodium chloride 0.9 % SOLN 50 mL with ceFEPIme 2 g SOLR 2 g Infuse 2 g intravenously 2 times daily    Historical Provider, MD   methocarbamol (ROBAXIN) 500 MG tablet Take 500 mg by mouth 4 times daily    Historical Provider, MD   QUEtiapine Fumarate (SEROQUEL PO) Take 25 mg by mouth    Historical Provider, MD   lamoTRIgine (LAMICTAL) 100 MG tablet take 1 tablet by mouth twice a day 12/5/18   Lizzette Loja MD   Pseudoephedrine-DM-GG (ROBITUSSIN CF PO) Take 2 capsules by mouth 4 times daily Indications: Pain     Historical Provider, MD   methenamine (HIPREX) 1 g tablet Take 1 g by mouth 2 times daily (with meals)    Historical Provider, MD   baclofen (LIORESAL) 10 MG tablet Take 20 mg by mouth 4 times daily     Historical Provider, MD   omeprazole (PRILOSEC) 20 MG delayed release capsule Take 20 mg by mouth daily    Historical Provider, MD   gabapentin (NEURONTIN) 300 MG capsule Take 900 mg by mouth 4 times daily. Historical Provider, MD    Scheduled Meds:   cefepime  2 g Intravenous Q12H     Continuous Infusions:   sodium chloride       PRN Meds:. Allergies  is allergic to bee venom; zosyn [piperacillin sod-tazobactam so]; and doxycycline calcium. Family History  family history includes Asthma in his sister; Diabetes in his maternal grandfather; High Blood Pressure in his maternal grandfather; Other in his sister. Social History   reports that he quit smoking about 4 years ago. His smoking use included cigarettes. He smoked 1.00 pack per day. He has quit using smokeless tobacco.  His smokeless tobacco use included chew. He reports current alcohol use of about 1.0 standard drinks of alcohol per week. He reports that he does not use drugs.   Review of Systems:  General Denies any fever or chills  HEENT Denies any diplopia, tinnitus or vertigo  Resp Denies any shortness of breath, cough or wheezing  Cardiac Denies any chest pain, palpitations, claudication or edema  GI Denies any melena, hematochezia, hematemesis or pyrosis   Denies any frequency, urgency, hesitancy or incontinence  Heme Denies bruising or bleeding easily  Endocrine Denies any history of diabetes or thyroid disease  Neuro Denies any focal motor or sensory deficits    OBJECTIVE   CURRENT VITALS:  height is 5' 11\" (1.803 m) and weight is 198 lb (89.8 kg). His oral temperature is 98.5 °F (36.9 °C). His blood pressure is 100/60 and his pulse is 80. His respiration is 16 and oxygen saturation is 99%. Body mass index is 27.62 kg/m². Temperature Range (24h):Temp: 98.5 °F (36.9 °C) Temp  Av.5 °F (36.9 °C)  Min: 98.5 °F (36.9 °C)  Max: 98.5 °F (36.9 °C)  BP Range (60N): Systolic (50AZV), CPL:318 , Min:100 , UHD:543     Diastolic (11TAJ), XKO:62, Min:60, Max:89    Pulse Range (24h): Pulse  Av.7  Min: 80  Max: 100  Respiration Range (24h): Resp  Av  Min: 16  Max: 16  Current Pulse Ox (24h):  SpO2: 99 %  Pulse Ox Range (24h):  SpO2  Av.3 %  Min: 96 %  Max: 100 %  Oxygen Amount and Delivery:    CONSTITUTIONAL: Alert and oriented times 3, no acute distress and cooperative to examination with proper mood and affect. SKIN: Skin color, texture, turgor normal. No rashes or lesions. CHEST/LUNGS: chest symmetric with normal A/P diameter, normal respiratory rate and rhythm, lungs clear to auscultation without wheezes, rales or rhonchi. No accessory muscle use. Scars None   CARDIOVASCULAR: Heart sounds are normal.  Regular rate and rhythm without murmur, gallop or rub. Normal S1 and S2. Carotid and femoral pulses 2+/4 and equal bilaterally. ABDOMEN: Normal shape. large midline scar, No and Laparoscopic scar(s) present. Normal bowel sounds. No bruits.   Abdomen soft, distended with palpable mass around

## 2020-06-07 NOTE — PROGRESS NOTES
NG tube removed at request of patient due to irritation in throat. Patient requested it be removed by staff and stated he would remove on his own if not done for him. LIYA Cervantes notified.

## 2020-06-07 NOTE — PLAN OF CARE
Problem: Pain:  Goal: Pain level will decrease  Description: Pain level will decrease  6/7/2020 1622 by Bj Hawkins RN  Outcome: Ongoing  Note: Patient reporting pain 9/10 with goal of 5/10. Patient satisfied with current interventions including rest and repositioning. Pain assessments ongoing. Problem: Falls - Risk of:  Goal: Will remain free from falls  Description: Will remain free from falls  6/7/2020 1622 by Bj Hawkins RN  Outcome: Ongoing  Note: Patient free from falls this shift. Patient using call light appropriately. Call light in reach, fall sign posted, nonskid footwear on, hourly rounding, bed alarm on, bed rails up x2. Patient at risk for falls due to generalized weakness and paraplegia. Problem: Discharge Planning:  Goal: Discharged to appropriate level of care  Description: Discharged to appropriate level of care  6/7/2020 1622 by Bj Hawkins RN  Outcome: Ongoing  Note: Discharge plans to return home with current [facility] discussed with patient. Problem: Bowel/Gastric:  Goal: Ability to achieve a regular elimination pattern will improve  Description: Ability to achieve a regular elimination pattern will improve  6/7/2020 1622 by Bj Hawkins RN  Outcome: Ongoing  Note: BS hypoactive. Patient reports no stool in ostomy for 2 days prior to discharge, with first output yesterday. Problem: Physical Regulation:  Goal: Prevent transmision of infection  Description: Prevent transmision of infection  6/7/2020 1622 by Bj Hawkins RN  Outcome: Ongoing  Note: Patient in contact isolation for MRSA. Problem: Skin Integrity:  Goal: Absence of new skin breakdown  Description: Absence of new skin breakdown  Outcome: Ongoing  Note: No new skin breakdown noted. Patient assisted with turning and repositioning with pillow support every 2 hours. Skin assessments ongoing. Care plan reviewed with patient.   Patient verbalizes understanding of the plan of care and

## 2020-06-07 NOTE — ED NOTES
Upon first contact with patient this RN receives bedside shift report ERICA Pelaez.           Olivia Short RN  06/07/20 [id number]
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
## 2020-06-08 ENCOUNTER — APPOINTMENT (OUTPATIENT)
Dept: GENERAL RADIOLOGY | Age: 30
DRG: 335 | End: 2020-06-08
Payer: MEDICARE

## 2020-06-08 LAB
ALBUMIN SERPL-MCNC: 2.1 G/DL (ref 3.5–5.1)
ALP BLD-CCNC: 53 U/L (ref 38–126)
ALT SERPL-CCNC: 10 U/L (ref 11–66)
ANION GAP SERPL CALCULATED.3IONS-SCNC: 8 MEQ/L (ref 8–16)
AST SERPL-CCNC: 13 U/L (ref 5–40)
BASOPHILS # BLD: 0.8 %
BASOPHILS ABSOLUTE: 0 THOU/MM3 (ref 0–0.1)
BILIRUB SERPL-MCNC: 0.3 MG/DL (ref 0.3–1.2)
BUN BLDV-MCNC: 9 MG/DL (ref 7–22)
CALCIUM IONIZED: 1.04 MMOL/L (ref 1.12–1.32)
CALCIUM SERPL-MCNC: 6.4 MG/DL (ref 8.5–10.5)
CHLORIDE BLD-SCNC: 115 MEQ/L (ref 98–111)
CO2: 20 MEQ/L (ref 23–33)
CREAT SERPL-MCNC: 0.4 MG/DL (ref 0.4–1.2)
EOSINOPHIL # BLD: 2 %
EOSINOPHILS ABSOLUTE: 0.1 THOU/MM3 (ref 0–0.4)
ERYTHROCYTE [DISTWIDTH] IN BLOOD BY AUTOMATED COUNT: 15.1 % (ref 11.5–14.5)
ERYTHROCYTE [DISTWIDTH] IN BLOOD BY AUTOMATED COUNT: 47.9 FL (ref 35–45)
GFR SERPL CREATININE-BSD FRML MDRD: > 90 ML/MIN/1.73M2
GLUCOSE BLD-MCNC: 70 MG/DL (ref 70–108)
HCT VFR BLD CALC: 45.7 % (ref 42–52)
HEMOGLOBIN: 14.3 GM/DL (ref 14–18)
IMMATURE GRANS (ABS): 0.13 THOU/MM3 (ref 0–0.07)
IMMATURE GRANULOCYTES: 2.6 %
LYMPHOCYTES # BLD: 23.7 %
LYMPHOCYTES ABSOLUTE: 1.2 THOU/MM3 (ref 1–4.8)
MAGNESIUM: 1.2 MG/DL (ref 1.6–2.4)
MCH RBC QN AUTO: 27.2 PG (ref 26–33)
MCHC RBC AUTO-ENTMCNC: 31.3 GM/DL (ref 32.2–35.5)
MCV RBC AUTO: 86.9 FL (ref 80–94)
MONOCYTES # BLD: 12.1 %
MONOCYTES ABSOLUTE: 0.6 THOU/MM3 (ref 0.4–1.3)
NUCLEATED RED BLOOD CELLS: 0 /100 WBC
PLATELET # BLD: 142 THOU/MM3 (ref 130–400)
PMV BLD AUTO: 10.4 FL (ref 9.4–12.4)
POTASSIUM REFLEX MAGNESIUM: 2.8 MEQ/L (ref 3.5–5.2)
POTASSIUM SERPL-SCNC: 4.1 MEQ/L (ref 3.5–5.2)
RBC # BLD: 5.26 MILL/MM3 (ref 4.7–6.1)
REASON FOR REJECTION: NORMAL
REJECTED TEST: NORMAL
SEG NEUTROPHILS: 58.8 %
SEGMENTED NEUTROPHILS ABSOLUTE COUNT: 2.9 THOU/MM3 (ref 1.8–7.7)
SODIUM BLD-SCNC: 143 MEQ/L (ref 135–145)
TOTAL PROTEIN: 4.4 G/DL (ref 6.1–8)
WBC # BLD: 4.9 THOU/MM3 (ref 4.8–10.8)

## 2020-06-08 PROCEDURE — 36415 COLL VENOUS BLD VENIPUNCTURE: CPT

## 2020-06-08 PROCEDURE — 6360000002 HC RX W HCPCS: Performed by: PHYSICIAN ASSISTANT

## 2020-06-08 PROCEDURE — 1200000000 HC SEMI PRIVATE

## 2020-06-08 PROCEDURE — 84132 ASSAY OF SERUM POTASSIUM: CPT

## 2020-06-08 PROCEDURE — 83735 ASSAY OF MAGNESIUM: CPT

## 2020-06-08 PROCEDURE — C9113 INJ PANTOPRAZOLE SODIUM, VIA: HCPCS | Performed by: PHYSICIAN ASSISTANT

## 2020-06-08 PROCEDURE — 6370000000 HC RX 637 (ALT 250 FOR IP): Performed by: PHYSICIAN ASSISTANT

## 2020-06-08 PROCEDURE — 6370000000 HC RX 637 (ALT 250 FOR IP): Performed by: SURGERY

## 2020-06-08 PROCEDURE — 36592 COLLECT BLOOD FROM PICC: CPT

## 2020-06-08 PROCEDURE — 74018 RADEX ABDOMEN 1 VIEW: CPT

## 2020-06-08 PROCEDURE — 2580000003 HC RX 258: Performed by: PHYSICIAN ASSISTANT

## 2020-06-08 PROCEDURE — 82330 ASSAY OF CALCIUM: CPT

## 2020-06-08 PROCEDURE — 99024 POSTOP FOLLOW-UP VISIT: CPT | Performed by: SURGERY

## 2020-06-08 PROCEDURE — 2500000003 HC RX 250 WO HCPCS: Performed by: SURGERY

## 2020-06-08 PROCEDURE — 85025 COMPLETE CBC W/AUTO DIFF WBC: CPT

## 2020-06-08 PROCEDURE — 6360000002 HC RX W HCPCS: Performed by: SURGERY

## 2020-06-08 PROCEDURE — 2580000003 HC RX 258: Performed by: SURGERY

## 2020-06-08 PROCEDURE — 80053 COMPREHEN METABOLIC PANEL: CPT

## 2020-06-08 PROCEDURE — 1200000003 HC TELEMETRY R&B

## 2020-06-08 RX ORDER — ZINC SULFATE 50(220)MG
50 CAPSULE ORAL DAILY
Status: DISCONTINUED | OUTPATIENT
Start: 2020-06-08 | End: 2020-06-14 | Stop reason: HOSPADM

## 2020-06-08 RX ORDER — METHENAMINE HIPPURATE 1000 MG/1
1 TABLET ORAL 2 TIMES DAILY
Status: DISCONTINUED | OUTPATIENT
Start: 2020-06-08 | End: 2020-06-14 | Stop reason: HOSPADM

## 2020-06-08 RX ORDER — MULTIVITAMIN WITH IRON
1 TABLET ORAL DAILY
Status: DISCONTINUED | OUTPATIENT
Start: 2020-06-08 | End: 2020-06-14 | Stop reason: HOSPADM

## 2020-06-08 RX ORDER — MAGNESIUM SULFATE IN WATER 40 MG/ML
4 INJECTION, SOLUTION INTRAVENOUS ONCE
Status: COMPLETED | OUTPATIENT
Start: 2020-06-08 | End: 2020-06-08

## 2020-06-08 RX ORDER — BIOTIN 1 MG
1000 TABLET ORAL DAILY
Status: DISCONTINUED | OUTPATIENT
Start: 2020-06-08 | End: 2020-06-14 | Stop reason: HOSPADM

## 2020-06-08 RX ORDER — LACTOBACILLUS RHAMNOSUS GG 10B CELL
1 CAPSULE ORAL 2 TIMES DAILY WITH MEALS
Status: DISCONTINUED | OUTPATIENT
Start: 2020-06-08 | End: 2020-06-14 | Stop reason: HOSPADM

## 2020-06-08 RX ORDER — BACLOFEN 10 MG/1
10 TABLET ORAL 4 TIMES DAILY
Status: DISCONTINUED | OUTPATIENT
Start: 2020-06-08 | End: 2020-06-14 | Stop reason: HOSPADM

## 2020-06-08 RX ORDER — POTASSIUM CHLORIDE 29.8 MG/ML
20 INJECTION INTRAVENOUS
Status: COMPLETED | OUTPATIENT
Start: 2020-06-08 | End: 2020-06-08

## 2020-06-08 RX ORDER — DEXTROMETHORPHAN HBR 15 MG/1
15 CAPSULE, LIQUID FILLED ORAL 4 TIMES DAILY PRN
Status: DISCONTINUED | OUTPATIENT
Start: 2020-06-08 | End: 2020-06-14 | Stop reason: HOSPADM

## 2020-06-08 RX ADMIN — PANTOPRAZOLE SODIUM 40 MG: 40 INJECTION, POWDER, FOR SOLUTION INTRAVENOUS at 09:20

## 2020-06-08 RX ADMIN — SODIUM CHLORIDE, PRESERVATIVE FREE 10 ML: 5 INJECTION INTRAVENOUS at 09:21

## 2020-06-08 RX ADMIN — METHOCARBAMOL TABLETS 500 MG: 500 TABLET, COATED ORAL at 06:21

## 2020-06-08 RX ADMIN — HYDROMORPHONE HYDROCHLORIDE 0.5 MG: 1 INJECTION, SOLUTION INTRAMUSCULAR; INTRAVENOUS; SUBCUTANEOUS at 05:12

## 2020-06-08 RX ADMIN — FAMOTIDINE 20 MG: 10 INJECTION INTRAVENOUS at 09:21

## 2020-06-08 RX ADMIN — Medication 1000 MCG: at 14:15

## 2020-06-08 RX ADMIN — MAGNESIUM SULFATE IN WATER 4 G: 40 INJECTION, SOLUTION INTRAVENOUS at 08:56

## 2020-06-08 RX ADMIN — QUETIAPINE FUMARATE 25 MG: 25 TABLET ORAL at 21:00

## 2020-06-08 RX ADMIN — METHOCARBAMOL TABLETS 500 MG: 500 TABLET, COATED ORAL at 18:30

## 2020-06-08 RX ADMIN — Medication 1 TABLET: at 14:16

## 2020-06-08 RX ADMIN — POTASSIUM CHLORIDE 20 MEQ: 29.8 INJECTION, SOLUTION INTRAVENOUS at 08:56

## 2020-06-08 RX ADMIN — GABAPENTIN 900 MG: 300 CAPSULE ORAL at 06:20

## 2020-06-08 RX ADMIN — HYDROMORPHONE HYDROCHLORIDE 0.5 MG: 1 INJECTION, SOLUTION INTRAMUSCULAR; INTRAVENOUS; SUBCUTANEOUS at 17:20

## 2020-06-08 RX ADMIN — BACLOFEN 10 MG: 10 TABLET ORAL at 09:21

## 2020-06-08 RX ADMIN — Medication 1 CAPSULE: at 18:30

## 2020-06-08 RX ADMIN — SODIUM CHLORIDE: 9 INJECTION, SOLUTION INTRAVENOUS at 08:57

## 2020-06-08 RX ADMIN — GABAPENTIN 900 MG: 300 CAPSULE ORAL at 18:30

## 2020-06-08 RX ADMIN — LAMOTRIGINE 100 MG: 100 TABLET ORAL at 21:00

## 2020-06-08 RX ADMIN — BACLOFEN 10 MG: 10 TABLET ORAL at 18:30

## 2020-06-08 RX ADMIN — FAMOTIDINE 20 MG: 10 INJECTION INTRAVENOUS at 20:59

## 2020-06-08 RX ADMIN — PROMETHAZINE HYDROCHLORIDE 6.25 MG: 25 INJECTION INTRAMUSCULAR; INTRAVENOUS at 17:20

## 2020-06-08 RX ADMIN — LAMOTRIGINE 100 MG: 100 TABLET ORAL at 09:21

## 2020-06-08 RX ADMIN — METHENAMINE HIPPURATE 1 G: 1 TABLET ORAL at 14:08

## 2020-06-08 RX ADMIN — HYDROMORPHONE HYDROCHLORIDE 0.5 MG: 1 INJECTION, SOLUTION INTRAMUSCULAR; INTRAVENOUS; SUBCUTANEOUS at 10:39

## 2020-06-08 RX ADMIN — MORPHINE SULFATE 4 MG: 4 INJECTION, SOLUTION INTRAMUSCULAR; INTRAVENOUS at 03:07

## 2020-06-08 RX ADMIN — Medication 10 ML: at 09:34

## 2020-06-08 RX ADMIN — GABAPENTIN 900 MG: 300 CAPSULE ORAL at 14:08

## 2020-06-08 RX ADMIN — METHENAMINE HIPPURATE 1 G: 1 TABLET ORAL at 21:00

## 2020-06-08 RX ADMIN — PROMETHAZINE HYDROCHLORIDE 6.25 MG: 25 INJECTION INTRAMUSCULAR; INTRAVENOUS at 09:23

## 2020-06-08 RX ADMIN — MEROPENEM 1 G: 1 INJECTION, POWDER, FOR SOLUTION INTRAVENOUS at 03:07

## 2020-06-08 RX ADMIN — PROMETHAZINE HYDROCHLORIDE 6.25 MG: 25 INJECTION INTRAMUSCULAR; INTRAVENOUS at 03:22

## 2020-06-08 RX ADMIN — POTASSIUM CHLORIDE 20 MEQ: 29.8 INJECTION, SOLUTION INTRAVENOUS at 10:05

## 2020-06-08 RX ADMIN — Medication 50 MG: at 14:16

## 2020-06-08 RX ADMIN — LORAZEPAM 0.5 MG: 2 INJECTION INTRAMUSCULAR; INTRAVENOUS at 10:39

## 2020-06-08 RX ADMIN — FAMOTIDINE 20 MG: 10 INJECTION INTRAVENOUS at 08:57

## 2020-06-08 RX ADMIN — METHOCARBAMOL TABLETS 500 MG: 500 TABLET, COATED ORAL at 14:08

## 2020-06-08 RX ADMIN — MEROPENEM 1 G: 1 INJECTION, POWDER, FOR SOLUTION INTRAVENOUS at 14:08

## 2020-06-08 RX ADMIN — MEROPENEM 1 G: 1 INJECTION, POWDER, FOR SOLUTION INTRAVENOUS at 22:41

## 2020-06-08 RX ADMIN — POTASSIUM CHLORIDE 20 MEQ: 29.8 INJECTION, SOLUTION INTRAVENOUS at 10:39

## 2020-06-08 ASSESSMENT — PAIN DESCRIPTION - DESCRIPTORS
DESCRIPTORS: ACHING

## 2020-06-08 ASSESSMENT — PAIN DESCRIPTION - PAIN TYPE
TYPE: ACUTE PAIN

## 2020-06-08 ASSESSMENT — PAIN DESCRIPTION - ORIENTATION
ORIENTATION: MID

## 2020-06-08 ASSESSMENT — PAIN - FUNCTIONAL ASSESSMENT
PAIN_FUNCTIONAL_ASSESSMENT: ACTIVITIES ARE NOT PREVENTED

## 2020-06-08 ASSESSMENT — PAIN DESCRIPTION - LOCATION
LOCATION: ABDOMEN

## 2020-06-08 ASSESSMENT — PAIN DESCRIPTION - PROGRESSION
CLINICAL_PROGRESSION: GRADUALLY WORSENING
CLINICAL_PROGRESSION: NOT CHANGED

## 2020-06-08 ASSESSMENT — PAIN SCALES - GENERAL
PAINLEVEL_OUTOF10: 7
PAINLEVEL_OUTOF10: 5
PAINLEVEL_OUTOF10: 6
PAINLEVEL_OUTOF10: 7
PAINLEVEL_OUTOF10: 6
PAINLEVEL_OUTOF10: 5
PAINLEVEL_OUTOF10: 7
PAINLEVEL_OUTOF10: 6

## 2020-06-08 ASSESSMENT — PAIN DESCRIPTION - ONSET
ONSET: ON-GOING

## 2020-06-08 ASSESSMENT — PAIN DESCRIPTION - FREQUENCY
FREQUENCY: CONTINUOUS

## 2020-06-08 NOTE — PLAN OF CARE
Problem: Pain:  Goal: Pain level will decrease  Description: Pain level will decrease  Outcome: Ongoing  Note: Pt pain at 6/10. Pt goal 5/10. Reposition and rest given to decrease discomfort. Will continue ongoing pain assessment. Problem: Falls - Risk of:  Goal: Will remain free from falls  Description: Will remain free from falls  6/8/2020 1650 by Maria Luisa Mahoney  Outcome: Ongoing  Note: Pt at risk for falls d/t  paraplegia. Pt free of falls this shift. Call light used. Problem: Discharge Planning:  Goal: Discharged to appropriate level of care  Description: Discharged to appropriate level of care  Outcome: Ongoing  Note: Discharge plans discussed with pt. Pt discharge to home with [facility] tyler Jackson. Problem: Bowel/Gastric:  Goal: Ability to achieve a regular elimination pattern will improve  Description: Ability to achieve a regular elimination pattern will improve  Outcome: Ongoing  Note: Bowel sounds active. Pt ostomy has increased output. Problem: Physical Regulation:  Goal: Prevent transmision of infection  Description: Prevent transmision of infection  Outcome: Ongoing  Note: Pt in contact isolation for MRSA     Problem: Skin Integrity:  Goal: Absence of new skin breakdown  Description: Absence of new skin breakdown  Outcome: Ongoing  Note: Pt turned Q2H with pillow support. No new skin breakdown. Continue with skin integrity assessment. Problem: Nutrition Deficit:  Goal: Ability to achieve adequate nutritional intake will improve  Description: Ability to achieve adequate nutritional intake will improve  Outcome: Ongoing  Note: Pt tolerates clear liquid diet. Care plan reviewed with patient. Patient verbalizes understanding of the plan of care and contributes to goal setting.

## 2020-06-08 NOTE — PROGRESS NOTES
Present to patient room for consult regarding established colostomy and dressing change to left lateral ischium pressure injury, present on admission. Patient in bed upon arrival to room. Assessment and photo below. Patient denies concerns or issues with colostomy. Pt uses [employer] pouching supplies. Per primary RN, patient under care of wound specialist physician at [facility]. Current treatment plan is hydrofera blue to wound bed, packed with dry AMD gauze, covered with ABD, secured with tape. Dressing changed TID by Health system and alternating days by patient's mother. Patient assisted to right side for evaluation. Old dressing removed. Hydrofera blue removed and disposed of per patient request. States this is removed and changed three times weekly. Patient noted to have unstagable pressure injury to left lateral ischium, present on admission. GILSON to evaluated wound bed fully due to size and depth of wound. Cleansed wound with normal saline and gauze. Pat dry with clean gauze. Wound lightly packed with 1 full piece of AMD ribbon, per patient request, covered with folded ABD, secured with tape. Staff currently changing dressing BID per patient request. Recommend staff to comply with patient request. Additional AMD ribbon left in room and sent to unit for later use. Continue to turn every 2 hours, offload. Pt on [employer] dream air support surface with alternating pressure and microclimate control. Wound ostomy to continue to follow and assess wound weekly. Call with concerns. Bed in low, call light in reach. Wound type: left lateral ischial unstagable pressure injury, present on admission  Wound size: 2.5cm x 2cm x 4.6cm  Undermining or Tunneling: Tunnel at 11 o'clock- 7.4cm  Wound assessment: Red, yellow  Wound color: GILSON due to depth  Drainage amount: Scant  Drainage description: serosaginous  Odor: None  Margins: Rolled edges  Julianne wound:  Maceration, blanchable erythema    Thank you for allowing us to participate in the care of your patient. TIME   Wound/ostomy individual minutes  Time In: 0950  Time Out: 1020  Minutes: 30  Time does not include documentation.

## 2020-06-08 NOTE — PLAN OF CARE
Consult received-see  notes for 6/8/2020-patient is current with [facility] mervin MORALES [facility].

## 2020-06-09 ENCOUNTER — APPOINTMENT (OUTPATIENT)
Dept: GENERAL RADIOLOGY | Age: 30
DRG: 335 | End: 2020-06-09
Payer: MEDICARE

## 2020-06-09 LAB
EKG ATRIAL RATE: 93 BPM
EKG P AXIS: 31 DEGREES
EKG P-R INTERVAL: 136 MS
EKG Q-T INTERVAL: 342 MS
EKG QRS DURATION: 86 MS
EKG QTC CALCULATION (BAZETT): 425 MS
EKG R AXIS: 29 DEGREES
EKG T AXIS: -14 DEGREES
EKG VENTRICULAR RATE: 93 BPM
MAGNESIUM: 1.5 MG/DL (ref 1.6–2.4)

## 2020-06-09 PROCEDURE — 2580000003 HC RX 258: Performed by: PHYSICIAN ASSISTANT

## 2020-06-09 PROCEDURE — 6360000002 HC RX W HCPCS: Performed by: PHYSICIAN ASSISTANT

## 2020-06-09 PROCEDURE — 6360000002 HC RX W HCPCS: Performed by: SURGERY

## 2020-06-09 PROCEDURE — 6370000000 HC RX 637 (ALT 250 FOR IP): Performed by: SURGERY

## 2020-06-09 PROCEDURE — 83735 ASSAY OF MAGNESIUM: CPT

## 2020-06-09 PROCEDURE — 82330 ASSAY OF CALCIUM: CPT

## 2020-06-09 PROCEDURE — 84100 ASSAY OF PHOSPHORUS: CPT

## 2020-06-09 PROCEDURE — 71045 X-RAY EXAM CHEST 1 VIEW: CPT

## 2020-06-09 PROCEDURE — 2580000003 HC RX 258: Performed by: SURGERY

## 2020-06-09 PROCEDURE — 6370000000 HC RX 637 (ALT 250 FOR IP): Performed by: PHYSICIAN ASSISTANT

## 2020-06-09 PROCEDURE — 93005 ELECTROCARDIOGRAM TRACING: CPT | Performed by: SURGERY

## 2020-06-09 PROCEDURE — 80048 BASIC METABOLIC PNL TOTAL CA: CPT

## 2020-06-09 PROCEDURE — C9113 INJ PANTOPRAZOLE SODIUM, VIA: HCPCS | Performed by: PHYSICIAN ASSISTANT

## 2020-06-09 PROCEDURE — 36415 COLL VENOUS BLD VENIPUNCTURE: CPT

## 2020-06-09 PROCEDURE — 84484 ASSAY OF TROPONIN QUANT: CPT

## 2020-06-09 PROCEDURE — 85025 COMPLETE CBC W/AUTO DIFF WBC: CPT

## 2020-06-09 PROCEDURE — 99024 POSTOP FOLLOW-UP VISIT: CPT | Performed by: SURGERY

## 2020-06-09 PROCEDURE — 1200000003 HC TELEMETRY R&B

## 2020-06-09 PROCEDURE — 2500000003 HC RX 250 WO HCPCS: Performed by: SURGERY

## 2020-06-09 PROCEDURE — 85379 FIBRIN DEGRADATION QUANT: CPT

## 2020-06-09 RX ORDER — METRONIDAZOLE 500 MG/1
500 TABLET ORAL EVERY 8 HOURS SCHEDULED
Status: DISCONTINUED | OUTPATIENT
Start: 2020-06-09 | End: 2020-06-14 | Stop reason: HOSPADM

## 2020-06-09 RX ORDER — MAGNESIUM SULFATE IN WATER 40 MG/ML
2 INJECTION, SOLUTION INTRAVENOUS ONCE
Status: COMPLETED | OUTPATIENT
Start: 2020-06-09 | End: 2020-06-09

## 2020-06-09 RX ADMIN — HYDROMORPHONE HYDROCHLORIDE 0.5 MG: 1 INJECTION, SOLUTION INTRAMUSCULAR; INTRAVENOUS; SUBCUTANEOUS at 23:42

## 2020-06-09 RX ADMIN — PROMETHAZINE HYDROCHLORIDE 6.25 MG: 25 INJECTION INTRAMUSCULAR; INTRAVENOUS at 08:53

## 2020-06-09 RX ADMIN — DEXTROMETHORPHAN HBR 15 MG: 15 CAPSULE, LIQUID FILLED ORAL at 05:53

## 2020-06-09 RX ADMIN — Medication 10 ML: at 08:58

## 2020-06-09 RX ADMIN — METHENAMINE HIPPURATE 1 G: 1 TABLET ORAL at 08:57

## 2020-06-09 RX ADMIN — GABAPENTIN 900 MG: 300 CAPSULE ORAL at 13:03

## 2020-06-09 RX ADMIN — DEXTROMETHORPHAN HBR 15 MG: 15 CAPSULE, LIQUID FILLED ORAL at 00:50

## 2020-06-09 RX ADMIN — METHOCARBAMOL TABLETS 500 MG: 500 TABLET, COATED ORAL at 00:39

## 2020-06-09 RX ADMIN — SODIUM CHLORIDE, PRESERVATIVE FREE 10 ML: 5 INJECTION INTRAVENOUS at 09:06

## 2020-06-09 RX ADMIN — MAGNESIUM SULFATE HEPTAHYDRATE 2 G: 40 INJECTION, SOLUTION INTRAVENOUS at 10:58

## 2020-06-09 RX ADMIN — METHOCARBAMOL TABLETS 500 MG: 500 TABLET, COATED ORAL at 13:03

## 2020-06-09 RX ADMIN — MEROPENEM 1 G: 1 INJECTION, POWDER, FOR SOLUTION INTRAVENOUS at 05:53

## 2020-06-09 RX ADMIN — BACLOFEN 10 MG: 10 TABLET ORAL at 05:52

## 2020-06-09 RX ADMIN — Medication 1 CAPSULE: at 08:58

## 2020-06-09 RX ADMIN — LAMOTRIGINE 100 MG: 100 TABLET ORAL at 08:57

## 2020-06-09 RX ADMIN — BACLOFEN 10 MG: 10 TABLET ORAL at 00:39

## 2020-06-09 RX ADMIN — FAMOTIDINE 20 MG: 10 INJECTION INTRAVENOUS at 08:57

## 2020-06-09 RX ADMIN — GABAPENTIN 900 MG: 300 CAPSULE ORAL at 18:25

## 2020-06-09 RX ADMIN — BACLOFEN 10 MG: 10 TABLET ORAL at 18:25

## 2020-06-09 RX ADMIN — LORAZEPAM 0.5 MG: 2 INJECTION INTRAMUSCULAR; INTRAVENOUS at 20:01

## 2020-06-09 RX ADMIN — DEXTROMETHORPHAN HBR 15 MG: 15 CAPSULE, LIQUID FILLED ORAL at 18:26

## 2020-06-09 RX ADMIN — SODIUM CHLORIDE: 9 INJECTION, SOLUTION INTRAVENOUS at 18:23

## 2020-06-09 RX ADMIN — QUETIAPINE FUMARATE 25 MG: 25 TABLET ORAL at 20:01

## 2020-06-09 RX ADMIN — DEXTROMETHORPHAN HBR 15 MG: 15 CAPSULE, LIQUID FILLED ORAL at 13:05

## 2020-06-09 RX ADMIN — METRONIDAZOLE 500 MG: 500 TABLET, FILM COATED ORAL at 23:25

## 2020-06-09 RX ADMIN — LAMOTRIGINE 100 MG: 100 TABLET ORAL at 20:01

## 2020-06-09 RX ADMIN — Medication 10 ML: at 10:57

## 2020-06-09 RX ADMIN — CEFEPIME HYDROCHLORIDE 2 G: 2 INJECTION, POWDER, FOR SOLUTION INTRAVENOUS at 18:23

## 2020-06-09 RX ADMIN — MORPHINE SULFATE 2 MG: 2 INJECTION, SOLUTION INTRAMUSCULAR; INTRAVENOUS at 05:00

## 2020-06-09 RX ADMIN — HYDROMORPHONE HYDROCHLORIDE 0.5 MG: 1 INJECTION, SOLUTION INTRAMUSCULAR; INTRAVENOUS; SUBCUTANEOUS at 00:39

## 2020-06-09 RX ADMIN — Medication 50 MG: at 08:57

## 2020-06-09 RX ADMIN — MEROPENEM 1 G: 1 INJECTION, POWDER, FOR SOLUTION INTRAVENOUS at 13:01

## 2020-06-09 RX ADMIN — GABAPENTIN 900 MG: 300 CAPSULE ORAL at 05:53

## 2020-06-09 RX ADMIN — BACLOFEN 10 MG: 10 TABLET ORAL at 13:01

## 2020-06-09 RX ADMIN — METHOCARBAMOL TABLETS 500 MG: 500 TABLET, COATED ORAL at 18:25

## 2020-06-09 RX ADMIN — METHENAMINE HIPPURATE 1 G: 1 TABLET ORAL at 20:02

## 2020-06-09 RX ADMIN — FAMOTIDINE 20 MG: 10 INJECTION INTRAVENOUS at 20:01

## 2020-06-09 RX ADMIN — Medication 1 CAPSULE: at 18:25

## 2020-06-09 RX ADMIN — METHOCARBAMOL TABLETS 500 MG: 500 TABLET, COATED ORAL at 05:53

## 2020-06-09 RX ADMIN — GABAPENTIN 900 MG: 300 CAPSULE ORAL at 00:39

## 2020-06-09 RX ADMIN — HYDROMORPHONE HYDROCHLORIDE 0.5 MG: 1 INJECTION, SOLUTION INTRAMUSCULAR; INTRAVENOUS; SUBCUTANEOUS at 16:25

## 2020-06-09 RX ADMIN — Medication 1000 MCG: at 05:53

## 2020-06-09 RX ADMIN — Medication 1 TABLET: at 09:05

## 2020-06-09 RX ADMIN — ONDANSETRON 4 MG: 2 INJECTION INTRAMUSCULAR; INTRAVENOUS at 16:16

## 2020-06-09 RX ADMIN — PANTOPRAZOLE SODIUM 40 MG: 40 INJECTION, POWDER, FOR SOLUTION INTRAVENOUS at 08:57

## 2020-06-09 ASSESSMENT — PAIN DESCRIPTION - LOCATION
LOCATION: CHEST
LOCATION: ABDOMEN
LOCATION: CHEST
LOCATION: ABDOMEN
LOCATION: CHEST
LOCATION: ABDOMEN
LOCATION: ABDOMEN

## 2020-06-09 ASSESSMENT — PAIN DESCRIPTION - ORIENTATION
ORIENTATION: MID
ORIENTATION: MID
ORIENTATION: LEFT
ORIENTATION: MID
ORIENTATION: LEFT
ORIENTATION: LEFT
ORIENTATION: MID

## 2020-06-09 ASSESSMENT — PAIN DESCRIPTION - FREQUENCY
FREQUENCY: INTERMITTENT
FREQUENCY: CONTINUOUS
FREQUENCY: INTERMITTENT
FREQUENCY: INTERMITTENT
FREQUENCY: CONTINUOUS

## 2020-06-09 ASSESSMENT — PAIN DESCRIPTION - PAIN TYPE
TYPE: ACUTE PAIN

## 2020-06-09 ASSESSMENT — PAIN DESCRIPTION - PROGRESSION
CLINICAL_PROGRESSION: GRADUALLY WORSENING
CLINICAL_PROGRESSION: NOT CHANGED

## 2020-06-09 ASSESSMENT — PAIN - FUNCTIONAL ASSESSMENT
PAIN_FUNCTIONAL_ASSESSMENT: ACTIVITIES ARE NOT PREVENTED

## 2020-06-09 ASSESSMENT — PAIN SCALES - GENERAL
PAINLEVEL_OUTOF10: 7
PAINLEVEL_OUTOF10: 8
PAINLEVEL_OUTOF10: 6
PAINLEVEL_OUTOF10: 7
PAINLEVEL_OUTOF10: 7
PAINLEVEL_OUTOF10: 8
PAINLEVEL_OUTOF10: 7

## 2020-06-09 ASSESSMENT — PAIN DESCRIPTION - ONSET
ONSET: ON-GOING
ONSET: AWAKENED FROM SLEEP
ONSET: ON-GOING

## 2020-06-09 ASSESSMENT — PAIN DESCRIPTION - DESCRIPTORS
DESCRIPTORS: ACHING
DESCRIPTORS: ACHING
DESCRIPTORS: SHARP
DESCRIPTORS: ACHING
DESCRIPTORS: ACHING
DESCRIPTORS: SHARP
DESCRIPTORS: SHARP

## 2020-06-09 NOTE — PROGRESS NOTES
 baclofen  10 mg Oral 4x Daily    multivitamin  1 tablet Oral Daily    lactobacillus  1 capsule Oral BID WC    zinc sulfate  50 mg Oral Daily    Biotin  1,000 mcg Oral Daily    sodium chloride flush  10 mL Intravenous 2 times per day    pantoprazole  40 mg Intravenous Daily    And    sodium chloride (PF)  10 mL Intravenous Daily    meropenem  1 g Intravenous Q8H    lamoTRIgine  100 mg Oral BID    methocarbamol  500 mg Oral 4x Daily    QUEtiapine  25 mg Oral Nightly    gabapentin  900 mg Oral 4x Daily    famotidine (PEPCID) injection  20 mg Intravenous BID     Continuous Infusions:   sodium chloride 125 mL/hr at 20 0857     PRN Meds:.Dextromethorphan HBr, sodium chloride flush, morphine **OR** morphine, promethazine, phenol, ondansetron, HYDROmorphone, LORazepam  OBJECTIVE   CURRENT VITALS:  height is 5' 11\" (1.803 m) and weight is 194 lb 7.1 oz (88.2 kg). His oral temperature is 97.9 °F (36.6 °C). His blood pressure is 109/55 (abnormal) and his pulse is 79. His respiration is 16 and oxygen saturation is 94%. Temperature Range (24h):Temp: 97.9 °F (36.6 °C) Temp  Av.7 °F (36.5 °C)  Min: 96.5 °F (35.8 °C)  Max: 98.3 °F (36.8 °C)  BP Range (59P): Systolic (46WTE), SCU:817 , Min:109 , OZ:509     Diastolic (40HYS), KK, Min:55, Max:80    Pulse Range (24h): Pulse  Av.4  Min: 77  Max: 101  Respiration Range (24h): Resp  Av  Min: 16  Max: 16  Current Pulse Ox (24h):  SpO2: 94 %  Pulse Ox Range (24h):  SpO2  Av.2 %  Min: 94 %  Max: 96 %  Oxygen Amount and Delivery:    Incentive Spirometry Tx:            GENERAL: alert, no distress  LUNGS: clear to ausculation, without wheezes, rales or rhonci  HEART: normal rate and regular rhythm  ABDOMEN: soft, non-tender, non-distended, bowel sounds present in all 4 quadrants and no guarding or peritoneal signs Midline incision C/D/I. Peristomal erythema resolved. Hernia appears reduced.    EXTREMITY: no cyanosis, clubbing or edema  In: 6432

## 2020-06-09 NOTE — PLAN OF CARE
Problem: Pain:  Goal: Pain level will decrease  Description: Pain level will decrease  Outcome: Ongoing  Note: Patient having back pain and head pain this shift. PRN Phenergan given for nausea and it helped the headache     Problem: Falls - Risk of:  Goal: Will remain free from falls  Description: Will remain free from falls  Outcome: Ongoing  Note: Patient free from falls this shift. Hourly rounding continued. Bed alarm used this shift. Problem: Discharge Planning:  Goal: Discharged to appropriate level of care  Description: Discharged to appropriate level of care  Outcome: Ongoing  Note: Patient is not a discharge this shift. No home going issues voiced at this time. Problem: Bowel/Gastric:  Goal: Control of bowel function will improve  Description: Control of bowel function will improve  Outcome: Ongoing  Note: Patient having output from colostomy this shift. Problem: Skin Integrity:  Goal: Absence of new skin breakdown  Description: Absence of new skin breakdown  Outcome: Ongoing  Note: Wound on left buttocks cleansed and packing changed this shift. Patient tolerated well. Problem: Discharge Planning:  Goal: Patients continuum of care needs are met  Description: Patients continuum of care needs are met  Outcome: Ongoing  Note: Patient is not a discharge this shift. Patient to return home with Mother and home health at [address] [place] [address] plan reviewed with patient. Patient verbalizes understanding of the plan of care and contribute to goal setting.

## 2020-06-09 NOTE — PROGRESS NOTES
Patient requesting Morphine and Phenergan at this time. Phenergan IM given first per patient request. After phenergan patient requesting to not have Morphine at this time. Medication already drawn up in syringe. Morphine wasted in medication waste bin witnessed by Sami Akers RN.

## 2020-06-09 NOTE — PROGRESS NOTES
nausea last night; +colosotmy with 300 mL output in 24 hours; diet advanced today; pt amenable to ONS and would like Ensure Clear TID (apple); wound; Rx includes: Culturelle, Zice and Multivitamin w/minerals  · Wound Type: Pressure Ulcer, Unstageable(unstageable pressure injury on right buttocks; s/p peristomal hernia repair with mesh on6/3/20)  · Current Nutrition Therapies:  · Oral Diet Orders: Full Liquid   · Oral Diet intake: (1140 mL in 24 hours per I/O; pt reports not eating anything for 5 days PTA )  · Oral Nutrition Supplement (ONS) Orders: Clear Liquid Oral Supplement(Apple Ensure Clear TID)  · ONS intake: (Initiated today)  · Anthropometric Measures:  · Ht: 5' 11\" (180.3 cm)   · Current Body Wt: 194 lb 7.1 oz (88.2 kg)(6/7; no edema noted)  · Admission Body Wt: 194 lb 7.1 oz (88.2 kg)(6/7; no edema noted)  · Usual Body Wt: (170-180 lbs per pt report.  No actual weights per EMR)  · % Weight Change: No actual weights per EMR  · Ideal Body Wt: 163 lb (73.9 kg)(adjusted for paraplegia)   · BMI Classification: BMI 25.0 - 29.9 Overweight(27.2)    Nutrition Interventions:   Continue current diet, Start ONS, Vitamin Supplement  Continued Inpatient Monitoring, Education Initiated, Coordination of Care(Encouraged po intake of meals at best effort)    Nutrition Evaluation:   · Evaluation: Goals set   · Goals: Pt will consume 75% or more of meals during LOS    · Monitoring: Nutrition Progression, Meal Intake, Supplement Intake, Diet Tolerance, Skin Integrity, Wound Healing, Weight, Pertinent Labs, Monitor Bowel Function    Electronically signed by Raman Senior RD, LD on 6/9/20 at 10:13 AM EDT    Contact Number: [phone]

## 2020-06-10 ENCOUNTER — APPOINTMENT (OUTPATIENT)
Dept: GENERAL RADIOLOGY | Age: 30
DRG: 335 | End: 2020-06-10
Payer: MEDICARE

## 2020-06-10 ENCOUNTER — APPOINTMENT (OUTPATIENT)
Dept: CT IMAGING | Age: 30
DRG: 335 | End: 2020-06-10
Payer: MEDICARE

## 2020-06-10 LAB
ANION GAP SERPL CALCULATED.3IONS-SCNC: 10 MEQ/L (ref 8–16)
ANION GAP SERPL CALCULATED.3IONS-SCNC: 10 MEQ/L (ref 8–16)
BASOPHILS # BLD: 0.8 %
BASOPHILS ABSOLUTE: 0.1 THOU/MM3 (ref 0–0.1)
BUN BLDV-MCNC: 7 MG/DL (ref 7–22)
BUN BLDV-MCNC: 7 MG/DL (ref 7–22)
CALCIUM IONIZED: 1.11 MMOL/L (ref 1.12–1.32)
CALCIUM SERPL-MCNC: 9 MG/DL (ref 8.5–10.5)
CALCIUM SERPL-MCNC: 9.2 MG/DL (ref 8.5–10.5)
CHLORIDE BLD-SCNC: 101 MEQ/L (ref 98–111)
CHLORIDE BLD-SCNC: 103 MEQ/L (ref 98–111)
CO2: 26 MEQ/L (ref 23–33)
CO2: 27 MEQ/L (ref 23–33)
CREAT SERPL-MCNC: 0.7 MG/DL (ref 0.4–1.2)
CREAT SERPL-MCNC: 0.7 MG/DL (ref 0.4–1.2)
D-DIMER QUANTITATIVE: 7017 NG/ML FEU (ref 0–500)
EOSINOPHIL # BLD: 2.7 %
EOSINOPHILS ABSOLUTE: 0.2 THOU/MM3 (ref 0–0.4)
ERYTHROCYTE [DISTWIDTH] IN BLOOD BY AUTOMATED COUNT: 15 % (ref 11.5–14.5)
ERYTHROCYTE [DISTWIDTH] IN BLOOD BY AUTOMATED COUNT: 48.4 FL (ref 35–45)
GFR SERPL CREATININE-BSD FRML MDRD: > 90 ML/MIN/1.73M2
GFR SERPL CREATININE-BSD FRML MDRD: > 90 ML/MIN/1.73M2
GLUCOSE BLD-MCNC: 102 MG/DL (ref 70–108)
GLUCOSE BLD-MCNC: 99 MG/DL (ref 70–108)
HCT VFR BLD CALC: 39.7 % (ref 42–52)
HEMOGLOBIN: 12.3 GM/DL (ref 14–18)
IMMATURE GRANS (ABS): 0.19 THOU/MM3 (ref 0–0.07)
IMMATURE GRANULOCYTES: 2.3 %
LYMPHOCYTES # BLD: 22.5 %
LYMPHOCYTES ABSOLUTE: 1.9 THOU/MM3 (ref 1–4.8)
MAGNESIUM: 2 MG/DL (ref 1.6–2.4)
MCH RBC QN AUTO: 27.3 PG (ref 26–33)
MCHC RBC AUTO-ENTMCNC: 31 GM/DL (ref 32.2–35.5)
MCV RBC AUTO: 88.2 FL (ref 80–94)
MONOCYTES # BLD: 11.8 %
MONOCYTES ABSOLUTE: 1 THOU/MM3 (ref 0.4–1.3)
NUCLEATED RED BLOOD CELLS: 0 /100 WBC
PHOSPHORUS: 2.9 MG/DL (ref 2.4–4.7)
PLATELET # BLD: 263 THOU/MM3 (ref 130–400)
PMV BLD AUTO: 11 FL (ref 9.4–12.4)
POTASSIUM SERPL-SCNC: 3.6 MEQ/L (ref 3.5–5.2)
POTASSIUM SERPL-SCNC: 4 MEQ/L (ref 3.5–5.2)
RBC # BLD: 4.5 MILL/MM3 (ref 4.7–6.1)
SEG NEUTROPHILS: 59.9 %
SEGMENTED NEUTROPHILS ABSOLUTE COUNT: 5 THOU/MM3 (ref 1.8–7.7)
SODIUM BLD-SCNC: 137 MEQ/L (ref 135–145)
SODIUM BLD-SCNC: 140 MEQ/L (ref 135–145)
TROPONIN T: < 0.01 NG/ML
WBC # BLD: 8.4 THOU/MM3 (ref 4.8–10.8)

## 2020-06-10 PROCEDURE — 6360000002 HC RX W HCPCS: Performed by: PHYSICIAN ASSISTANT

## 2020-06-10 PROCEDURE — 6360000002 HC RX W HCPCS: Performed by: SURGERY

## 2020-06-10 PROCEDURE — 6370000000 HC RX 637 (ALT 250 FOR IP): Performed by: PHYSICIAN ASSISTANT

## 2020-06-10 PROCEDURE — 2580000003 HC RX 258: Performed by: PHYSICIAN ASSISTANT

## 2020-06-10 PROCEDURE — 36415 COLL VENOUS BLD VENIPUNCTURE: CPT

## 2020-06-10 PROCEDURE — 2580000003 HC RX 258: Performed by: SURGERY

## 2020-06-10 PROCEDURE — 6360000002 HC RX W HCPCS: Performed by: FAMILY MEDICINE

## 2020-06-10 PROCEDURE — 2500000003 HC RX 250 WO HCPCS: Performed by: SURGERY

## 2020-06-10 PROCEDURE — C9113 INJ PANTOPRAZOLE SODIUM, VIA: HCPCS | Performed by: PHYSICIAN ASSISTANT

## 2020-06-10 PROCEDURE — 74019 RADEX ABDOMEN 2 VIEWS: CPT

## 2020-06-10 PROCEDURE — 94760 N-INVAS EAR/PLS OXIMETRY 1: CPT

## 2020-06-10 PROCEDURE — 1200000003 HC TELEMETRY R&B

## 2020-06-10 PROCEDURE — 6370000000 HC RX 637 (ALT 250 FOR IP): Performed by: SURGERY

## 2020-06-10 PROCEDURE — 99232 SBSQ HOSP IP/OBS MODERATE 35: CPT | Performed by: SURGERY

## 2020-06-10 PROCEDURE — 80048 BASIC METABOLIC PNL TOTAL CA: CPT

## 2020-06-10 PROCEDURE — 71275 CT ANGIOGRAPHY CHEST: CPT

## 2020-06-10 PROCEDURE — 6360000004 HC RX CONTRAST MEDICATION: Performed by: PHYSICIAN ASSISTANT

## 2020-06-10 PROCEDURE — 93010 ELECTROCARDIOGRAM REPORT: CPT | Performed by: INTERNAL MEDICINE

## 2020-06-10 RX ORDER — METOCLOPRAMIDE HYDROCHLORIDE 5 MG/ML
10 INJECTION INTRAMUSCULAR; INTRAVENOUS EVERY 8 HOURS PRN
Status: DISCONTINUED | OUTPATIENT
Start: 2020-06-10 | End: 2020-06-14 | Stop reason: HOSPADM

## 2020-06-10 RX ADMIN — FAMOTIDINE 20 MG: 10 INJECTION INTRAVENOUS at 20:26

## 2020-06-10 RX ADMIN — METHENAMINE HIPPURATE 1 G: 1 TABLET ORAL at 22:00

## 2020-06-10 RX ADMIN — Medication 50 MG: at 10:13

## 2020-06-10 RX ADMIN — LAMOTRIGINE 100 MG: 100 TABLET ORAL at 22:00

## 2020-06-10 RX ADMIN — GABAPENTIN 900 MG: 300 CAPSULE ORAL at 18:38

## 2020-06-10 RX ADMIN — HYDROMORPHONE HYDROCHLORIDE 0.5 MG: 1 INJECTION, SOLUTION INTRAMUSCULAR; INTRAVENOUS; SUBCUTANEOUS at 13:47

## 2020-06-10 RX ADMIN — GABAPENTIN 900 MG: 300 CAPSULE ORAL at 08:07

## 2020-06-10 RX ADMIN — ONDANSETRON 4 MG: 2 INJECTION INTRAMUSCULAR; INTRAVENOUS at 05:05

## 2020-06-10 RX ADMIN — METHENAMINE HIPPURATE 1 G: 1 TABLET ORAL at 10:11

## 2020-06-10 RX ADMIN — Medication 10 ML: at 10:12

## 2020-06-10 RX ADMIN — GABAPENTIN 900 MG: 300 CAPSULE ORAL at 12:51

## 2020-06-10 RX ADMIN — BACLOFEN 10 MG: 10 TABLET ORAL at 12:51

## 2020-06-10 RX ADMIN — DEXTROMETHORPHAN HBR 15 MG: 15 CAPSULE, LIQUID FILLED ORAL at 12:52

## 2020-06-10 RX ADMIN — SODIUM CHLORIDE, PRESERVATIVE FREE 10 ML: 5 INJECTION INTRAVENOUS at 20:27

## 2020-06-10 RX ADMIN — PANTOPRAZOLE SODIUM 40 MG: 40 INJECTION, POWDER, FOR SOLUTION INTRAVENOUS at 10:11

## 2020-06-10 RX ADMIN — METOCLOPRAMIDE 10 MG: 5 INJECTION, SOLUTION INTRAMUSCULAR; INTRAVENOUS at 15:27

## 2020-06-10 RX ADMIN — METHOCARBAMOL TABLETS 500 MG: 500 TABLET, COATED ORAL at 12:51

## 2020-06-10 RX ADMIN — BACLOFEN 10 MG: 10 TABLET ORAL at 18:38

## 2020-06-10 RX ADMIN — METHOCARBAMOL TABLETS 500 MG: 500 TABLET, COATED ORAL at 01:38

## 2020-06-10 RX ADMIN — ENOXAPARIN SODIUM 40 MG: 40 INJECTION SUBCUTANEOUS at 01:38

## 2020-06-10 RX ADMIN — METHOCARBAMOL TABLETS 500 MG: 500 TABLET, COATED ORAL at 18:38

## 2020-06-10 RX ADMIN — Medication 1 TABLET: at 10:12

## 2020-06-10 RX ADMIN — METRONIDAZOLE 500 MG: 500 TABLET, FILM COATED ORAL at 08:07

## 2020-06-10 RX ADMIN — METRONIDAZOLE 500 MG: 500 TABLET, FILM COATED ORAL at 15:24

## 2020-06-10 RX ADMIN — METRONIDAZOLE 500 MG: 500 TABLET, FILM COATED ORAL at 23:51

## 2020-06-10 RX ADMIN — LAMOTRIGINE 100 MG: 100 TABLET ORAL at 10:11

## 2020-06-10 RX ADMIN — FAMOTIDINE 20 MG: 10 INJECTION INTRAVENOUS at 10:11

## 2020-06-10 RX ADMIN — BACLOFEN 10 MG: 10 TABLET ORAL at 08:07

## 2020-06-10 RX ADMIN — ENOXAPARIN SODIUM 40 MG: 40 INJECTION SUBCUTANEOUS at 23:51

## 2020-06-10 RX ADMIN — DEXTROMETHORPHAN HBR 15 MG: 15 CAPSULE, LIQUID FILLED ORAL at 08:10

## 2020-06-10 RX ADMIN — QUETIAPINE FUMARATE 25 MG: 25 TABLET ORAL at 22:00

## 2020-06-10 RX ADMIN — BACLOFEN 10 MG: 10 TABLET ORAL at 01:38

## 2020-06-10 RX ADMIN — PROMETHAZINE HYDROCHLORIDE 6.25 MG: 25 INJECTION INTRAMUSCULAR; INTRAVENOUS at 20:26

## 2020-06-10 RX ADMIN — CEFEPIME HYDROCHLORIDE 2 G: 2 INJECTION, POWDER, FOR SOLUTION INTRAVENOUS at 08:08

## 2020-06-10 RX ADMIN — IOPAMIDOL 80 ML: 755 INJECTION, SOLUTION INTRAVENOUS at 01:21

## 2020-06-10 RX ADMIN — Medication 1 CAPSULE: at 10:11

## 2020-06-10 RX ADMIN — MORPHINE SULFATE 4 MG: 4 INJECTION, SOLUTION INTRAMUSCULAR; INTRAVENOUS at 01:38

## 2020-06-10 RX ADMIN — CEFEPIME HYDROCHLORIDE 2 G: 2 INJECTION, POWDER, FOR SOLUTION INTRAVENOUS at 18:42

## 2020-06-10 RX ADMIN — Medication 1 CAPSULE: at 18:38

## 2020-06-10 RX ADMIN — METHOCARBAMOL TABLETS 500 MG: 500 TABLET, COATED ORAL at 08:08

## 2020-06-10 RX ADMIN — ONDANSETRON 4 MG: 2 INJECTION INTRAMUSCULAR; INTRAVENOUS at 18:42

## 2020-06-10 RX ADMIN — HYDROMORPHONE HYDROCHLORIDE 0.5 MG: 1 INJECTION, SOLUTION INTRAMUSCULAR; INTRAVENOUS; SUBCUTANEOUS at 05:05

## 2020-06-10 RX ADMIN — SODIUM CHLORIDE: 9 INJECTION, SOLUTION INTRAVENOUS at 12:53

## 2020-06-10 RX ADMIN — PROMETHAZINE HYDROCHLORIDE 6.25 MG: 25 INJECTION INTRAMUSCULAR; INTRAVENOUS at 08:08

## 2020-06-10 RX ADMIN — DEXTROMETHORPHAN HBR 15 MG: 15 CAPSULE, LIQUID FILLED ORAL at 18:39

## 2020-06-10 RX ADMIN — DEXTROMETHORPHAN HBR 15 MG: 15 CAPSULE, LIQUID FILLED ORAL at 01:42

## 2020-06-10 RX ADMIN — Medication 1000 MCG: at 08:10

## 2020-06-10 RX ADMIN — GABAPENTIN 900 MG: 300 CAPSULE ORAL at 01:38

## 2020-06-10 RX ADMIN — ONDANSETRON 4 MG: 2 INJECTION INTRAMUSCULAR; INTRAVENOUS at 01:34

## 2020-06-10 ASSESSMENT — PAIN DESCRIPTION - PROGRESSION

## 2020-06-10 ASSESSMENT — PAIN DESCRIPTION - PAIN TYPE
TYPE: SURGICAL PAIN
TYPE: ACUTE PAIN;SURGICAL PAIN
TYPE: ACUTE PAIN
TYPE: SURGICAL PAIN
TYPE: ACUTE PAIN;SURGICAL PAIN

## 2020-06-10 ASSESSMENT — PAIN - FUNCTIONAL ASSESSMENT
PAIN_FUNCTIONAL_ASSESSMENT: ACTIVITIES ARE NOT PREVENTED

## 2020-06-10 ASSESSMENT — PAIN SCALES - GENERAL
PAINLEVEL_OUTOF10: 5
PAINLEVEL_OUTOF10: 7
PAINLEVEL_OUTOF10: 3
PAINLEVEL_OUTOF10: 7
PAINLEVEL_OUTOF10: 7
PAINLEVEL_OUTOF10: 0
PAINLEVEL_OUTOF10: 7
PAINLEVEL_OUTOF10: 6

## 2020-06-10 ASSESSMENT — PAIN DESCRIPTION - FREQUENCY
FREQUENCY: CONTINUOUS

## 2020-06-10 ASSESSMENT — PAIN DESCRIPTION - LOCATION
LOCATION: ABDOMEN

## 2020-06-10 ASSESSMENT — PAIN DESCRIPTION - ORIENTATION
ORIENTATION: LOWER;MID
ORIENTATION: LOWER;MID
ORIENTATION: RIGHT;MID
ORIENTATION: RIGHT;MID
ORIENTATION: MID

## 2020-06-10 ASSESSMENT — PAIN DESCRIPTION - ONSET
ONSET: ON-GOING

## 2020-06-10 ASSESSMENT — PAIN DESCRIPTION - DESCRIPTORS
DESCRIPTORS: ACHING
DESCRIPTORS: ACHING
DESCRIPTORS: ACHING;BURNING;SHARP
DESCRIPTORS: ACHING
DESCRIPTORS: ACHING
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
## 2020-06-11 PROCEDURE — 6370000000 HC RX 637 (ALT 250 FOR IP): Performed by: PHYSICIAN ASSISTANT

## 2020-06-11 PROCEDURE — 6360000002 HC RX W HCPCS: Performed by: SURGERY

## 2020-06-11 PROCEDURE — 6370000000 HC RX 637 (ALT 250 FOR IP): Performed by: SURGERY

## 2020-06-11 PROCEDURE — 2580000003 HC RX 258: Performed by: SURGERY

## 2020-06-11 PROCEDURE — 2580000003 HC RX 258: Performed by: PHYSICIAN ASSISTANT

## 2020-06-11 PROCEDURE — 99024 POSTOP FOLLOW-UP VISIT: CPT | Performed by: SURGERY

## 2020-06-11 PROCEDURE — 94760 N-INVAS EAR/PLS OXIMETRY 1: CPT

## 2020-06-11 PROCEDURE — 1200000003 HC TELEMETRY R&B

## 2020-06-11 PROCEDURE — 6360000002 HC RX W HCPCS: Performed by: PHYSICIAN ASSISTANT

## 2020-06-11 PROCEDURE — C9113 INJ PANTOPRAZOLE SODIUM, VIA: HCPCS | Performed by: PHYSICIAN ASSISTANT

## 2020-06-11 PROCEDURE — 93005 ELECTROCARDIOGRAM TRACING: CPT | Performed by: SURGERY

## 2020-06-11 PROCEDURE — 2500000003 HC RX 250 WO HCPCS: Performed by: SURGERY

## 2020-06-11 RX ORDER — SODIUM CHLORIDE 9 MG/ML
INJECTION, SOLUTION INTRAVENOUS CONTINUOUS
Status: DISCONTINUED | OUTPATIENT
Start: 2020-06-11 | End: 2020-06-12

## 2020-06-11 RX ORDER — SODIUM CHLORIDE 0.9 % (FLUSH) 0.9 %
10 SYRINGE (ML) INJECTION PRN
Status: DISCONTINUED | OUTPATIENT
Start: 2020-06-11 | End: 2020-06-12

## 2020-06-11 RX ORDER — SODIUM CHLORIDE 0.9 % (FLUSH) 0.9 %
10 SYRINGE (ML) INJECTION EVERY 12 HOURS SCHEDULED
Status: DISCONTINUED | OUTPATIENT
Start: 2020-06-11 | End: 2020-06-12

## 2020-06-11 RX ADMIN — BACLOFEN 10 MG: 10 TABLET ORAL at 06:28

## 2020-06-11 RX ADMIN — DEXTROMETHORPHAN HBR 15 MG: 15 CAPSULE, LIQUID FILLED ORAL at 13:37

## 2020-06-11 RX ADMIN — LAMOTRIGINE 100 MG: 100 TABLET ORAL at 20:15

## 2020-06-11 RX ADMIN — DEXTROMETHORPHAN HBR 15 MG: 15 CAPSULE, LIQUID FILLED ORAL at 01:32

## 2020-06-11 RX ADMIN — Medication 50 MG: at 08:25

## 2020-06-11 RX ADMIN — PANTOPRAZOLE SODIUM 40 MG: 40 INJECTION, POWDER, FOR SOLUTION INTRAVENOUS at 08:26

## 2020-06-11 RX ADMIN — METHOCARBAMOL TABLETS 500 MG: 500 TABLET, COATED ORAL at 06:28

## 2020-06-11 RX ADMIN — GABAPENTIN 900 MG: 300 CAPSULE ORAL at 06:28

## 2020-06-11 RX ADMIN — FAMOTIDINE 20 MG: 10 INJECTION INTRAVENOUS at 08:26

## 2020-06-11 RX ADMIN — METHOCARBAMOL TABLETS 500 MG: 500 TABLET, COATED ORAL at 13:37

## 2020-06-11 RX ADMIN — LAMOTRIGINE 100 MG: 100 TABLET ORAL at 08:26

## 2020-06-11 RX ADMIN — QUETIAPINE FUMARATE 25 MG: 25 TABLET ORAL at 20:15

## 2020-06-11 RX ADMIN — Medication 1 CAPSULE: at 18:47

## 2020-06-11 RX ADMIN — GABAPENTIN 900 MG: 300 CAPSULE ORAL at 13:37

## 2020-06-11 RX ADMIN — METOCLOPRAMIDE 10 MG: 5 INJECTION, SOLUTION INTRAMUSCULAR; INTRAVENOUS at 07:43

## 2020-06-11 RX ADMIN — Medication 10 ML: at 08:27

## 2020-06-11 RX ADMIN — DEXTROMETHORPHAN HBR 15 MG: 15 CAPSULE, LIQUID FILLED ORAL at 20:16

## 2020-06-11 RX ADMIN — HYDROMORPHONE HYDROCHLORIDE 0.5 MG: 1 INJECTION, SOLUTION INTRAMUSCULAR; INTRAVENOUS; SUBCUTANEOUS at 19:01

## 2020-06-11 RX ADMIN — BACLOFEN 10 MG: 10 TABLET ORAL at 18:46

## 2020-06-11 RX ADMIN — METHOCARBAMOL TABLETS 500 MG: 500 TABLET, COATED ORAL at 18:46

## 2020-06-11 RX ADMIN — HYDROMORPHONE HYDROCHLORIDE 0.5 MG: 1 INJECTION, SOLUTION INTRAMUSCULAR; INTRAVENOUS; SUBCUTANEOUS at 09:20

## 2020-06-11 RX ADMIN — FAMOTIDINE 20 MG: 10 INJECTION INTRAVENOUS at 20:17

## 2020-06-11 RX ADMIN — BACLOFEN 10 MG: 10 TABLET ORAL at 13:37

## 2020-06-11 RX ADMIN — GABAPENTIN 900 MG: 300 CAPSULE ORAL at 18:46

## 2020-06-11 RX ADMIN — Medication 1000 MCG: at 06:29

## 2020-06-11 RX ADMIN — METHENAMINE HIPPURATE 1 G: 1 TABLET ORAL at 20:15

## 2020-06-11 RX ADMIN — METHENAMINE HIPPURATE 1 G: 1 TABLET ORAL at 08:26

## 2020-06-11 RX ADMIN — CEFEPIME HYDROCHLORIDE 2 G: 2 INJECTION, POWDER, FOR SOLUTION INTRAVENOUS at 06:30

## 2020-06-11 RX ADMIN — GABAPENTIN 900 MG: 300 CAPSULE ORAL at 01:31

## 2020-06-11 RX ADMIN — BACLOFEN 10 MG: 10 TABLET ORAL at 01:31

## 2020-06-11 RX ADMIN — METRONIDAZOLE 500 MG: 500 TABLET, FILM COATED ORAL at 06:28

## 2020-06-11 RX ADMIN — DEXTROMETHORPHAN HBR 15 MG: 15 CAPSULE, LIQUID FILLED ORAL at 06:29

## 2020-06-11 RX ADMIN — METHOCARBAMOL TABLETS 500 MG: 500 TABLET, COATED ORAL at 01:31

## 2020-06-11 RX ADMIN — LORAZEPAM 0.5 MG: 2 INJECTION INTRAMUSCULAR; INTRAVENOUS at 01:55

## 2020-06-11 RX ADMIN — METRONIDAZOLE 500 MG: 500 TABLET, FILM COATED ORAL at 13:37

## 2020-06-11 RX ADMIN — CEFEPIME HYDROCHLORIDE 2 G: 2 INJECTION, POWDER, FOR SOLUTION INTRAVENOUS at 18:46

## 2020-06-11 RX ADMIN — SODIUM CHLORIDE, PRESERVATIVE FREE 10 ML: 5 INJECTION INTRAVENOUS at 20:15

## 2020-06-11 ASSESSMENT — PAIN SCALES - GENERAL
PAINLEVEL_OUTOF10: 6
PAINLEVEL_OUTOF10: 7
PAINLEVEL_OUTOF10: 7
PAINLEVEL_OUTOF10: 6
PAINLEVEL_OUTOF10: 4

## 2020-06-11 ASSESSMENT — PAIN DESCRIPTION - DESCRIPTORS: DESCRIPTORS: DULL;THROBBING

## 2020-06-11 ASSESSMENT — PAIN DESCRIPTION - ORIENTATION: ORIENTATION: RIGHT;MID

## 2020-06-11 ASSESSMENT — PAIN DESCRIPTION - ONSET: ONSET: ON-GOING

## 2020-06-11 ASSESSMENT — PAIN DESCRIPTION - PAIN TYPE: TYPE: SURGICAL PAIN

## 2020-06-11 ASSESSMENT — PAIN DESCRIPTION - FREQUENCY: FREQUENCY: CONTINUOUS

## 2020-06-11 ASSESSMENT — PAIN DESCRIPTION - LOCATION: LOCATION: ABDOMEN

## 2020-06-11 NOTE — PROGRESS NOTES
are inherent in voice recognition technology. **      Final report electronically signed by Dr. Aicha Salas on 6/7/2020 10:40 AM      XR ABDOMEN FOR NG/OG/NE TUBE PLACEMENT   Final Result   . 1. Nasogastric tube within the body of the stomach. 2. Dilated small bowel segments compatible with ileus versus bowel obstruction follow-up recommended. **This report has been created using voice recognition software. It may contain minor errors which are inherent in voice recognition technology. **      Final report electronically signed by Dr. Johny Sommers on 6/7/2020 4:49 AM      CT ABDOMEN PELVIS W IV CONTRAST Additional Contrast? Oral (4 doses)   Final Result   . 1. Left lower quadrant ostomy site with current study demonstrating a parastomal herniation of bowel that has become distended and has the appearance of a large fluid collection with air foci. There is multiple fluid distended segments of small bowel in    the abdomen changes of developing small bowel obstruction or suspected   2. Stable appearance of marked atrophy of left native kidney   **This report has been created using voice recognition software. It may contain minor errors which are inherent in voice recognition technology. **      Final report electronically signed by Dr. Johny Sommers on 6/7/2020 1:24 AM            Electronically signed by Polo Kessler DO on 6/11/2020 at 10:40 AM

## 2020-06-11 NOTE — PLAN OF CARE
Problem: Pain:  Goal: Pain level will decrease  Description: Pain level will decrease  6/11/2020 0050 by Flores Green RN  Outcome: Ongoing  Note: Pain Assessment: 0-10  Pain Level: 3   Patient's Stated Pain Goal: 5   Is pain goal met at this time? Yes     Non-Pharmaceutical Pain Intervention(s): Rest, Repositioned, Cold applied     Problem: Falls - Risk of:  Goal: Will remain free from falls  Description: Will remain free from falls  6/11/2020 0050 by Flores Green RN  Outcome: Ongoing  Note: No falls noted this shift. Fall risk assessment completed. Hourly rounding performed. Bed locked in lowest position, bed alarm on, call light and personal items within reach, and fall sign posted. Patient has a colostomy that he takes care by himself. Has a suprapubic catheter. Problem: Discharge Planning:  Goal: Discharged to appropriate level of care  Description: Discharged to appropriate level of care  6/11/2020 0050 by Flores Green RN  Outcome: Ongoing  Note: Patient from home with mother and [facility]. Patient plans to return home with mother and [facility] at discharge. Problem: Bowel/Gastric:  Goal: Control of bowel function will improve  Description: Control of bowel function will improve  6/11/2020 0050 by Flores Green RN  Outcome: Ongoing  Note: Patient admitted for SBO. Had a hernia repair on 6/3. Reports constipation ever since surgery on 6/3. Has midline incision. Reports nausea and emesis throughout the day and night. IM phenergan administered per MAR. Patient reports of BM and passing flatus through colostomy. Bowel sounds are hypoactive. Pain control. Belching noted. Will continue to assess. Problem: Physical Regulation:  Goal: Prevent transmision of infection  Description: Prevent transmision of infection  6/11/2020 0050 by Flores Green RN  Outcome: Ongoing  Note: Contact isolation precautions maintained. PO flagyl.       Problem: Skin Integrity:  Goal: Absence

## 2020-06-12 ENCOUNTER — ANESTHESIA (OUTPATIENT)
Dept: OPERATING ROOM | Age: 30
DRG: 335 | End: 2020-06-12
Payer: MEDICARE

## 2020-06-12 ENCOUNTER — ANESTHESIA EVENT (OUTPATIENT)
Dept: OPERATING ROOM | Age: 30
DRG: 335 | End: 2020-06-12
Payer: MEDICARE

## 2020-06-12 VITALS — DIASTOLIC BLOOD PRESSURE: 73 MMHG | SYSTOLIC BLOOD PRESSURE: 132 MMHG | OXYGEN SATURATION: 100 %

## 2020-06-12 LAB
BLOOD CULTURE, ROUTINE: NORMAL
BLOOD CULTURE, ROUTINE: NORMAL
EKG ATRIAL RATE: 104 BPM
EKG P AXIS: 19 DEGREES
EKG P-R INTERVAL: 140 MS
EKG Q-T INTERVAL: 340 MS
EKG QRS DURATION: 84 MS
EKG QTC CALCULATION (BAZETT): 447 MS
EKG R AXIS: 23 DEGREES
EKG T AXIS: -14 DEGREES
EKG VENTRICULAR RATE: 104 BPM

## 2020-06-12 PROCEDURE — 2709999900 HC NON-CHARGEABLE SUPPLY: Performed by: SURGERY

## 2020-06-12 PROCEDURE — 6370000000 HC RX 637 (ALT 250 FOR IP): Performed by: SURGERY

## 2020-06-12 PROCEDURE — 3700000000 HC ANESTHESIA ATTENDED CARE: Performed by: SURGERY

## 2020-06-12 PROCEDURE — 44005 FREEING OF BOWEL ADHESION: CPT | Performed by: SURGERY

## 2020-06-12 PROCEDURE — 6360000002 HC RX W HCPCS: Performed by: SURGERY

## 2020-06-12 PROCEDURE — 2500000003 HC RX 250 WO HCPCS: Performed by: NURSE ANESTHETIST, CERTIFIED REGISTERED

## 2020-06-12 PROCEDURE — 0DN80ZZ RELEASE SMALL INTESTINE, OPEN APPROACH: ICD-10-PCS | Performed by: SURGERY

## 2020-06-12 PROCEDURE — 6360000002 HC RX W HCPCS: Performed by: PHYSICIAN ASSISTANT

## 2020-06-12 PROCEDURE — 7100000000 HC PACU RECOVERY - FIRST 15 MIN: Performed by: SURGERY

## 2020-06-12 PROCEDURE — 2580000003 HC RX 258: Performed by: SURGERY

## 2020-06-12 PROCEDURE — 3600000004 HC SURGERY LEVEL 4 BASE: Performed by: SURGERY

## 2020-06-12 PROCEDURE — 6370000000 HC RX 637 (ALT 250 FOR IP): Performed by: PHYSICIAN ASSISTANT

## 2020-06-12 PROCEDURE — 2580000003 HC RX 258: Performed by: PHYSICIAN ASSISTANT

## 2020-06-12 PROCEDURE — 2500000003 HC RX 250 WO HCPCS: Performed by: SURGERY

## 2020-06-12 PROCEDURE — 3700000001 HC ADD 15 MINUTES (ANESTHESIA): Performed by: SURGERY

## 2020-06-12 PROCEDURE — 7100000001 HC PACU RECOVERY - ADDTL 15 MIN: Performed by: SURGERY

## 2020-06-12 PROCEDURE — 93010 ELECTROCARDIOGRAM REPORT: CPT | Performed by: INTERNAL MEDICINE

## 2020-06-12 PROCEDURE — C9113 INJ PANTOPRAZOLE SODIUM, VIA: HCPCS | Performed by: PHYSICIAN ASSISTANT

## 2020-06-12 PROCEDURE — 1200000003 HC TELEMETRY R&B

## 2020-06-12 PROCEDURE — 6360000002 HC RX W HCPCS: Performed by: NURSE ANESTHETIST, CERTIFIED REGISTERED

## 2020-06-12 PROCEDURE — 6360000002 HC RX W HCPCS: Performed by: ANESTHESIOLOGY

## 2020-06-12 PROCEDURE — 3600000014 HC SURGERY LEVEL 4 ADDTL 15MIN: Performed by: SURGERY

## 2020-06-12 PROCEDURE — 94760 N-INVAS EAR/PLS OXIMETRY 1: CPT

## 2020-06-12 RX ORDER — MORPHINE SULFATE 2 MG/ML
2 INJECTION, SOLUTION INTRAMUSCULAR; INTRAVENOUS EVERY 5 MIN PRN
Status: DISCONTINUED | OUTPATIENT
Start: 2020-06-12 | End: 2020-06-12

## 2020-06-12 RX ORDER — MEPERIDINE HYDROCHLORIDE 25 MG/ML
12.5 INJECTION INTRAMUSCULAR; INTRAVENOUS; SUBCUTANEOUS EVERY 5 MIN PRN
Status: DISCONTINUED | OUTPATIENT
Start: 2020-06-12 | End: 2020-06-12

## 2020-06-12 RX ORDER — PROPOFOL 10 MG/ML
INJECTION, EMULSION INTRAVENOUS PRN
Status: DISCONTINUED | OUTPATIENT
Start: 2020-06-12 | End: 2020-06-12 | Stop reason: SDUPTHER

## 2020-06-12 RX ORDER — DIPHENHYDRAMINE HYDROCHLORIDE 50 MG/ML
12.5 INJECTION INTRAMUSCULAR; INTRAVENOUS
Status: DISCONTINUED | OUTPATIENT
Start: 2020-06-12 | End: 2020-06-12

## 2020-06-12 RX ORDER — LABETALOL 20 MG/4 ML (5 MG/ML) INTRAVENOUS SYRINGE
5 EVERY 5 MIN PRN
Status: DISCONTINUED | OUTPATIENT
Start: 2020-06-12 | End: 2020-06-12

## 2020-06-12 RX ORDER — LIDOCAINE HYDROCHLORIDE 20 MG/ML
INJECTION, SOLUTION INTRAVENOUS PRN
Status: DISCONTINUED | OUTPATIENT
Start: 2020-06-12 | End: 2020-06-12 | Stop reason: SDUPTHER

## 2020-06-12 RX ORDER — FENTANYL CITRATE 50 UG/ML
INJECTION, SOLUTION INTRAMUSCULAR; INTRAVENOUS PRN
Status: DISCONTINUED | OUTPATIENT
Start: 2020-06-12 | End: 2020-06-12 | Stop reason: SDUPTHER

## 2020-06-12 RX ORDER — BUPIVACAINE HYDROCHLORIDE 5 MG/ML
INJECTION, SOLUTION PERINEURAL PRN
Status: DISCONTINUED | OUTPATIENT
Start: 2020-06-12 | End: 2020-06-12 | Stop reason: ALTCHOICE

## 2020-06-12 RX ORDER — CIPROFLOXACIN 2 MG/ML
400 INJECTION, SOLUTION INTRAVENOUS
Status: COMPLETED | OUTPATIENT
Start: 2020-06-12 | End: 2020-06-12

## 2020-06-12 RX ORDER — ONDANSETRON 2 MG/ML
INJECTION INTRAMUSCULAR; INTRAVENOUS PRN
Status: DISCONTINUED | OUTPATIENT
Start: 2020-06-12 | End: 2020-06-12 | Stop reason: SDUPTHER

## 2020-06-12 RX ORDER — FENTANYL CITRATE 50 UG/ML
50 INJECTION, SOLUTION INTRAMUSCULAR; INTRAVENOUS EVERY 5 MIN PRN
Status: DISCONTINUED | OUTPATIENT
Start: 2020-06-12 | End: 2020-06-12

## 2020-06-12 RX ORDER — DEXAMETHASONE SODIUM PHOSPHATE 4 MG/ML
INJECTION, SOLUTION INTRA-ARTICULAR; INTRALESIONAL; INTRAMUSCULAR; INTRAVENOUS; SOFT TISSUE PRN
Status: DISCONTINUED | OUTPATIENT
Start: 2020-06-12 | End: 2020-06-12 | Stop reason: SDUPTHER

## 2020-06-12 RX ORDER — ONDANSETRON 2 MG/ML
4 INJECTION INTRAMUSCULAR; INTRAVENOUS
Status: DISCONTINUED | OUTPATIENT
Start: 2020-06-12 | End: 2020-06-12

## 2020-06-12 RX ORDER — HYDRALAZINE HYDROCHLORIDE 20 MG/ML
5 INJECTION INTRAMUSCULAR; INTRAVENOUS EVERY 10 MIN PRN
Status: DISCONTINUED | OUTPATIENT
Start: 2020-06-12 | End: 2020-06-12

## 2020-06-12 RX ORDER — MIDAZOLAM HYDROCHLORIDE 1 MG/ML
INJECTION INTRAMUSCULAR; INTRAVENOUS PRN
Status: DISCONTINUED | OUTPATIENT
Start: 2020-06-12 | End: 2020-06-12 | Stop reason: SDUPTHER

## 2020-06-12 RX ORDER — ROCURONIUM BROMIDE 10 MG/ML
INJECTION, SOLUTION INTRAVENOUS PRN
Status: DISCONTINUED | OUTPATIENT
Start: 2020-06-12 | End: 2020-06-12 | Stop reason: SDUPTHER

## 2020-06-12 RX ORDER — PROMETHAZINE HYDROCHLORIDE 25 MG/ML
INJECTION, SOLUTION INTRAMUSCULAR; INTRAVENOUS PRN
Status: DISCONTINUED | OUTPATIENT
Start: 2020-06-12 | End: 2020-06-12 | Stop reason: SDUPTHER

## 2020-06-12 RX ADMIN — QUETIAPINE FUMARATE 25 MG: 25 TABLET ORAL at 19:59

## 2020-06-12 RX ADMIN — METHENAMINE HIPPURATE 1 G: 1 TABLET ORAL at 19:59

## 2020-06-12 RX ADMIN — DEXAMETHASONE SODIUM PHOSPHATE 8 MG: 4 INJECTION, SOLUTION INTRAMUSCULAR; INTRAVENOUS at 12:51

## 2020-06-12 RX ADMIN — CIPROFLOXACIN 400 MG: 2 INJECTION, SOLUTION INTRAVENOUS at 12:59

## 2020-06-12 RX ADMIN — METHOCARBAMOL TABLETS 500 MG: 500 TABLET, COATED ORAL at 18:36

## 2020-06-12 RX ADMIN — PANTOPRAZOLE SODIUM 40 MG: 40 INJECTION, POWDER, FOR SOLUTION INTRAVENOUS at 09:30

## 2020-06-12 RX ADMIN — Medication 10 ML: at 09:36

## 2020-06-12 RX ADMIN — ONDANSETRON HYDROCHLORIDE 4 MG: 4 INJECTION, SOLUTION INTRAMUSCULAR; INTRAVENOUS at 12:51

## 2020-06-12 RX ADMIN — FENTANYL CITRATE 50 MCG: 50 INJECTION, SOLUTION INTRAMUSCULAR; INTRAVENOUS at 14:00

## 2020-06-12 RX ADMIN — METHOCARBAMOL TABLETS 500 MG: 500 TABLET, COATED ORAL at 00:28

## 2020-06-12 RX ADMIN — PROPOFOL 150 MG: 10 INJECTION, EMULSION INTRAVENOUS at 12:43

## 2020-06-12 RX ADMIN — METRONIDAZOLE 500 MG: 500 TABLET, FILM COATED ORAL at 15:12

## 2020-06-12 RX ADMIN — SODIUM CHLORIDE: 9 INJECTION, SOLUTION INTRAVENOUS at 05:23

## 2020-06-12 RX ADMIN — FAMOTIDINE 20 MG: 10 INJECTION INTRAVENOUS at 19:48

## 2020-06-12 RX ADMIN — FENTANYL CITRATE 100 MCG: 50 INJECTION, SOLUTION INTRAMUSCULAR; INTRAVENOUS at 12:33

## 2020-06-12 RX ADMIN — MIDAZOLAM HYDROCHLORIDE 2 MG: 1 INJECTION, SOLUTION INTRAMUSCULAR; INTRAVENOUS at 12:33

## 2020-06-12 RX ADMIN — BACLOFEN 10 MG: 10 TABLET ORAL at 00:28

## 2020-06-12 RX ADMIN — CEFEPIME HYDROCHLORIDE 2 G: 2 INJECTION, POWDER, FOR SOLUTION INTRAVENOUS at 18:36

## 2020-06-12 RX ADMIN — ROCURONIUM BROMIDE 30 MG: 10 INJECTION INTRAVENOUS at 12:43

## 2020-06-12 RX ADMIN — LAMOTRIGINE 100 MG: 100 TABLET ORAL at 19:59

## 2020-06-12 RX ADMIN — ONDANSETRON 4 MG: 2 INJECTION INTRAMUSCULAR; INTRAVENOUS at 17:43

## 2020-06-12 RX ADMIN — BACLOFEN 10 MG: 10 TABLET ORAL at 18:36

## 2020-06-12 RX ADMIN — MORPHINE SULFATE 4 MG: 4 INJECTION, SOLUTION INTRAMUSCULAR; INTRAVENOUS at 02:32

## 2020-06-12 RX ADMIN — METRONIDAZOLE 500 MG: 500 INJECTION, SOLUTION INTRAVENOUS at 12:42

## 2020-06-12 RX ADMIN — Medication 1 CAPSULE: at 18:42

## 2020-06-12 RX ADMIN — METOCLOPRAMIDE 10 MG: 5 INJECTION, SOLUTION INTRAMUSCULAR; INTRAVENOUS at 02:30

## 2020-06-12 RX ADMIN — CEFEPIME HYDROCHLORIDE 2 G: 2 INJECTION, POWDER, FOR SOLUTION INTRAVENOUS at 06:28

## 2020-06-12 RX ADMIN — METRONIDAZOLE 500 MG: 500 TABLET, FILM COATED ORAL at 00:28

## 2020-06-12 RX ADMIN — DEXTROMETHORPHAN HBR 15 MG: 15 CAPSULE, LIQUID FILLED ORAL at 18:42

## 2020-06-12 RX ADMIN — FENTANYL CITRATE 50 MCG: 50 INJECTION, SOLUTION INTRAMUSCULAR; INTRAVENOUS at 14:10

## 2020-06-12 RX ADMIN — LIDOCAINE HYDROCHLORIDE 100 MG: 20 INJECTION, SOLUTION INTRAVENOUS at 12:43

## 2020-06-12 RX ADMIN — GABAPENTIN 900 MG: 300 CAPSULE ORAL at 18:36

## 2020-06-12 RX ADMIN — GABAPENTIN 900 MG: 300 CAPSULE ORAL at 00:28

## 2020-06-12 RX ADMIN — METRONIDAZOLE 500 MG: 500 TABLET, FILM COATED ORAL at 23:06

## 2020-06-12 RX ADMIN — FAMOTIDINE 20 MG: 10 INJECTION INTRAVENOUS at 09:30

## 2020-06-12 RX ADMIN — METOCLOPRAMIDE 10 MG: 5 INJECTION, SOLUTION INTRAMUSCULAR; INTRAVENOUS at 19:49

## 2020-06-12 RX ADMIN — SODIUM CHLORIDE: 9 INJECTION, SOLUTION INTRAVENOUS at 17:44

## 2020-06-12 RX ADMIN — PROMETHAZINE HYDROCHLORIDE 12.5 MG: 25 INJECTION INTRAMUSCULAR; INTRAVENOUS at 13:01

## 2020-06-12 ASSESSMENT — PULMONARY FUNCTION TESTS
PIF_VALUE: 15
PIF_VALUE: 3
PIF_VALUE: 2
PIF_VALUE: 15
PIF_VALUE: 18
PIF_VALUE: 15
PIF_VALUE: 17
PIF_VALUE: 16
PIF_VALUE: 15
PIF_VALUE: 14
PIF_VALUE: 0
PIF_VALUE: 18
PIF_VALUE: 18
PIF_VALUE: 16
PIF_VALUE: 14
PIF_VALUE: 1
PIF_VALUE: 17
PIF_VALUE: 0
PIF_VALUE: 11
PIF_VALUE: 18
PIF_VALUE: 15
PIF_VALUE: 18
PIF_VALUE: 0
PIF_VALUE: 0
PIF_VALUE: 1
PIF_VALUE: 18
PIF_VALUE: 5
PIF_VALUE: 18
PIF_VALUE: 16
PIF_VALUE: 18
PIF_VALUE: 18
PIF_VALUE: 15
PIF_VALUE: 2
PIF_VALUE: 17
PIF_VALUE: 17
PIF_VALUE: 14
PIF_VALUE: 0
PIF_VALUE: 17
PIF_VALUE: 16
PIF_VALUE: 17
PIF_VALUE: 12
PIF_VALUE: 3
PIF_VALUE: 14
PIF_VALUE: 17
PIF_VALUE: 17
PIF_VALUE: 11
PIF_VALUE: 0
PIF_VALUE: 18
PIF_VALUE: 14
PIF_VALUE: 17
PIF_VALUE: 18
PIF_VALUE: 18
PIF_VALUE: 3
PIF_VALUE: 15
PIF_VALUE: 0
PIF_VALUE: 17
PIF_VALUE: 14
PIF_VALUE: 1
PIF_VALUE: 14

## 2020-06-12 ASSESSMENT — PAIN DESCRIPTION - LOCATION
LOCATION: ABDOMEN;HEAD
LOCATION: ABDOMEN
LOCATION: HEAD
LOCATION: ABDOMEN
LOCATION: BACK
LOCATION: ABDOMEN
LOCATION: HEAD

## 2020-06-12 ASSESSMENT — PAIN DESCRIPTION - FREQUENCY
FREQUENCY: CONTINUOUS

## 2020-06-12 ASSESSMENT — PAIN DESCRIPTION - DESCRIPTORS
DESCRIPTORS: SPASM
DESCRIPTORS: BURNING
DESCRIPTORS: SORE
DESCRIPTORS: SORE
DESCRIPTORS: DULL;THROBBING

## 2020-06-12 ASSESSMENT — PAIN DESCRIPTION - PAIN TYPE
TYPE: SURGICAL PAIN
TYPE: SURGICAL PAIN
TYPE: ACUTE PAIN
TYPE: OTHER (COMMENT)
TYPE: SURGICAL PAIN

## 2020-06-12 ASSESSMENT — PAIN SCALES - GENERAL
PAINLEVEL_OUTOF10: 7
PAINLEVEL_OUTOF10: 5
PAINLEVEL_OUTOF10: 7
PAINLEVEL_OUTOF10: 7
PAINLEVEL_OUTOF10: 6
PAINLEVEL_OUTOF10: 7
PAINLEVEL_OUTOF10: 4
PAINLEVEL_OUTOF10: 7
PAINLEVEL_OUTOF10: 7

## 2020-06-12 ASSESSMENT — PAIN DESCRIPTION - ORIENTATION
ORIENTATION: RIGHT;MID
ORIENTATION: MID;LOWER;UPPER
ORIENTATION: RIGHT;MID

## 2020-06-12 ASSESSMENT — PAIN DESCRIPTION - ONSET
ONSET: ON-GOING

## 2020-06-12 ASSESSMENT — PAIN - FUNCTIONAL ASSESSMENT: PAIN_FUNCTIONAL_ASSESSMENT: ACTIVITIES ARE NOT PREVENTED

## 2020-06-12 ASSESSMENT — PAIN DESCRIPTION - PROGRESSION: CLINICAL_PROGRESSION: NOT CHANGED
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
## 2020-06-13 LAB
ANION GAP SERPL CALCULATED.3IONS-SCNC: 10 MEQ/L (ref 8–16)
BUN BLDV-MCNC: 7 MG/DL (ref 7–22)
CALCIUM SERPL-MCNC: 8.5 MG/DL (ref 8.5–10.5)
CHLORIDE BLD-SCNC: 103 MEQ/L (ref 98–111)
CO2: 25 MEQ/L (ref 23–33)
CREAT SERPL-MCNC: 0.7 MG/DL (ref 0.4–1.2)
GFR SERPL CREATININE-BSD FRML MDRD: > 90 ML/MIN/1.73M2
GLUCOSE BLD-MCNC: 87 MG/DL (ref 70–108)
POTASSIUM SERPL-SCNC: 3.5 MEQ/L (ref 3.5–5.2)
SODIUM BLD-SCNC: 138 MEQ/L (ref 135–145)

## 2020-06-13 PROCEDURE — 94760 N-INVAS EAR/PLS OXIMETRY 1: CPT

## 2020-06-13 PROCEDURE — 6370000000 HC RX 637 (ALT 250 FOR IP): Performed by: SURGERY

## 2020-06-13 PROCEDURE — C9113 INJ PANTOPRAZOLE SODIUM, VIA: HCPCS | Performed by: SURGERY

## 2020-06-13 PROCEDURE — 36415 COLL VENOUS BLD VENIPUNCTURE: CPT

## 2020-06-13 PROCEDURE — 6360000002 HC RX W HCPCS: Performed by: SURGERY

## 2020-06-13 PROCEDURE — 2580000003 HC RX 258: Performed by: SURGERY

## 2020-06-13 PROCEDURE — 99024 POSTOP FOLLOW-UP VISIT: CPT | Performed by: SURGERY

## 2020-06-13 PROCEDURE — 80048 BASIC METABOLIC PNL TOTAL CA: CPT

## 2020-06-13 PROCEDURE — 6370000000 HC RX 637 (ALT 250 FOR IP): Performed by: NURSE PRACTITIONER

## 2020-06-13 PROCEDURE — 2500000003 HC RX 250 WO HCPCS: Performed by: SURGERY

## 2020-06-13 PROCEDURE — 1200000003 HC TELEMETRY R&B

## 2020-06-13 RX ORDER — ACETAMINOPHEN 325 MG/1
650 TABLET ORAL EVERY 4 HOURS PRN
Status: DISCONTINUED | OUTPATIENT
Start: 2020-06-13 | End: 2020-06-14 | Stop reason: HOSPADM

## 2020-06-13 RX ADMIN — FAMOTIDINE 20 MG: 10 INJECTION INTRAVENOUS at 09:46

## 2020-06-13 RX ADMIN — BACLOFEN 10 MG: 10 TABLET ORAL at 06:26

## 2020-06-13 RX ADMIN — FAMOTIDINE 20 MG: 10 INJECTION INTRAVENOUS at 20:27

## 2020-06-13 RX ADMIN — METOCLOPRAMIDE 10 MG: 5 INJECTION, SOLUTION INTRAMUSCULAR; INTRAVENOUS at 06:49

## 2020-06-13 RX ADMIN — GABAPENTIN 900 MG: 300 CAPSULE ORAL at 01:17

## 2020-06-13 RX ADMIN — METHOCARBAMOL TABLETS 500 MG: 500 TABLET, COATED ORAL at 06:26

## 2020-06-13 RX ADMIN — CEFEPIME HYDROCHLORIDE 2 G: 2 INJECTION, POWDER, FOR SOLUTION INTRAVENOUS at 06:28

## 2020-06-13 RX ADMIN — SODIUM CHLORIDE, PRESERVATIVE FREE 10 ML: 5 INJECTION INTRAVENOUS at 09:52

## 2020-06-13 RX ADMIN — GABAPENTIN 900 MG: 300 CAPSULE ORAL at 12:27

## 2020-06-13 RX ADMIN — LAMOTRIGINE 100 MG: 100 TABLET ORAL at 09:48

## 2020-06-13 RX ADMIN — METHOCARBAMOL TABLETS 500 MG: 500 TABLET, COATED ORAL at 18:35

## 2020-06-13 RX ADMIN — LAMOTRIGINE 100 MG: 100 TABLET ORAL at 20:27

## 2020-06-13 RX ADMIN — METHOCARBAMOL TABLETS 500 MG: 500 TABLET, COATED ORAL at 01:17

## 2020-06-13 RX ADMIN — CEFEPIME HYDROCHLORIDE 2 G: 2 INJECTION, POWDER, FOR SOLUTION INTRAVENOUS at 18:36

## 2020-06-13 RX ADMIN — BACLOFEN 10 MG: 10 TABLET ORAL at 18:35

## 2020-06-13 RX ADMIN — GABAPENTIN 900 MG: 300 CAPSULE ORAL at 06:26

## 2020-06-13 RX ADMIN — ENOXAPARIN SODIUM 40 MG: 40 INJECTION SUBCUTANEOUS at 06:28

## 2020-06-13 RX ADMIN — Medication 50 MG: at 09:56

## 2020-06-13 RX ADMIN — PANTOPRAZOLE SODIUM 40 MG: 40 INJECTION, POWDER, FOR SOLUTION INTRAVENOUS at 09:51

## 2020-06-13 RX ADMIN — Medication 1 TABLET: at 09:55

## 2020-06-13 RX ADMIN — Medication 1 CAPSULE: at 17:24

## 2020-06-13 RX ADMIN — QUETIAPINE FUMARATE 25 MG: 25 TABLET ORAL at 20:27

## 2020-06-13 RX ADMIN — DEXTROMETHORPHAN HBR 15 MG: 15 CAPSULE, LIQUID FILLED ORAL at 18:38

## 2020-06-13 RX ADMIN — BACLOFEN 10 MG: 10 TABLET ORAL at 12:27

## 2020-06-13 RX ADMIN — METHENAMINE HIPPURATE 1 G: 1 TABLET ORAL at 20:27

## 2020-06-13 RX ADMIN — METRONIDAZOLE 500 MG: 500 TABLET, FILM COATED ORAL at 15:21

## 2020-06-13 RX ADMIN — Medication 1 CAPSULE: at 09:54

## 2020-06-13 RX ADMIN — METRONIDAZOLE 500 MG: 500 TABLET, FILM COATED ORAL at 06:26

## 2020-06-13 RX ADMIN — ACETAMINOPHEN 650 MG: 325 TABLET ORAL at 15:19

## 2020-06-13 RX ADMIN — DEXTROMETHORPHAN HBR 15 MG: 15 CAPSULE, LIQUID FILLED ORAL at 12:32

## 2020-06-13 RX ADMIN — METHOCARBAMOL TABLETS 500 MG: 500 TABLET, COATED ORAL at 12:28

## 2020-06-13 RX ADMIN — GABAPENTIN 900 MG: 300 CAPSULE ORAL at 18:35

## 2020-06-13 RX ADMIN — METHENAMINE HIPPURATE 1 G: 1 TABLET ORAL at 09:50

## 2020-06-13 RX ADMIN — ACETAMINOPHEN 650 MG: 325 TABLET ORAL at 20:27

## 2020-06-13 RX ADMIN — Medication 1000 MCG: at 06:28

## 2020-06-13 RX ADMIN — BACLOFEN 10 MG: 10 TABLET ORAL at 01:17

## 2020-06-13 ASSESSMENT — PAIN SCALES - GENERAL
PAINLEVEL_OUTOF10: 0
PAINLEVEL_OUTOF10: 5
PAINLEVEL_OUTOF10: 5
PAINLEVEL_OUTOF10: 3

## 2020-06-13 NOTE — OP NOTE
[address] [place] [address]                                OPERATIVE REPORT    PATIENT NAME: Luli Zuniga                       :        1990  MED REC NO:   [id number]                           ROOM:       3184  ACCOUNT NO:   [de-identified]                           ADMIT DATE: 2020  PROVIDER:     Bria Morales D.O.    DATE OF PROCEDURE:  2020    PREOPERATIVE DIAGNOSIS:  Small bowel obstruction. POSTOPERATIVE DIAGNOSIS:  Small bowel obstruction. OPERATION PERFORMED:  Release of small bowel obstruction. SURGEON:  Bria Morales DO    ANESTHESIA:  General with local.    ESTIMATED BLOOD LOSS:  5 mL. SPECIMENS:  None. COMPLICATIONS:  None immediately appreciated. DISCUSSION:  The patient is a 78-year-old paraplegic patient who had a  keyhole mesh parastomal intraperitoneal hernia repair performed and  subsequently presented to the hospital with small bowel obstruction. He  had failed medical management, and after history and physical  examination performed, potential diagnostic and therapeutic modalities  were discussed with the patient. Operative and nonoperative management  was discussed. Risks, complications, and benefits were reviewed. He  was given the opportunity to ask questions. Once answered, informed  consent was obtained. The patient was brought to the operating room on  2020 for procedure. OPERATIVE FINDINGS:  Using a lateral parastomal approach, the patient  had an old hematoma/seroma present to the lateral portion of his ostomy,  creating a bulge present within this area. The mesh placement was  adequate.   The fascial defect was closed down to 2 cm which is what it  was intraoperatively; however, loop of small bowel had snug between two  absorbable tacks on the inferior portion of the mesh and was located  between the mesh and the anterior abdominal wall causing

## 2020-06-13 NOTE — PLAN OF CARE
Problem: Pain:  Goal: Pain level will decrease  Description: Pain level will decrease  Outcome: Met This Shift  Patient states pain relief from PRN pain medications. Pain reassessed one hour post PRN pain medication given. Patient rates pain 3 on SHERI 0-10 scale. Problem: Falls - Risk of:  Goal: Will remain free from falls  Description: Will remain free from falls  Outcome: Met This Shift  Fall assessment completed. Patient using call light appropriately to call for assistance with ambulation to bathroom. Personal items within reach. Patient is also compliant with use of non-skid slippers. Problem: Bowel/Gastric:  Goal: Control of bowel function will improve  Description: Control of bowel function will improve  Outcome: Met This Shift     Patient has  chronic colostomy present. Stool green watery. Problem: Nutrition Deficit:  Goal: Ability to achieve adequate nutritional intake will improve  Description: Ability to achieve adequate nutritional intake will improve  Outcome: Met This Shift  Patients appetite good. Continuing to encourage fluids. Problem: Discharge Planning:  Goal: Discharged to appropriate level of care  Description: Discharged to appropriate level of care  Outcome: Ongoing  Discharge plan is in process. Plan discharge home with home health. Problem: Physical Regulation:  Goal: Prevent transmision of infection  Description: Prevent transmision of infection  Outcome: Ongoing   VS- WNL. Problem: Skin Integrity:  Goal: Absence of new skin breakdown  Description: Absence of new skin breakdown  Outcome: Ongoing  No skin breakdown this shift. Patient being assisted with turning. Patients states understanding of repositioning every two hours. Care plan reviewed with patient and family. Patient and family verbalize understanding of the plan of care and contribute to goal setting.
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
## 2020-06-14 VITALS
BODY MASS INDEX: 28.09 KG/M2 | TEMPERATURE: 98.3 F | SYSTOLIC BLOOD PRESSURE: 112 MMHG | HEART RATE: 105 BPM | HEIGHT: 71 IN | OXYGEN SATURATION: 95 % | RESPIRATION RATE: 16 BRPM | WEIGHT: 200.62 LBS | DIASTOLIC BLOOD PRESSURE: 56 MMHG

## 2020-06-14 PROCEDURE — 2500000003 HC RX 250 WO HCPCS: Performed by: SURGERY

## 2020-06-14 PROCEDURE — 6360000002 HC RX W HCPCS: Performed by: SURGERY

## 2020-06-14 PROCEDURE — 6370000000 HC RX 637 (ALT 250 FOR IP): Performed by: SURGERY

## 2020-06-14 PROCEDURE — C9113 INJ PANTOPRAZOLE SODIUM, VIA: HCPCS | Performed by: SURGERY

## 2020-06-14 PROCEDURE — 2580000003 HC RX 258: Performed by: SURGERY

## 2020-06-14 PROCEDURE — 99024 POSTOP FOLLOW-UP VISIT: CPT | Performed by: SURGERY

## 2020-06-14 RX ORDER — HYDROCODONE BITARTRATE AND ACETAMINOPHEN 5; 325 MG/1; MG/1
1 TABLET ORAL EVERY 4 HOURS PRN
Qty: 40 TABLET | Refills: 0 | Status: SHIPPED | OUTPATIENT
Start: 2020-06-14 | End: 2020-06-19 | Stop reason: ALTCHOICE

## 2020-06-14 RX ADMIN — METRONIDAZOLE 500 MG: 500 TABLET, FILM COATED ORAL at 06:16

## 2020-06-14 RX ADMIN — SODIUM CHLORIDE, PRESERVATIVE FREE 10 ML: 5 INJECTION INTRAVENOUS at 10:17

## 2020-06-14 RX ADMIN — METHOCARBAMOL TABLETS 500 MG: 500 TABLET, COATED ORAL at 12:13

## 2020-06-14 RX ADMIN — METRONIDAZOLE 500 MG: 500 TABLET, FILM COATED ORAL at 01:12

## 2020-06-14 RX ADMIN — DEXTROMETHORPHAN HBR 15 MG: 15 CAPSULE, LIQUID FILLED ORAL at 06:20

## 2020-06-14 RX ADMIN — PANTOPRAZOLE SODIUM 40 MG: 40 INJECTION, POWDER, FOR SOLUTION INTRAVENOUS at 09:16

## 2020-06-14 RX ADMIN — METHOCARBAMOL TABLETS 500 MG: 500 TABLET, COATED ORAL at 01:13

## 2020-06-14 RX ADMIN — METHOCARBAMOL TABLETS 500 MG: 500 TABLET, COATED ORAL at 06:16

## 2020-06-14 RX ADMIN — Medication 1 CAPSULE: at 09:11

## 2020-06-14 RX ADMIN — FAMOTIDINE 20 MG: 10 INJECTION INTRAVENOUS at 09:10

## 2020-06-14 RX ADMIN — BACLOFEN 10 MG: 10 TABLET ORAL at 01:13

## 2020-06-14 RX ADMIN — Medication 1000 MCG: at 06:17

## 2020-06-14 RX ADMIN — BACLOFEN 10 MG: 10 TABLET ORAL at 12:12

## 2020-06-14 RX ADMIN — CEFEPIME HYDROCHLORIDE 2 G: 2 INJECTION, POWDER, FOR SOLUTION INTRAVENOUS at 06:16

## 2020-06-14 RX ADMIN — METHENAMINE HIPPURATE 1 G: 1 TABLET ORAL at 09:13

## 2020-06-14 RX ADMIN — ENOXAPARIN SODIUM 40 MG: 40 INJECTION SUBCUTANEOUS at 06:16

## 2020-06-14 RX ADMIN — GABAPENTIN 900 MG: 300 CAPSULE ORAL at 06:16

## 2020-06-14 RX ADMIN — Medication 50 MG: at 09:15

## 2020-06-14 RX ADMIN — BACLOFEN 10 MG: 10 TABLET ORAL at 06:16

## 2020-06-14 RX ADMIN — SODIUM CHLORIDE, PRESERVATIVE FREE 10 ML: 5 INJECTION INTRAVENOUS at 09:15

## 2020-06-14 RX ADMIN — Medication 1 TABLET: at 09:14

## 2020-06-14 RX ADMIN — GABAPENTIN 900 MG: 300 CAPSULE ORAL at 12:12

## 2020-06-14 RX ADMIN — GABAPENTIN 900 MG: 300 CAPSULE ORAL at 01:13

## 2020-06-14 RX ADMIN — LAMOTRIGINE 100 MG: 100 TABLET ORAL at 09:13

## 2020-06-14 NOTE — PROGRESS NOTES
inherent in voice recognition technology. **      Final report electronically signed by Dr. Joao Reid on 6/7/2020 4:49 AM      CT ABDOMEN PELVIS W IV CONTRAST Additional Contrast? Oral (4 doses)   Final Result   . 1. Left lower quadrant ostomy site with current study demonstrating a parastomal herniation of bowel that has become distended and has the appearance of a large fluid collection with air foci. There is multiple fluid distended segments of small bowel in    the abdomen changes of developing small bowel obstruction or suspected   2. Stable appearance of marked atrophy of left native kidney   **This report has been created using voice recognition software. It may contain minor errors which are inherent in voice recognition technology. **      Final report electronically signed by Dr. Joao Reid on 6/7/2020 1:24 AM            Electronically signed by Estrella Yang DO on 6/14/2020 at 12:28 PM

## 2020-06-14 NOTE — PLAN OF CARE
Problem: Pain:  Goal: Pain level will decrease  Description: Pain level will decrease  6/14/2020 0401 by Effie Bedolla RN  Outcome: Ongoing  Note: Patient denies pain this shift. Rest and repositioning as needed. Problem: Discharge Planning:  Goal: Discharged to appropriate level of care  Description: Discharged to appropriate level of care  6/14/2020 0401 by Effie Bedolla RN  Outcome: Ongoing  Note: Discharge plans to go home with mother and [place] discussed with patient. Problem: Bowel/Gastric:  Goal: Control of bowel function will improve  Description: Control of bowel function will improve  6/14/2020 0401 by Effie Bedolla RN  Outcome: Ongoing  Note: Patient has ostomy that has adequate green output. Problem: Physical Regulation:  Goal: Prevent transmision of infection  Description: Prevent transmision of infection  6/14/2020 0401 by Effie Bedolla RN  Outcome: Ongoing  Note: Patient in contact precautions. Problem: Skin Integrity:  Goal: Absence of new skin breakdown  Description: Absence of new skin breakdown  6/14/2020 0401 by Effie Bedolla RN  Outcome: Ongoing  Note: No new areas of skin breakdown. Dressing changes on unstageable wound done this shift per order. Problem: Nutrition Deficit:  Goal: Ability to achieve adequate nutritional intake will improve  Description: Ability to achieve adequate nutritional intake will improve  6/14/2020 0401 by Effie Bedolla RN  Outcome: Ongoing  Note: Patient on low fiber diet. Tolerating well. Problem: Falls - Risk of:  Goal: Will remain free from falls  Description: Will remain free from falls  6/14/2020 0401 by Effie Bedolla RN  Outcome: Ongoing  Note: Patient free from falls this shift. Patient uses call light appropriately, bed in lowest position, nonskid socks on, bed rails up x2, fall sign posted and call light in reach. Patient at risk due to paraplegia. Care plan reviewed with patient.  Patient verbalize understanding of the plan of care and contribute to goal setting.

## 2020-06-15 NOTE — CARE COORDINATION
6/12/20, 11:11 AM EDT    DISCHARGE ON GOING EVALUATION    [facility] day: 5  Location: [facility]27/027-A Reason for admit: Small bowel obstruction ([facility] [F50.295]   Procedure: OR today, exploratory Laparotomy. Treatment Plan of Care: NPO, colostomy care, suprapubic catheter care, Maxipime, IV Pepcid, IV Protonix, Lovenox on hold, oral Flagyl, IV Cipro and IV Flagyl pre-op, prn Dilaudid, Morphine, Ativan, Reglan, Zofran, and Phenergan, NPO, wound care, daily weights, electrolyte replacement protocol, telemetry, up as tolerated. Barriers to Discharge: Medical stability. PCP: Trent Mehta DO  Readmission Risk Score: 20%  Patient Goals/Plan/Treatment Preferences: Home residing with his mother, and resuming services through [address]
6/15/20, 8:44 AM EDT    DISCHARGE PLANNING EVALUATION    Call to [facility] ([place])-they were aware of patient's discharge and were already out to see him. No other needs. 6/15/20, 9:00 AM EDT    Patient goals/plan/ treatment preferences discussed by  and . Patient goals/plan/ treatment preferences reviewed with patient/ family. Patient/ family verbalize understanding of discharge plan and are in agreement with goal/plan/treatment preferences. Understanding was demonstrated using the teach back method. AVS provided by RN at time of discharge, which includes all necessary medical information pertaining to the patients current course of illness, treatment, post-discharge goals of care, and treatment preferences.     Services After Discharge  Services At/After Discharge: Aide Services, Nursing Services([facility])   IMM Letter  IMM Letter given to Patient/Family/Significant other/Guardian/POA/by[de-identified] copy delivered to patient by [place]  IMM Letter date given[de-identified] 06/12/20  IMM Letter time given[de-identified] [phone] [id number]
AM
potassium replacement protocol   Other RX Placeholder    calcium replacement protocol   Other RX Placeholder    magnesium replacement protocol   Other RX Placeholder    methenamine  1 g Oral BID    baclofen  10 mg Oral 4x Daily    multivitamin  1 tablet Oral Daily    lactobacillus  1 capsule Oral BID WC    zinc sulfate  50 mg Oral Daily    Biotin  1,000 mcg Oral Daily    sodium chloride flush  10 mL Intravenous 2 times per day    pantoprazole  40 mg Intravenous Daily    And    sodium chloride (PF)  10 mL Intravenous Daily    meropenem  1 g Intravenous Q8H    lamoTRIgine  100 mg Oral BID    methocarbamol  500 mg Oral 4x Daily    QUEtiapine  25 mg Oral Nightly    gabapentin  900 mg Oral 4x Daily    famotidine (PEPCID) injection  20 mg Intravenous BID     Continuous Infusions:   sodium chloride 125 mL/hr at 06/08/20 0857      Pertinent Info/Orders/Treatment Plan:  IV fluids, Merrem, IV Pepcid, IV Protonix, prn Dilaudid, Ativan, Morphine, Zofran and Phenergan, Potassium and Magnesium replacement protocol, repeat x-ray of abdomen, wound care with normal saline and AMD ribbon, daily weights, incentive spirometry, colostomy care, telemetry, up as tolerated. Diet: DIET CLEAR LIQUID;   Smoking status:  reports that he quit smoking about 4 years ago. His smoking use included cigarettes. He smoked 1.00 pack per day. He has quit using smokeless tobacco.  His smokeless tobacco use included chew.    PCP: Jose Luis Perez DO  Readmission 30 days or less: No  Readmission Risk Score: 15%    Discharge Planning Evaluation  Current Residence:  Private Residence  Living Arrangements:  Parent   Support Systems:  Parent, Friends/Neighbors, Family Members  Current Services PTA:     Potential Assistance Needed:  Outpatient IV, Home Care  Potential Assistance Purchasing Medications:  No  Does patient want to participate in local refill/ meds to beds program?  Yes  Type of Home Care Services:  [place], Nursing Services,

## 2020-06-19 ENCOUNTER — OFFICE VISIT (OUTPATIENT)
Dept: SURGERY | Age: 30
End: 2020-06-19

## 2020-06-19 VITALS
OXYGEN SATURATION: 98 % | TEMPERATURE: 98.2 F | SYSTOLIC BLOOD PRESSURE: 124 MMHG | WEIGHT: 200.62 LBS | HEIGHT: 71 IN | HEART RATE: 122 BPM | RESPIRATION RATE: 18 BRPM | DIASTOLIC BLOOD PRESSURE: 80 MMHG | BODY MASS INDEX: 28.09 KG/M2

## 2020-06-19 PROCEDURE — 99024 POSTOP FOLLOW-UP VISIT: CPT | Performed by: SURGERY

## 2020-06-21 LAB
FUNGUS IDENTIFIED: NORMAL
FUNGUS SMEAR: NORMAL

## 2020-06-26 ENCOUNTER — OFFICE VISIT (OUTPATIENT)
Dept: SURGERY | Age: 30
End: 2020-06-26

## 2020-06-26 VITALS
HEIGHT: 71 IN | HEART RATE: 111 BPM | OXYGEN SATURATION: 98 % | WEIGHT: 200.62 LBS | SYSTOLIC BLOOD PRESSURE: 121 MMHG | BODY MASS INDEX: 28.09 KG/M2 | TEMPERATURE: 98.9 F | DIASTOLIC BLOOD PRESSURE: 68 MMHG | RESPIRATION RATE: 15 BRPM

## 2020-06-26 PROCEDURE — 99024 POSTOP FOLLOW-UP VISIT: CPT | Performed by: SURGERY

## 2020-06-26 NOTE — LETTER
Vika Feliz DO  [address] [facility]. [address]  [place] [address]  [phone]     Pt Name: Enrrique Plunkett,#102 Record Number: [id number]  Date of Birth 1990   Today's Date: 6/26/2020    Santo Lefort was seen in consultation in the office today. My assessment and plans are listed below. ASSESSMENT      Diagnosis Orders   1. Parastomal hernia without obstruction or gangrene      S/P peristomal hernia repair 6/3/20, release SBO 6/12/20   Incision is clean, dry and intact  PLANS:   Pathology reviewed with the patient who understands. All questions were answered. Superficial dehiscence of lateral stoma wound  Local wound care  Follow up: Return in about 1 week (around 7/3/2020). If I can provide any additional assistance or you have any concerns, please feel free to contact me. Thank you for allowing to participate in the care of your patients. Sincerely,      Amanda Brown

## 2020-07-06 LAB — AFB CULTURE & SMEAR: NORMAL
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
## 2020-07-07 ENCOUNTER — OFFICE VISIT (OUTPATIENT)
Dept: SURGERY | Age: 30
End: 2020-07-07

## 2020-07-07 VITALS
SYSTOLIC BLOOD PRESSURE: 124 MMHG | BODY MASS INDEX: 28.09 KG/M2 | RESPIRATION RATE: 18 BRPM | HEIGHT: 71 IN | TEMPERATURE: 96.8 F | OXYGEN SATURATION: 98 % | HEART RATE: 104 BPM | DIASTOLIC BLOOD PRESSURE: 80 MMHG | WEIGHT: 200.62 LBS

## 2020-07-07 PROCEDURE — 99024 POSTOP FOLLOW-UP VISIT: CPT | Performed by: SURGERY

## 2020-07-07 NOTE — LETTER
[facility],   [address] [facility]. [address]  [place] [address]  [phone]     Pt Name: Enrrique [address] Record Number: [id number]  Date of Birth 1990   Today's Date: 7/7/2020    Fabrizio Herr was evaluated in the office today. My assessment and plans are listed below. ASSESSMENT      Diagnosis Orders   1. Parastomal hernia without obstruction or gangrene      S/P peristomal hernia repair 6/3/20, release of SBO 6/12/20   Incision is clean, dry and intact  PLANS:   Pathology reviewed with the patient who understands. All questions were answered. Superficial dehiscence of lateral stoma wound  Local wound care  Follow up: No follow-ups on file. If I can provide any additional assistance or you have any concerns, please feel free to contact me. Thank you for allowing to participate in the care of your patients. Sincerely,      Regino Esteban.  Amanda Morales

## 2020-07-08 NOTE — DISCHARGE SUMMARY
having ostomy output. Reglan added to regimen. 6/11/20 Pt failed advancing diet, scheduled for surgery 6/12 6/12/20 taken to OR, has seroma in old hernia sac and SBO which was released. 6/13/20 Tolerating fulls having ostomy output, diet advanced. At time of discharge 6/14/20, Bharat Dobson was tolerating a regular diet, having bowel movements,ambulating on his own accord and had adequate analgesia on oral pain medications. PHYSICAL EXAMINATION   Discharge Vitals:  height is 5' 11\" (1.803 m) and weight is 200 lb 9.9 oz (91 kg). His oral temperature is 98.3 °F (36.8 °C). His blood pressure is 112/56 (abnormal) and his pulse is 105. His respiration is 16 and oxygen saturation is 95%. General appearance - alert, well appearing, and in no distress  Chest - clear to ausculation  Heart - normal rate and regular rhythm  Abdomen - soft, incisional tenderness only, bowel sounds present  Neurological - motor and sensory grossly normal bilaterally  Musculoskeletal - full range of motion without pain  Extremities - peripheral pulses normal, no pedal edema, no clubbing or cyanosis  Incision: healing well, no drainage  LABS     Recent Labs     06/13/20  0640  06/09/20  2315   WBC  --   --  8.4   HGB  --   --  12.3*   HCT  --   --  39.7*   PLT  --   --  263      < > 137   K 3.5   < > 3.6      < > 101   CO2 25   < > 26   BUN 7   < > 7   CREATININE 0.7   < > 0.7    < > = values in this interval not displayed.      DISCHARGE INSTRUCTIONS   Discharge Medications:      Medication List      CONTINUE taking these medications    baclofen 10 MG tablet  Commonly known as:  LIORESAL     gabapentin 300 MG capsule  Commonly known as:  NEURONTIN     lamoTRIgine 100 MG tablet  Commonly known as:  LAMICTAL  take 1 tablet by mouth twice a day     methenamine 1 g tablet  Commonly known as:  HIPREX     methocarbamol 500 MG tablet  Commonly known as:  ROBAXIN     omeprazole 20 MG delayed release capsule  Commonly known as:  PRILOSEC
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
## 2020-07-28 ENCOUNTER — OFFICE VISIT (OUTPATIENT)
Dept: SURGERY | Age: 30
End: 2020-07-28

## 2020-07-28 VITALS
DIASTOLIC BLOOD PRESSURE: 76 MMHG | SYSTOLIC BLOOD PRESSURE: 118 MMHG | HEIGHT: 70 IN | TEMPERATURE: 97.6 F | RESPIRATION RATE: 16 BRPM | BODY MASS INDEX: 28.63 KG/M2 | OXYGEN SATURATION: 98 % | HEART RATE: 101 BPM | WEIGHT: 200 LBS

## 2020-07-28 PROCEDURE — 99024 POSTOP FOLLOW-UP VISIT: CPT | Performed by: SURGERY

## 2020-08-19 ENCOUNTER — HOSPITAL ENCOUNTER (OUTPATIENT)
Age: 30
Discharge: HOME | End: 2020-08-19
Payer: MEDICARE

## 2020-08-19 PROCEDURE — 87186 SC STD MICRODIL/AGAR DIL: CPT

## 2020-08-19 PROCEDURE — 87077 CULTURE AEROBIC IDENTIFY: CPT

## 2020-08-19 PROCEDURE — 81001 URINALYSIS AUTO W/SCOPE: CPT

## 2020-08-19 PROCEDURE — 87086 URINE CULTURE/COLONY COUNT: CPT

## 2020-08-20 LAB
BACTERIA: ABNORMAL /HPF
BILIRUBIN URINE: NEGATIVE
BLOOD, URINE: ABNORMAL
CASTS 2: ABNORMAL /LPF
CASTS UA: ABNORMAL /LPF
CHARACTER, URINE: ABNORMAL
COLOR: YELLOW
CRYSTALS, UA: ABNORMAL
EPITHELIAL CELLS, UA: ABNORMAL /HPF
GLUCOSE URINE: NEGATIVE MG/DL
KETONES, URINE: ABNORMAL
LEUKOCYTE ESTERASE, URINE: ABNORMAL
MISCELLANEOUS 2: ABNORMAL
MISCELLANEOUS LAB TEST RESULT: ABNORMAL
NITRITE, URINE: POSITIVE
PH UA: >= 9 (ref 5–9)
PROTEIN UA: 100
RBC URINE: ABNORMAL /HPF
RENAL EPITHELIAL, UA: ABNORMAL
SPECIFIC GRAVITY, URINE: 1.02 (ref 1–1.03)
UROBILINOGEN, URINE: 0.2 EU/DL (ref 0–1)
WBC UA: ABNORMAL /HPF
YEAST: ABNORMAL

## 2020-08-23 LAB
ORGANISM: ABNORMAL
URINE CULTURE REFLEX: ABNORMAL
URINE CULTURE REFLEX: ABNORMAL

## 2020-08-25 ENCOUNTER — HOSPITAL ENCOUNTER (OUTPATIENT)
Dept: MRI IMAGING | Age: 30
Discharge: HOME | End: 2020-08-25
Payer: MEDICARE

## 2020-08-25 ENCOUNTER — OFFICE VISIT (OUTPATIENT)
Dept: CARDIOLOGY CLINIC | Age: 30
End: 2020-08-25
Payer: MEDICARE

## 2020-08-25 VITALS
DIASTOLIC BLOOD PRESSURE: 78 MMHG | HEIGHT: 71 IN | SYSTOLIC BLOOD PRESSURE: 118 MMHG | BODY MASS INDEX: 25.2 KG/M2 | HEART RATE: 92 BPM | WEIGHT: 180 LBS

## 2020-08-25 PROCEDURE — G8427 DOCREV CUR MEDS BY ELIG CLIN: HCPCS | Performed by: NUCLEAR MEDICINE

## 2020-08-25 PROCEDURE — 6360000004 HC RX CONTRAST MEDICATION: Performed by: NURSE PRACTITIONER

## 2020-08-25 PROCEDURE — A9579 GAD-BASE MR CONTRAST NOS,1ML: HCPCS | Performed by: NURSE PRACTITIONER

## 2020-08-25 PROCEDURE — 99203 OFFICE O/P NEW LOW 30 MIN: CPT | Performed by: NUCLEAR MEDICINE

## 2020-08-25 PROCEDURE — 1036F TOBACCO NON-USER: CPT | Performed by: NUCLEAR MEDICINE

## 2020-08-25 PROCEDURE — G8419 CALC BMI OUT NRM PARAM NOF/U: HCPCS | Performed by: NUCLEAR MEDICINE

## 2020-08-25 PROCEDURE — 72197 MRI PELVIS W/O & W/DYE: CPT

## 2020-08-25 RX ORDER — METOPROLOL SUCCINATE 25 MG/1
25 TABLET, EXTENDED RELEASE ORAL DAILY
COMMUNITY

## 2020-08-25 RX ADMIN — GADOTERIDOL 15 ML: 279.3 INJECTION, SOLUTION INTRAVENOUS at 07:50

## 2020-08-25 ASSESSMENT — ENCOUNTER SYMPTOMS
ANAL BLEEDING: 0
RECTAL PAIN: 0
ABDOMINAL PAIN: 0
ABDOMINAL DISTENTION: 0
DIARRHEA: 0
NAUSEA: 0
BACK PAIN: 1
CONSTIPATION: 0
CHEST TIGHTNESS: 0
VOMITING: 0
BLOOD IN STOOL: 0
SHORTNESS OF BREATH: 0

## 2020-08-25 NOTE — PROGRESS NOTES
Establish Cardiologist, previously seen for tachycardia. Patient was having headache and sweats with elevated BP, was running 160-190's/70-90's, better since on Toprol.
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
## 2020-08-29 ENCOUNTER — HOSPITAL ENCOUNTER (OUTPATIENT)
Age: 30
Setting detail: SPECIMEN
Discharge: HOME | End: 2020-08-29
Payer: MEDICARE

## 2020-08-29 PROCEDURE — 87186 SC STD MICRODIL/AGAR DIL: CPT

## 2020-08-29 PROCEDURE — 87086 URINE CULTURE/COLONY COUNT: CPT

## 2020-08-29 PROCEDURE — 87077 CULTURE AEROBIC IDENTIFY: CPT

## 2020-08-31 LAB
ORGANISM: ABNORMAL
URINE CULTURE, ROUTINE: ABNORMAL

## 2020-09-15 ENCOUNTER — HOSPITAL ENCOUNTER (OUTPATIENT)
Age: 30
Discharge: HOME | End: 2020-09-15
Payer: MEDICARE

## 2020-09-15 PROCEDURE — 87086 URINE CULTURE/COLONY COUNT: CPT

## 2020-09-15 PROCEDURE — 87186 SC STD MICRODIL/AGAR DIL: CPT

## 2020-09-15 PROCEDURE — 87077 CULTURE AEROBIC IDENTIFY: CPT

## 2020-09-15 PROCEDURE — 87106 FUNGI IDENTIFICATION YEAST: CPT

## 2020-09-15 PROCEDURE — 81001 URINALYSIS AUTO W/SCOPE: CPT

## 2020-09-16 LAB
BACTERIA: ABNORMAL /HPF
BILIRUBIN URINE: NEGATIVE
BLOOD, URINE: NEGATIVE
CASTS 2: ABNORMAL /LPF
CASTS UA: ABNORMAL /LPF
CHARACTER, URINE: ABNORMAL
COLOR: YELLOW
CRYSTALS, UA: ABNORMAL
EPITHELIAL CELLS, UA: ABNORMAL /HPF
GLUCOSE URINE: NEGATIVE MG/DL
KETONES, URINE: NEGATIVE
LEUKOCYTE ESTERASE, URINE: ABNORMAL
MISCELLANEOUS 2: ABNORMAL
MISCELLANEOUS LAB TEST RESULT: ABNORMAL
NITRITE, URINE: NEGATIVE
PH UA: 6 (ref 5–9)
PROTEIN UA: NEGATIVE
RBC URINE: ABNORMAL /HPF
RENAL EPITHELIAL, UA: ABNORMAL
SPECIFIC GRAVITY, URINE: 1.02 (ref 1–1.03)
UROBILINOGEN, URINE: 0.2 EU/DL (ref 0–1)
WBC UA: ABNORMAL /HPF
YEAST: ABNORMAL

## 2020-12-31 ENCOUNTER — HOSPITAL ENCOUNTER (OUTPATIENT)
Age: 30
Discharge: HOME | End: 2020-12-31
Payer: MEDICARE

## 2020-12-31 LAB
BACTERIA: ABNORMAL /HPF
BILIRUBIN URINE: NEGATIVE
BLOOD, URINE: NEGATIVE
CASTS 2: ABNORMAL /LPF
CASTS UA: ABNORMAL /LPF
CHARACTER, URINE: ABNORMAL
COLOR: YELLOW
CRYSTALS, UA: ABNORMAL
EPITHELIAL CELLS, UA: ABNORMAL /HPF
GLUCOSE URINE: NEGATIVE MG/DL
KETONES, URINE: NEGATIVE
LEUKOCYTE ESTERASE, URINE: ABNORMAL
MISCELLANEOUS 2: ABNORMAL
NITRITE, URINE: POSITIVE
PH UA: 6 (ref 5–9)
PROTEIN UA: NEGATIVE
RBC URINE: ABNORMAL /HPF
RENAL EPITHELIAL, UA: ABNORMAL
SPECIFIC GRAVITY, URINE: 1.02 (ref 1–1.03)
UROBILINOGEN, URINE: 0.2 EU/DL (ref 0–1)
WBC UA: ABNORMAL /HPF
YEAST: ABNORMAL

## 2020-12-31 PROCEDURE — 87077 CULTURE AEROBIC IDENTIFY: CPT

## 2020-12-31 PROCEDURE — U0003 INFECTIOUS AGENT DETECTION BY NUCLEIC ACID (DNA OR RNA); SEVERE ACUTE RESPIRATORY SYNDROME CORONAVIRUS 2 (SARS-COV-2) (CORONAVIRUS DISEASE [COVID-19]), AMPLIFIED PROBE TECHNIQUE, MAKING USE OF HIGH THROUGHPUT TECHNOLOGIES AS DESCRIBED BY CMS-2020-01-R: HCPCS

## 2020-12-31 PROCEDURE — 87086 URINE CULTURE/COLONY COUNT: CPT

## 2020-12-31 PROCEDURE — 81001 URINALYSIS AUTO W/SCOPE: CPT

## 2020-12-31 PROCEDURE — 87186 SC STD MICRODIL/AGAR DIL: CPT

## 2020-12-31 NOTE — ED NOTES
covid swab obtained and sent to lab. Tolerated without complaint. Ua obtained. Pt  Detatched  Apple and  Drained  Into collection cup. Cloudy yellow with strong odor noted. Apple connections cleaned with alcohol swab by this nurse and pt reattached tubing. Left per self by wheelchair. Pt notified to call the office today  In about an hour for ua results and  Culture will be done  Monday or Tuesday.      Bhanu Garner LPN  56/27/56 [id number]

## 2021-01-01 ENCOUNTER — APPOINTMENT (OUTPATIENT)
Dept: CT IMAGING | Age: 31
End: 2021-01-01
Payer: MEDICARE

## 2021-01-01 ENCOUNTER — APPOINTMENT (OUTPATIENT)
Dept: GENERAL RADIOLOGY | Age: 31
End: 2021-01-01
Payer: MEDICARE

## 2021-01-01 ENCOUNTER — HOSPITAL ENCOUNTER (EMERGENCY)
Age: 31
Discharge: HOME | End: 2021-01-01
Attending: EMERGENCY MEDICINE
Payer: MEDICARE

## 2021-01-01 VITALS
HEART RATE: 100 BPM | TEMPERATURE: 98.9 F | WEIGHT: 185 LBS | OXYGEN SATURATION: 98 % | BODY MASS INDEX: 25.9 KG/M2 | SYSTOLIC BLOOD PRESSURE: 118 MMHG | RESPIRATION RATE: 18 BRPM | DIASTOLIC BLOOD PRESSURE: 63 MMHG | HEIGHT: 71 IN

## 2021-01-01 DIAGNOSIS — R11.0 NAUSEA: ICD-10-CM

## 2021-01-01 DIAGNOSIS — E86.0 DEHYDRATION: ICD-10-CM

## 2021-01-01 DIAGNOSIS — N30.00 ACUTE CYSTITIS WITHOUT HEMATURIA: Primary | ICD-10-CM

## 2021-01-01 LAB
ALBUMIN SERPL-MCNC: 4.5 G/DL (ref 3.5–5.1)
ALP BLD-CCNC: 150 U/L (ref 38–126)
ALT SERPL-CCNC: 21 U/L (ref 11–66)
ANION GAP SERPL CALCULATED.3IONS-SCNC: 15 MEQ/L (ref 8–16)
AST SERPL-CCNC: 16 U/L (ref 5–40)
BACTERIA: ABNORMAL /HPF
BASOPHILS # BLD: 0.4 %
BASOPHILS ABSOLUTE: 0.1 THOU/MM3 (ref 0–0.1)
BILIRUB SERPL-MCNC: 0.4 MG/DL (ref 0.3–1.2)
BILIRUBIN DIRECT: < 0.2 MG/DL (ref 0–0.3)
BILIRUBIN URINE: NEGATIVE
BLOOD, URINE: NEGATIVE
BUN BLDV-MCNC: 14 MG/DL (ref 7–22)
CALCIUM SERPL-MCNC: 10.2 MG/DL (ref 8.5–10.5)
CASTS 2: ABNORMAL /LPF
CASTS UA: ABNORMAL /LPF
CHARACTER, URINE: ABNORMAL
CHLORIDE BLD-SCNC: 93 MEQ/L (ref 98–111)
CO2: 27 MEQ/L (ref 23–33)
COLOR: YELLOW
CREAT SERPL-MCNC: 0.8 MG/DL (ref 0.4–1.2)
CRYSTALS, UA: ABNORMAL
EOSINOPHIL # BLD: 0.2 %
EOSINOPHILS ABSOLUTE: 0 THOU/MM3 (ref 0–0.4)
EPITHELIAL CELLS, UA: ABNORMAL /HPF
ERYTHROCYTE [DISTWIDTH] IN BLOOD BY AUTOMATED COUNT: 15.1 % (ref 11.5–14.5)
ERYTHROCYTE [DISTWIDTH] IN BLOOD BY AUTOMATED COUNT: 46.1 FL (ref 35–45)
GFR SERPL CREATININE-BSD FRML MDRD: > 90 ML/MIN/1.73M2
GLUCOSE BLD-MCNC: 77 MG/DL (ref 70–108)
GLUCOSE URINE: NEGATIVE MG/DL
HCT VFR BLD CALC: 49.9 % (ref 42–52)
HEMOGLOBIN: 15.9 GM/DL (ref 14–18)
IMMATURE GRANS (ABS): 0.13 THOU/MM3 (ref 0–0.07)
IMMATURE GRANULOCYTES: 0.9 %
KETONES, URINE: NEGATIVE
LACTIC ACID, SEPSIS: 1.1 MMOL/L (ref 0.5–1.9)
LEUKOCYTE ESTERASE, URINE: ABNORMAL
LYMPHOCYTES # BLD: 8.8 %
LYMPHOCYTES ABSOLUTE: 1.2 THOU/MM3 (ref 1–4.8)
MCH RBC QN AUTO: 27.1 PG (ref 26–33)
MCHC RBC AUTO-ENTMCNC: 31.9 GM/DL (ref 32.2–35.5)
MCV RBC AUTO: 85.2 FL (ref 80–94)
MISCELLANEOUS 2: ABNORMAL
MONOCYTES # BLD: 7.1 %
MONOCYTES ABSOLUTE: 1 THOU/MM3 (ref 0.4–1.3)
NITRITE, URINE: NEGATIVE
NUCLEATED RED BLOOD CELLS: 0 /100 WBC
OSMOLALITY CALCULATION: 269.4 MOSMOL/KG (ref 275–300)
PH UA: 5.5 (ref 5–9)
PLATELET # BLD: 349 THOU/MM3 (ref 130–400)
PMV BLD AUTO: 10.2 FL (ref 9.4–12.4)
POTASSIUM REFLEX MAGNESIUM: 4 MEQ/L (ref 3.5–5.2)
PROTEIN UA: ABNORMAL
RBC # BLD: 5.86 MILL/MM3 (ref 4.7–6.1)
RBC URINE: ABNORMAL /HPF
RENAL EPITHELIAL, UA: ABNORMAL
SEG NEUTROPHILS: 82.6 %
SEGMENTED NEUTROPHILS ABSOLUTE COUNT: 11.7 THOU/MM3 (ref 1.8–7.7)
SODIUM BLD-SCNC: 135 MEQ/L (ref 135–145)
SPECIFIC GRAVITY, URINE: 1.02 (ref 1–1.03)
TOTAL PROTEIN: 9.5 G/DL (ref 6.1–8)
UROBILINOGEN, URINE: 0.2 EU/DL (ref 0–1)
WBC # BLD: 14.2 THOU/MM3 (ref 4.8–10.8)
WBC UA: > 100 /HPF
YEAST: ABNORMAL

## 2021-01-01 PROCEDURE — 81001 URINALYSIS AUTO W/SCOPE: CPT

## 2021-01-01 PROCEDURE — 74176 CT ABD & PELVIS W/O CONTRAST: CPT

## 2021-01-01 PROCEDURE — 80076 HEPATIC FUNCTION PANEL: CPT

## 2021-01-01 PROCEDURE — 87077 CULTURE AEROBIC IDENTIFY: CPT

## 2021-01-01 PROCEDURE — 2580000003 HC RX 258: Performed by: EMERGENCY MEDICINE

## 2021-01-01 PROCEDURE — 83605 ASSAY OF LACTIC ACID: CPT

## 2021-01-01 PROCEDURE — 99284 EMERGENCY DEPT VISIT MOD MDM: CPT

## 2021-01-01 PROCEDURE — 6360000002 HC RX W HCPCS: Performed by: EMERGENCY MEDICINE

## 2021-01-01 PROCEDURE — 96375 TX/PRO/DX INJ NEW DRUG ADDON: CPT

## 2021-01-01 PROCEDURE — 85025 COMPLETE CBC W/AUTO DIFF WBC: CPT

## 2021-01-01 PROCEDURE — 71045 X-RAY EXAM CHEST 1 VIEW: CPT

## 2021-01-01 PROCEDURE — 87086 URINE CULTURE/COLONY COUNT: CPT

## 2021-01-01 PROCEDURE — 96365 THER/PROPH/DIAG IV INF INIT: CPT

## 2021-01-01 PROCEDURE — 87186 SC STD MICRODIL/AGAR DIL: CPT

## 2021-01-01 PROCEDURE — 80048 BASIC METABOLIC PNL TOTAL CA: CPT

## 2021-01-01 PROCEDURE — 36415 COLL VENOUS BLD VENIPUNCTURE: CPT

## 2021-01-01 RX ORDER — SODIUM CHLORIDE, SODIUM LACTATE, POTASSIUM CHLORIDE, CALCIUM CHLORIDE 600; 310; 30; 20 MG/100ML; MG/100ML; MG/100ML; MG/100ML
1000 INJECTION, SOLUTION INTRAVENOUS ONCE
Status: COMPLETED | OUTPATIENT
Start: 2021-01-01 | End: 2021-01-01

## 2021-01-01 RX ORDER — ONDANSETRON 4 MG/1
4 TABLET, ORALLY DISINTEGRATING ORAL EVERY 8 HOURS PRN
Qty: 20 TABLET | Refills: 0 | Status: SHIPPED | OUTPATIENT
Start: 2021-01-01

## 2021-01-01 RX ORDER — CEFDINIR 300 MG/1
300 CAPSULE ORAL 2 TIMES DAILY
Qty: 28 CAPSULE | Refills: 0 | Status: SHIPPED | OUTPATIENT
Start: 2021-01-01 | End: 2021-01-15

## 2021-01-01 RX ORDER — ACETAMINOPHEN 325 MG/1
650 TABLET ORAL ONCE
Status: DISCONTINUED | OUTPATIENT
Start: 2021-01-01 | End: 2021-01-01 | Stop reason: HOSPADM

## 2021-01-01 RX ORDER — ONDANSETRON 2 MG/ML
4 INJECTION INTRAMUSCULAR; INTRAVENOUS ONCE
Status: COMPLETED | OUTPATIENT
Start: 2021-01-01 | End: 2021-01-01

## 2021-01-01 RX ORDER — HYDROCODONE BITARTRATE AND ACETAMINOPHEN 5; 325 MG/1; MG/1
1 TABLET ORAL ONCE
Status: DISCONTINUED | OUTPATIENT
Start: 2021-01-01 | End: 2021-01-01 | Stop reason: HOSPADM

## 2021-01-01 RX ADMIN — SODIUM CHLORIDE, POTASSIUM CHLORIDE, SODIUM LACTATE AND CALCIUM CHLORIDE 1000 ML: 600; 310; 30; 20 INJECTION, SOLUTION INTRAVENOUS at 16:35

## 2021-01-01 RX ADMIN — ONDANSETRON 4 MG: 2 INJECTION INTRAMUSCULAR; INTRAVENOUS at 18:02

## 2021-01-01 RX ADMIN — CEFTRIAXONE SODIUM 1 G: 1 INJECTION, POWDER, FOR SOLUTION INTRAMUSCULAR; INTRAVENOUS at 18:02

## 2021-01-01 ASSESSMENT — ENCOUNTER SYMPTOMS
ABDOMINAL PAIN: 0
SORE THROAT: 0
VOMITING: 0
BACK PAIN: 0
COUGH: 0
DIARRHEA: 0
EYE REDNESS: 0
COLOR CHANGE: 0
SINUS PRESSURE: 0
PHOTOPHOBIA: 0
NAUSEA: 1
CHEST TIGHTNESS: 0

## 2021-01-01 ASSESSMENT — PAIN DESCRIPTION - DESCRIPTORS: DESCRIPTORS: ACHING

## 2021-01-01 ASSESSMENT — PAIN DESCRIPTION - PAIN TYPE: TYPE: ACUTE PAIN

## 2021-01-01 ASSESSMENT — PAIN DESCRIPTION - FREQUENCY: FREQUENCY: CONTINUOUS

## 2021-01-01 NOTE — ED NOTES
Pt to ED for complaints of headache with migraines, penile pain with discharge, abdominal pain, and feeling dehydrated since Monday. Pt states paralyzation from T6 and below X4 years and has colostomy and suprapubic catheter for stool and urine collection. Pt states chronic leg spasms from paralyzation. Pt states pain is overall 6/10 at this time and states intermittent fever since Monday. Pt states no further symptoms at this time. No distress noted at this time. Pt support dog at bedside at this time with pt.            [facility]  01/01/21 [id number]

## 2021-01-01 NOTE — ED NOTES
Pt resting in bed with call light in reach and states no further needs at this time. No distress noted at this time.        [facility]  01/01/21 0316

## 2021-01-01 NOTE — ED NOTES
Pt resting in bed with call light in reach and states no further needs. Support dog at bedside and no distress noted at this time.        [facility]  01/01/21 2992
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
## 2021-01-05 LAB — SARS-COV-2: NOT DETECTED

## 2021-01-29 ENCOUNTER — HOSPITAL ENCOUNTER (EMERGENCY)
Age: 31
Discharge: HOME | End: 2021-01-29
Attending: FAMILY MEDICINE
Payer: MEDICARE

## 2021-01-29 VITALS
OXYGEN SATURATION: 97 % | WEIGHT: 180 LBS | SYSTOLIC BLOOD PRESSURE: 91 MMHG | TEMPERATURE: 98 F | HEART RATE: 101 BPM | BODY MASS INDEX: 25.2 KG/M2 | HEIGHT: 71 IN | DIASTOLIC BLOOD PRESSURE: 68 MMHG | RESPIRATION RATE: 16 BRPM

## 2021-01-29 DIAGNOSIS — R31.9 HEMATURIA, UNSPECIFIED TYPE: ICD-10-CM

## 2021-01-29 DIAGNOSIS — N32.89 BLADDER SPASMS: ICD-10-CM

## 2021-01-29 DIAGNOSIS — R30.0 DYSURIA: Primary | ICD-10-CM

## 2021-01-29 LAB
BACTERIA: ABNORMAL
BILIRUBIN URINE: NEGATIVE
BLOOD, URINE: ABNORMAL
CASTS: ABNORMAL /LPF
CASTS: ABNORMAL /LPF
CHARACTER, URINE: ABNORMAL
COLOR: YELLOW
CRYSTALS: ABNORMAL
EKG ATRIAL RATE: 93 BPM
EKG P AXIS: 40 DEGREES
EKG P-R INTERVAL: 148 MS
EKG Q-T INTERVAL: 324 MS
EKG QRS DURATION: 78 MS
EKG QTC CALCULATION (BAZETT): 402 MS
EKG R AXIS: 73 DEGREES
EKG T AXIS: -2 DEGREES
EKG VENTRICULAR RATE: 93 BPM
EPITHELIAL CELLS, UA: ABNORMAL /HPF
GLUCOSE, URINE: NEGATIVE MG/DL
KETONES, URINE: NEGATIVE
LEUKOCYTE EST, POC: ABNORMAL
MISCELLANEOUS LAB TEST RESULT: ABNORMAL
NITRITE, URINE: NEGATIVE
PH UA: 5.5 (ref 5–9)
PROTEIN UA: 30 MG/DL
RBC URINE: ABNORMAL /HPF
RENAL EPITHELIAL, UA: ABNORMAL
SPECIFIC GRAVITY UA: 1.02 (ref 1–1.03)
UROBILINOGEN, URINE: 0.2 EU/DL (ref 0–1)
WBC UA: ABNORMAL /HPF
YEAST: ABNORMAL

## 2021-01-29 PROCEDURE — 87086 URINE CULTURE/COLONY COUNT: CPT

## 2021-01-29 PROCEDURE — 93005 ELECTROCARDIOGRAM TRACING: CPT | Performed by: STUDENT IN AN ORGANIZED HEALTH CARE EDUCATION/TRAINING PROGRAM

## 2021-01-29 PROCEDURE — 99283 EMERGENCY DEPT VISIT LOW MDM: CPT

## 2021-01-29 PROCEDURE — 81001 URINALYSIS AUTO W/SCOPE: CPT

## 2021-01-29 ASSESSMENT — PAIN DESCRIPTION - ORIENTATION: ORIENTATION: LOWER

## 2021-01-29 ASSESSMENT — ENCOUNTER SYMPTOMS
EYE ITCHING: 0
COLOR CHANGE: 0
CHEST TIGHTNESS: 0
VOMITING: 0
ABDOMINAL PAIN: 0
SHORTNESS OF BREATH: 0
NAUSEA: 0
ABDOMINAL DISTENTION: 0
EYE REDNESS: 0
CONSTIPATION: 0
SINUS PRESSURE: 0
EYE PAIN: 0
SINUS PAIN: 0
DIARRHEA: 0
EYE DISCHARGE: 0
RHINORRHEA: 0

## 2021-01-29 ASSESSMENT — PAIN DESCRIPTION - LOCATION: LOCATION: BACK

## 2021-01-30 NOTE — ED TRIAGE NOTES
Pt to the ED due to bladder spasms and issues with his catheter. Wednesday was having headaches and felt dehydrated. Felt like he was having bladder spasms through his penis so he changed his cather. He saw a \"mucusy looking blood\" in his tubing on Thursday so he changed his tubing again. Pt stated hes had a UTI before and that this feels \"different than that. \"
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
## 2021-01-31 LAB — URINE CULTURE, ROUTINE: NORMAL

## 2021-04-05 ENCOUNTER — HOSPITAL ENCOUNTER (EMERGENCY)
Age: 31
Discharge: HOME | End: 2021-04-05
Attending: EMERGENCY MEDICINE
Payer: MEDICARE

## 2021-04-05 ENCOUNTER — APPOINTMENT (OUTPATIENT)
Dept: CT IMAGING | Age: 31
End: 2021-04-05
Payer: MEDICARE

## 2021-04-05 VITALS
DIASTOLIC BLOOD PRESSURE: 89 MMHG | HEART RATE: 79 BPM | RESPIRATION RATE: 16 BRPM | BODY MASS INDEX: 25.2 KG/M2 | TEMPERATURE: 98.6 F | SYSTOLIC BLOOD PRESSURE: 141 MMHG | OXYGEN SATURATION: 96 % | HEIGHT: 71 IN | WEIGHT: 180 LBS

## 2021-04-05 DIAGNOSIS — I95.1 AUTONOMIC POSTURAL HYPOTENSION: ICD-10-CM

## 2021-04-05 DIAGNOSIS — R82.81 PYURIA: Primary | ICD-10-CM

## 2021-04-05 LAB
ALBUMIN SERPL-MCNC: 3.5 G/DL (ref 3.5–5.1)
ALP BLD-CCNC: 146 U/L (ref 38–126)
ALT SERPL-CCNC: 110 U/L (ref 11–66)
ANION GAP SERPL CALCULATED.3IONS-SCNC: 10 MEQ/L (ref 8–16)
AST SERPL-CCNC: 67 U/L (ref 5–40)
BACTERIA: ABNORMAL /HPF
BACTERIA: ABNORMAL /HPF
BASOPHILS # BLD: 0.9 %
BASOPHILS ABSOLUTE: 0 THOU/MM3 (ref 0–0.1)
BILIRUB SERPL-MCNC: 0.2 MG/DL (ref 0.3–1.2)
BILIRUBIN URINE: NEGATIVE
BILIRUBIN URINE: NEGATIVE
BLOOD, URINE: ABNORMAL
BLOOD, URINE: ABNORMAL
BUN BLDV-MCNC: 8 MG/DL (ref 7–22)
CALCIUM SERPL-MCNC: 9 MG/DL (ref 8.5–10.5)
CASTS 2: ABNORMAL /LPF
CASTS 2: ABNORMAL /LPF
CASTS UA: ABNORMAL /LPF
CASTS UA: ABNORMAL /LPF
CHARACTER, URINE: CLEAR
CHARACTER, URINE: CLEAR
CHLORIDE BLD-SCNC: 104 MEQ/L (ref 98–111)
CO2: 26 MEQ/L (ref 23–33)
COLOR: YELLOW
COLOR: YELLOW
CREAT SERPL-MCNC: 0.9 MG/DL (ref 0.4–1.2)
CRYSTALS, UA: ABNORMAL
CRYSTALS, UA: ABNORMAL
EOSINOPHIL # BLD: 4.1 %
EOSINOPHILS ABSOLUTE: 0.2 THOU/MM3 (ref 0–0.4)
EPITHELIAL CELLS, UA: ABNORMAL /HPF
EPITHELIAL CELLS, UA: ABNORMAL /HPF
ERYTHROCYTE [DISTWIDTH] IN BLOOD BY AUTOMATED COUNT: 13.5 % (ref 11.5–14.5)
ERYTHROCYTE [DISTWIDTH] IN BLOOD BY AUTOMATED COUNT: 42.1 FL (ref 35–45)
GFR SERPL CREATININE-BSD FRML MDRD: > 90 ML/MIN/1.73M2
GLUCOSE BLD-MCNC: 91 MG/DL (ref 70–108)
GLUCOSE URINE: NEGATIVE MG/DL
GLUCOSE URINE: NEGATIVE MG/DL
HCT VFR BLD CALC: 46.2 % (ref 42–52)
HEMOGLOBIN: 14.4 GM/DL (ref 14–18)
IMMATURE GRANS (ABS): 0.03 THOU/MM3 (ref 0–0.07)
IMMATURE GRANULOCYTES: 0.6 %
KETONES, URINE: NEGATIVE
KETONES, URINE: NEGATIVE
LACTIC ACID: 1.3 MMOL/L (ref 0.5–2.2)
LEUKOCYTE ESTERASE, URINE: ABNORMAL
LEUKOCYTE ESTERASE, URINE: ABNORMAL
LIPASE: 30.3 U/L (ref 5.6–51.3)
LYMPHOCYTES # BLD: 24.9 %
LYMPHOCYTES ABSOLUTE: 1.3 THOU/MM3 (ref 1–4.8)
MCH RBC QN AUTO: 26.9 PG (ref 26–33)
MCHC RBC AUTO-ENTMCNC: 31.2 GM/DL (ref 32.2–35.5)
MCV RBC AUTO: 86.4 FL (ref 80–94)
MISCELLANEOUS 2: ABNORMAL
MISCELLANEOUS 2: ABNORMAL
MONOCYTES # BLD: 10.8 %
MONOCYTES ABSOLUTE: 0.6 THOU/MM3 (ref 0.4–1.3)
NITRITE, URINE: NEGATIVE
NITRITE, URINE: POSITIVE
NUCLEATED RED BLOOD CELLS: 0 /100 WBC
OSMOLALITY CALCULATION: 277.3 MOSMOL/KG (ref 275–300)
PH UA: 7 (ref 5–9)
PH UA: 7 (ref 5–9)
PLATELET # BLD: 179 THOU/MM3 (ref 130–400)
PMV BLD AUTO: 10.6 FL (ref 9.4–12.4)
POTASSIUM SERPL-SCNC: 4.2 MEQ/L (ref 3.5–5.2)
PROCALCITONIN: 0.08 NG/ML (ref 0.01–0.09)
PROTEIN UA: NEGATIVE
PROTEIN UA: NEGATIVE
RBC # BLD: 5.35 MILL/MM3 (ref 4.7–6.1)
RBC URINE: ABNORMAL /HPF
RBC URINE: ABNORMAL /HPF
RENAL EPITHELIAL, UA: ABNORMAL
RENAL EPITHELIAL, UA: ABNORMAL
SEG NEUTROPHILS: 58.7 %
SEGMENTED NEUTROPHILS ABSOLUTE COUNT: 3.2 THOU/MM3 (ref 1.8–7.7)
SODIUM BLD-SCNC: 140 MEQ/L (ref 135–145)
SPECIFIC GRAVITY, URINE: 1.01 (ref 1–1.03)
SPECIFIC GRAVITY, URINE: 1.01 (ref 1–1.03)
TOTAL PROTEIN: 6.7 G/DL (ref 6.1–8)
UROBILINOGEN, URINE: 0.2 EU/DL (ref 0–1)
UROBILINOGEN, URINE: 0.2 EU/DL (ref 0–1)
WBC # BLD: 5.4 THOU/MM3 (ref 4.8–10.8)
WBC UA: ABNORMAL /HPF
WBC UA: ABNORMAL /HPF
YEAST: ABNORMAL
YEAST: ABNORMAL

## 2021-04-05 PROCEDURE — 36415 COLL VENOUS BLD VENIPUNCTURE: CPT

## 2021-04-05 PROCEDURE — 87186 SC STD MICRODIL/AGAR DIL: CPT

## 2021-04-05 PROCEDURE — 81001 URINALYSIS AUTO W/SCOPE: CPT

## 2021-04-05 PROCEDURE — 83690 ASSAY OF LIPASE: CPT

## 2021-04-05 PROCEDURE — 87086 URINE CULTURE/COLONY COUNT: CPT

## 2021-04-05 PROCEDURE — 87077 CULTURE AEROBIC IDENTIFY: CPT

## 2021-04-05 PROCEDURE — 74177 CT ABD & PELVIS W/CONTRAST: CPT

## 2021-04-05 PROCEDURE — 87040 BLOOD CULTURE FOR BACTERIA: CPT

## 2021-04-05 PROCEDURE — 99284 EMERGENCY DEPT VISIT MOD MDM: CPT

## 2021-04-05 PROCEDURE — 6360000002 HC RX W HCPCS: Performed by: EMERGENCY MEDICINE

## 2021-04-05 PROCEDURE — 80053 COMPREHEN METABOLIC PANEL: CPT

## 2021-04-05 PROCEDURE — 85025 COMPLETE CBC W/AUTO DIFF WBC: CPT

## 2021-04-05 PROCEDURE — 87106 FUNGI IDENTIFICATION YEAST: CPT

## 2021-04-05 PROCEDURE — 2580000003 HC RX 258: Performed by: EMERGENCY MEDICINE

## 2021-04-05 PROCEDURE — 84145 PROCALCITONIN (PCT): CPT

## 2021-04-05 PROCEDURE — 6360000004 HC RX CONTRAST MEDICATION: Performed by: EMERGENCY MEDICINE

## 2021-04-05 PROCEDURE — 83605 ASSAY OF LACTIC ACID: CPT

## 2021-04-05 PROCEDURE — 96365 THER/PROPH/DIAG IV INF INIT: CPT

## 2021-04-05 RX ORDER — 0.9 % SODIUM CHLORIDE 0.9 %
1000 INTRAVENOUS SOLUTION INTRAVENOUS ONCE
Status: COMPLETED | OUTPATIENT
Start: 2021-04-05 | End: 2021-04-05

## 2021-04-05 RX ADMIN — SODIUM CHLORIDE 1000 ML: 9 INJECTION, SOLUTION INTRAVENOUS at 17:54

## 2021-04-05 RX ADMIN — MEROPENEM 1000 MG: 1 INJECTION, POWDER, FOR SOLUTION INTRAVENOUS at 19:54

## 2021-04-05 RX ADMIN — IOPAMIDOL 80 ML: 755 INJECTION, SOLUTION INTRAVENOUS at 18:52

## 2021-04-05 ASSESSMENT — PAIN SCALES - GENERAL: PAINLEVEL_OUTOF10: 8

## 2021-04-05 ASSESSMENT — PAIN DESCRIPTION - FREQUENCY: FREQUENCY: CONTINUOUS
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
## 2021-04-05 NOTE — ED NOTES
Upon first contact with patient this RN receives bedside shift report from [facility]. Pt repositioned at this time, pillow support adjusted. VSS, respirations even and unlabored. Side rails up x2.  Call light within reach       [facility], RN  04/05/21 Machelle Akers, ERICA  04/05/21 7747

## 2021-04-05 NOTE — ED NOTES
Bed: 002A  Expected date: 4/5/21  Expected time:   Means of arrival: [facility]  Comments:      Vanessa Scott RN  04/05/21 6702

## 2021-04-06 ASSESSMENT — ENCOUNTER SYMPTOMS
EYE ITCHING: 0
RHINORRHEA: 0
BACK PAIN: 0
VOMITING: 0
NAUSEA: 0
EYE DISCHARGE: 0
SHORTNESS OF BREATH: 0
WHEEZING: 0
EYE REDNESS: 0
SORE THROAT: 0
EYE PAIN: 0
ABDOMINAL DISTENTION: 0
PHOTOPHOBIA: 0
DIARRHEA: 0
CHEST TIGHTNESS: 0
COUGH: 0
CONSTIPATION: 0
STRIDOR: 0
ABDOMINAL PAIN: 0

## 2021-04-06 NOTE — ED NOTES
Dr Elias Rounds at bedside to update pt on plan to DC after antibiotics     Erick Hamlin, RN  04/05/21 2001

## 2021-04-08 LAB
ORGANISM: ABNORMAL
URINE CULTURE REFLEX: ABNORMAL

## 2021-04-11 LAB
BLOOD CULTURE, ROUTINE: NORMAL
BLOOD CULTURE, ROUTINE: NORMAL

## 2021-06-08 ENCOUNTER — HOSPITAL ENCOUNTER (OUTPATIENT)
Dept: CT IMAGING | Age: 31
Discharge: HOME | End: 2021-06-08
Payer: MEDICARE

## 2021-06-08 DIAGNOSIS — R19.04 ABDOMINAL SWELLING, LEFT LOWER QUADRANT: ICD-10-CM

## 2021-06-08 DIAGNOSIS — R19.02 ABDOMINAL MASS, LEFT UPPER QUADRANT: ICD-10-CM

## 2021-06-08 DIAGNOSIS — R10.84 ABDOMINAL PAIN, GENERALIZED: ICD-10-CM

## 2021-06-08 PROCEDURE — 6360000004 HC RX CONTRAST MEDICATION: Performed by: FAMILY MEDICINE

## 2021-06-08 PROCEDURE — 74178 CT ABD&PLV WO CNTR FLWD CNTR: CPT

## 2021-06-08 RX ADMIN — IOHEXOL 50 ML: 240 INJECTION, SOLUTION INTRATHECAL; INTRAVASCULAR; INTRAVENOUS; ORAL at 13:18

## 2021-06-08 RX ADMIN — IOPAMIDOL 85 ML: 755 INJECTION, SOLUTION INTRAVENOUS at 13:18

## 2021-07-30 ENCOUNTER — HOSPITAL ENCOUNTER (OUTPATIENT)
Age: 31
Discharge: HOME | End: 2021-07-30
Payer: MEDICARE

## 2021-07-30 LAB
BACTERIA: ABNORMAL /HPF
BILIRUBIN URINE: NEGATIVE
BLOOD, URINE: NEGATIVE
CASTS 2: ABNORMAL /LPF
CASTS UA: ABNORMAL /LPF
CHARACTER, URINE: CLEAR
COLOR: YELLOW
CRYSTALS, UA: ABNORMAL
EPITHELIAL CELLS, UA: ABNORMAL /HPF
GLUCOSE URINE: NEGATIVE MG/DL
KETONES, URINE: NEGATIVE
LEUKOCYTE ESTERASE, URINE: ABNORMAL
MISCELLANEOUS 2: ABNORMAL
NITRITE, URINE: NEGATIVE
PH UA: 6.5 (ref 5–9)
PROTEIN UA: NEGATIVE
RBC URINE: ABNORMAL /HPF
RENAL EPITHELIAL, UA: ABNORMAL
SPECIFIC GRAVITY, URINE: < 1.005 (ref 1–1.03)
UROBILINOGEN, URINE: 0.2 EU/DL (ref 0–1)
WBC UA: ABNORMAL /HPF
YEAST: ABNORMAL

## 2021-07-30 PROCEDURE — 81001 URINALYSIS AUTO W/SCOPE: CPT

## 2021-08-14 ENCOUNTER — HOSPITAL ENCOUNTER (OUTPATIENT)
Age: 31
Discharge: HOME | End: 2021-08-14
Payer: MEDICARE

## 2021-08-14 PROCEDURE — 87086 URINE CULTURE/COLONY COUNT: CPT

## 2021-08-14 PROCEDURE — 87186 SC STD MICRODIL/AGAR DIL: CPT

## 2021-08-14 PROCEDURE — 87077 CULTURE AEROBIC IDENTIFY: CPT

## 2021-08-17 LAB
ORGANISM: ABNORMAL
ORGANISM: ABNORMAL
URINE CULTURE, ROUTINE: ABNORMAL
URINE CULTURE, ROUTINE: ABNORMAL

## 2021-08-30 ENCOUNTER — TELEPHONE (OUTPATIENT)
Dept: UROLOGY | Age: 31
End: 2021-08-30

## 2021-08-30 ENCOUNTER — OFFICE VISIT (OUTPATIENT)
Dept: UROLOGY | Age: 31
End: 2021-08-30
Payer: MEDICARE

## 2021-08-30 DIAGNOSIS — N39.0 RECURRENT UTI: ICD-10-CM

## 2021-08-30 DIAGNOSIS — N31.9 NEUROGENIC BLADDER: Primary | ICD-10-CM

## 2021-08-30 PROCEDURE — G8419 CALC BMI OUT NRM PARAM NOF/U: HCPCS | Performed by: UROLOGY

## 2021-08-30 PROCEDURE — 1036F TOBACCO NON-USER: CPT | Performed by: UROLOGY

## 2021-08-30 PROCEDURE — G8427 DOCREV CUR MEDS BY ELIG CLIN: HCPCS | Performed by: UROLOGY

## 2021-08-30 PROCEDURE — 99214 OFFICE O/P EST MOD 30 MIN: CPT | Performed by: UROLOGY

## 2021-08-30 NOTE — TELEPHONE ENCOUNTER
Pt was seen by Dr Farhan Valdivia today and he states he needs smaller catheters. He gets them from [facility]. And he needs a 14 Pakistani instead of a 16 Pakistani.
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
## 2021-08-31 ENCOUNTER — TELEPHONE (OUTPATIENT)
Dept: UROLOGY | Age: 31
End: 2021-08-31

## 2021-08-31 NOTE — TELEPHONE ENCOUNTER
Patient scheduled for SP Cath placement on 9/15/21 arrival of 10:00am with NPO 4 hours prior. have a  and no blood thinners 5 days prior. Go to [facility] 2nd floor Out Patient Nursing. Left message with the details on the patient's voice mail.

## 2021-09-01 NOTE — TELEPHONE ENCOUNTER
I called and spoke with Juwan Olivas with [facility] at [phone]. I explained to her that the patient currently has an indwelling olguin until he has his s/p cath placement on 09/15/21. Patient currently has a 16 fr olguin and would like a one time supply of 14Fr olguin. Patient states that the 16Fr is too big and is having discomfort. Juwan Olivas is going to try and see if they can just send him a one time courtesy 14Fr olguin. Juwan Olivas is going to look at his order and call me back.

## 2021-09-24 RX ORDER — SODIUM CHLORIDE 450 MG/100ML
INJECTION, SOLUTION INTRAVENOUS CONTINUOUS
Status: CANCELLED | OUTPATIENT
Start: 2021-09-24

## 2021-09-24 RX ORDER — FENTANYL CITRATE 50 UG/ML
50 INJECTION, SOLUTION INTRAMUSCULAR; INTRAVENOUS ONCE
Status: CANCELLED | OUTPATIENT
Start: 2021-09-24 | End: 2021-09-24

## 2021-09-24 RX ORDER — MIDAZOLAM HYDROCHLORIDE 1 MG/ML
1 INJECTION INTRAMUSCULAR; INTRAVENOUS ONCE
Status: CANCELLED | OUTPATIENT
Start: 2021-09-24 | End: 2021-09-24

## 2021-09-27 ENCOUNTER — HOSPITAL ENCOUNTER (OUTPATIENT)
Dept: INTERVENTIONAL RADIOLOGY/VASCULAR | Age: 31
Discharge: HOME | End: 2021-09-27
Payer: MEDICARE

## 2021-09-27 VITALS
RESPIRATION RATE: 13 BRPM | TEMPERATURE: 98 F | HEART RATE: 60 BPM | SYSTOLIC BLOOD PRESSURE: 130 MMHG | OXYGEN SATURATION: 92 % | DIASTOLIC BLOOD PRESSURE: 69 MMHG

## 2021-09-27 DIAGNOSIS — N39.0 RECURRENT UTI: ICD-10-CM

## 2021-09-27 DIAGNOSIS — N31.9 NEUROGENIC BLADDER: ICD-10-CM

## 2021-09-27 PROCEDURE — 77002 NEEDLE LOCALIZATION BY XRAY: CPT | Performed by: RADIOLOGY

## 2021-09-27 PROCEDURE — 51705 CHANGE OF BLADDER TUBE: CPT

## 2021-09-27 PROCEDURE — 6360000002 HC RX W HCPCS: Performed by: RADIOLOGY

## 2021-09-27 PROCEDURE — 6360000004 HC RX CONTRAST MEDICATION: Performed by: RADIOLOGY

## 2021-09-27 PROCEDURE — 51102 DRAIN BL W/CATH INSERTION: CPT | Performed by: RADIOLOGY

## 2021-09-27 PROCEDURE — 6360000002 HC RX W HCPCS

## 2021-09-27 PROCEDURE — 2580000003 HC RX 258: Performed by: RADIOLOGY

## 2021-09-27 RX ORDER — FENTANYL CITRATE 50 UG/ML
50 INJECTION, SOLUTION INTRAMUSCULAR; INTRAVENOUS ONCE
Status: COMPLETED | OUTPATIENT
Start: 2021-09-27 | End: 2021-09-27

## 2021-09-27 RX ORDER — CEFAZOLIN SODIUM 2 G/100ML
2000 INJECTION, SOLUTION INTRAVENOUS
Status: DISCONTINUED | OUTPATIENT
Start: 2021-09-27 | End: 2021-09-28 | Stop reason: HOSPADM

## 2021-09-27 RX ORDER — MIDAZOLAM HYDROCHLORIDE 1 MG/ML
1 INJECTION INTRAMUSCULAR; INTRAVENOUS ONCE
Status: COMPLETED | OUTPATIENT
Start: 2021-09-27 | End: 2021-09-27

## 2021-09-27 RX ORDER — SODIUM CHLORIDE 450 MG/100ML
INJECTION, SOLUTION INTRAVENOUS CONTINUOUS
Status: DISCONTINUED | OUTPATIENT
Start: 2021-09-27 | End: 2021-09-28 | Stop reason: HOSPADM

## 2021-09-27 RX ORDER — CEFAZOLIN SODIUM 2 G/100ML
INJECTION, SOLUTION INTRAVENOUS
Status: COMPLETED
Start: 2021-09-27 | End: 2021-09-27

## 2021-09-27 RX ADMIN — IOTHALAMATE MEGLUMINE 40 ML: 600 INJECTION INTRAVASCULAR at 11:50

## 2021-09-27 RX ADMIN — FENTANYL CITRATE 50 MCG: 0.05 INJECTION, SOLUTION INTRAMUSCULAR; INTRAVENOUS at 11:23

## 2021-09-27 RX ADMIN — SODIUM CHLORIDE: 4.5 INJECTION, SOLUTION INTRAVENOUS at 10:34

## 2021-09-27 RX ADMIN — CEFAZOLIN SODIUM 2000 MG: 2 INJECTION, SOLUTION INTRAVENOUS at 10:33

## 2021-09-27 RX ADMIN — MIDAZOLAM 1 MG: 1 INJECTION INTRAMUSCULAR; INTRAVENOUS at 11:23

## 2021-09-27 ASSESSMENT — PAIN SCALES - GENERAL: PAINLEVEL_OUTOF10: 3

## 2021-09-27 ASSESSMENT — PAIN DESCRIPTION - PAIN TYPE: TYPE: CHRONIC PAIN

## 2021-09-27 ASSESSMENT — PAIN DESCRIPTION - LOCATION: LOCATION: BACK

## 2021-09-27 ASSESSMENT — PAIN DESCRIPTION - ORIENTATION: ORIENTATION: LOWER

## 2021-09-27 ASSESSMENT — PAIN DESCRIPTION - DESCRIPTORS: DESCRIPTORS: ACHING
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
## 2021-09-27 NOTE — H&P
Formulation and discussion of sedation / procedure plans, risks, benefits, side effects and alternatives with patient and/or responsible adult completed.     Electronically signed by Maxime Pack MD on 9/27/2021 at 11:23 AM

## 2021-09-27 NOTE — PROGRESS NOTES
1000: Patient arrived in wheelchair for suprapubic catheter exchange. Patient came alone but states he has a . Patient does not take any blood thinners. PT RIGHTS AND RESPONSIBILITIES OFFERED TO PT.  IV fluid infusing. IV Ancef infusing.                            _m___ Safety:       (Environmental)   [place] to environment   Ensure ID band is correct and in place/ allergy band as needed   Assess for fall risk   Initiate fall precautions as applicable (fall band, side rails, etc.)   Call light within reach   Bed in low position/ wheels locked    m____ Pain:        Assess pain level and characteristics   Administer analgesics as ordered   Assess effectiveness of pain management and report to MD as needed    _m___ Knowledge Deficit:   Assess baseline knowledge   Provide teaching at level of understanding   Provide teaching via preferred learning method   Evaluate teaching effectiveness    _m___ Hemodynamic/Respiratory Status:       (Pre and Post Procedure Monitoring)   Assess/Monitor vital signs and LOC   Assess Baseline SpO2 prior to any sedation   Obtain weight/height   Assess vital signs/ LOC until patient meets discharge criteria   Monitor procedure site and notify MD of any issues
12 Pt arrived in IR for suprapubic catheter placement  1127 Pt prepped for procedure with chloraprep  6410 Procedure started with Dr. Abelino Crawford  1148 Procedure complete. Suprapubic catheter to leg bag, draining clear yellow urine to gravity  1202 Report called to [place], Rikki Moraes Pt transported back to [facility] via cart. Skin pink, warm, dry. Respirations even at rest. No complaints voiced at this time.
1215: Patient back from procedure, tolerated well. Vitals as charted. Dressing clean, dry, intact. Leg bag changed to drainage bag per patient request. Patient removed olguin catheter. Patient provided with snack and beverage. 1230: Dressing clean, dry, intact. Patient tolerating meal fine. 1245: Dressing clean, dry, intact. 1300: Dressing clean, dry, intact. Vitals as charted. 1330: Patient discharged in wheelchair.
I will STOP taking the medications listed below when I get home from the hospital:  None

## 2021-09-28 ENCOUNTER — TELEPHONE (OUTPATIENT)
Dept: UROLOGY | Age: 31
End: 2021-09-28

## 2021-09-28 RX ORDER — MAGNESIUM HYDROXIDE 1200 MG/15ML
LIQUID ORAL
Qty: 500 ML | Refills: 5 | Status: SHIPPED | OUTPATIENT
Start: 2021-09-28 | End: 2021-10-05

## 2021-09-28 RX ORDER — SYRINGE, DISPOSABLE, 1 ML
SYRINGE, EMPTY DISPOSABLE MISCELLANEOUS
Qty: 30 EACH | Refills: 11 | Status: SHIPPED | OUTPATIENT
Start: 2021-09-28

## 2021-09-28 RX ORDER — OXYBUTYNIN CHLORIDE 10 MG/1
10 TABLET, EXTENDED RELEASE ORAL DAILY PRN
Qty: 30 TABLET | Refills: 2 | Status: SHIPPED | OUTPATIENT
Start: 2021-09-28

## 2021-09-28 NOTE — TELEPHONE ENCOUNTER
Will send sterile saline and syringe to pharmacy. Can irrigate with 50 ml as needed. Can give him IRs phone number if he has questions. Oxybutynin sent for spasms  If irrigating catheter does not help with drainage of urine, he needs seen in IR, looks like he has pigtail catheter.

## 2021-09-28 NOTE — TELEPHONE ENCOUNTER
Patient advised saline, syringes, and oxybutynin was sent to the pharmacy. He stated he is comfortable with irrigating the catheter and has noticed more sediment in the urine. Patient also given number for IR to call if irrigating the catheter does not help.

## 2021-09-28 NOTE — TELEPHONE ENCOUNTER
Patient had sp catheter placed in IR yesterday. The catheter is not draining well and urine is leaking from the penis. He knows how to irrigate the catheter himself but would need solution and syringes sent to the pharmacy. He is not on anything for spasms. The urine is dark. He denies passing any clots and has been drinking plenty of fluids. He has a follow up on 10/26/2021 with Sarkis Ochoa CNP. Please advise. Thank you.

## 2022-04-22 ENCOUNTER — OFFICE VISIT (OUTPATIENT)
Dept: SURGERY | Age: 32
End: 2022-04-22
Payer: MEDICARE

## 2022-04-22 VITALS
DIASTOLIC BLOOD PRESSURE: 68 MMHG | OXYGEN SATURATION: 98 % | SYSTOLIC BLOOD PRESSURE: 102 MMHG | HEART RATE: 87 BPM | WEIGHT: 180 LBS | BODY MASS INDEX: 25.2 KG/M2 | HEIGHT: 71 IN | TEMPERATURE: 97.5 F

## 2022-04-22 DIAGNOSIS — K43.5 PARASTOMAL HERNIA WITHOUT OBSTRUCTION OR GANGRENE: Primary | ICD-10-CM

## 2022-04-22 PROCEDURE — G8419 CALC BMI OUT NRM PARAM NOF/U: HCPCS | Performed by: SURGERY

## 2022-04-22 PROCEDURE — G8427 DOCREV CUR MEDS BY ELIG CLIN: HCPCS | Performed by: SURGERY

## 2022-04-22 PROCEDURE — 1036F TOBACCO NON-USER: CPT | Performed by: SURGERY

## 2022-04-22 PROCEDURE — 99213 OFFICE O/P EST LOW 20 MIN: CPT | Performed by: SURGERY
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
## 2022-10-03 ENCOUNTER — APPOINTMENT (OUTPATIENT)
Dept: CT IMAGING | Age: 32
End: 2022-10-03
Payer: OTHER MISCELLANEOUS

## 2022-10-03 ENCOUNTER — APPOINTMENT (OUTPATIENT)
Dept: GENERAL RADIOLOGY | Age: 32
End: 2022-10-03
Payer: OTHER MISCELLANEOUS

## 2022-10-03 ENCOUNTER — HOSPITAL ENCOUNTER (EMERGENCY)
Age: 32
Discharge: HOME | End: 2022-10-03
Payer: OTHER MISCELLANEOUS

## 2022-10-03 VITALS
SYSTOLIC BLOOD PRESSURE: 140 MMHG | OXYGEN SATURATION: 97 % | TEMPERATURE: 98.1 F | HEART RATE: 100 BPM | DIASTOLIC BLOOD PRESSURE: 79 MMHG | RESPIRATION RATE: 18 BRPM

## 2022-10-03 DIAGNOSIS — M79.641 PAIN OF RIGHT HAND: ICD-10-CM

## 2022-10-03 DIAGNOSIS — Z87.820 HISTORY OF TRAUMATIC BRAIN INJURY: Primary | ICD-10-CM

## 2022-10-03 DIAGNOSIS — S16.1XXA STRAIN OF NECK MUSCLE, INITIAL ENCOUNTER: ICD-10-CM

## 2022-10-03 DIAGNOSIS — Z87.828 HISTORY OF SPINAL CORD INJURY: ICD-10-CM

## 2022-10-03 DIAGNOSIS — M54.9 UPPER BACK PAIN ON LEFT SIDE: ICD-10-CM
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
## 2022-10-03 PROCEDURE — 72125 CT NECK SPINE W/O DYE: CPT

## 2022-10-03 PROCEDURE — 72072 X-RAY EXAM THORAC SPINE 3VWS: CPT

## 2022-10-03 PROCEDURE — 73130 X-RAY EXAM OF HAND: CPT

## 2022-10-03 PROCEDURE — 70450 CT HEAD/BRAIN W/O DYE: CPT

## 2022-10-03 PROCEDURE — 99284 EMERGENCY DEPT VISIT MOD MDM: CPT

## 2022-10-03 PROCEDURE — 72100 X-RAY EXAM L-S SPINE 2/3 VWS: CPT

## 2022-10-03 RX ORDER — IBUPROFEN 800 MG/1
800 TABLET ORAL EVERY 8 HOURS PRN
Qty: 15 TABLET | Refills: 0 | Status: SHIPPED | OUTPATIENT
Start: 2022-10-03 | End: 2022-11-07

## 2022-10-03 ASSESSMENT — ENCOUNTER SYMPTOMS
VOMITING: 0
COUGH: 0
RHINORRHEA: 0
ABDOMINAL PAIN: 0
BACK PAIN: 1
NAUSEA: 1
FACIAL SWELLING: 0
SHORTNESS OF BREATH: 0

## 2022-10-03 ASSESSMENT — PAIN DESCRIPTION - LOCATION: LOCATION: HEAD

## 2022-10-03 ASSESSMENT — PAIN SCALES - GENERAL: PAINLEVEL_OUTOF10: 6

## 2022-10-03 ASSESSMENT — PAIN - FUNCTIONAL ASSESSMENT: PAIN_FUNCTIONAL_ASSESSMENT: 0-10

## 2022-10-03 NOTE — Clinical Note
Deena Zhu was seen and treated in our emergency department on 10/3/2022. He may return to work on 10/05/2022. If you have any questions or concerns, please don't hesitate to call.       Samantha Thomson PA-C
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
## 2022-11-07 ENCOUNTER — OFFICE VISIT (OUTPATIENT)
Dept: FAMILY MEDICINE CLINIC | Age: 32
End: 2022-11-07
Payer: MEDICARE

## 2022-11-07 VITALS
DIASTOLIC BLOOD PRESSURE: 60 MMHG | TEMPERATURE: 98.4 F | SYSTOLIC BLOOD PRESSURE: 104 MMHG | HEART RATE: 88 BPM | RESPIRATION RATE: 16 BRPM

## 2022-11-07 DIAGNOSIS — Z13.31 POSITIVE DEPRESSION SCREENING: ICD-10-CM

## 2022-11-07 DIAGNOSIS — S06.9X9A: ICD-10-CM

## 2022-11-07 DIAGNOSIS — N31.9 NEUROGENIC BLADDER: ICD-10-CM

## 2022-11-07 DIAGNOSIS — Z13.220 SCREENING, LIPID: ICD-10-CM

## 2022-11-07 DIAGNOSIS — Z13.1 SCREENING FOR DIABETES MELLITUS: ICD-10-CM

## 2022-11-07 DIAGNOSIS — M79.2 NEUROPATHIC PAIN: Primary | ICD-10-CM

## 2022-11-07 DIAGNOSIS — G82.20 PARAPLEGIA (HCC): ICD-10-CM

## 2022-11-07 DIAGNOSIS — G40.909 SEIZURE DISORDER (HCC): ICD-10-CM

## 2022-11-07 PROBLEM — K21.9 GERD WITHOUT ESOPHAGITIS: Status: ACTIVE | Noted: 2021-01-15

## 2022-11-07 PROBLEM — S06.9XAA TRAUMATIC BRAIN INJURY (HCC): Status: ACTIVE | Noted: 2017-11-14

## 2022-11-07 PROCEDURE — G8484 FLU IMMUNIZE NO ADMIN: HCPCS | Performed by: NURSE PRACTITIONER

## 2022-11-07 PROCEDURE — G8427 DOCREV CUR MEDS BY ELIG CLIN: HCPCS | Performed by: NURSE PRACTITIONER

## 2022-11-07 PROCEDURE — G8419 CALC BMI OUT NRM PARAM NOF/U: HCPCS | Performed by: NURSE PRACTITIONER

## 2022-11-07 PROCEDURE — 99204 OFFICE O/P NEW MOD 45 MIN: CPT | Performed by: NURSE PRACTITIONER

## 2022-11-07 PROCEDURE — 1036F TOBACCO NON-USER: CPT | Performed by: NURSE PRACTITIONER

## 2022-11-07 RX ORDER — LIDOCAINE HYDROCHLORIDE 40 MG/ML
4 SOLUTION TOPICAL 4 TIMES DAILY PRN
Qty: 15 ML | Refills: 0 | Status: SHIPPED | OUTPATIENT
Start: 2022-11-07 | End: 2022-12-07

## 2022-11-07 RX ORDER — CYPROHEPTADINE HYDROCHLORIDE 4 MG/1
4 TABLET ORAL DAILY
COMMUNITY
Start: 2020-12-08 | End: 2022-11-07 | Stop reason: SDUPTHER

## 2022-11-07 RX ORDER — CYPROHEPTADINE HYDROCHLORIDE 4 MG/1
4 TABLET ORAL DAILY
Qty: 30 TABLET | Refills: 5 | Status: SHIPPED | OUTPATIENT
Start: 2022-11-07

## 2022-11-07 RX ORDER — BACLOFEN 20 MG/1
20 TABLET ORAL 4 TIMES DAILY
COMMUNITY

## 2022-11-07 RX ORDER — DEXTROMETHORPHAN HBR 15 MG/1
CAPSULE, LIQUID FILLED ORAL
COMMUNITY
Start: 2022-08-01

## 2022-11-07 RX ORDER — LIDOCAINE HYDROCHLORIDE 40 MG/ML
4 SOLUTION TOPICAL 4 TIMES DAILY PRN
COMMUNITY
Start: 2022-11-03 | End: 2022-11-07 | Stop reason: SDUPTHER

## 2022-11-07 SDOH — ECONOMIC STABILITY: FOOD INSECURITY: WITHIN THE PAST 12 MONTHS, THE FOOD YOU BOUGHT JUST DIDN'T LAST AND YOU DIDN'T HAVE MONEY TO GET MORE.: NEVER TRUE

## 2022-11-07 SDOH — ECONOMIC STABILITY: FOOD INSECURITY: WITHIN THE PAST 12 MONTHS, YOU WORRIED THAT YOUR FOOD WOULD RUN OUT BEFORE YOU GOT MONEY TO BUY MORE.: NEVER TRUE

## 2022-11-07 ASSESSMENT — PATIENT HEALTH QUESTIONNAIRE - PHQ9
SUM OF ALL RESPONSES TO PHQ QUESTIONS 1-9: 14
SUM OF ALL RESPONSES TO PHQ QUESTIONS 1-9: 14
10. IF YOU CHECKED OFF ANY PROBLEMS, HOW DIFFICULT HAVE THESE PROBLEMS MADE IT FOR YOU TO DO YOUR WORK, TAKE CARE OF THINGS AT HOME, OR GET ALONG WITH OTHER PEOPLE: 2
SUM OF ALL RESPONSES TO PHQ QUESTIONS 1-9: 14
3. TROUBLE FALLING OR STAYING ASLEEP: 3
5. POOR APPETITE OR OVEREATING: 2
SUM OF ALL RESPONSES TO PHQ QUESTIONS 1-9: 14
9. THOUGHTS THAT YOU WOULD BE BETTER OFF DEAD, OR OF HURTING YOURSELF: 0
2. FEELING DOWN, DEPRESSED OR HOPELESS: 0
SUM OF ALL RESPONSES TO PHQ9 QUESTIONS 1 & 2: 1
1. LITTLE INTEREST OR PLEASURE IN DOING THINGS: 1
4. FEELING TIRED OR HAVING LITTLE ENERGY: 2
6. FEELING BAD ABOUT YOURSELF - OR THAT YOU ARE A FAILURE OR HAVE LET YOURSELF OR YOUR FAMILY DOWN: 0
7. TROUBLE CONCENTRATING ON THINGS, SUCH AS READING THE NEWSPAPER OR WATCHING TELEVISION: 3
8. MOVING OR SPEAKING SO SLOWLY THAT OTHER PEOPLE COULD HAVE NOTICED. OR THE OPPOSITE, BEING SO FIGETY OR RESTLESS THAT YOU HAVE BEEN MOVING AROUND A LOT MORE THAN USUAL: 3

## 2022-11-07 ASSESSMENT — SOCIAL DETERMINANTS OF HEALTH (SDOH): HOW HARD IS IT FOR YOU TO PAY FOR THE VERY BASICS LIKE FOOD, HOUSING, MEDICAL CARE, AND HEATING?: NOT HARD AT ALL
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
## 2022-11-10 ENCOUNTER — HOSPITAL ENCOUNTER (OUTPATIENT)
Age: 32
Discharge: HOME | End: 2022-11-10
Payer: MEDICARE

## 2022-11-10 ENCOUNTER — HOSPITAL ENCOUNTER (OUTPATIENT)
Dept: GENERAL RADIOLOGY | Age: 32
Discharge: HOME | End: 2022-11-10
Payer: MEDICARE

## 2022-11-10 DIAGNOSIS — T14.8XXA OPEN WOUND: ICD-10-CM

## 2022-11-10 PROCEDURE — 72220 X-RAY EXAM SACRUM TAILBONE: CPT

## 2022-11-15 ENCOUNTER — HOSPITAL ENCOUNTER (OUTPATIENT)
Age: 32
Discharge: HOME | End: 2022-11-15
Payer: MEDICARE

## 2022-11-15 DIAGNOSIS — Z13.1 SCREENING FOR DIABETES MELLITUS: ICD-10-CM

## 2022-11-15 DIAGNOSIS — Z13.220 SCREENING, LIPID: ICD-10-CM

## 2022-11-15 DIAGNOSIS — N31.9 NEUROGENIC BLADDER: ICD-10-CM

## 2022-11-15 DIAGNOSIS — S06.9X9A: ICD-10-CM

## 2022-11-15 DIAGNOSIS — M79.2 NEUROPATHIC PAIN: ICD-10-CM

## 2022-11-15 PROBLEM — Q06.8: Status: ACTIVE | Noted: 2018-05-24

## 2022-11-15 PROBLEM — M89.8X9 HETEROTOPIC OSSIFICATION: Status: ACTIVE | Noted: 2022-02-03

## 2022-11-15 PROBLEM — Z99.3 WHEELCHAIR DEPENDENCE: Status: ACTIVE | Noted: 2019-01-16

## 2022-11-15 PROBLEM — Z96.7 FIXATION HARDWARE IN SPINE: Status: ACTIVE | Noted: 2019-01-17

## 2022-11-15 LAB
ALBUMIN SERPL-MCNC: 4.2 G/DL (ref 3.5–5.1)
ALP BLD-CCNC: 95 U/L (ref 38–126)
ALT SERPL-CCNC: 51 U/L (ref 11–66)
ANION GAP SERPL CALCULATED.3IONS-SCNC: 13 MEQ/L (ref 8–16)
AST SERPL-CCNC: 32 U/L (ref 5–40)
BASOPHILS # BLD: 0.7 %
BASOPHILS ABSOLUTE: 0 THOU/MM3 (ref 0–0.1)
BILIRUB SERPL-MCNC: 0.5 MG/DL (ref 0.3–1.2)
BUN BLDV-MCNC: 12 MG/DL (ref 7–22)
CALCIUM SERPL-MCNC: 9.9 MG/DL (ref 8.5–10.5)
CHLORIDE BLD-SCNC: 103 MEQ/L (ref 98–111)
CHOLESTEROL, TOTAL: 184 MG/DL (ref 100–199)
CO2: 24 MEQ/L (ref 23–33)
CREAT SERPL-MCNC: 0.8 MG/DL (ref 0.4–1.2)
EOSINOPHIL # BLD: 1.1 %
EOSINOPHILS ABSOLUTE: 0 THOU/MM3 (ref 0–0.4)
ERYTHROCYTE [DISTWIDTH] IN BLOOD BY AUTOMATED COUNT: 13.2 % (ref 11.5–14.5)
ERYTHROCYTE [DISTWIDTH] IN BLOOD BY AUTOMATED COUNT: 40.9 FL (ref 35–45)
GFR SERPL CREATININE-BSD FRML MDRD: > 60 ML/MIN/1.73M2
GLUCOSE BLD-MCNC: 81 MG/DL (ref 70–108)
HCT VFR BLD CALC: 46.6 % (ref 42–52)
HDLC SERPL-MCNC: 30 MG/DL
HEMOGLOBIN: 15.9 GM/DL (ref 14–18)
IMMATURE GRANS (ABS): 0.04 THOU/MM3 (ref 0–0.07)
IMMATURE GRANULOCYTES: 0.9 %
LDL CHOLESTEROL CALCULATED: 119 MG/DL
LYMPHOCYTES # BLD: 33.9 %
LYMPHOCYTES ABSOLUTE: 1.5 THOU/MM3 (ref 1–4.8)
MCH RBC QN AUTO: 29.2 PG (ref 26–33)
MCHC RBC AUTO-ENTMCNC: 34.1 GM/DL (ref 32.2–35.5)
MCV RBC AUTO: 85.7 FL (ref 80–94)
MONOCYTES # BLD: 11.3 %
MONOCYTES ABSOLUTE: 0.5 THOU/MM3 (ref 0.4–1.3)
NUCLEATED RED BLOOD CELLS: 0 /100 WBC
PLATELET # BLD: 227 THOU/MM3 (ref 130–400)
PMV BLD AUTO: 11.1 FL (ref 9.4–12.4)
POTASSIUM SERPL-SCNC: 4.1 MEQ/L (ref 3.5–5.2)
RBC # BLD: 5.44 MILL/MM3 (ref 4.7–6.1)
SEG NEUTROPHILS: 52.1 %
SEGMENTED NEUTROPHILS ABSOLUTE COUNT: 2.3 THOU/MM3 (ref 1.8–7.7)
SODIUM BLD-SCNC: 140 MEQ/L (ref 135–145)
TOTAL PROTEIN: 7.6 G/DL (ref 6.1–8)
TRIGL SERPL-MCNC: 173 MG/DL (ref 0–199)
WBC # BLD: 4.5 THOU/MM3 (ref 4.8–10.8)

## 2022-11-15 PROCEDURE — 36415 COLL VENOUS BLD VENIPUNCTURE: CPT

## 2022-11-15 PROCEDURE — 80061 LIPID PANEL: CPT

## 2022-11-15 PROCEDURE — 85025 COMPLETE CBC W/AUTO DIFF WBC: CPT

## 2022-11-15 PROCEDURE — 80053 COMPREHEN METABOLIC PANEL: CPT

## 2022-11-15 ASSESSMENT — ENCOUNTER SYMPTOMS
BLOOD IN STOOL: 0
CHEST TIGHTNESS: 0
EYES NEGATIVE: 1
SHORTNESS OF BREATH: 0
ABDOMINAL PAIN: 0

## 2022-11-17 ENCOUNTER — OFFICE VISIT (OUTPATIENT)
Dept: FAMILY MEDICINE CLINIC | Age: 32
End: 2022-11-17
Payer: MEDICARE

## 2022-11-17 VITALS — DIASTOLIC BLOOD PRESSURE: 76 MMHG | RESPIRATION RATE: 18 BRPM | SYSTOLIC BLOOD PRESSURE: 120 MMHG | HEART RATE: 100 BPM

## 2022-11-17 DIAGNOSIS — Z99.3 WHEELCHAIR DEPENDENCE: ICD-10-CM

## 2022-11-17 DIAGNOSIS — G40.909 SEIZURE DISORDER (HCC): ICD-10-CM

## 2022-11-17 DIAGNOSIS — S06.9X9A: ICD-10-CM

## 2022-11-17 DIAGNOSIS — M54.50 LUMBAR BACK PAIN: Primary | ICD-10-CM

## 2022-11-17 DIAGNOSIS — N52.1 ERECTILE DYSFUNCTION DUE TO DISEASES CLASSIFIED ELSEWHERE: ICD-10-CM

## 2022-11-17 DIAGNOSIS — R06.02 SOB (SHORTNESS OF BREATH): ICD-10-CM

## 2022-11-17 DIAGNOSIS — G89.29 CHRONIC THORACIC BACK PAIN, UNSPECIFIED BACK PAIN LATERALITY: ICD-10-CM

## 2022-11-17 DIAGNOSIS — M54.6 CHRONIC THORACIC BACK PAIN, UNSPECIFIED BACK PAIN LATERALITY: ICD-10-CM

## 2022-11-17 PROCEDURE — G8427 DOCREV CUR MEDS BY ELIG CLIN: HCPCS | Performed by: NURSE PRACTITIONER

## 2022-11-17 PROCEDURE — 99214 OFFICE O/P EST MOD 30 MIN: CPT | Performed by: NURSE PRACTITIONER

## 2022-11-17 PROCEDURE — G8484 FLU IMMUNIZE NO ADMIN: HCPCS | Performed by: NURSE PRACTITIONER

## 2022-11-17 PROCEDURE — G8419 CALC BMI OUT NRM PARAM NOF/U: HCPCS | Performed by: NURSE PRACTITIONER

## 2022-11-17 PROCEDURE — 1036F TOBACCO NON-USER: CPT | Performed by: NURSE PRACTITIONER

## 2022-11-17 RX ORDER — CIPROFLOXACIN 500 MG/1
TABLET, FILM COATED ORAL
COMMUNITY
Start: 2022-11-08

## 2022-11-17 RX ORDER — SILDENAFIL 50 MG/1
50 TABLET, FILM COATED ORAL DAILY PRN
Qty: 30 TABLET | Refills: 0 | Status: SHIPPED | OUTPATIENT
Start: 2022-11-17

## 2022-11-17 NOTE — PATIENT INSTRUCTIONS
Recommend PFT and CXR  Pt to call concussion clinic for persistent headache  Pt to check with neurologist regarding viagra before starting  Referral to ortho - they will call pt to schedule

## 2022-11-17 NOTE — PROGRESS NOTES
Chief Complaint   Patient presents with    Discuss Medications     Pt would like to discuss Lidocaine spray due to it causing some burning in his nose. Pt would also like to discuss some xrays and Cts that he completed in October and the results. Discuss medication for ED. Pt would also like to discuss being prescribed an inhaler. Would like to discuss his Baclofen and Metoprolol doses         SUBJECTIVE     Kalin Mcfarlane is a 33 y.o.male      Pt c/o back pain. Wants to review imaging completed in early Oct.   Pt feels like he cannot take a deep breath for the last few days. Does not feel like he is getting ill. Denies cough or wheezing. He has ventolin but states it is not helping. Was putting gas in his vehicle and states that being in the cold did not bother his breathing but inside where it is warm he notices the breathing changes. Pt has hardware in his back from T3-T10    Metoprolol was recently elevated to 50 mg by specialist. He is concerned that his BP will drop. He does have a BP cuff at home. Was placed on cipro for UTI by specialist.     Pt is interested in medications for ED. States he can get an erection, but sometimes has difficulty maintaining this. Would like to try medication prior to urology referral.     Review of Systems   Constitutional:  Negative for chills, fatigue, fever and unexpected weight change. HENT: Negative. Eyes: Negative. Respiratory:  Positive for shortness of breath. Negative for chest tightness. Cardiovascular:  Negative for chest pain, palpitations and leg swelling. Gastrointestinal:  Negative for abdominal pain and blood in stool. Genitourinary:  Negative for dysuria. Musculoskeletal:  Positive for back pain. Negative for joint swelling. Skin:  Negative for rash. Neurological:  Negative for dizziness. Psychiatric/Behavioral: Negative. All other systems reviewed and are negative.       OBJECTIVE     /76   Pulse 100   Resp 18     Physical Exam  Vitals and nursing note reviewed. Constitutional:       Appearance: He is well-developed. HENT:      Head: Normocephalic and atraumatic. Right Ear: External ear normal.      Left Ear: External ear normal.      Nose: Nose normal.   Eyes:      Conjunctiva/sclera: Conjunctivae normal.      Pupils: Pupils are equal, round, and reactive to light. Cardiovascular:      Rate and Rhythm: Normal rate and regular rhythm. Pulmonary:      Effort: Pulmonary effort is normal.      Breath sounds: Normal breath sounds. Abdominal:      General: Bowel sounds are normal.      Palpations: Abdomen is soft. Musculoskeletal:         General: Normal range of motion. Cervical back: Normal range of motion and neck supple. Comments: Wheelchair bound   Skin:     General: Skin is warm and dry. Neurological:      Mental Status: He is alert and oriented to person, place, and time. Deep Tendon Reflexes: Reflexes are normal and symmetric. Psychiatric:         Behavior: Behavior normal.         Thought Content: Thought content normal.         Judgment: Judgment normal.         No results found for this visit on 11/17/22. ASSESSMENT       Diagnosis Orders   1. Lumbar back pain  Amb External Referral To Orthopedic Surgery      2. SOB (shortness of breath)  Full PFT Study With Bronchodilator    XR CHEST (2 VW)      3. Wheelchair dependence        4. Brain injury without open intracranial wound and with brief loss of consciousness (less than one hour) ([address]        5. Seizure disorder ([address]        6. Erectile dysfunction due to diseases classified elsewhere        7.  Chronic thoracic back pain, unspecified back pain laterality  Amb External Referral To Orthopedic Surgery          PLAN     Requested Prescriptions     Signed Prescriptions Disp Refills    sildenafil (VIAGRA) 50 MG tablet 30 tablet 0     Sig: Take 1 tablet by mouth daily as needed for Erectile Dysfunction       Orders Placed This Encounter Procedures    XR CHEST (2 VW)     Standing Status:   Future     Standing Expiration Date:   11/17/2023     Order Specific Question:   Reason for exam:     Answer:   sob last few days    Amb External Referral To Orthopedic Surgery     Referral Priority:   Routine     Referral Type:   Consult for Advice and Opinion     Requested Specialty:   Orthopaedic Surgery     Number of Visits Requested:   1    Full PFT Study With Bronchodilator     If an ABG is needed along with this PFT procedure, please place the appropriate lab order     Standing Status:   Future     Standing Expiration Date:   11/17/2023       Recommend PFT and CXR - pt is agreeable  Pt to call concussion clinic for persistent headache and to discuss lidocaine spray  Pt to check with neurologist regarding viagra before starting. Can refer to urology if patient desires.    Referral to ortho - they will call pt to schedule          Electronically signed by RADHA Nuno CNP on 11/22/2022 at 3:26 PM

## 2022-11-22 ASSESSMENT — ENCOUNTER SYMPTOMS
EYES NEGATIVE: 1
BLOOD IN STOOL: 0
ABDOMINAL PAIN: 0
BACK PAIN: 1
SHORTNESS OF BREATH: 1
CHEST TIGHTNESS: 0

## 2022-11-28 NOTE — PROGRESS NOTES
Referral to [facility] faxed om 11/28/22 with office note, insurance card, XR lumbar spine, XR Thoracic spine, CT cervical spine and XR of the Sacrum Coccyc attached. They will call the pt to schedule an appt.     Fax number: [phone]

## 2022-12-13 ENCOUNTER — HOSPITAL ENCOUNTER (OUTPATIENT)
Dept: PULMONOLOGY | Age: 32
Discharge: HOME | End: 2022-12-13
Payer: MEDICARE

## 2022-12-13 DIAGNOSIS — R06.02 SOB (SHORTNESS OF BREATH): ICD-10-CM

## 2022-12-13 PROCEDURE — 94729 DIFFUSING CAPACITY: CPT

## 2022-12-13 PROCEDURE — 94060 EVALUATION OF WHEEZING: CPT

## 2023-01-04 ENCOUNTER — OFFICE VISIT (OUTPATIENT)
Dept: FAMILY MEDICINE CLINIC | Age: 33
End: 2023-01-04
Payer: MEDICARE

## 2023-01-04 VITALS
RESPIRATION RATE: 14 BRPM | BODY MASS INDEX: 25.2 KG/M2 | SYSTOLIC BLOOD PRESSURE: 126 MMHG | HEART RATE: 105 BPM | OXYGEN SATURATION: 97 % | TEMPERATURE: 98 F | WEIGHT: 180 LBS | DIASTOLIC BLOOD PRESSURE: 68 MMHG | HEIGHT: 71 IN

## 2023-01-04 DIAGNOSIS — N30.01 ACUTE CYSTITIS WITH HEMATURIA: Primary | ICD-10-CM

## 2023-01-04 LAB
BILIRUBIN URINE: NEGATIVE
BLOOD URINE, POC: ABNORMAL
CHARACTER, URINE: ABNORMAL
COLOR, URINE: YELLOW
GLUCOSE URINE: NEGATIVE MG/DL
KETONES, URINE: NEGATIVE
LEUKOCYTE CLUMPS, URINE: ABNORMAL
NITRITE, URINE: NEGATIVE
PH, URINE: 7 (ref 5–9)
PROTEIN, URINE: NEGATIVE MG/DL
SPECIFIC GRAVITY, URINE: <= 1.005 (ref 1–1.03)
UROBILINOGEN, URINE: 0.2 EU/DL (ref 0–1)

## 2023-01-04 PROCEDURE — G8484 FLU IMMUNIZE NO ADMIN: HCPCS | Performed by: NURSE PRACTITIONER

## 2023-01-04 PROCEDURE — 99214 OFFICE O/P EST MOD 30 MIN: CPT | Performed by: NURSE PRACTITIONER

## 2023-01-04 PROCEDURE — 1036F TOBACCO NON-USER: CPT | Performed by: NURSE PRACTITIONER

## 2023-01-04 PROCEDURE — G8427 DOCREV CUR MEDS BY ELIG CLIN: HCPCS | Performed by: NURSE PRACTITIONER

## 2023-01-04 PROCEDURE — G8419 CALC BMI OUT NRM PARAM NOF/U: HCPCS | Performed by: NURSE PRACTITIONER

## 2023-01-04 RX ORDER — CIPROFLOXACIN 500 MG/1
500 TABLET, FILM COATED ORAL 2 TIMES DAILY
Qty: 14 TABLET | Refills: 0 | Status: SHIPPED | OUTPATIENT
Start: 2023-01-04 | End: 2023-01-11
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
## 2023-01-04 NOTE — PATIENT INSTRUCTIONS
UTI Prevention:    Drink 2-3 liters of water per day   *helps flush the bladder out and keep bacteria counts low    Start taking these medications daily:  Probiotic (with lactobacillus acidophilus) 1 capsule daily  Cranberry Extract 1 tablet daily    *would have to drink 1 gallon of Cranberry Juice to get the equivalent of 1 tab   *doesn't have all of the sugar and acid that the juice does, which can be irritating to the bladder  D-mannose 1500 mg daily   *probably the most important one of the three   *easiest place to get it is [address] [facility] [address], not all pharmacies carry it    During course of infection try to limit bladder irritating foods/drinks:  -caffeine containing beverages (coffee, tea, energy drinks, soda)  -citrus containing food and beverages (joaquim, limes, flavored drinks, etc)  -tomato based products (tomato juice, pizza sauce, spaghetti sauce, etc)

## 2023-01-06 LAB
ORGANISM: ABNORMAL
URINE CULTURE, ROUTINE: ABNORMAL

## 2023-01-06 NOTE — PROGRESS NOTES
Patient/CM have researched transportation options for patient for therapy and MD appts. Patient is eligible for transportation through [employer] once consent form signed by physician. CM also referred patient to [employer] for transportation on dates [employer] unable to provide assistance. [employer] will transport patient to his [address] on 1/18/18. Patient /therapists aware of arrangements.   Jessa Hernadez RN 1/10/2017 Male

## 2023-01-09 ENCOUNTER — TELEPHONE (OUTPATIENT)
Dept: FAMILY MEDICINE CLINIC | Age: 33
End: 2023-01-09

## 2023-01-09 DIAGNOSIS — N30.01 ACUTE CYSTITIS WITH HEMATURIA: ICD-10-CM

## 2023-01-09 RX ORDER — CIPROFLOXACIN 500 MG/1
500 TABLET, FILM COATED ORAL 2 TIMES DAILY
Qty: 14 TABLET | Refills: 0 | Status: SHIPPED | OUTPATIENT
Start: 2023-01-09 | End: 2023-01-13

## 2023-01-09 NOTE — TELEPHONE ENCOUNTER
I called and spoke with Leverne Homans and he states that he does not have the medication and states that he was told you would refill the antibiotic?

## 2023-01-09 NOTE — TELEPHONE ENCOUNTER
----- Message from RADHA Meraz CNP sent at 1/6/2023 12:40 PM EST -----  Urine culture grew Enterobacter. It is sensitive to Cipro. Finish full course of med.

## 2023-01-13 ENCOUNTER — OFFICE VISIT (OUTPATIENT)
Dept: FAMILY MEDICINE CLINIC | Age: 33
End: 2023-01-13
Payer: MEDICARE

## 2023-01-13 VITALS — DIASTOLIC BLOOD PRESSURE: 84 MMHG | HEART RATE: 80 BPM | RESPIRATION RATE: 18 BRPM | SYSTOLIC BLOOD PRESSURE: 126 MMHG

## 2023-01-13 DIAGNOSIS — K21.9 GERD WITHOUT ESOPHAGITIS: ICD-10-CM

## 2023-01-13 DIAGNOSIS — Z99.3 WHEELCHAIR DEPENDENCE: ICD-10-CM

## 2023-01-13 DIAGNOSIS — N52.8 OTHER MALE ERECTILE DYSFUNCTION: ICD-10-CM

## 2023-01-13 DIAGNOSIS — N39.0 URINARY TRACT INFECTION ASSOCIATED WITH CATHETERIZATION OF URINARY TRACT, UNSPECIFIED INDWELLING URINARY CATHETER TYPE, SUBSEQUENT ENCOUNTER: Primary | ICD-10-CM

## 2023-01-13 DIAGNOSIS — T83.511D URINARY TRACT INFECTION ASSOCIATED WITH CATHETERIZATION OF URINARY TRACT, UNSPECIFIED INDWELLING URINARY CATHETER TYPE, SUBSEQUENT ENCOUNTER: Primary | ICD-10-CM

## 2023-01-13 PROCEDURE — G8419 CALC BMI OUT NRM PARAM NOF/U: HCPCS | Performed by: NURSE PRACTITIONER

## 2023-01-13 PROCEDURE — 99213 OFFICE O/P EST LOW 20 MIN: CPT | Performed by: NURSE PRACTITIONER

## 2023-01-13 PROCEDURE — G8427 DOCREV CUR MEDS BY ELIG CLIN: HCPCS | Performed by: NURSE PRACTITIONER

## 2023-01-13 PROCEDURE — G8484 FLU IMMUNIZE NO ADMIN: HCPCS | Performed by: NURSE PRACTITIONER

## 2023-01-13 PROCEDURE — 1036F TOBACCO NON-USER: CPT | Performed by: NURSE PRACTITIONER

## 2023-01-13 RX ORDER — CIPROFLOXACIN 500 MG/1
500 TABLET, FILM COATED ORAL 2 TIMES DAILY
Qty: 20 TABLET | Refills: 0 | Status: SHIPPED | OUTPATIENT
Start: 2023-01-13 | End: 2023-01-23

## 2023-01-13 RX ORDER — OMEPRAZOLE 20 MG/1
20 CAPSULE, DELAYED RELEASE ORAL DAILY
Qty: 90 CAPSULE | Refills: 3 | Status: SHIPPED | OUTPATIENT
Start: 2023-01-13

## 2023-01-13 RX ORDER — ONDANSETRON 4 MG/1
4 TABLET, ORALLY DISINTEGRATING ORAL EVERY 8 HOURS PRN
Qty: 20 TABLET | Refills: 0 | Status: SHIPPED | OUTPATIENT
Start: 2023-01-13

## 2023-01-13 RX ORDER — SILDENAFIL 50 MG/1
50 TABLET, FILM COATED ORAL DAILY PRN
Qty: 30 TABLET | Refills: 0 | Status: SHIPPED | OUTPATIENT
Start: 2023-01-13

## 2023-01-13 RX ORDER — CIPROFLOXACIN 500 MG/1
500 TABLET, FILM COATED ORAL 2 TIMES DAILY
Qty: 20 TABLET | Refills: 0 | Status: SHIPPED | OUTPATIENT
Start: 2023-01-13 | End: 2023-01-13

## 2023-01-13 RX ORDER — SULFAMETHOXAZOLE AND TRIMETHOPRIM 800; 160 MG/1; MG/1
1 TABLET ORAL 2 TIMES DAILY
Qty: 20 TABLET | Refills: 0 | Status: SHIPPED | OUTPATIENT
Start: 2023-01-13 | End: 2023-01-23

## 2023-01-13 RX ORDER — SULFAMETHOXAZOLE AND TRIMETHOPRIM 800; 160 MG/1; MG/1
1 TABLET ORAL 2 TIMES DAILY
Qty: 20 TABLET | Refills: 0 | Status: SHIPPED | OUTPATIENT
Start: 2023-01-13 | End: 2023-01-13

## 2023-01-13 ASSESSMENT — ENCOUNTER SYMPTOMS
CHEST TIGHTNESS: 0
SHORTNESS OF BREATH: 0
EYES NEGATIVE: 1
BACK PAIN: 1
ABDOMINAL PAIN: 0
BLOOD IN STOOL: 0

## 2023-01-13 ASSESSMENT — PATIENT HEALTH QUESTIONNAIRE - PHQ9
5. POOR APPETITE OR OVEREATING: 0
8. MOVING OR SPEAKING SO SLOWLY THAT OTHER PEOPLE COULD HAVE NOTICED. OR THE OPPOSITE, BEING SO FIGETY OR RESTLESS THAT YOU HAVE BEEN MOVING AROUND A LOT MORE THAN USUAL: 0
1. LITTLE INTEREST OR PLEASURE IN DOING THINGS: 0
4. FEELING TIRED OR HAVING LITTLE ENERGY: 0
7. TROUBLE CONCENTRATING ON THINGS, SUCH AS READING THE NEWSPAPER OR WATCHING TELEVISION: 0
9. THOUGHTS THAT YOU WOULD BE BETTER OFF DEAD, OR OF HURTING YOURSELF: 0
6. FEELING BAD ABOUT YOURSELF - OR THAT YOU ARE A FAILURE OR HAVE LET YOURSELF OR YOUR FAMILY DOWN: 0
2. FEELING DOWN, DEPRESSED OR HOPELESS: 0
SUM OF ALL RESPONSES TO PHQ9 QUESTIONS 1 & 2: 0
SUM OF ALL RESPONSES TO PHQ QUESTIONS 1-9: 0
3. TROUBLE FALLING OR STAYING ASLEEP: 0
10. IF YOU CHECKED OFF ANY PROBLEMS, HOW DIFFICULT HAVE THESE PROBLEMS MADE IT FOR YOU TO DO YOUR WORK, TAKE CARE OF THINGS AT HOME, OR GET ALONG WITH OTHER PEOPLE: 0

## 2023-01-13 NOTE — PROGRESS NOTES
Chief Complaint   Patient presents with    Follow-up     8 week follow up. Pt does not believe that his UTI cleared up and would like to discuss. Pt would like to discuss getting something that states his disability and need for service animal and medication. Griselda Gallop is a 33 y.o.male      Pt was treated recently for a UTI. He states symptoms are better, but not resolved. Gets muscle spasms and back pain. Would like to repeat course for the full length of time. Would also like antibiotic to take over sees with him when he travels as his access to medical care will be limited and he has an indwelling catheter. Will be leaving to go overse[place] on 1[place]23 for 8 months. Will be for travel. He will take his medical supplies and his service dog with him. Would like notes to reflect the need for this. Review of Systems   Constitutional:  Negative for chills, fatigue, fever and unexpected weight change. HENT: Negative. Eyes: Negative. Respiratory:  Negative for chest tightness and shortness of breath. Cardiovascular:  Negative for chest pain, palpitations and leg swelling. Gastrointestinal:  Negative for abdominal pain and blood in stool. Genitourinary:  Negative for dysuria. Musculoskeletal:  Positive for back pain and myalgias. Negative for joint swelling. Skin:  Negative for rash. Neurological:  Negative for dizziness. Psychiatric/Behavioral: Negative. All other systems reviewed and are negative. OBJECTIVE     /84   Pulse 80   Resp 18     Physical Exam  Vitals and nursing note reviewed. Constitutional:       Appearance: He is well-developed. HENT:      Head: Normocephalic and atraumatic. Right Ear: External ear normal.      Left Ear: External ear normal.      Nose: Nose normal.   Eyes:      Conjunctiva/sclera: Conjunctivae normal.      Pupils: Pupils are equal, round, and reactive to light.    Cardiovascular:      Rate and Rhythm: Normal rate and regular rhythm. Pulmonary:      Effort: Pulmonary effort is normal.      Breath sounds: Normal breath sounds. Abdominal:      General: Bowel sounds are normal.      Palpations: Abdomen is soft. Musculoskeletal:         General: Normal range of motion. Cervical back: Normal range of motion and neck supple. Comments: Wheelchair bound   Skin:     General: Skin is warm and dry. Neurological:      Mental Status: He is alert and oriented to person, place, and time. Deep Tendon Reflexes: Reflexes are normal and symmetric. Psychiatric:         Behavior: Behavior normal.         Thought Content: Thought content normal.         Judgment: Judgment normal.         No results found for this visit on 01/13/23. ASSESSMENT       Diagnosis Orders   1. Urinary tract infection associated with catheterization of urinary tract, unspecified indwelling urinary catheter type, subsequent encounter  Urinalysis with Reflex to Culture    sulfamethoxazole-trimethoprim (BACTRIM DS;SEPTRA DS) 800-160 MG per tablet    ciprofloxacin (CIPRO) 500 MG tablet      2. Wheelchair dependence        3. Other male erectile dysfunction  sildenafil (VIAGRA) 50 MG tablet      4.  GERD without esophagitis  omeprazole (PRILOSEC) 20 MG delayed release capsule          PLAN     Requested Prescriptions     Signed Prescriptions Disp Refills    ondansetron (ZOFRAN ODT) 4 MG disintegrating tablet 20 tablet 0     Sig: Take 1 tablet by mouth every 8 hours as needed for Nausea    omeprazole (PRILOSEC) 20 MG delayed release capsule 90 capsule 3     Sig: Take 1 capsule by mouth daily    sulfamethoxazole-trimethoprim (BACTRIM DS;SEPTRA DS) 800-160 MG per tablet 20 tablet 0     Sig: Take 1 tablet by mouth 2 times daily for 10 days    ciprofloxacin (CIPRO) 500 MG tablet 20 tablet 0     Sig: Take 1 tablet by mouth 2 times daily for 10 days    sildenafil (VIAGRA) 50 MG tablet 30 tablet 0     Sig: Take 1 tablet by mouth daily as needed for Erectile Dysfunction       Orders Placed This Encounter   Procedures    Urinalysis with Reflex to Culture     With sensitivity if indicated     Standing Status:   Future     Standing Expiration Date:   1/13/2024     Order Specific Question:   SPECIFY(EX-CATH,MIDSTREAM,CYSTO,ETC)?      Answer:   from catheter     Will repeat cipro  Urine to be completed prior to leaving country  Bactrim script sent [place] him to take [place] with him  Refills sent  Letters provided  Follow up upon return    Electronically signed by RADHA Marin CNP on 1/13/2023 at 1:23 PM

## 2023-01-13 NOTE — LETTER
[facility]  [address] [facility] [address]  Phone: [phone]  Fax: [phone]    RADHA Betts CNP        January 13, 2023     Patient: Noé Licea   YOB: 1990   Date of Visit: 1/13/2023       To Whom it May Concern:    Noé Licea is patient of this office. He is permanently disabled and uses a wheelchair. He does have a service animal.    Sincerely,         RADHA Betts CNP

## 2023-01-13 NOTE — LETTER
[address] [place] [address]  Phone: [phone]  Fax: [phone]    RADHA Deutsch CNP        January 13, 2023     Patient: Teresa Brannon   YOB: 1990   Date of Visit: 1/13/2023       To Whom it May Concern:    Kaci Castellon is a patient of this office. Please see attached paper for list of his medications. He needs these with him while traveling. If you have any questions or concerns, please don't hesitate to call.     Sincerely,         RADHA Deutsch CNP

## 2023-01-22 ENCOUNTER — PATIENT MESSAGE (OUTPATIENT)
Dept: FAMILY MEDICINE CLINIC | Age: 33
End: 2023-01-22

## 2023-01-23 NOTE — TELEPHONE ENCOUNTER
From: German Badillo  To: Olaf Lowry  Sent: 1/22/2023 5:36 PM EST  Subject: Antibiotics    So I finished my antibiotic today and my body still feels odd, I'm not sure if it's because I still have an UTI or if it's because of my foot. I also lost the order for my UA, I'm gonna try to walk into the place on [facility]. I also realize that I took the bactrium instead of the script for cypro. Is there a way you could write me another 7 day script for cypro? And maybe something else just in case?  If you need to call me you can or if I need to come in for an appointment I can try to do that as well

## 2023-01-26 ENCOUNTER — TELEPHONE (OUTPATIENT)
Dept: FAMILY MEDICINE CLINIC | Age: 33
End: 2023-01-26

## 2023-01-26 ENCOUNTER — HOSPITAL ENCOUNTER (OUTPATIENT)
Age: 33
Discharge: HOME | End: 2023-01-26
Payer: MEDICARE

## 2023-01-26 DIAGNOSIS — N39.0 URINARY TRACT INFECTION ASSOCIATED WITH CATHETERIZATION OF URINARY TRACT, UNSPECIFIED INDWELLING URINARY CATHETER TYPE, SUBSEQUENT ENCOUNTER: ICD-10-CM

## 2023-01-26 DIAGNOSIS — T83.511D URINARY TRACT INFECTION ASSOCIATED WITH CATHETERIZATION OF URINARY TRACT, UNSPECIFIED INDWELLING URINARY CATHETER TYPE, SUBSEQUENT ENCOUNTER: ICD-10-CM

## 2023-01-26 DIAGNOSIS — N39.0 URINARY TRACT INFECTION WITH HEMATURIA, SITE UNSPECIFIED: Primary | ICD-10-CM

## 2023-01-26 DIAGNOSIS — R31.9 URINARY TRACT INFECTION WITH HEMATURIA, SITE UNSPECIFIED: Primary | ICD-10-CM

## 2023-01-26 LAB
BACTERIA URNS QL MICRO: ABNORMAL /HPF
BILIRUB UR QL STRIP.AUTO: NEGATIVE
CASTS #/AREA URNS LPF: ABNORMAL /LPF
CASTS 2: ABNORMAL /LPF
CHARACTER UR: CLEAR
COLOR: YELLOW
CRYSTALS URNS MICRO: ABNORMAL
EPITHELIAL CELLS, UA: ABNORMAL /HPF
GLUCOSE UR QL STRIP.AUTO: NEGATIVE MG/DL
HGB UR QL STRIP.AUTO: ABNORMAL
KETONES UR QL STRIP.AUTO: NEGATIVE
MISCELLANEOUS 2: ABNORMAL
NITRITE UR QL STRIP: NEGATIVE
PH UR STRIP.AUTO: 6.5 [PH] (ref 5–9)
PROT UR STRIP.AUTO-MCNC: NEGATIVE MG/DL
RBC URINE: ABNORMAL /HPF
RENAL EPI CELLS #/AREA URNS HPF: ABNORMAL /[HPF]
SP GR UR REFRACT.AUTO: < 1.005 (ref 1–1.03)
UROBILINOGEN, URINE: 0.2 EU/DL (ref 0–1)
WBC #/AREA URNS HPF: ABNORMAL /HPF
WBC #/AREA URNS HPF: ABNORMAL /[HPF]
YEAST LIKE FUNGI URNS QL MICRO: ABNORMAL

## 2023-01-26 PROCEDURE — 81001 URINALYSIS AUTO W/SCOPE: CPT

## 2023-01-26 NOTE — TELEPHONE ENCOUNTER
Spoke with patient. He is symptomatic but wanted reassurance that it is ok to go on Cipro again since he was recently on it. (Prior to the [facility] in January)    He is agreeable to treatment if you think it is ok. Call pt back tomorrow. Pharmacy is [facility].

## 2023-01-26 NOTE — TELEPHONE ENCOUNTER
----- Message from RADHA Bacon CNP sent at 1/26/2023  4:40 PM EST -----  Urine continues to show moderate blood and small leukocytes. Please see if he is still having symptoms, if he is, send in cipro 500mg BID x10 days. Pt recently took bactrim instead of cipro.  TS

## 2023-01-27 RX ORDER — NITROFURANTOIN 25; 75 MG/1; MG/1
100 CAPSULE ORAL 2 TIMES DAILY
Qty: 28 CAPSULE | Refills: 0 | Status: SHIPPED | OUTPATIENT
Start: 2023-01-27 | End: 2023-02-10

## 2023-01-27 NOTE — TELEPHONE ENCOUNTER
If he will be in the country that would be a good idea. Originally he was supposed to be traveling abroad. Can repeat UA 1 week after antibiotic complete.  TS
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
## 2023-01-27 NOTE — TELEPHONE ENCOUNTER
Cipro would be okay, but lets go with macrobid. His culture earlier this month showed the current bacteria at that time was sensitive. I will send this to his pharmacy.  TS

## 2023-02-06 ENCOUNTER — PATIENT MESSAGE (OUTPATIENT)
Dept: FAMILY MEDICINE CLINIC | Age: 33
End: 2023-02-06

## 2023-02-06 DIAGNOSIS — N31.9 NEUROGENIC BLADDER: Primary | ICD-10-CM

## 2023-02-06 DIAGNOSIS — N39.0 FREQUENT UTI: ICD-10-CM

## 2023-02-06 NOTE — TELEPHONE ENCOUNTER
From: Lamar Belle  To: Amalia Kasey  Sent: 2/6/2023 11:32 AM EST  Subject: Uti    I started feeling better a couple days ago then yesterday morning I started feeling a little rough and today is worse. My muscles can't relax and it seems like they're always tight. I'm having some kidney pains and when I'm laying down I'm having lots of muscle spasms and I've been sweating really bad in my sleep. I'm also having headaches which I'm not sure if it's the UTI or not. I'm also not sure of how many days I have left on the antibiotic but if I had to guess I would say 4 days maybe.     Thanks,  Kelvin Seo

## 2023-02-10 RX ORDER — OXYBUTYNIN CHLORIDE 5 MG/1
5 TABLET, EXTENDED RELEASE ORAL DAILY
Qty: 30 TABLET | Refills: 0 | Status: SHIPPED | OUTPATIENT
Start: 2023-02-10

## 2023-02-17 ENCOUNTER — HOSPITAL ENCOUNTER (EMERGENCY)
Age: 33
Discharge: HOME | End: 2023-02-17
Attending: STUDENT IN AN ORGANIZED HEALTH CARE EDUCATION/TRAINING PROGRAM
Payer: MEDICARE

## 2023-02-17 VITALS
TEMPERATURE: 99.1 F | WEIGHT: 190 LBS | BODY MASS INDEX: 26.6 KG/M2 | RESPIRATION RATE: 19 BRPM | SYSTOLIC BLOOD PRESSURE: 142 MMHG | OXYGEN SATURATION: 98 % | HEIGHT: 71 IN | HEART RATE: 89 BPM | DIASTOLIC BLOOD PRESSURE: 83 MMHG

## 2023-02-17 DIAGNOSIS — N39.0 URINARY TRACT INFECTION WITHOUT HEMATURIA, SITE UNSPECIFIED: Primary | ICD-10-CM

## 2023-02-17 LAB
ALBUMIN SERPL BCG-MCNC: 4.3 G/DL (ref 3.5–5.1)
ALP SERPL-CCNC: 88 U/L (ref 38–126)
ALT SERPL W/O P-5'-P-CCNC: 30 U/L (ref 11–66)
AMORPH SED URNS QL MICRO: ABNORMAL
ANION GAP SERPL CALC-SCNC: 10 MEQ/L (ref 8–16)
AST SERPL-CCNC: 19 U/L (ref 5–40)
BACTERIA URNS QL MICRO: ABNORMAL /HPF
BASOPHILS ABSOLUTE: 0 THOU/MM3 (ref 0–0.1)
BASOPHILS NFR BLD AUTO: 0.7 %
BILIRUB SERPL-MCNC: 0.3 MG/DL (ref 0.3–1.2)
BILIRUB UR QL STRIP.AUTO: NEGATIVE
BUN SERPL-MCNC: 11 MG/DL (ref 7–22)
CALCIUM SERPL-MCNC: 9.5 MG/DL (ref 8.5–10.5)
CASTS #/AREA URNS LPF: ABNORMAL /LPF
CHARACTER UR: ABNORMAL
CHLORIDE SERPL-SCNC: 99 MEQ/L (ref 98–111)
CO2 SERPL-SCNC: 27 MEQ/L (ref 23–33)
COLOR: YELLOW
CREAT SERPL-MCNC: 0.7 MG/DL (ref 0.4–1.2)
CRYSTALS URNS MICRO: ABNORMAL
DEPRECATED RDW RBC AUTO: 42.2 FL (ref 35–45)
EOSINOPHIL NFR BLD AUTO: 0.4 %
EOSINOPHILS ABSOLUTE: 0 THOU/MM3 (ref 0–0.4)
EPITHELIAL CELLS, UA: ABNORMAL /HPF
ERYTHROCYTE [DISTWIDTH] IN BLOOD BY AUTOMATED COUNT: 13 % (ref 11.5–14.5)
GFR SERPL CREATININE-BSD FRML MDRD: > 60 ML/MIN/1.73M2
GLUCOSE SERPL-MCNC: 74 MG/DL (ref 70–108)
GLUCOSE UR QL STRIP.AUTO: NEGATIVE MG/DL
HCT VFR BLD AUTO: 49.1 % (ref 42–52)
HGB BLD-MCNC: 16.1 GM/DL (ref 14–18)
HGB UR QL STRIP.AUTO: ABNORMAL
IMM GRANULOCYTES # BLD AUTO: 0.02 THOU/MM3 (ref 0–0.07)
IMM GRANULOCYTES NFR BLD AUTO: 0.4 %
KETONES UR QL STRIP.AUTO: NEGATIVE
LYMPHOCYTES ABSOLUTE: 1.6 THOU/MM3 (ref 1–4.8)
LYMPHOCYTES NFR BLD AUTO: 28.9 %
MAGNESIUM SERPL-MCNC: 1.6 MG/DL (ref 1.6–2.4)
MCH RBC QN AUTO: 29.1 PG (ref 26–33)
MCHC RBC AUTO-ENTMCNC: 32.8 GM/DL (ref 32.2–35.5)
MCV RBC AUTO: 88.8 FL (ref 80–94)
MONOCYTES ABSOLUTE: 0.5 THOU/MM3 (ref 0.4–1.3)
MONOCYTES NFR BLD AUTO: 8.7 %
NEUTROPHILS NFR BLD AUTO: 60.9 %
NITRITE UR QL STRIP: NEGATIVE
NRBC BLD AUTO-RTO: 0 /100 WBC
OSMOLALITY SERPL CALC.SUM OF ELEC: 270 MOSMOL/KG (ref 275–300)
PH UR STRIP.AUTO: 8 [PH] (ref 5–9)
PLATELET # BLD AUTO: 235 THOU/MM3 (ref 130–400)
PMV BLD AUTO: 10.8 FL (ref 9.4–12.4)
POTASSIUM SERPL-SCNC: 3.8 MEQ/L (ref 3.5–5.2)
PROT SERPL-MCNC: 8.3 G/DL (ref 6.1–8)
PROT UR STRIP.AUTO-MCNC: NEGATIVE MG/DL
RBC # BLD AUTO: 5.53 MILL/MM3 (ref 4.7–6.1)
RBC URINE: ABNORMAL /HPF
SEGMENTED NEUTROPHILS ABSOLUTE COUNT: 3.3 THOU/MM3 (ref 1.8–7.7)
SODIUM SERPL-SCNC: 136 MEQ/L (ref 135–145)
SP GR UR REFRACT.AUTO: 1.01 (ref 1–1.03)
UROBILINOGEN, URINE: 0.2 EU/DL (ref 0–1)
WBC # BLD AUTO: 5.5 THOU/MM3 (ref 4.8–10.8)
WBC #/AREA URNS HPF: ABNORMAL /HPF
WBC #/AREA URNS HPF: ABNORMAL /[HPF]
YEAST LIKE FUNGI URNS QL MICRO: ABNORMAL

## 2023-02-17 PROCEDURE — 80053 COMPREHEN METABOLIC PANEL: CPT

## 2023-02-17 PROCEDURE — 99283 EMERGENCY DEPT VISIT LOW MDM: CPT

## 2023-02-17 PROCEDURE — 85025 COMPLETE CBC W/AUTO DIFF WBC: CPT

## 2023-02-17 PROCEDURE — 83735 ASSAY OF MAGNESIUM: CPT

## 2023-02-17 PROCEDURE — 81001 URINALYSIS AUTO W/SCOPE: CPT

## 2023-02-17 PROCEDURE — 87086 URINE CULTURE/COLONY COUNT: CPT

## 2023-02-17 PROCEDURE — 36415 COLL VENOUS BLD VENIPUNCTURE: CPT

## 2023-02-17 RX ORDER — CEFUROXIME AXETIL 500 MG/1
500 TABLET ORAL 2 TIMES DAILY
Qty: 20 TABLET | Refills: 0 | Status: SHIPPED | OUTPATIENT
Start: 2023-02-17 | End: 2023-02-27

## 2023-02-17 RX ORDER — SULFAMETHOXAZOLE AND TRIMETHOPRIM 800; 160 MG/1; MG/1
1 TABLET ORAL 2 TIMES DAILY
Qty: 20 TABLET | Refills: 0 | Status: SHIPPED | OUTPATIENT
Start: 2023-02-17 | End: 2023-02-17

## 2023-02-17 ASSESSMENT — PAIN DESCRIPTION - PAIN TYPE: TYPE: CHRONIC PAIN

## 2023-02-17 ASSESSMENT — PAIN - FUNCTIONAL ASSESSMENT: PAIN_FUNCTIONAL_ASSESSMENT: 0-10

## 2023-02-17 ASSESSMENT — PAIN DESCRIPTION - LOCATION: LOCATION: BACK

## 2023-02-17 ASSESSMENT — PAIN SCALES - GENERAL: PAINLEVEL_OUTOF10: 5
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
## 2023-02-17 NOTE — ED TRIAGE NOTES
Pt to ED via private vehicle w/rprts of chronic UTI. Pt rprts just completed a dose of bactrim w/no improvement. Pt rprts 5/10 \"kidney\" pain for several days.
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
## 2023-02-19 LAB
BACTERIA UR CULT: ABNORMAL
ORGANISM: ABNORMAL

## 2023-02-20 ENCOUNTER — TELEPHONE (OUTPATIENT)
Dept: FAMILY MEDICINE CLINIC | Age: 33
End: 2023-02-20

## 2023-02-23 ENCOUNTER — OFFICE VISIT (OUTPATIENT)
Dept: UROLOGY | Age: 33
End: 2023-02-23
Payer: MEDICARE

## 2023-02-23 VITALS — WEIGHT: 190 LBS | BODY MASS INDEX: 26.6 KG/M2 | RESPIRATION RATE: 18 BRPM | HEIGHT: 71 IN

## 2023-02-23 DIAGNOSIS — N39.0 RECURRENT UTI: ICD-10-CM

## 2023-02-23 DIAGNOSIS — N31.9 NEUROGENIC BLADDER: Primary | ICD-10-CM

## 2023-02-23 PROCEDURE — G8419 CALC BMI OUT NRM PARAM NOF/U: HCPCS | Performed by: UROLOGY

## 2023-02-23 PROCEDURE — G8427 DOCREV CUR MEDS BY ELIG CLIN: HCPCS | Performed by: UROLOGY

## 2023-02-23 PROCEDURE — G8484 FLU IMMUNIZE NO ADMIN: HCPCS | Performed by: UROLOGY

## 2023-02-23 PROCEDURE — 99214 OFFICE O/P EST MOD 30 MIN: CPT | Performed by: UROLOGY

## 2023-02-23 PROCEDURE — 1036F TOBACCO NON-USER: CPT | Performed by: UROLOGY

## 2023-02-23 RX ORDER — NITROFURANTOIN 25; 75 MG/1; MG/1
100 CAPSULE ORAL DAILY
Qty: 30 CAPSULE | Refills: 3 | Status: SHIPPED | OUTPATIENT
Start: 2023-02-23 | End: 2023-03-25

## 2023-02-23 RX ORDER — PYRIDOXINE HCL (VITAMIN B6) 100 MG
500 TABLET ORAL 2 TIMES DAILY
Qty: 60 CAPSULE | Refills: 11 | Status: SHIPPED | OUTPATIENT
Start: 2023-02-23
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
## 2023-03-13 DIAGNOSIS — N52.8 OTHER MALE ERECTILE DYSFUNCTION: ICD-10-CM

## 2023-03-13 RX ORDER — SILDENAFIL 50 MG/1
TABLET, FILM COATED ORAL
Qty: 30 TABLET | Refills: 0 | OUTPATIENT
Start: 2023-03-13

## 2023-04-04 ENCOUNTER — PATIENT MESSAGE (OUTPATIENT)
Dept: FAMILY MEDICINE CLINIC | Age: 33
End: 2023-04-04

## 2023-04-05 NOTE — TELEPHONE ENCOUNTER
From: Valarie Truong  To: Kiesha Whitaker  Sent: 4/4/2023 10:43 PM EDT  Subject: New prescriptions    When I went to the pharmacy to  my prescriptions. They only had two out of the four. They had the doxepin and the cefdinir, but not the cream and not the Viagra.

## 2023-04-05 NOTE — TELEPHONE ENCOUNTER
Spoke with pharmacy and pt. They will get rx filled. He will need a coupon for sildenafil and they have this for him and will bring the cost down to $21 . Pt notified.

## 2023-04-17 ENCOUNTER — TELEPHONE (OUTPATIENT)
Dept: FAMILY MEDICINE CLINIC | Age: 33
End: 2023-04-17

## 2023-04-17 DIAGNOSIS — R10.9 FLANK PAIN: Primary | ICD-10-CM

## 2023-04-17 DIAGNOSIS — M54.50 ACUTE LOW BACK PAIN, UNSPECIFIED BACK PAIN LATERALITY, UNSPECIFIED WHETHER SCIATICA PRESENT: ICD-10-CM

## 2023-04-17 NOTE — TELEPHONE ENCOUNTER
Patient would like to see if you would be willing to give order for him to  and complete today for urinalysis at the lab. C/o low back/flank pain along with sediment in his urine. States that he wouldn't be able to come in for appointment today but could stop at the lab with an order. Please advise.

## 2023-04-18 ENCOUNTER — HOSPITAL ENCOUNTER (OUTPATIENT)
Age: 33
Discharge: HOME | End: 2023-04-18
Payer: MEDICARE

## 2023-04-18 ENCOUNTER — TELEPHONE (OUTPATIENT)
Dept: FAMILY MEDICINE CLINIC | Age: 33
End: 2023-04-18

## 2023-04-18 DIAGNOSIS — M54.50 ACUTE LOW BACK PAIN, UNSPECIFIED BACK PAIN LATERALITY, UNSPECIFIED WHETHER SCIATICA PRESENT: ICD-10-CM

## 2023-04-18 DIAGNOSIS — R10.9 FLANK PAIN: ICD-10-CM

## 2023-04-18 DIAGNOSIS — R10.9 FLANK PAIN: Primary | ICD-10-CM

## 2023-04-18 LAB
AMORPH SED URNS QL MICRO: ABNORMAL
BACTERIA URNS QL MICRO: ABNORMAL /HPF
BILIRUB UR QL STRIP.AUTO: NEGATIVE
CASTS #/AREA URNS LPF: ABNORMAL /LPF
CASTS 2: ABNORMAL /LPF
CHARACTER UR: CLEAR
COLOR: YELLOW
CRYSTALS URNS MICRO: ABNORMAL
EPITHELIAL CELLS, UA: ABNORMAL /HPF
GLUCOSE UR QL STRIP.AUTO: NEGATIVE MG/DL
HGB UR QL STRIP.AUTO: ABNORMAL
KETONES UR QL STRIP.AUTO: NEGATIVE
MISCELLANEOUS 2: ABNORMAL
MISCELLANEOUS LAB TEST RESULT: ABNORMAL
NITRITE UR QL STRIP: NEGATIVE
PH UR STRIP.AUTO: 7 [PH] (ref 5–9)
PROT UR STRIP.AUTO-MCNC: NEGATIVE MG/DL
RBC URINE: ABNORMAL /HPF
RENAL EPI CELLS #/AREA URNS HPF: ABNORMAL /[HPF]
SP GR UR REFRACT.AUTO: < 1.005 (ref 1–1.03)
UROBILINOGEN, URINE: 0.2 EU/DL (ref 0–1)
WBC #/AREA URNS HPF: ABNORMAL /HPF
WBC #/AREA URNS HPF: ABNORMAL /[HPF]
YEAST LIKE FUNGI URNS QL MICRO: ABNORMAL

## 2023-04-18 PROCEDURE — 81001 URINALYSIS AUTO W/SCOPE: CPT

## 2023-04-18 RX ORDER — NITROFURANTOIN 25; 75 MG/1; MG/1
100 CAPSULE ORAL 2 TIMES DAILY
Qty: 28 CAPSULE | Refills: 0 | Status: SHIPPED | OUTPATIENT
Start: 2023-04-18 | End: 2023-05-02

## 2023-04-18 NOTE — TELEPHONE ENCOUNTER
Patient notified. States that his urine is usually always cultured. (Per TS--ok for culture) Order placed and lab notified. Patient states that he will not be able to see urology regarding this as he will be going out of town as of tomorrow. Continues to have flank/low back pain along with sediment in his catheter. No new symptoms. Please advise.

## 2023-04-18 NOTE — TELEPHONE ENCOUNTER
Will send in antibiotic since he is leaving to go out of town, but really needs to follow up with urology given complaints and frequent antibiotic use.  Thanks, TS

## 2023-04-18 NOTE — TELEPHONE ENCOUNTER
----- Message from RADHA Abraham Si, CNP sent at 4/18/2023 11:46 AM EDT -----  Urine shows a small amounf of blood and moderate amount of WBC. Only few bacteria. Is patient currently having symptoms and does he follow with urology. He has had several antibiotics recently for UTIs and I just had him on cefdinir for rash/sore throat.  Thanks, TS

## 2023-04-18 NOTE — TELEPHONE ENCOUNTER
Discussed with patient. States that he is actually already on a daily macrobid since February per Dr. Artemio Sanders. Discussed with TS. She would like to see what urine culture shows before proceeding. (Currently in process)    Patient notified of this and agreeable. States that he is traveling out of state tomorrow, not out of the country. If ATB is prescribed, he can always have rx transferred. Await culture results.

## 2023-04-19 ENCOUNTER — TELEPHONE (OUTPATIENT)
Dept: FAMILY MEDICINE CLINIC | Age: 33
End: 2023-04-19

## 2023-04-19 LAB
BACTERIA UR CULT: ABNORMAL
ORGANISM: ABNORMAL

## 2023-04-19 NOTE — TELEPHONE ENCOUNTER
Pt informed and verbalized understanding. He stated he is not currently in [facility] and will not be for awhile so he is requesting a antibiotic and states he is going to try and get a video visit with urology.

## 2023-04-19 NOTE — TELEPHONE ENCOUNTER
----- Message from RADHA Seo CNP sent at 4/19/2023  3:32 PM EDT -----  Urine culture is showing mixed growth. No specific bacteria in urine. Would hold on another antibiotic at this time but I would have him contact urology as he has had several UTI's recently despite prophylactic antibiotic use.  Thanks, TS

## 2023-04-19 NOTE — TELEPHONE ENCOUNTER
Ganesh Padgett, APRN - CNP   4/19/2023  3:32 PM EDT       Urine culture is showing mixed growth. No specific bacteria in urine. Would hold on another antibiotic at this time but I would have him contact urology as he has had several UTI's recently despite prophylactic antibiotic use. Thanks, TS   Pt was notified.  See phone encounter dated 4-19-23

## 2023-04-20 RX ORDER — CIPROFLOXACIN 500 MG/1
500 TABLET, FILM COATED ORAL 2 TIMES DAILY
Qty: 20 TABLET | Refills: 0 | Status: SHIPPED | OUTPATIENT
Start: 2023-04-20 | End: 2023-04-30

## 2023-04-20 NOTE — TELEPHONE ENCOUNTER
I will send in an antibiotic for him to take with him out of the country in case symptoms get worse, but he should not start it right now. Would also recommend starting D-mannose daily otc to help prevent future UTIs. Where should the antibiotic go?  TS

## 2023-04-20 NOTE — TELEPHONE ENCOUNTER
Patient notified. He requested that rx just be sent to a local [facility] and then he will have it transferred to a [facility] where he currently is.

## 2023-05-08 ENCOUNTER — TELEPHONE (OUTPATIENT)
Dept: FAMILY MEDICINE CLINIC | Age: 33
End: 2023-05-08

## 2023-05-08 DIAGNOSIS — F43.23 ADJUSTMENT DISORDER WITH MIXED ANXIETY AND DEPRESSED MOOD: Primary | ICD-10-CM

## 2023-05-08 NOTE — TELEPHONE ENCOUNTER
[id number] N[address] Salgdao for an office appt to discuss. Referral can be made at that time if needed.  Thanks -WS
Incoming call from pt asking for recommendation for a psychiatrist. Pt does not have any preference as long as the provider accepts medicare or medicaid. Pt is not sure if he needs an appointment to discuss or not. Pt was last seen on 4/4/2 by TS, and he has not spoken with her about this. Please advise. Call patient back at [phone].
Spoke with Chhaya Conklin and avtar to place a referral.    [address] [facility] - Audrey Rojas, CNP  [address] [address] [address], [address], [address] [facility]    Referral placed and pt notified.  They will call him to schedule
lmtcb
ready to quit

## 2023-05-09 ENCOUNTER — TELEPHONE (OUTPATIENT)
Dept: FAMILY MEDICINE CLINIC | Age: 33
End: 2023-05-09

## 2023-05-09 DIAGNOSIS — N39.0 URINARY TRACT INFECTION ASSOCIATED WITH CATHETERIZATION OF URINARY TRACT, UNSPECIFIED INDWELLING URINARY CATHETER TYPE, SUBSEQUENT ENCOUNTER: ICD-10-CM

## 2023-05-09 DIAGNOSIS — L89.324 PRESSURE ULCER OF ISCHIUM, LEFT, STAGE IV (HCC): ICD-10-CM

## 2023-05-09 DIAGNOSIS — T83.511D URINARY TRACT INFECTION ASSOCIATED WITH CATHETERIZATION OF URINARY TRACT, UNSPECIFIED INDWELLING URINARY CATHETER TYPE, SUBSEQUENT ENCOUNTER: ICD-10-CM

## 2023-05-09 DIAGNOSIS — N39.0 FREQUENT UTI: Primary | ICD-10-CM

## 2023-05-09 DIAGNOSIS — A41.9 SEPSIS, DUE TO UNSPECIFIED ORGANISM, UNSPECIFIED WHETHER ACUTE ORGAN DYSFUNCTION PRESENT (HCC): ICD-10-CM

## 2023-05-09 DIAGNOSIS — R31.9 URINARY TRACT INFECTION WITH HEMATURIA, SITE UNSPECIFIED: ICD-10-CM

## 2023-05-09 DIAGNOSIS — N39.0 URINARY TRACT INFECTION WITH HEMATURIA, SITE UNSPECIFIED: ICD-10-CM

## 2023-05-09 NOTE — TELEPHONE ENCOUNTER
Pt calls in requesting a new referral sent to Dr Lily Márquez infectious disease doctor in [address]  for frequent UTIs. He has seen him in the past and needs a new referral on file. They will call him to schedule.  Okay for referral?      [facility]  [facility]  [address] , [address]   Get Directions  P: [phone]  F: [phone]

## 2023-05-15 RX ORDER — OXYBUTYNIN CHLORIDE 5 MG/1
TABLET, EXTENDED RELEASE ORAL
Qty: 30 TABLET | Refills: 5 | Status: SHIPPED | OUTPATIENT
Start: 2023-05-15

## 2023-05-15 NOTE — TELEPHONE ENCOUNTER
This medication refill is regarding a electronic request. Refill requested by  SHOAIB BUSCH . Requested Prescriptions     Pending Prescriptions Disp Refills    oxybutynin (DITROPAN-XL) 5 MG extended release tablet [Pharmacy Med Name: OXYBUTYNIN CL ER 5 MG TABLET] 30 tablet 0     Sig: take 1 tablet by mouth once daily       Date of last visit: 4/4/2023   Date of next visit: Visit date not found  Date of last refill: 2/10/23  Pharmacy Name: [facility]    Last Lipid Panel:    Lab Results   Component Value Date/Time    CHOL 184 11/15/2022 12:51 PM    TRIG 173 11/15/2022 12:51 PM    HDL 30 11/15/2022 12:51 PM    [address] 119 11/15/2022 12:51 PM     Last CMP:   Lab Results   Component Value Date     02/17/2023    K 3.8 02/17/2023    CL 99 02/17/2023    CO2 27 02/17/2023    BUN 11 02/17/2023    CREATININE 0.7 02/17/2023    GLUCOSE 74 02/17/2023    CALCIUM 9.5 02/17/2023    PROT 8.3 (H) 02/17/2023    LABALBU 4.3 02/17/2023    BILITOT 0.3 02/17/2023    ALKPHOS 88 02/17/2023    AST 19 02/17/2023    ALT 30 02/17/2023    LABGLOM >60 02/17/2023       Last Thyroid:    Lab Results   Component Value Date    TSH 0.788 04/04/2018     Last Hemoglobin A1C:  No results found for: LABA1C, AVGG    Rx verified, ordered and set to EP.

## 2023-05-24 RX ORDER — NITROFURANTOIN 25; 75 MG/1; MG/1
CAPSULE ORAL
Qty: 28 CAPSULE | Refills: 0 | OUTPATIENT
Start: 2023-05-24

## 2023-05-24 RX ORDER — NITROFURANTOIN 25; 75 MG/1; MG/1
100 CAPSULE ORAL 2 TIMES DAILY
Qty: 28 CAPSULE | Refills: 0 | Status: SHIPPED | OUTPATIENT
Start: 2023-05-24 | End: 2023-06-07

## 2023-05-24 NOTE — TELEPHONE ENCOUNTER
Patient stated he would like started on a prevention dose. He seen ID in [facility]. They are ordering a urine culture and ct scan. Advised he should follow up with ID for recommendations since they are ordering testing. Young Chacko was ordered previously by PCP. He will call that office for a refill. Next appointment with urology 08/10/2023.

## 2023-05-24 NOTE — TELEPHONE ENCOUNTER
This medication refill is regarding a telephone request. Refill requested by patient. Requested Prescriptions     Pending Prescriptions Disp Refills    nitrofurantoin, macrocrystal-monohydrate, (MACROBID) 100 MG capsule 28 capsule 0     Sig: Take 1 capsule by mouth 2 times daily for 14 days       Date of last visit: 4/4/2023   Date of next visit: Visit date not found  Date of last refill: 4/18/2023 #28/0  Pharmacy Name: [facility]        Rx verified, ordered and set to EP.

## 2023-05-26 ENCOUNTER — HOSPITAL ENCOUNTER (OUTPATIENT)
Dept: CT IMAGING | Age: 33
Discharge: HOME | End: 2023-05-26
Payer: MEDICARE

## 2023-05-26 DIAGNOSIS — N31.9 NEUROGENIC BLADDER: ICD-10-CM

## 2023-05-26 PROCEDURE — 72192 CT PELVIS W/O DYE: CPT

## 2023-05-30 ENCOUNTER — HOSPITAL ENCOUNTER (OUTPATIENT)
Age: 33
Discharge: HOME | End: 2023-05-30
Payer: MEDICARE

## 2023-05-30 DIAGNOSIS — R31.9 URINARY TRACT INFECTION WITH HEMATURIA, SITE UNSPECIFIED: ICD-10-CM

## 2023-05-30 DIAGNOSIS — N39.0 URINARY TRACT INFECTION WITH HEMATURIA, SITE UNSPECIFIED: ICD-10-CM

## 2023-05-30 PROCEDURE — 81001 URINALYSIS AUTO W/SCOPE: CPT

## 2023-05-31 LAB
BACTERIA URNS QL MICRO: ABNORMAL /HPF
BILIRUB UR QL STRIP.AUTO: NEGATIVE
CASTS #/AREA URNS LPF: ABNORMAL /LPF
CASTS 2: ABNORMAL /LPF
CHARACTER UR: CLEAR
COLOR: YELLOW
CRYSTALS URNS MICRO: ABNORMAL
EPITHELIAL CELLS, UA: ABNORMAL /HPF
GLUCOSE UR QL STRIP.AUTO: NEGATIVE MG/DL
HGB UR QL STRIP.AUTO: NEGATIVE
KETONES UR QL STRIP.AUTO: NEGATIVE
MISCELLANEOUS 2: ABNORMAL
NITRITE UR QL STRIP: NEGATIVE
PH UR STRIP.AUTO: 7 [PH] (ref 5–9)
PROT UR STRIP.AUTO-MCNC: NEGATIVE MG/DL
RBC URINE: ABNORMAL /HPF
RENAL EPI CELLS #/AREA URNS HPF: ABNORMAL /[HPF]
SP GR UR REFRACT.AUTO: < 1.005 (ref 1–1.03)
UROBILINOGEN, URINE: 0.2 EU/DL (ref 0–1)
WBC #/AREA URNS HPF: ABNORMAL /HPF
WBC #/AREA URNS HPF: ABNORMAL /[HPF]
YEAST LIKE FUNGI URNS QL MICRO: ABNORMAL

## 2023-06-03 ENCOUNTER — HOSPITAL ENCOUNTER (EMERGENCY)
Age: 33
Discharge: HOME | End: 2023-06-03
Attending: STUDENT IN AN ORGANIZED HEALTH CARE EDUCATION/TRAINING PROGRAM
Payer: MEDICARE

## 2023-06-03 VITALS
TEMPERATURE: 98.9 F | OXYGEN SATURATION: 96 % | RESPIRATION RATE: 18 BRPM | HEART RATE: 91 BPM | SYSTOLIC BLOOD PRESSURE: 136 MMHG | DIASTOLIC BLOOD PRESSURE: 86 MMHG

## 2023-06-03 DIAGNOSIS — Z93.59 SUPRAPUBIC CATHETER (HCC): Primary | ICD-10-CM

## 2023-06-03 PROCEDURE — 51702 INSERT TEMP BLADDER CATH: CPT

## 2023-06-03 PROCEDURE — 99283 EMERGENCY DEPT VISIT LOW MDM: CPT

## 2023-06-03 ASSESSMENT — PAIN - FUNCTIONAL ASSESSMENT: PAIN_FUNCTIONAL_ASSESSMENT: NONE - DENIES PAIN

## 2023-06-03 NOTE — DISCHARGE INSTRUCTIONS
Patient seen for suprapubic catheter replacement. At this time you are stable for discharge. Follow-up with primary care.

## 2023-06-07 ENCOUNTER — TELEPHONE (OUTPATIENT)
Dept: FAMILY MEDICINE CLINIC | Age: 33
End: 2023-06-07

## 2023-06-08 ENCOUNTER — HOSPITAL ENCOUNTER (OUTPATIENT)
Age: 33
Discharge: HOME | End: 2023-06-08
Payer: MEDICARE

## 2023-06-08 PROCEDURE — 87086 URINE CULTURE/COLONY COUNT: CPT

## 2023-06-08 PROCEDURE — 87106 FUNGI IDENTIFICATION YEAST: CPT

## 2023-06-09 ENCOUNTER — TELEPHONE (OUTPATIENT)
Dept: FAMILY MEDICINE CLINIC | Age: 33
End: 2023-06-09

## 2023-06-09 NOTE — TELEPHONE ENCOUNTER
Spoke to the pt and notified him of WS response. VV scheduled with Triny Amaya on 6/12/23 to discuss.

## 2023-06-09 NOTE — TELEPHONE ENCOUNTER
I would recommend going to Urgent care to be seen so the proper testing can be ordered and completed.   Thanks -WS
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
## 2023-06-11 LAB
BACTERIA UR CULT: ABNORMAL
ORGANISM: ABNORMAL

## 2023-08-14 ENCOUNTER — PATIENT MESSAGE (OUTPATIENT)
Dept: FAMILY MEDICINE CLINIC | Age: 33
End: 2023-08-14

## 2023-08-14 DIAGNOSIS — Z11.3 SCREEN FOR STD (SEXUALLY TRANSMITTED DISEASE): Primary | ICD-10-CM

## 2023-08-14 NOTE — TELEPHONE ENCOUNTER
From: Shanti Bran  To: Shawn Suárez  Sent: 8/14/2023 11:57 AM EDT  Subject: Std test    Could you please send an order for me to get tested for everything to outpatient express testing at [address] [place] [address].   Thank you

## 2023-08-15 ENCOUNTER — HOSPITAL ENCOUNTER (OUTPATIENT)
Age: 33
Discharge: HOME | End: 2023-08-15
Payer: MEDICARE

## 2023-08-15 DIAGNOSIS — Z11.3 SCREEN FOR STD (SEXUALLY TRANSMITTED DISEASE): ICD-10-CM

## 2023-08-15 LAB
HCV IGG SERPL QL IA: NEGATIVE
HIV 1+2 AB+HIV1 P24 AG SERPL QL IA: NONREACTIVE

## 2023-08-15 PROCEDURE — 86803 HEPATITIS C AB TEST: CPT

## 2023-08-15 PROCEDURE — 87389 HIV-1 AG W/HIV-1&-2 AB AG IA: CPT

## 2023-08-15 PROCEDURE — 86592 SYPHILIS TEST NON-TREP QUAL: CPT

## 2023-08-15 PROCEDURE — 87491 CHLMYD TRACH DNA AMP PROBE: CPT

## 2023-08-15 PROCEDURE — 87070 CULTURE OTHR SPECIMN AEROBIC: CPT

## 2023-08-15 PROCEDURE — 36415 COLL VENOUS BLD VENIPUNCTURE: CPT

## 2023-08-15 PROCEDURE — 87591 N.GONORRHOEAE DNA AMP PROB: CPT

## 2023-08-15 PROCEDURE — 87661 TRICHOMONAS VAGINALIS AMPLIF: CPT

## 2023-08-16 ENCOUNTER — TELEPHONE (OUTPATIENT)
Dept: FAMILY MEDICINE CLINIC | Age: 33
End: 2023-08-16

## 2023-08-16 LAB — RPR SER QL: NONREACTIVE

## 2023-08-16 NOTE — TELEPHONE ENCOUNTER
----- Message from RADHA Blake CNP sent at 8/16/2023 10:51 AM EDT -----  HIV test normal     Others pending

## 2023-08-17 LAB — BACTERIA THROAT AEROBE CULT: NORMAL

## 2023-08-18 LAB
C TRACH RRNA SPEC QL NAA+PROBE: NEGATIVE
N GONORRHOEA RRNA SPEC QL NAA+PROBE: NEGATIVE
SPEC CONTAINER SPEC: NORMAL
SPECIMEN SOURCE: NORMAL
SPECIMEN SOURCE: NORMAL

## 2023-08-24 ENCOUNTER — PATIENT MESSAGE (OUTPATIENT)
Dept: FAMILY MEDICINE CLINIC | Age: 33
End: 2023-08-24

## 2023-08-24 DIAGNOSIS — N52.8 OTHER MALE ERECTILE DYSFUNCTION: ICD-10-CM

## 2023-08-24 RX ORDER — SILDENAFIL 50 MG/1
50 TABLET, FILM COATED ORAL DAILY PRN
Qty: 30 TABLET | Refills: 1 | Status: SHIPPED | OUTPATIENT
Start: 2023-08-24

## 2023-08-24 NOTE — TELEPHONE ENCOUNTER
From: Nichelle Wright  To: Tracey Segundo  Sent: 8/24/2023 12:02 PM EDT  Subject: Refill    Are you able to send me a refill for Viagra to the pharmacy or will I need to schedule an appointment?

## 2023-08-30 LAB
SPEC CONTAINER SPEC: NORMAL
SPECIMEN SOURCE: NORMAL
T VAGINALIS RRNA SPEC QL NAA+PROBE: NEGATIVE

## 2023-08-31 ENCOUNTER — TELEPHONE (OUTPATIENT)
Dept: FAMILY MEDICINE CLINIC | Age: 33
End: 2023-08-31

## 2023-11-08 ENCOUNTER — OFFICE VISIT (OUTPATIENT)
Dept: FAMILY MEDICINE CLINIC | Age: 33
End: 2023-11-08
Payer: MEDICARE

## 2023-11-08 VITALS
DIASTOLIC BLOOD PRESSURE: 62 MMHG | TEMPERATURE: 98.2 F | RESPIRATION RATE: 18 BRPM | HEART RATE: 68 BPM | SYSTOLIC BLOOD PRESSURE: 128 MMHG

## 2023-11-08 DIAGNOSIS — K21.9 GASTROESOPHAGEAL REFLUX DISEASE, UNSPECIFIED WHETHER ESOPHAGITIS PRESENT: ICD-10-CM

## 2023-11-08 DIAGNOSIS — L89.324 PRESSURE ULCER OF ISCHIUM, LEFT, STAGE IV (HCC): ICD-10-CM

## 2023-11-08 DIAGNOSIS — G82.20 PARAPLEGIA (HCC): ICD-10-CM

## 2023-11-08 DIAGNOSIS — F41.9 ANXIETY: Primary | ICD-10-CM

## 2023-11-08 DIAGNOSIS — N52.8 OTHER MALE ERECTILE DYSFUNCTION: ICD-10-CM

## 2023-11-08 PROCEDURE — G8419 CALC BMI OUT NRM PARAM NOF/U: HCPCS | Performed by: NURSE PRACTITIONER

## 2023-11-08 PROCEDURE — 1036F TOBACCO NON-USER: CPT | Performed by: NURSE PRACTITIONER

## 2023-11-08 PROCEDURE — 99214 OFFICE O/P EST MOD 30 MIN: CPT | Performed by: NURSE PRACTITIONER

## 2023-11-08 PROCEDURE — G8484 FLU IMMUNIZE NO ADMIN: HCPCS | Performed by: NURSE PRACTITIONER

## 2023-11-08 PROCEDURE — G8427 DOCREV CUR MEDS BY ELIG CLIN: HCPCS | Performed by: NURSE PRACTITIONER

## 2023-11-08 RX ORDER — HYDROXYZINE PAMOATE 25 MG/1
25 CAPSULE ORAL 3 TIMES DAILY PRN
Qty: 30 CAPSULE | Refills: 0 | Status: SHIPPED | OUTPATIENT
Start: 2023-11-08 | End: 2023-11-22

## 2023-11-08 RX ORDER — SILDENAFIL 50 MG/1
50 TABLET, FILM COATED ORAL DAILY PRN
Qty: 30 TABLET | Refills: 1 | Status: SHIPPED | OUTPATIENT
Start: 2023-11-08

## 2023-11-08 RX ORDER — OMEPRAZOLE 40 MG/1
40 CAPSULE, DELAYED RELEASE ORAL
Qty: 30 CAPSULE | Refills: 1 | Status: SHIPPED | OUTPATIENT
Start: 2023-11-08

## 2023-11-08 RX ORDER — ONDANSETRON 4 MG/1
4 TABLET, ORALLY DISINTEGRATING ORAL EVERY 8 HOURS PRN
Qty: 20 TABLET | Refills: 1 | Status: SHIPPED | OUTPATIENT
Start: 2023-11-08

## 2023-11-15 ASSESSMENT — ENCOUNTER SYMPTOMS
NAUSEA: 1
ABDOMINAL PAIN: 1

## 2023-11-22 ENCOUNTER — HOSPITAL ENCOUNTER (OUTPATIENT)
Dept: MRI IMAGING | Age: 33
Discharge: HOME | End: 2023-11-22
Payer: MEDICARE

## 2023-11-22 DIAGNOSIS — L89.153 PRESSURE INJURY OF COCCYGEAL REGION, STAGE 3 (HCC): ICD-10-CM

## 2023-11-22 DIAGNOSIS — T14.8XXA WOUND INFECTION: ICD-10-CM

## 2023-11-22 DIAGNOSIS — L08.9 WOUND INFECTION: ICD-10-CM

## 2023-11-22 DIAGNOSIS — G82.20 PARAPLEGIA FOLLOWING SPINAL CORD INJURY (HCC): ICD-10-CM

## 2023-11-22 PROCEDURE — A9579 GAD-BASE MR CONTRAST NOS,1ML: HCPCS | Performed by: NURSE PRACTITIONER

## 2023-11-22 PROCEDURE — 6360000004 HC RX CONTRAST MEDICATION: Performed by: NURSE PRACTITIONER

## 2023-11-22 PROCEDURE — 72197 MRI PELVIS W/O & W/DYE: CPT

## 2023-11-22 RX ADMIN — GADOTERIDOL 15 ML: 279.3 INJECTION, SOLUTION INTRAVENOUS at 13:07

## 2023-12-30 ENCOUNTER — HOSPITAL ENCOUNTER (EMERGENCY)
Age: 33
Discharge: ANOTHER HOSPITAL | End: 2023-12-31
Attending: EMERGENCY MEDICINE
Payer: MEDICARE

## 2023-12-30 ENCOUNTER — APPOINTMENT (OUTPATIENT)
Dept: CT IMAGING | Age: 33
End: 2023-12-30
Payer: MEDICARE

## 2023-12-30 DIAGNOSIS — U07.1 COVID-19: ICD-10-CM

## 2023-12-30 DIAGNOSIS — M46.28 SACRAL OSTEOMYELITIS (HCC): Primary | ICD-10-CM

## 2023-12-30 LAB
ANION GAP SERPL CALC-SCNC: 9 MEQ/L (ref 8–16)
BASOPHILS ABSOLUTE: 0 THOU/MM3 (ref 0–0.1)
BASOPHILS NFR BLD AUTO: 0.4 %
BUN SERPL-MCNC: 7 MG/DL (ref 7–22)
CALCIUM SERPL-MCNC: 9.1 MG/DL (ref 8.5–10.5)
CHLORIDE SERPL-SCNC: 100 MEQ/L (ref 98–111)
CO2 SERPL-SCNC: 28 MEQ/L (ref 23–33)
CREAT SERPL-MCNC: 0.8 MG/DL (ref 0.4–1.2)
CRP SERPL-MCNC: 6.35 MG/DL (ref 0–1)
DEPRECATED RDW RBC AUTO: 40.8 FL (ref 35–45)
EOSINOPHIL NFR BLD AUTO: 0.4 %
EOSINOPHILS ABSOLUTE: 0 THOU/MM3 (ref 0–0.4)
ERYTHROCYTE [DISTWIDTH] IN BLOOD BY AUTOMATED COUNT: 13.1 % (ref 11.5–14.5)
ERYTHROCYTE [SEDIMENTATION RATE] IN BLOOD BY WESTERGREN METHOD: 40 MM/HR (ref 0–10)
FLUAV RNA RESP QL NAA+PROBE: NOT DETECTED
FLUBV RNA RESP QL NAA+PROBE: NOT DETECTED
GFR SERPL CREATININE-BSD FRML MDRD: > 60 ML/MIN/1.73M2
GLUCOSE SERPL-MCNC: 99 MG/DL (ref 70–108)
HCT VFR BLD AUTO: 41.8 % (ref 42–52)
HGB BLD-MCNC: 13.5 GM/DL (ref 14–18)
IMM GRANULOCYTES # BLD AUTO: 0.02 THOU/MM3 (ref 0–0.07)
IMM GRANULOCYTES NFR BLD AUTO: 0.4 %
LACTIC ACID, SEPSIS: 1.3 MMOL/L (ref 0.5–1.9)
LYMPHOCYTES ABSOLUTE: 1.1 THOU/MM3 (ref 1–4.8)
LYMPHOCYTES NFR BLD AUTO: 21 %
MCH RBC QN AUTO: 27.8 PG (ref 26–33)
MCHC RBC AUTO-ENTMCNC: 32.3 GM/DL (ref 32.2–35.5)
MCV RBC AUTO: 86 FL (ref 80–94)
MONOCYTES ABSOLUTE: 0.6 THOU/MM3 (ref 0.4–1.3)
MONOCYTES NFR BLD AUTO: 11.2 %
NEUTROPHILS NFR BLD AUTO: 66.6 %
NRBC BLD AUTO-RTO: 0 /100 WBC
OSMOLALITY SERPL CALC.SUM OF ELEC: 271.8 MOSMOL/KG (ref 275–300)
PLATELET # BLD AUTO: 258 THOU/MM3 (ref 130–400)
PMV BLD AUTO: 10.3 FL (ref 9.4–12.4)
POTASSIUM SERPL-SCNC: 3.7 MEQ/L (ref 3.5–5.2)
PROCALCITONIN SERPL IA-MCNC: 0.05 NG/ML (ref 0.01–0.09)
RBC # BLD AUTO: 4.86 MILL/MM3 (ref 4.7–6.1)
SARS-COV-2 RNA RESP QL NAA+PROBE: DETECTED
SEGMENTED NEUTROPHILS ABSOLUTE COUNT: 3.5 THOU/MM3 (ref 1.8–7.7)
SODIUM SERPL-SCNC: 137 MEQ/L (ref 135–145)
WBC # BLD AUTO: 5.3 THOU/MM3 (ref 4.8–10.8)

## 2023-12-30 PROCEDURE — 36415 COLL VENOUS BLD VENIPUNCTURE: CPT

## 2023-12-30 PROCEDURE — 80048 BASIC METABOLIC PNL TOTAL CA: CPT

## 2023-12-30 PROCEDURE — 85651 RBC SED RATE NONAUTOMATED: CPT

## 2023-12-30 PROCEDURE — 86140 C-REACTIVE PROTEIN: CPT

## 2023-12-30 PROCEDURE — 83605 ASSAY OF LACTIC ACID: CPT

## 2023-12-30 PROCEDURE — 6370000000 HC RX 637 (ALT 250 FOR IP): Performed by: STUDENT IN AN ORGANIZED HEALTH CARE EDUCATION/TRAINING PROGRAM

## 2023-12-30 PROCEDURE — 96361 HYDRATE IV INFUSION ADD-ON: CPT

## 2023-12-30 PROCEDURE — 2580000003 HC RX 258: Performed by: EMERGENCY MEDICINE

## 2023-12-30 PROCEDURE — 87040 BLOOD CULTURE FOR BACTERIA: CPT

## 2023-12-30 PROCEDURE — 72192 CT PELVIS W/O DYE: CPT

## 2023-12-30 PROCEDURE — 99285 EMERGENCY DEPT VISIT HI MDM: CPT

## 2023-12-30 PROCEDURE — 85025 COMPLETE CBC W/AUTO DIFF WBC: CPT

## 2023-12-30 PROCEDURE — 6360000002 HC RX W HCPCS: Performed by: EMERGENCY MEDICINE

## 2023-12-30 PROCEDURE — 96365 THER/PROPH/DIAG IV INF INIT: CPT

## 2023-12-30 PROCEDURE — 96367 TX/PROPH/DG ADDL SEQ IV INF: CPT

## 2023-12-30 PROCEDURE — 87636 SARSCOV2 & INF A&B AMP PRB: CPT

## 2023-12-30 PROCEDURE — 84145 PROCALCITONIN (PCT): CPT

## 2023-12-30 PROCEDURE — 96366 THER/PROPH/DIAG IV INF ADDON: CPT

## 2023-12-30 RX ORDER — 0.9 % SODIUM CHLORIDE 0.9 %
1000 INTRAVENOUS SOLUTION INTRAVENOUS ONCE
Status: COMPLETED | OUTPATIENT
Start: 2023-12-30 | End: 2023-12-30

## 2023-12-30 RX ORDER — DIPHENHYDRAMINE HCL 25 MG
50 TABLET ORAL ONCE
Status: COMPLETED | OUTPATIENT
Start: 2023-12-30 | End: 2023-12-30

## 2023-12-30 RX ADMIN — CEFTRIAXONE SODIUM 2000 MG: 2 INJECTION, POWDER, FOR SOLUTION INTRAMUSCULAR; INTRAVENOUS at 20:49

## 2023-12-30 RX ADMIN — SODIUM CHLORIDE 1000 ML: 9 INJECTION, SOLUTION INTRAVENOUS at 18:48

## 2023-12-30 RX ADMIN — VANCOMYCIN HYDROCHLORIDE 2000 MG: 1 INJECTION, POWDER, LYOPHILIZED, FOR SOLUTION INTRAVENOUS at 21:26

## 2023-12-30 RX ADMIN — DIPHENHYDRAMINE HYDROCHLORIDE 50 MG: 25 TABLET ORAL at 22:28

## 2023-12-30 ASSESSMENT — PAIN DESCRIPTION - PAIN TYPE: TYPE: ACUTE PAIN

## 2023-12-30 ASSESSMENT — PAIN DESCRIPTION - LOCATION: LOCATION: COCCYX;LEG

## 2023-12-30 ASSESSMENT — PAIN SCALES - GENERAL
PAINLEVEL_OUTOF10: 5
PAINLEVEL_OUTOF10: 5
PAINLEVEL_OUTOF10: 3
PAINLEVEL_OUTOF10: 5
PAINLEVEL_OUTOF10: 3

## 2023-12-30 ASSESSMENT — PAIN DESCRIPTION - ONSET: ONSET: ON-GOING

## 2023-12-30 ASSESSMENT — PAIN DESCRIPTION - FREQUENCY: FREQUENCY: CONTINUOUS

## 2023-12-30 ASSESSMENT — PAIN DESCRIPTION - ORIENTATION: ORIENTATION: LEFT

## 2023-12-30 ASSESSMENT — PAIN - FUNCTIONAL ASSESSMENT: PAIN_FUNCTIONAL_ASSESSMENT: 0-10

## 2023-12-30 NOTE — ED TRIAGE NOTES
Pt presents to the ED with c/o osteomyelitis flare up, headache, fever, and left leg pain. Pt states that he has noticed a wound on his coccyx getting more red and inflamed over the past few days.

## 2023-12-30 NOTE — ED NOTES
Pt vitals collected. Pt denies any needs at this time. Pt informed of need for urine sample. Pt respirations easy and unlabored.

## 2023-12-30 NOTE — ED PROVIDER NOTES
reviewed  Tests in the radiology section of CPT®: ordered and reviewed  Tests in the medicine section of CPT®: ordered and reviewed  Discuss the patient with other providers: yes  Independent visualization of images, tracings, or specimens: yes    Patient Progress  Patient progress: stable    /  ED Course as of 12/30/23 2027   Sat Dec 30, 2023   1941 SARS-CoV-2 RNA, RT PCR(!!): DETECTED [AC]   1941 CRP(!): 6.35  Significant elevation [AC]   1944 IMPRESSION:  1. There is soft tissue irregularity posterior to the sacrum which extends from the skin surface to the bone. There is some gas in this region. A drainable abscess is not identified.  2. Left lower quadrant colostomy. There is a peristomal hernia which contains some loops of bowel and mesenteric fat.   [AC]   2005 I spoke to our general surgeon Dr Genoveva Benedict who recommends we transfer this Pt to [facility] [AC]      ED Course User Index  [AC] Navi Cruz DO     Vitals Reviewed:    Vitals:    12/30/23 1803 12/30/23 1857 12/30/23 1950   BP:  121/70 119/67   Pulse: (!) 101 (!) 101 84   Resp: 17 17 13   Temp: 98.3 °F (36.8 °C)     TempSrc: Oral     SpO2: 94% 94% 98%   Weight: 81.6 kg (180 lb)     Height: 1.803 m (5' 11\")         The patient was seen and examined. Appropriate diagnostic testing was performed and results reviewed with the patient.      The results of pertinent diagnostic studies and exam findings were discussed. The patient’s provisional diagnosis and plan of care were discussed with the patient and present family who expressed understanding. Any medications were reviewed and indications and risks of medications were discussed with the patient /family present. Strict verbal and written return precautions, instructions and appropriate follow-up provided to  the patient.     ED Medications administered this visit:  (None if blank)  Medications   sodium chloride 0.9 % bolus 1,000 mL (1,000 mLs IntraVENous New Bag 12/30/23 1848)   vancomycin (VANCOCIN)

## 2023-12-31 VITALS
TEMPERATURE: 98.3 F | HEART RATE: 89 BPM | WEIGHT: 180 LBS | RESPIRATION RATE: 18 BRPM | BODY MASS INDEX: 25.2 KG/M2 | OXYGEN SATURATION: 97 % | SYSTOLIC BLOOD PRESSURE: 103 MMHG | DIASTOLIC BLOOD PRESSURE: 55 MMHG | HEIGHT: 71 IN

## 2023-12-31 PROCEDURE — 6370000000 HC RX 637 (ALT 250 FOR IP): Performed by: STUDENT IN AN ORGANIZED HEALTH CARE EDUCATION/TRAINING PROGRAM

## 2023-12-31 PROCEDURE — 96366 THER/PROPH/DIAG IV INF ADDON: CPT

## 2023-12-31 PROCEDURE — 6360000002 HC RX W HCPCS

## 2023-12-31 RX ORDER — GABAPENTIN 600 MG/1
900 TABLET ORAL ONCE
Status: COMPLETED | OUTPATIENT
Start: 2023-12-31 | End: 2023-12-31

## 2023-12-31 RX ORDER — BACLOFEN 10 MG/1
20 TABLET ORAL ONCE
Status: COMPLETED | OUTPATIENT
Start: 2023-12-31 | End: 2023-12-31

## 2023-12-31 RX ORDER — METHOCARBAMOL 500 MG/1
500 TABLET, FILM COATED ORAL ONCE
Status: COMPLETED | OUTPATIENT
Start: 2023-12-31 | End: 2023-12-31

## 2023-12-31 RX ADMIN — BACLOFEN 20 MG: 10 TABLET ORAL at 01:28

## 2023-12-31 RX ADMIN — METHOCARBAMOL 500 MG: 500 TABLET ORAL at 01:27

## 2023-12-31 RX ADMIN — GABAPENTIN 900 MG: 600 TABLET, FILM COATED ORAL at 01:28

## 2023-12-31 ASSESSMENT — PAIN SCALES - GENERAL
PAINLEVEL_OUTOF10: 5
PAINLEVEL_OUTOF10: 3
PAINLEVEL_OUTOF10: 5

## 2023-12-31 NOTE — ED NOTES
Pt in bed sleeping at this time with no s/s of distress noted. Wakes to voice. Voiced no needs. Call light in reach.

## 2023-12-31 NOTE — ED NOTES
Pt in bed playing cell phone. Pt given juice and ginger ale per request. Voiced no further needs. Updated on plan of care. Call light in reach.

## 2023-12-31 NOTE — ED NOTES
Pt in bed sleeping at this time. Wakes to voice. Updated on plan of care. Voiced no needs. Call light in reach.

## 2023-12-31 NOTE — ED NOTES
Pt has existing urinary catheter on arrival. Pt states he has had it for 2 weeks and does not want it changed and will \"collect a specimen for us by himself\". Provider notified and told this nurse not to send urine from existing catheter. Pt aware. Pt given 2 pillows per request and repositioned for comfort. Call light in reach.

## 2023-12-31 NOTE — ED NOTES
Pt advised this nurse that \"he forgot to tell us that Vancomycin makes him itchy.\" Pt states Benadryl normally resolves issue. Provider notified.

## 2024-01-04 LAB
BACTERIA BLD AEROBE CULT: NORMAL
BACTERIA BLD AEROBE CULT: NORMAL

## 2024-01-09 DIAGNOSIS — N52.8 OTHER MALE ERECTILE DYSFUNCTION: ICD-10-CM

## 2024-01-09 RX ORDER — SILDENAFIL 50 MG/1
TABLET, FILM COATED ORAL
Qty: 30 TABLET | Refills: 1 | Status: SHIPPED | OUTPATIENT
Start: 2024-01-09

## 2024-01-09 RX ORDER — OMEPRAZOLE 40 MG/1
40 CAPSULE, DELAYED RELEASE ORAL
Qty: 30 CAPSULE | Refills: 1 | Status: SHIPPED | OUTPATIENT
Start: 2024-01-09

## 2024-01-09 NOTE — TELEPHONE ENCOUNTER
This medication refill is regarding a electronic request. Refill requested by  pharmacy .    Requested Prescriptions     Pending Prescriptions Disp Refills    omeprazole (PRILOSEC) 40 MG delayed release capsule [Pharmacy Med Name: OMEPRAZOLE DR 40 MG CAPSULE] 30 capsule 1     Sig: take 1 capsule by mouth EVERY MORNING BEFORE BREAKFAST    sildenafil (VIAGRA) 50 MG tablet [Pharmacy Med Name: SILDENAFIL 50 MG TABLET] 30 tablet 1     Sig: take 1 tablet by mouth once daily if needed for ERECTILE DYSFUNCTION       Date of last visit: 11/8/2023   Date of next visit: Visit date not found  Date of last refill: 11/8/23  Pharmacy Name:

## 2024-02-26 ENCOUNTER — TELEPHONE (OUTPATIENT)
Dept: FAMILY MEDICINE CLINIC | Age: 34
End: 2024-02-26

## 2024-02-26 NOTE — TELEPHONE ENCOUNTER
----- Message from Mariana Christine sent at 2/26/2024  2:41 PM EST -----  Subject: Message to Provider    QUESTIONS  Information for Provider? Celine called to see if PCP would take over   overseeing the plan of care for the pt for their wound. They have had   [facility] (3rd party through [employer]) come in every week to   see the wound, assess, and update plan of care per pt's request. [facility] is writing the orders, they are just looking for someone to oversee   plan of care. Please advise Celine at cell? [phone], office phone?   [phone], or fax? [phone]  ---------------------------------------------------------------------------  --------------  CALL BACK INFO  [phone]; OK to leave message on voicemail  ---------------------------------------------------------------------------  --------------  SCRIPT ANSWERS  Relationship to Patient? Covered Entity  Covered Entity Type? Home Health Care?  Representative Name? Celine/[facility] [facility]

## 2024-02-27 ENCOUNTER — OFFICE VISIT (OUTPATIENT)
Dept: SURGERY | Age: 34
End: 2024-02-27
Payer: MEDICARE

## 2024-02-27 VITALS
WEIGHT: 180 LBS | BODY MASS INDEX: 25.2 KG/M2 | HEIGHT: 71 IN | TEMPERATURE: 97.6 F | RESPIRATION RATE: 18 BRPM | SYSTOLIC BLOOD PRESSURE: 132 MMHG | OXYGEN SATURATION: 96 % | DIASTOLIC BLOOD PRESSURE: 78 MMHG | HEART RATE: 86 BPM

## 2024-02-27 DIAGNOSIS — K43.5 PARASTOMAL HERNIA WITHOUT OBSTRUCTION OR GANGRENE: Primary | ICD-10-CM

## 2024-02-27 PROCEDURE — G8427 DOCREV CUR MEDS BY ELIG CLIN: HCPCS | Performed by: SURGERY

## 2024-02-27 PROCEDURE — G8419 CALC BMI OUT NRM PARAM NOF/U: HCPCS | Performed by: SURGERY

## 2024-02-27 PROCEDURE — 99214 OFFICE O/P EST MOD 30 MIN: CPT | Performed by: SURGERY

## 2024-02-27 PROCEDURE — 1036F TOBACCO NON-USER: CPT | Performed by: SURGERY

## 2024-02-27 PROCEDURE — G8484 FLU IMMUNIZE NO ADMIN: HCPCS | Performed by: SURGERY
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
## 2024-03-11 RX ORDER — OMEPRAZOLE 40 MG/1
40 CAPSULE, DELAYED RELEASE ORAL
Qty: 30 CAPSULE | Refills: 1 | Status: SHIPPED | OUTPATIENT
Start: 2024-03-11

## 2024-03-11 NOTE — TELEPHONE ENCOUNTER
This medication refill is regarding a electronic request. Refill requested by  pharmacy .    Requested Prescriptions     Pending Prescriptions Disp Refills    omeprazole (PRILOSEC) 40 MG delayed release capsule [Pharmacy Med Name: OMEPRAZOLE DR 40 MG CAPSULE] 30 capsule 1     Sig: take 1 capsule by mouth EVERY MORNING BEFORE BREAKFAST       Date of last visit: 11/8/2023   Date of next visit: Visit date not found  Date of last refill: 1/9/24  Pharmacy Name:     Last Lipid Panel:    Lab Results   Component Value Date/Time    CHOL 184 11/15/2022 12:51 PM    TRIG 173 11/15/2022 12:51 PM    HDL 30 11/15/2022 12:51 PM    LDLCALC 119 11/15/2022 12:51 PM     Last CMP:   Lab Results   Component Value Date     12/30/2023    K 3.7 12/30/2023     12/30/2023    CO2 28 12/30/2023    BUN 7 12/30/2023    CREATININE 0.8 12/30/2023    GLUCOSE 99 12/30/2023    CALCIUM 9.1 12/30/2023    PROT 8.3 (H) 02/17/2023    LABALBU 4.3 02/17/2023    BILITOT 0.3 02/17/2023    ALKPHOS 88 02/17/2023    AST 19 02/17/2023    ALT 30 02/17/2023    LABGLOM >60 12/30/2023       Last Thyroid:    Lab Results   Component Value Date    TSH 0.788 04/04/2018     Last Hemoglobin A1C:  No results found for: \"LABA1C\"    Rx verified, ordered and set to EP.

## 2024-03-26 ENCOUNTER — OFFICE VISIT (OUTPATIENT)
Dept: FAMILY MEDICINE CLINIC | Age: 34
End: 2024-03-26
Payer: MEDICARE

## 2024-03-26 VITALS
DIASTOLIC BLOOD PRESSURE: 74 MMHG | SYSTOLIC BLOOD PRESSURE: 110 MMHG | TEMPERATURE: 98.3 F | HEART RATE: 76 BPM | RESPIRATION RATE: 16 BRPM

## 2024-03-26 DIAGNOSIS — Z00.00 ENCOUNTER FOR WELL ADULT EXAM WITHOUT ABNORMAL FINDINGS: Primary | ICD-10-CM

## 2024-03-26 DIAGNOSIS — R11.0 NAUSEA: ICD-10-CM

## 2024-03-26 PROCEDURE — G8484 FLU IMMUNIZE NO ADMIN: HCPCS | Performed by: NURSE PRACTITIONER

## 2024-03-26 PROCEDURE — 99395 PREV VISIT EST AGE 18-39: CPT | Performed by: NURSE PRACTITIONER

## 2024-03-26 RX ORDER — ONDANSETRON 4 MG/1
4 TABLET, ORALLY DISINTEGRATING ORAL EVERY 8 HOURS PRN
Qty: 20 TABLET | Refills: 1 | Status: SHIPPED | OUTPATIENT
Start: 2024-03-26

## 2024-03-26 ASSESSMENT — ENCOUNTER SYMPTOMS: BACK PAIN: 1

## 2024-03-26 NOTE — PROGRESS NOTES
current facility-administered medications for this visit.     Facility-Administered Medications Ordered in Other Visits   Medication Dose Route Frequency Provider Last Rate Last Admin    0.45 % sodium chloride infusion   IntraVENous Continuous Checo Claire MD        midazolam (VERSED) injection 1 mg  1 mg IntraVENous Once Checo Claire MD        0.45 % sodium chloride infusion   IntraVENous Continuous Gerardo Khan MD         Allergies   Allergen Reactions    Bee Venom Swelling    Silver Other (See Comments)     Muscle Spasms (dressing or medications containing silver)    Zosyn [Piperacillin Sod-Tazobactam So] Other (See Comments)     Muscle spasms and headache     Doxycycline Calcium Rash       Subjective:    Review of Systems   Endocrine: Positive for cold intolerance.   Genitourinary:         Urinary cath in place  Colostomy in place   Musculoskeletal:  Positive for arthralgias, back pain and gait problem.   Neurological:  Positive for weakness.     Objective:     Vitals:    03/26/24 1039   BP: 110/74   Site: Right Upper Arm   Pulse: 76   Resp: 16   Temp: 98.3 °F (36.8 °C)   TempSrc: Oral     There is no height or weight on file to calculate BMI.    BP Readings from Last 3 Encounters:   03/26/24 110/74   02/27/24 132/78   12/31/23 (!) 103/55     Physical Exam  Cardiovascular:      Rate and Rhythm: Normal rate and regular rhythm.   Pulmonary:      Effort: Pulmonary effort is normal. No respiratory distress.      Breath sounds: No wheezing.   Neurological:      Mental Status: He is alert.      Gait: Gait abnormal (wheelchair bound).         Lab Results   Component Value Date    WBC 5.3 12/30/2023    HGB 13.5 (L) 12/30/2023    HCT 41.8 (L) 12/30/2023     12/30/2023    CHOL 184 11/15/2022    TRIG 173 11/15/2022    HDL 30 11/15/2022    LDLCALC 119 11/15/2022    AST 19 02/17/2023     12/30/2023    K 3.7 12/30/2023     12/30/2023    CREATININE 0.8 12/30/2023    BUN 7 12/30/2023

## 2024-03-26 NOTE — PATIENT INSTRUCTIONS

## 2024-04-03 ENCOUNTER — OFFICE VISIT (OUTPATIENT)
Dept: FAMILY MEDICINE CLINIC | Age: 34
End: 2024-04-03
Payer: MEDICARE

## 2024-04-03 VITALS
SYSTOLIC BLOOD PRESSURE: 106 MMHG | HEART RATE: 124 BPM | TEMPERATURE: 99.1 F | DIASTOLIC BLOOD PRESSURE: 60 MMHG | RESPIRATION RATE: 16 BRPM

## 2024-04-03 DIAGNOSIS — R51.9 NONINTRACTABLE EPISODIC HEADACHE, UNSPECIFIED HEADACHE TYPE: Primary | ICD-10-CM

## 2024-04-03 DIAGNOSIS — R41.0 DISORIENTATION: ICD-10-CM

## 2024-04-03 DIAGNOSIS — R09.81 SINUS CONGESTION: ICD-10-CM

## 2024-04-03 PROCEDURE — G8419 CALC BMI OUT NRM PARAM NOF/U: HCPCS | Performed by: NURSE PRACTITIONER

## 2024-04-03 PROCEDURE — 99213 OFFICE O/P EST LOW 20 MIN: CPT | Performed by: NURSE PRACTITIONER

## 2024-04-03 PROCEDURE — 1036F TOBACCO NON-USER: CPT | Performed by: NURSE PRACTITIONER

## 2024-04-03 PROCEDURE — 96372 THER/PROPH/DIAG INJ SC/IM: CPT | Performed by: NURSE PRACTITIONER

## 2024-04-03 PROCEDURE — G8427 DOCREV CUR MEDS BY ELIG CLIN: HCPCS | Performed by: NURSE PRACTITIONER

## 2024-04-03 RX ORDER — KETOROLAC TROMETHAMINE 30 MG/ML
30 INJECTION, SOLUTION INTRAMUSCULAR; INTRAVENOUS ONCE
Status: COMPLETED | OUTPATIENT
Start: 2024-04-03 | End: 2024-04-03

## 2024-04-03 RX ADMIN — KETOROLAC TROMETHAMINE 30 MG: 30 INJECTION, SOLUTION INTRAMUSCULAR; INTRAVENOUS at 12:27

## 2024-04-03 ASSESSMENT — PATIENT HEALTH QUESTIONNAIRE - PHQ9
SUM OF ALL RESPONSES TO PHQ QUESTIONS 1-9: 9
10. IF YOU CHECKED OFF ANY PROBLEMS, HOW DIFFICULT HAVE THESE PROBLEMS MADE IT FOR YOU TO DO YOUR WORK, TAKE CARE OF THINGS AT HOME, OR GET ALONG WITH OTHER PEOPLE: NOT DIFFICULT AT ALL
2. FEELING DOWN, DEPRESSED OR HOPELESS: NOT AT ALL
SUM OF ALL RESPONSES TO PHQ9 QUESTIONS 1 & 2: 2
9. THOUGHTS THAT YOU WOULD BE BETTER OFF DEAD, OR OF HURTING YOURSELF: NOT AT ALL
5. POOR APPETITE OR OVEREATING: NEARLY EVERY DAY
SUM OF ALL RESPONSES TO PHQ QUESTIONS 1-9: 9
7. TROUBLE CONCENTRATING ON THINGS, SUCH AS READING THE NEWSPAPER OR WATCHING TELEVISION: NOT AT ALL
SUM OF ALL RESPONSES TO PHQ QUESTIONS 1-9: 9
4. FEELING TIRED OR HAVING LITTLE ENERGY: NEARLY EVERY DAY
3. TROUBLE FALLING OR STAYING ASLEEP: SEVERAL DAYS
8. MOVING OR SPEAKING SO SLOWLY THAT OTHER PEOPLE COULD HAVE NOTICED. OR THE OPPOSITE, BEING SO FIGETY OR RESTLESS THAT YOU HAVE BEEN MOVING AROUND A LOT MORE THAN USUAL: NOT AT ALL
SUM OF ALL RESPONSES TO PHQ QUESTIONS 1-9: 9
1. LITTLE INTEREST OR PLEASURE IN DOING THINGS: MORE THAN HALF THE DAYS
6. FEELING BAD ABOUT YOURSELF - OR THAT YOU ARE A FAILURE OR HAVE LET YOURSELF OR YOUR FAMILY DOWN: NOT AT ALL

## 2024-04-03 ASSESSMENT — ENCOUNTER SYMPTOMS
SHORTNESS OF BREATH: 0
CHEST TIGHTNESS: 0
EYES NEGATIVE: 1
ABDOMINAL PAIN: 0

## 2024-04-03 NOTE — PROGRESS NOTES
After obtaining consent, and per orders of Grisel Martinez CNP, injection of Ketorolac 30 mg/ml 1 ml given IM left deltoid by JESSICA PRINCE CMA ([facility]). Patient tolerated well.

## 2024-04-03 NOTE — PROGRESS NOTES
Chief Complaint   Patient presents with    Headache     C/O HA off/on x 4 days, worse in the evening. Also c/o lightheadedness.          SUBJECTIVE     Noé HARLAN Licea is a 33 y.o.male      Pt complains of headache behind his eyes. This is causing a lot of pressure as well. Some stuffiness but no other sinus issues. He notes he is feeling lightheaded when he gets the head pain. Feels like his blood pressure is fluctuating. Was 160/90 the other day but low today. Denies feeling like he may pass out. Has not been using anything to help with the pain. He is not drinking much water lately.   Has been using hydroxyzine to help with anxiety. Has a lot of stress right now.     Pt states he is has been acting off lately. States he feels disoriented and thinks it could be a UTI.     Review of Systems   Constitutional:  Positive for activity change. Negative for chills, fatigue, fever and unexpected weight change.   HENT:  Positive for congestion.    Eyes: Negative.    Respiratory:  Negative for chest tightness and shortness of breath.    Cardiovascular:  Negative for chest pain, palpitations and leg swelling.   Gastrointestinal:  Negative for abdominal pain and blood in stool.   Genitourinary:  Negative for dysuria.   Musculoskeletal:  Negative for joint swelling.   Skin:  Negative for rash.   Neurological:  Positive for light-headedness and headaches. Negative for dizziness.   Psychiatric/Behavioral: Negative.          Disoriented at times   All other systems reviewed and are negative.        OBJECTIVE     /60 (Site: Left Upper Arm)   Pulse (!) 124   Temp 99.1 °F (37.3 °C) (Oral)   Resp 16     Physical Exam  Vitals and nursing note reviewed.   Constitutional:       Appearance: He is well-developed.   HENT:      Head: Normocephalic and atraumatic.      Right Ear: External ear normal.      Left Ear: External ear normal.      Nose: Congestion and rhinorrhea present.   Eyes:      Conjunctiva/sclera: Conjunctivae normal.

## 2024-04-05 ENCOUNTER — PATIENT MESSAGE (OUTPATIENT)
Dept: FAMILY MEDICINE CLINIC | Age: 34
End: 2024-04-05

## 2024-04-05 NOTE — TELEPHONE ENCOUNTER
From: Noé Licea  To: Grisel Martinez  Sent: 4/5/2024 9:19 AM EDT  Subject: Accommodations     - Can I get an accommodation for my TBI because I can only focus on one thing at a time   - Have my phone on person on the sales floor in case of doctors or nurses try to contact me  - Note pad and pen to take notes on things about the changes in my health  - a note so I can check my blood pressure when necessary  - a note saying I can't wear pants with buttons like jeans and allow pants with elastic waist bands   There might be more but I can't think of them right now

## 2024-04-08 ENCOUNTER — TELEPHONE (OUTPATIENT)
Dept: SURGERY | Age: 34
End: 2024-04-08

## 2024-04-08 ENCOUNTER — HOSPITAL ENCOUNTER (OUTPATIENT)
Age: 34
Discharge: HOME | End: 2024-04-08
Payer: MEDICARE

## 2024-04-08 DIAGNOSIS — R51.9 NONINTRACTABLE EPISODIC HEADACHE, UNSPECIFIED HEADACHE TYPE: ICD-10-CM

## 2024-04-08 DIAGNOSIS — R41.0 DISORIENTATION: ICD-10-CM

## 2024-04-08 DIAGNOSIS — R09.81 SINUS CONGESTION: ICD-10-CM

## 2024-04-08 LAB
ANION GAP SERPL CALC-SCNC: 11 MEQ/L (ref 8–16)
BASOPHILS ABSOLUTE: 0.1 THOU/MM3 (ref 0–0.1)
BASOPHILS NFR BLD AUTO: 1.3 %
BUN SERPL-MCNC: 8 MG/DL (ref 7–22)
CALCIUM SERPL-MCNC: 8.8 MG/DL (ref 8.5–10.5)
CHLORIDE SERPL-SCNC: 103 MEQ/L (ref 98–111)
CO2 SERPL-SCNC: 25 MEQ/L (ref 23–33)
CREAT SERPL-MCNC: 0.9 MG/DL (ref 0.4–1.2)
DEPRECATED RDW RBC AUTO: 43.5 FL (ref 35–45)
EOSINOPHIL NFR BLD AUTO: 1.5 %
EOSINOPHILS ABSOLUTE: 0.1 THOU/MM3 (ref 0–0.4)
ERYTHROCYTE [DISTWIDTH] IN BLOOD BY AUTOMATED COUNT: 14.3 % (ref 11.5–14.5)
GFR SERPL CREATININE-BSD FRML MDRD: > 90 ML/MIN/1.73M2
GLUCOSE SERPL-MCNC: 87 MG/DL (ref 70–108)
HCT VFR BLD AUTO: 43.1 % (ref 42–52)
HGB BLD-MCNC: 14.4 GM/DL (ref 14–18)
IMM GRANULOCYTES # BLD AUTO: 0.03 THOU/MM3 (ref 0–0.07)
IMM GRANULOCYTES NFR BLD AUTO: 0.6 %
LYMPHOCYTES ABSOLUTE: 1.5 THOU/MM3 (ref 1–4.8)
LYMPHOCYTES NFR BLD AUTO: 31.1 %
MCH RBC QN AUTO: 28.1 PG (ref 26–33)
MCHC RBC AUTO-ENTMCNC: 33.4 GM/DL (ref 32.2–35.5)
MCV RBC AUTO: 84.2 FL (ref 80–94)
MONOCYTES ABSOLUTE: 0.4 THOU/MM3 (ref 0.4–1.3)
MONOCYTES NFR BLD AUTO: 8.4 %
NEUTROPHILS NFR BLD AUTO: 57.1 %
NRBC BLD AUTO-RTO: 0 /100 WBC
PLATELET # BLD AUTO: 268 THOU/MM3 (ref 130–400)
PMV BLD AUTO: 10.5 FL (ref 9.4–12.4)
POTASSIUM SERPL-SCNC: 4.4 MEQ/L (ref 3.5–5.2)
RBC # BLD AUTO: 5.12 MILL/MM3 (ref 4.7–6.1)
SEGMENTED NEUTROPHILS ABSOLUTE COUNT: 2.7 THOU/MM3 (ref 1.8–7.7)
SODIUM SERPL-SCNC: 139 MEQ/L (ref 135–145)
WBC # BLD AUTO: 4.8 THOU/MM3 (ref 4.8–10.8)

## 2024-04-08 PROCEDURE — 85025 COMPLETE CBC W/AUTO DIFF WBC: CPT

## 2024-04-08 PROCEDURE — 80048 BASIC METABOLIC PNL TOTAL CA: CPT

## 2024-04-08 PROCEDURE — 36415 COLL VENOUS BLD VENIPUNCTURE: CPT

## 2024-04-08 NOTE — TELEPHONE ENCOUNTER
Patient scheduled for surgery on 04-.  He is wondering about returning to work afterwards.  The only lifting he does is getting himself in and out of his vehicle.  I advised him that he would be off work at least 2-weeks or until he is reevaluated in the office, but he is hoping to be able to go back to work sooner.  When would you be comfortable allowing him to go back to work?

## 2024-04-09 NOTE — TELEPHONE ENCOUNTER
Okay for accommodations for having cell phone, pocket note pad and pencil, and wearing pants with elastic waist band. BP can be checked at break time unless one of his specialists needs him to monitor this more frequently. Thanks, TS

## 2024-04-16 ENCOUNTER — PREP FOR PROCEDURE (OUTPATIENT)
Dept: SURGERY | Age: 34
End: 2024-04-16

## 2024-04-16 RX ORDER — SODIUM CHLORIDE 0.9 % (FLUSH) 0.9 %
5-40 SYRINGE (ML) INJECTION PRN
Status: CANCELLED | OUTPATIENT
Start: 2024-04-16

## 2024-04-16 RX ORDER — SODIUM CHLORIDE 9 MG/ML
INJECTION, SOLUTION INTRAVENOUS CONTINUOUS
Status: CANCELLED | OUTPATIENT
Start: 2024-04-16

## 2024-04-16 RX ORDER — SODIUM CHLORIDE 9 MG/ML
INJECTION, SOLUTION INTRAVENOUS PRN
Status: CANCELLED | OUTPATIENT
Start: 2024-04-16

## 2024-04-16 RX ORDER — SODIUM CHLORIDE 0.9 % (FLUSH) 0.9 %
5-40 SYRINGE (ML) INJECTION EVERY 12 HOURS SCHEDULED
Status: CANCELLED | OUTPATIENT
Start: 2024-04-16
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
## 2024-04-18 ENCOUNTER — ANESTHESIA (OUTPATIENT)
Dept: OPERATING ROOM | Age: 34
End: 2024-04-18
Payer: MEDICARE

## 2024-04-18 ENCOUNTER — ANESTHESIA EVENT (OUTPATIENT)
Dept: OPERATING ROOM | Age: 34
End: 2024-04-18
Payer: MEDICARE

## 2024-04-18 ENCOUNTER — HOSPITAL ENCOUNTER (OUTPATIENT)
Age: 34
Setting detail: SURGERY ADMIT
Discharge: HOME | DRG: 329 | End: 2024-04-18
Attending: SURGERY | Admitting: SURGERY
Payer: MEDICARE

## 2024-04-18 VITALS
WEIGHT: 193 LBS | HEIGHT: 71 IN | BODY MASS INDEX: 27.02 KG/M2 | TEMPERATURE: 97.6 F | HEART RATE: 82 BPM | OXYGEN SATURATION: 96 % | RESPIRATION RATE: 16 BRPM | DIASTOLIC BLOOD PRESSURE: 77 MMHG | SYSTOLIC BLOOD PRESSURE: 118 MMHG

## 2024-04-18 DIAGNOSIS — G89.18 ACUTE POSTOPERATIVE PAIN: Primary | ICD-10-CM

## 2024-04-18 PROCEDURE — 7100000000 HC PACU RECOVERY - FIRST 15 MIN: Performed by: SURGERY

## 2024-04-18 PROCEDURE — 6360000002 HC RX W HCPCS: Performed by: SURGERY

## 2024-04-18 PROCEDURE — 2709999900 HC NON-CHARGEABLE SUPPLY: Performed by: SURGERY

## 2024-04-18 PROCEDURE — 49622 RPR PARASTOMAL HRNA NCR/STRN: CPT | Performed by: SURGERY

## 2024-04-18 PROCEDURE — 2580000003 HC RX 258: Performed by: SURGERY

## 2024-04-18 PROCEDURE — 3600000002 HC SURGERY LEVEL 2 BASE: Performed by: SURGERY

## 2024-04-18 PROCEDURE — 6370000000 HC RX 637 (ALT 250 FOR IP): Performed by: SURGERY

## 2024-04-18 PROCEDURE — 6360000002 HC RX W HCPCS

## 2024-04-18 PROCEDURE — 3700000001 HC ADD 15 MINUTES (ANESTHESIA): Performed by: SURGERY

## 2024-04-18 PROCEDURE — 7100000001 HC PACU RECOVERY - ADDTL 15 MIN: Performed by: SURGERY

## 2024-04-18 PROCEDURE — 0WQF0ZZ REPAIR ABDOMINAL WALL, OPEN APPROACH: ICD-10-PCS | Performed by: SURGERY

## 2024-04-18 PROCEDURE — 7100000011 HC PHASE II RECOVERY - ADDTL 15 MIN: Performed by: SURGERY

## 2024-04-18 PROCEDURE — 2500000003 HC RX 250 WO HCPCS

## 2024-04-18 PROCEDURE — 7100000010 HC PHASE II RECOVERY - FIRST 15 MIN: Performed by: SURGERY

## 2024-04-18 PROCEDURE — 3700000000 HC ANESTHESIA ATTENDED CARE: Performed by: SURGERY

## 2024-04-18 PROCEDURE — 87081 CULTURE SCREEN ONLY: CPT

## 2024-04-18 PROCEDURE — 3600000012 HC SURGERY LEVEL 2 ADDTL 15MIN: Performed by: SURGERY

## 2024-04-18 RX ORDER — ONDANSETRON 2 MG/ML
4 INJECTION INTRAMUSCULAR; INTRAVENOUS EVERY 6 HOURS PRN
Status: DISCONTINUED | OUTPATIENT
Start: 2024-04-18 | End: 2024-04-18 | Stop reason: HOSPADM

## 2024-04-18 RX ORDER — BUPIVACAINE HYDROCHLORIDE 5 MG/ML
INJECTION, SOLUTION EPIDURAL; INTRACAUDAL PRN
Status: DISCONTINUED | OUTPATIENT
Start: 2024-04-18 | End: 2024-04-18 | Stop reason: ALTCHOICE

## 2024-04-18 RX ORDER — FENTANYL CITRATE 50 UG/ML
INJECTION, SOLUTION INTRAMUSCULAR; INTRAVENOUS PRN
Status: DISCONTINUED | OUTPATIENT
Start: 2024-04-18 | End: 2024-04-18 | Stop reason: SDUPTHER

## 2024-04-18 RX ORDER — HYDROCODONE BITARTRATE AND ACETAMINOPHEN 5; 325 MG/1; MG/1
1 TABLET ORAL EVERY 4 HOURS PRN
Qty: 30 TABLET | Refills: 0 | Status: ON HOLD | OUTPATIENT
Start: 2024-04-18 | End: 2024-04-26 | Stop reason: HOSPADM

## 2024-04-18 RX ORDER — DEXAMETHASONE SODIUM PHOSPHATE 10 MG/ML
INJECTION, EMULSION INTRAMUSCULAR; INTRAVENOUS PRN
Status: DISCONTINUED | OUTPATIENT
Start: 2024-04-18 | End: 2024-04-18 | Stop reason: SDUPTHER

## 2024-04-18 RX ORDER — MIDAZOLAM HYDROCHLORIDE 1 MG/ML
INJECTION INTRAMUSCULAR; INTRAVENOUS PRN
Status: DISCONTINUED | OUTPATIENT
Start: 2024-04-18 | End: 2024-04-18 | Stop reason: SDUPTHER

## 2024-04-18 RX ORDER — SODIUM CHLORIDE 0.9 % (FLUSH) 0.9 %
5-40 SYRINGE (ML) INJECTION PRN
Status: DISCONTINUED | OUTPATIENT
Start: 2024-04-18 | End: 2024-04-18 | Stop reason: HOSPADM

## 2024-04-18 RX ORDER — LIDOCAINE HCL/PF 100 MG/5ML
SYRINGE (ML) INJECTION PRN
Status: DISCONTINUED | OUTPATIENT
Start: 2024-04-18 | End: 2024-04-18 | Stop reason: SDUPTHER

## 2024-04-18 RX ORDER — PHENYLEPHRINE HCL IN 0.9% NACL 1 MG/10 ML
SYRINGE (ML) INTRAVENOUS PRN
Status: DISCONTINUED | OUTPATIENT
Start: 2024-04-18 | End: 2024-04-18 | Stop reason: SDUPTHER

## 2024-04-18 RX ORDER — SODIUM CHLORIDE 0.9 % (FLUSH) 0.9 %
5-40 SYRINGE (ML) INJECTION EVERY 12 HOURS SCHEDULED
Status: DISCONTINUED | OUTPATIENT
Start: 2024-04-18 | End: 2024-04-18 | Stop reason: HOSPADM

## 2024-04-18 RX ORDER — HYDROCODONE BITARTRATE AND ACETAMINOPHEN 5; 325 MG/1; MG/1
1 TABLET ORAL EVERY 4 HOURS PRN
Status: DISCONTINUED | OUTPATIENT
Start: 2024-04-18 | End: 2024-04-18 | Stop reason: HOSPADM

## 2024-04-18 RX ORDER — SODIUM CHLORIDE 9 MG/ML
INJECTION, SOLUTION INTRAVENOUS PRN
Status: DISCONTINUED | OUTPATIENT
Start: 2024-04-18 | End: 2024-04-18 | Stop reason: HOSPADM

## 2024-04-18 RX ORDER — ONDANSETRON 2 MG/ML
INJECTION INTRAMUSCULAR; INTRAVENOUS PRN
Status: DISCONTINUED | OUTPATIENT
Start: 2024-04-18 | End: 2024-04-18 | Stop reason: SDUPTHER

## 2024-04-18 RX ORDER — ROCURONIUM BROMIDE 10 MG/ML
INJECTION, SOLUTION INTRAVENOUS PRN
Status: DISCONTINUED | OUTPATIENT
Start: 2024-04-18 | End: 2024-04-18 | Stop reason: SDUPTHER

## 2024-04-18 RX ORDER — SODIUM CHLORIDE 9 MG/ML
INJECTION, SOLUTION INTRAVENOUS CONTINUOUS
Status: DISCONTINUED | OUTPATIENT
Start: 2024-04-18 | End: 2024-04-18 | Stop reason: HOSPADM

## 2024-04-18 RX ADMIN — ROCURONIUM BROMIDE 50 MG: 10 INJECTION INTRAVENOUS at 12:11

## 2024-04-18 RX ADMIN — WATER 2000 MG: 1 INJECTION INTRAMUSCULAR; INTRAVENOUS; SUBCUTANEOUS at 12:18

## 2024-04-18 RX ADMIN — ONDANSETRON 4 MG: 2 INJECTION INTRAMUSCULAR; INTRAVENOUS at 13:07

## 2024-04-18 RX ADMIN — HYDROCODONE BITARTRATE AND ACETAMINOPHEN 1 TABLET: 5; 325 TABLET ORAL at 15:14

## 2024-04-18 RX ADMIN — MIDAZOLAM 2 MG: 1 INJECTION INTRAMUSCULAR; INTRAVENOUS at 12:04

## 2024-04-18 RX ADMIN — Medication 200 MCG: at 12:13

## 2024-04-18 RX ADMIN — FENTANYL CITRATE 100 MCG: 50 INJECTION, SOLUTION INTRAMUSCULAR; INTRAVENOUS at 12:11

## 2024-04-18 RX ADMIN — SODIUM CHLORIDE: 9 INJECTION, SOLUTION INTRAVENOUS at 11:48

## 2024-04-18 RX ADMIN — Medication 200 MCG: at 12:24

## 2024-04-18 RX ADMIN — DEXAMETHASONE SODIUM PHOSPHATE 10 MG: 10 INJECTION, EMULSION INTRAMUSCULAR; INTRAVENOUS at 12:11

## 2024-04-18 RX ADMIN — SUGAMMADEX 200 MG: 100 INJECTION, SOLUTION INTRAVENOUS at 13:25

## 2024-04-18 RX ADMIN — Medication 100 MG: at 12:11

## 2024-04-18 ASSESSMENT — PAIN DESCRIPTION - DESCRIPTORS: DESCRIPTORS: ACHING;DISCOMFORT

## 2024-04-18 ASSESSMENT — PAIN SCALES - GENERAL
PAINLEVEL_OUTOF10: 6
PAINLEVEL_OUTOF10: 7

## 2024-04-18 ASSESSMENT — PAIN - FUNCTIONAL ASSESSMENT: PAIN_FUNCTIONAL_ASSESSMENT: 0-10

## 2024-04-18 ASSESSMENT — PAIN DESCRIPTION - ORIENTATION: ORIENTATION: LOWER;RIGHT;LEFT

## 2024-04-18 ASSESSMENT — PAIN DESCRIPTION - LOCATION: LOCATION: ABDOMEN;BACK;FOOT;LEG

## 2024-04-18 NOTE — ANESTHESIA POSTPROCEDURE EVALUATION
Department of Anesthesiology  Postprocedure Note    Patient: Noé Licea  MRN: [id number]  YOB: 1990  Date of evaluation: 4/18/2024    Procedure Summary       Date: 04/18/24 Room / Location: [facility] OR [address] [place] [address] OR    Anesthesia Start: 1204 Anesthesia Stop: 1343    Procedure: Open Recurrent Parastomal Hernia Repair (Abdomen) Diagnosis:       Parastomal hernia without obstruction or gangrene      (Parastomal hernia without obstruction or gangrene [K43.5])    Surgeons: Andrade Morales DO Responsible Provider: Sukhwinder Ham MD    Anesthesia Type: General ASA Status: 3            Anesthesia Type: General    Alex Phase I: Alex Score: 9    Alex Phase II:      Anesthesia Post Evaluation    Patient location during evaluation: PACU  Patient participation: complete - patient participated  Level of consciousness: awake  Airway patency: patent  Nausea & Vomiting: no vomiting and no nausea  Cardiovascular status: hemodynamically stable  Respiratory status: acceptable and nasal cannula  Hydration status: stable  Pain management: adequate    No notable events documented.

## 2024-04-18 NOTE — PROGRESS NOTES
Discharge instructions given to patient and family. verbalized understanding. Patient discharged with home wheelchair

## 2024-04-18 NOTE — PROGRESS NOTES
Patient oriented to Same Day department and admitted to Same Day Surgery room 19.   Patient verbalized approval for first name, last initial with physician name on unit whiteboard.     Plan of care reviewed with patient.   Patient room whiteboard filled out and discussed with patient and responsible adult.   Patient and responsible adult offered Same Day Welcome Packet to review.    Call light in reach.   Bed in lowest position, locked, with one bed rail up.   SCDs and warming blanket in place.  Appropriate arm bands on patient.   Bathroom offered.   All questions and concerns of patient addressed.        Meds to Beds:   Patient informed of [facility] Meds to [facility]eds program during admission. Patient is agreeable to program.   Contact information for the pharmacy and the Meds to Beds program:   Name: Noé   Relationship to patient:patient   Phone number: same day

## 2024-04-18 NOTE — BRIEF OP NOTE
Brief Postoperative Note      Patient: Noé Licea  YOB: 1990  MRN: [id number]    Date of Procedure: 4/18/2024    Pre-Op Diagnosis Codes:     * Parastomal hernia without obstruction or gangrene [K43.5]    Post-Op Diagnosis: Same       Procedure(s):  Open Recurrent Parastomal Hernia Repair    Surgeon(s):  Andrade Morales DO    Assistant:  Resident: Aman Rosen DPM    Anesthesia: General    Estimated Blood Loss (mL): 15    Complications: None    Specimens:   * No specimens in log *    Implants:  * No implants in log *      Drains:   Colostomy LLQ (Active)   Stomal Appliance Clean, dry & intact 04/18/24 1339   Peristomal Assessment Clean, dry & intact 04/18/24 1339       Suprapubic Catheter (Active)   Urine Color Yellow 04/18/24 1210   Collection Container Standard 04/18/24 1210       Electronically signed by Andrade Morales DO on 4/18/2024 at 1:48 PM

## 2024-04-18 NOTE — PROGRESS NOTES
1339 Pt to PACU, responds to voice. Resp easy and unlabored on room air. Pt denies pain or nausea at this time. VSS.     1355 Patient awakens and continues to deny pain. Resp easy and unlabored on room air, VSS.     1405 Pt continues to rest in bed, snoring on and off. Resp easy and unlabored on room air. VSS    1409 Patient meets criteria for discharge from PACU at this time, transported to [facility] in stable condition.     Report given to Xochitl MALDONADO

## 2024-04-18 NOTE — PROGRESS NOTES
Back to [facility] from PACU Alert. No Family at bedside. Jello ice cream apple juice and ice water given. Side rails up bed in low position. Call light in reach

## 2024-04-18 NOTE — DISCHARGE INSTRUCTIONS
DR. ARANA'S DISCHARGE INSTRUCTIONS    Pt Name: Noé Licea  Medical Record Number: [id number]  Today's Date: 4/18/2024  GENERAL ANESTHESIA OR SEDATION   1. Do not drive or operate hazardous machinery for 24 hours.  2. Do not make important business or personal decisions for 24 hours.  3. Do not drink alcoholic beverages or use tobacco for 24 hours.   DIET INSTRUCTIONS   Regular diet as tolerated.  MEDICATIONS   Prescription written for Norco. Take as directed.  Do not drink alcohol or drive while taking pain medications. You may experience dizziness or drowsiness with these medications. You may also experience constipation which can be relieved with stool softners or laxatives.  You may resume your daily prescription medication schedule unless otherwise specified.  Do not take 325mg Aspirin or other blood thinners such as Coumadin or Plavix for 5 days.  WOUND & DRESSING INSTRUCTIONS   Always ensure you and your care giver clean hands before and after caring for the wound.  Keep dressing clean and dry for 48 hours. Change when soiled or wet.      Allow steri-strips to fall off on their own.   Ice operative site for 20 minutes 4 times a day.     May wash over incision in shower in 48 hours, but do not soak in a bath.  ABDOMINAL & LAPAROSCOPIC PROCEDURES   1. You are encouraged to get up and move around as this helps with the circulation and speeds up the healing process.  2. Breath deeply and cough from time to time. This helps to clear your lungs and helps prevent pneumonia.  3. Supporting your incision with a pillow or your hand helps to minimize discomfort and pain.  FOLLOW UP CARE, SPECIFICALLY WATCH FOR:    Fever over 101 degrees by mouth   Increased redness, warmth, hardness at operative site.   Blood soaked dressing (small amounts of oozing may be normal.)   Increased or progressive drainage from the surgical area   Inability to urinate or blood in the urine   Pain not relieved by the medications

## 2024-04-18 NOTE — ANESTHESIA PRE PROCEDURE
Department of Anesthesiology  Preprocedure Note       Name:  Noé Licea   Age:  33 y.o.  :  1990                                          MRN:  [id number]         Date:  2024      Surgeon: Surgeon(s):  Andrade Morales DO    Procedure: Procedure(s):  Open Recurrent Parastomal Hernia Repair    Medications prior to admission:   Prior to Admission medications    Medication Sig Start Date End Date Taking? Authorizing Provider   ondansetron (ZOFRAN ODT) 4 MG disintegrating tablet Take 1 tablet by mouth every 8 hours as needed for Nausea 3/26/24   Lauren Marroquin APRN - CNP   omeprazole (PRILOSEC) 40 MG delayed release capsule take 1 capsule by mouth EVERY MORNING BEFORE BREAKFAST 3/11/24   Grisel Martinez APRN - CNP   sildenafil (VIAGRA) 50 MG tablet take 1 tablet by mouth once daily if needed for ERECTILE DYSFUNCTION 24   Grisel Martinez APRN - CNP   oxybutynin (DITROPAN-XL) 5 MG extended release tablet take 1 tablet by mouth once daily 5/15/23   Sia Ortega MD   baclofen (LIORESAL) 20 MG tablet Take 1 tablet by mouth 4 times daily    Donna Logan MD   methocarbamol (ROBAXIN) 500 MG tablet Take 1 tablet by mouth 4 times daily    Donna Logan MD   lamoTRIgine (LAMICTAL) 100 MG tablet take 1 tablet by mouth twice a day  Patient taking differently: Take 0.5 tablets by mouth 2 times daily 18   Nazai Barroso MD   gabapentin (NEURONTIN) 300 MG capsule Take 3 capsules by mouth 4 times daily.    Provider, MD Donna       Current medications:    Current Facility-Administered Medications   Medication Dose Route Frequency Provider Last Rate Last Admin   • ceFAZolin (ANCEF) 2,000 mg in sterile water 20 mL IV syringe  2,000 mg IntraVENous On Call to OR Andrade Morales DO       • 0.9 % sodium chloride infusion   IntraVENous Continuous Andrade Morales,        • sodium chloride flush 0.9 % injection 5-40 mL  5-40 mL IntraVENous 2 times per day Andrade Morales

## 2024-04-19 ENCOUNTER — TELEPHONE (OUTPATIENT)
Dept: SURGERY | Age: 34
End: 2024-04-19

## 2024-04-19 LAB — MRSA SPEC QL CULT: NORMAL

## 2024-04-19 NOTE — TELEPHONE ENCOUNTER
Post procedural phone call placed to patient. Patient not utilizing prescribed medication with adequate control of pain. Patient education against constipation, recommended increased fluid intake, and stool softeners/stimulants as directed. Patient able to readback education. Patients follow up appointment verified and confirmed in chart. Time spent for patient questions. Patient to follow up with office as needed.

## 2024-04-20 ENCOUNTER — HOSPITAL ENCOUNTER (INPATIENT)
Age: 34
LOS: 6 days | Discharge: HOME | DRG: 329 | End: 2024-04-26
Attending: EMERGENCY MEDICINE
Payer: MEDICARE

## 2024-04-20 ENCOUNTER — APPOINTMENT (OUTPATIENT)
Dept: CT IMAGING | Age: 34
DRG: 329 | End: 2024-04-20
Payer: MEDICARE

## 2024-04-20 ENCOUNTER — APPOINTMENT (OUTPATIENT)
Dept: GENERAL RADIOLOGY | Age: 34
DRG: 329 | End: 2024-04-20
Payer: MEDICARE

## 2024-04-20 DIAGNOSIS — N17.9 AKI (ACUTE KIDNEY INJURY) (HCC): ICD-10-CM

## 2024-04-20 DIAGNOSIS — K94.00 COLOSTOMY COMPLICATION (HCC): ICD-10-CM

## 2024-04-20 DIAGNOSIS — A41.9 SEPTICEMIA (HCC): Primary | ICD-10-CM

## 2024-04-20 LAB
ALBUMIN SERPL BCG-MCNC: 3.5 G/DL (ref 3.5–5.1)
ALP SERPL-CCNC: 82 U/L (ref 38–126)
ALT SERPL W/O P-5'-P-CCNC: 18 U/L (ref 11–66)
ANION GAP SERPL CALC-SCNC: 16 MEQ/L (ref 8–16)
AST SERPL-CCNC: 26 U/L (ref 5–40)
BACTERIA URNS QL MICRO: ABNORMAL /HPF
BASOPHILS ABSOLUTE: 0 THOU/MM3 (ref 0–0.1)
BASOPHILS NFR BLD AUTO: 0.2 %
BILIRUB SERPL-MCNC: 1.2 MG/DL (ref 0.3–1.2)
BILIRUB UR QL STRIP.AUTO: NEGATIVE
BUN SERPL-MCNC: 22 MG/DL (ref 7–22)
CALCIUM SERPL-MCNC: 8.5 MG/DL (ref 8.5–10.5)
CASTS #/AREA URNS LPF: ABNORMAL /LPF
CASTS 2: ABNORMAL /LPF
CHARACTER UR: CLEAR
CHLORIDE SERPL-SCNC: 90 MEQ/L (ref 98–111)
CO2 SERPL-SCNC: 28 MEQ/L (ref 23–33)
COLOR: YELLOW
CREAT SERPL-MCNC: 2.2 MG/DL (ref 0.4–1.2)
CREAT UR-MCNC: 145.6 MG/DL
CRYSTALS URNS MICRO: ABNORMAL
DEPRECATED RDW RBC AUTO: 45.8 FL (ref 35–45)
EOSINOPHIL NFR BLD AUTO: 0.4 %
EOSINOPHILS ABSOLUTE: 0.1 THOU/MM3 (ref 0–0.4)
EPITHELIAL CELLS, UA: ABNORMAL /HPF
ERYTHROCYTE [DISTWIDTH] IN BLOOD BY AUTOMATED COUNT: 14.6 % (ref 11.5–14.5)
GFR SERPL CREATININE-BSD FRML MDRD: 40 ML/MIN/1.73M2
GLUCOSE SERPL-MCNC: 117 MG/DL (ref 70–108)
GLUCOSE UR QL STRIP.AUTO: NEGATIVE MG/DL
HCT VFR BLD AUTO: 42.1 % (ref 42–52)
HGB BLD-MCNC: 13.6 GM/DL (ref 14–18)
HGB UR QL STRIP.AUTO: NEGATIVE
IMM GRANULOCYTES # BLD AUTO: 0.66 THOU/MM3 (ref 0–0.07)
IMM GRANULOCYTES NFR BLD AUTO: 2.9 %
KETONES UR QL STRIP.AUTO: NEGATIVE
LACTIC ACID, SEPSIS: 3.1 MMOL/L (ref 0.5–1.9)
LACTIC ACID, SEPSIS: 4.9 MMOL/L (ref 0.5–1.9)
LIPASE SERPL-CCNC: 9.1 U/L (ref 5.6–51.3)
LYMPHOCYTES ABSOLUTE: 0.9 THOU/MM3 (ref 1–4.8)
LYMPHOCYTES NFR BLD AUTO: 3.9 %
MCH RBC QN AUTO: 27.7 PG (ref 26–33)
MCHC RBC AUTO-ENTMCNC: 32.3 GM/DL (ref 32.2–35.5)
MCV RBC AUTO: 85.7 FL (ref 80–94)
MISCELLANEOUS LAB TEST RESULT: ABNORMAL
MONOCYTES ABSOLUTE: 1.8 THOU/MM3 (ref 0.4–1.3)
MONOCYTES NFR BLD AUTO: 8.1 %
NEUTROPHILS NFR BLD AUTO: 84.5 %
NITRITE UR QL STRIP: NEGATIVE
NRBC BLD AUTO-RTO: 0 /100 WBC
OSMOLALITY SERPL CALC.SUM OF ELEC: 272.6 MOSMOL/KG (ref 275–300)
PH UR STRIP.AUTO: 6.5 [PH] (ref 5–9)
PLATELET # BLD AUTO: 271 THOU/MM3 (ref 130–400)
PLATELET BLD QL SMEAR: ADEQUATE
PMV BLD AUTO: 10.7 FL (ref 9.4–12.4)
POTASSIUM SERPL-SCNC: 4.4 MEQ/L (ref 3.5–5.2)
PROT SERPL-MCNC: 7.8 G/DL (ref 6.1–8)
PROT UR STRIP.AUTO-MCNC: ABNORMAL MG/DL
RBC # BLD AUTO: 4.91 MILL/MM3 (ref 4.7–6.1)
RBC URINE: ABNORMAL /HPF
SCAN OF BLOOD SMEAR: NORMAL
SEGMENTED NEUTROPHILS ABSOLUTE COUNT: 19.2 THOU/MM3 (ref 1.8–7.7)
SODIUM SERPL-SCNC: 134 MEQ/L (ref 135–145)
SODIUM UR-SCNC: 40 MEQ/L
SP GR UR REFRACT.AUTO: 1.01 (ref 1–1.03)
STOMATOCYTES: ABNORMAL
UROBILINOGEN, URINE: 0.2 EU/DL (ref 0–1)
WBC # BLD AUTO: 22.7 THOU/MM3 (ref 4.8–10.8)
WBC #/AREA URNS HPF: ABNORMAL /HPF
WBC #/AREA URNS HPF: NEGATIVE /[HPF]

## 2024-04-20 PROCEDURE — 6370000000 HC RX 637 (ALT 250 FOR IP): Performed by: STUDENT IN AN ORGANIZED HEALTH CARE EDUCATION/TRAINING PROGRAM

## 2024-04-20 PROCEDURE — 76376 3D RENDER W/INTRP POSTPROCES: CPT

## 2024-04-20 PROCEDURE — 87086 URINE CULTURE/COLONY COUNT: CPT

## 2024-04-20 PROCEDURE — 85025 COMPLETE CBC W/AUTO DIFF WBC: CPT

## 2024-04-20 PROCEDURE — 96367 TX/PROPH/DG ADDL SEQ IV INF: CPT

## 2024-04-20 PROCEDURE — 87186 SC STD MICRODIL/AGAR DIL: CPT

## 2024-04-20 PROCEDURE — 83690 ASSAY OF LIPASE: CPT

## 2024-04-20 PROCEDURE — 6360000002 HC RX W HCPCS: Performed by: STUDENT IN AN ORGANIZED HEALTH CARE EDUCATION/TRAINING PROGRAM

## 2024-04-20 PROCEDURE — 2580000003 HC RX 258: Performed by: STUDENT IN AN ORGANIZED HEALTH CARE EDUCATION/TRAINING PROGRAM

## 2024-04-20 PROCEDURE — 87075 CULTR BACTERIA EXCEPT BLOOD: CPT

## 2024-04-20 PROCEDURE — 87040 BLOOD CULTURE FOR BACTERIA: CPT

## 2024-04-20 PROCEDURE — 96361 HYDRATE IV INFUSION ADD-ON: CPT

## 2024-04-20 PROCEDURE — 74176 CT ABD & PELVIS W/O CONTRAST: CPT

## 2024-04-20 PROCEDURE — 71045 X-RAY EXAM CHEST 1 VIEW: CPT

## 2024-04-20 PROCEDURE — 93005 ELECTROCARDIOGRAM TRACING: CPT | Performed by: STUDENT IN AN ORGANIZED HEALTH CARE EDUCATION/TRAINING PROGRAM

## 2024-04-20 PROCEDURE — 87070 CULTURE OTHR SPECIMN AEROBIC: CPT

## 2024-04-20 PROCEDURE — 87147 CULTURE TYPE IMMUNOLOGIC: CPT

## 2024-04-20 PROCEDURE — 84300 ASSAY OF URINE SODIUM: CPT

## 2024-04-20 PROCEDURE — 87801 DETECT AGNT MULT DNA AMPLI: CPT

## 2024-04-20 PROCEDURE — 82570 ASSAY OF URINE CREATININE: CPT

## 2024-04-20 PROCEDURE — 83605 ASSAY OF LACTIC ACID: CPT

## 2024-04-20 PROCEDURE — 80053 COMPREHEN METABOLIC PANEL: CPT

## 2024-04-20 PROCEDURE — 87205 SMEAR GRAM STAIN: CPT

## 2024-04-20 PROCEDURE — 81001 URINALYSIS AUTO W/SCOPE: CPT

## 2024-04-20 PROCEDURE — 51702 INSERT TEMP BLADDER CATH: CPT

## 2024-04-20 PROCEDURE — 99285 EMERGENCY DEPT VISIT HI MDM: CPT

## 2024-04-20 PROCEDURE — 87077 CULTURE AEROBIC IDENTIFY: CPT

## 2024-04-20 PROCEDURE — 2060000000 HC ICU INTERMEDIATE R&B

## 2024-04-20 PROCEDURE — 96365 THER/PROPH/DIAG IV INF INIT: CPT

## 2024-04-20 PROCEDURE — 36415 COLL VENOUS BLD VENIPUNCTURE: CPT

## 2024-04-20 RX ORDER — BACLOFEN 10 MG/1
20 TABLET ORAL 4 TIMES DAILY
Status: DISCONTINUED | OUTPATIENT
Start: 2024-04-20 | End: 2024-04-26 | Stop reason: HOSPADM

## 2024-04-20 RX ORDER — ACETAMINOPHEN 500 MG
1000 TABLET ORAL ONCE
Status: COMPLETED | OUTPATIENT
Start: 2024-04-20 | End: 2024-04-20

## 2024-04-20 RX ORDER — SENNA AND DOCUSATE SODIUM 50; 8.6 MG/1; MG/1
1 TABLET, FILM COATED ORAL NIGHTLY
Status: DISCONTINUED | OUTPATIENT
Start: 2024-04-20 | End: 2024-04-20

## 2024-04-20 RX ORDER — PANTOPRAZOLE SODIUM 40 MG/1
40 TABLET, DELAYED RELEASE ORAL
Status: DISCONTINUED | OUTPATIENT
Start: 2024-04-21 | End: 2024-04-21

## 2024-04-20 RX ORDER — SODIUM CHLORIDE 9 MG/ML
INJECTION, SOLUTION INTRAVENOUS PRN
Status: DISCONTINUED | OUTPATIENT
Start: 2024-04-20 | End: 2024-04-26 | Stop reason: HOSPADM

## 2024-04-20 RX ORDER — HYDROCODONE BITARTRATE AND ACETAMINOPHEN 5; 325 MG/1; MG/1
1 TABLET ORAL EVERY 4 HOURS PRN
Status: DISCONTINUED | OUTPATIENT
Start: 2024-04-20 | End: 2024-04-26 | Stop reason: HOSPADM

## 2024-04-20 RX ORDER — 0.9 % SODIUM CHLORIDE 0.9 %
1000 INTRAVENOUS SOLUTION INTRAVENOUS ONCE
Status: COMPLETED | OUTPATIENT
Start: 2024-04-20 | End: 2024-04-20

## 2024-04-20 RX ORDER — METRONIDAZOLE 500 MG/100ML
500 INJECTION, SOLUTION INTRAVENOUS EVERY 8 HOURS
Status: DISCONTINUED | OUTPATIENT
Start: 2024-04-20 | End: 2024-04-20

## 2024-04-20 RX ORDER — SODIUM CHLORIDE 0.9 % (FLUSH) 0.9 %
5-40 SYRINGE (ML) INJECTION EVERY 12 HOURS SCHEDULED
Status: DISCONTINUED | OUTPATIENT
Start: 2024-04-20 | End: 2024-04-26 | Stop reason: HOSPADM

## 2024-04-20 RX ORDER — ENOXAPARIN SODIUM 100 MG/ML
40 INJECTION SUBCUTANEOUS DAILY
Status: DISCONTINUED | OUTPATIENT
Start: 2024-04-21 | End: 2024-04-21

## 2024-04-20 RX ORDER — LAMOTRIGINE 25 MG/1
50 TABLET ORAL 2 TIMES DAILY
Status: DISCONTINUED | OUTPATIENT
Start: 2024-04-20 | End: 2024-04-26 | Stop reason: HOSPADM

## 2024-04-20 RX ORDER — ACETAMINOPHEN 325 MG/1
650 TABLET ORAL EVERY 6 HOURS PRN
Status: DISCONTINUED | OUTPATIENT
Start: 2024-04-20 | End: 2024-04-26 | Stop reason: HOSPADM

## 2024-04-20 RX ORDER — 0.9 % SODIUM CHLORIDE 0.9 %
500 INTRAVENOUS SOLUTION INTRAVENOUS ONCE
Status: COMPLETED | OUTPATIENT
Start: 2024-04-20 | End: 2024-04-20

## 2024-04-20 RX ORDER — SODIUM CHLORIDE 9 MG/ML
INJECTION, SOLUTION INTRAVENOUS CONTINUOUS
Status: ACTIVE | OUTPATIENT
Start: 2024-04-20 | End: 2024-04-21

## 2024-04-20 RX ORDER — SODIUM CHLORIDE, SODIUM LACTATE, POTASSIUM CHLORIDE, AND CALCIUM CHLORIDE .6; .31; .03; .02 G/100ML; G/100ML; G/100ML; G/100ML
30 INJECTION, SOLUTION INTRAVENOUS ONCE
Status: COMPLETED | OUTPATIENT
Start: 2024-04-20 | End: 2024-04-20

## 2024-04-20 RX ORDER — 0.9 % SODIUM CHLORIDE 0.9 %
1250 INTRAVENOUS SOLUTION INTRAVENOUS ONCE
Status: COMPLETED | OUTPATIENT
Start: 2024-04-20 | End: 2024-04-20

## 2024-04-20 RX ORDER — SENNA AND DOCUSATE SODIUM 50; 8.6 MG/1; MG/1
1 TABLET, FILM COATED ORAL NIGHTLY
Status: DISCONTINUED | OUTPATIENT
Start: 2024-04-20 | End: 2024-04-21

## 2024-04-20 RX ORDER — GABAPENTIN 300 MG/1
900 CAPSULE ORAL 4 TIMES DAILY
Status: DISCONTINUED | OUTPATIENT
Start: 2024-04-20 | End: 2024-04-26 | Stop reason: HOSPADM

## 2024-04-20 RX ORDER — SODIUM CHLORIDE 0.9 % (FLUSH) 0.9 %
5-40 SYRINGE (ML) INJECTION PRN
Status: DISCONTINUED | OUTPATIENT
Start: 2024-04-20 | End: 2024-04-26 | Stop reason: HOSPADM

## 2024-04-20 RX ORDER — ACETAMINOPHEN 650 MG/1
650 SUPPOSITORY RECTAL EVERY 6 HOURS PRN
Status: DISCONTINUED | OUTPATIENT
Start: 2024-04-20 | End: 2024-04-26 | Stop reason: HOSPADM

## 2024-04-20 RX ADMIN — MEROPENEM 1000 MG: 1 INJECTION, POWDER, FOR SOLUTION INTRAVENOUS at 23:26

## 2024-04-20 RX ADMIN — LAMOTRIGINE 50 MG: 25 TABLET ORAL at 23:23

## 2024-04-20 RX ADMIN — SODIUM CHLORIDE 1250 ML: 9 INJECTION, SOLUTION INTRAVENOUS at 18:02

## 2024-04-20 RX ADMIN — SODIUM CHLORIDE 500 ML: 9 INJECTION, SOLUTION INTRAVENOUS at 19:55

## 2024-04-20 RX ADMIN — BACLOFEN 20 MG: 10 TABLET ORAL at 23:15

## 2024-04-20 RX ADMIN — PIPERACILLIN AND TAZOBACTAM 4500 MG: 4; .5 INJECTION, POWDER, LYOPHILIZED, FOR SOLUTION INTRAVENOUS at 18:29

## 2024-04-20 RX ADMIN — ACETAMINOPHEN 1000 MG: 500 TABLET ORAL at 19:45

## 2024-04-20 RX ADMIN — DOCUSATE SODIUM 50 MG AND SENNOSIDES 8.6 MG 1 TABLET: 8.6; 5 TABLET, FILM COATED ORAL at 23:20

## 2024-04-20 RX ADMIN — SODIUM CHLORIDE, POTASSIUM CHLORIDE, SODIUM LACTATE AND CALCIUM CHLORIDE 2259 ML: 600; 310; 30; 20 INJECTION, SOLUTION INTRAVENOUS at 21:26

## 2024-04-20 RX ADMIN — GABAPENTIN 900 MG: 300 CAPSULE ORAL at 23:22

## 2024-04-20 RX ADMIN — SODIUM CHLORIDE, PRESERVATIVE FREE 10 ML: 5 INJECTION INTRAVENOUS at 23:15

## 2024-04-20 RX ADMIN — SODIUM CHLORIDE 1000 ML: 9 INJECTION, SOLUTION INTRAVENOUS at 17:32

## 2024-04-20 RX ADMIN — VANCOMYCIN HYDROCHLORIDE 2000 MG: 1 INJECTION, POWDER, LYOPHILIZED, FOR SOLUTION INTRAVENOUS at 19:43

## 2024-04-20 RX ADMIN — SODIUM CHLORIDE: 9 INJECTION, SOLUTION INTRAVENOUS at 23:26

## 2024-04-20 ASSESSMENT — PAIN SCALES - GENERAL: PAINLEVEL_OUTOF10: 0

## 2024-04-20 NOTE — PROGRESS NOTES
[facility]   Pharmacy Pharmacokinetic Monitoring Service - Vancomycin     Noé Licea is a 33 y.o. male starting on vancomycin therapy for surgical site infection. Pharmacy consulted by Dr. Mandy Pop for monitoring and adjustment.    Target Concentration: Goal trough of 10-15 mg/L and AUC/PATT <500 mg*hr/L    Additional Antimicrobials: Zosyn    Pertinent Laboratory Values:   Wt Readings from Last 1 Encounters:   04/20/24 87.5 kg (193 lb)     Temp Readings from Last 1 Encounters:   04/20/24 98.3 °F (36.8 °C) (Oral)     Estimated Creatinine Clearance: 51 mL/min (A) (based on SCr of 2.2 mg/dL (H)).  Recent Labs     04/20/24  1715   CREATININE 2.2*   BUN 22   WBC 22.7*     Procalcitonin: n/a    Pertinent Cultures:  Culture Date Source Results   4/20/24 BCx2 pending   MRSA Nasal Swab: N/A. Non-respiratory infection.    Plan:  Concentration-guided dosing due to renal impairment/insufficiency  Start vancomycin 2000 mgx1  If patient is admitted and vancomycin is continued, will need to measure random vancomycin in 24 hrs to determine subsequent dosing  Renal labs as indicated   Pharmacy will continue to monitor patient and adjust therapy as indicated    Thank you for the consult,  Paresh Hernandez RPH  4/20/2024 7:24 PM

## 2024-04-20 NOTE — ED TRIAGE NOTES
Patient to room 8 from triage for complaint of fever, hypotension, and abdominal distention.  Patient had a hernia repair procedure with Dr. Morales on the 18th  Since this time he has had multiple issues with leaking from the ostomy.  This has been causing issues for the patient because the ostomy was placed in the same location as the hernia repair.  Per his home health nurse, patient has been febrile, tachycardic, and hypotensive.  See previous note for specifics.  EKG completed at bedside.

## 2024-04-20 NOTE — ED NOTES
ED nurse-to-nurse bedside report    Chief Complaint   Patient presents with    Fever    Bloated    Post-op Problem      LOC: alert and orientated to name, place, date  Vital signs   Vitals:    04/20/24 1648 04/20/24 1815 04/20/24 1915 04/20/24 1937   BP: 101/67  (!) 103/59 (!) 92/47   Pulse: (!) 148 (!) 131 (!) 135 (!) 135   Resp: 20 13 27   Temp: 98.3 °F (36.8 °C)   (!) 103.5 °F (39.7 °C)   TempSrc: Oral   Oral   SpO2: 91%   93%   Weight: 87.5 kg (193 lb)      Height: 1.803 m (5' 11\")         Pain:    Pain Interventions: Positioning  Pain Goal: N/A  Oxygen: No    Current needs required Room Air   Telemetry: Yes  LDAs:   Peripheral IV 04/20/24 Right Forearm (Active)   Site Assessment Clean, dry & intact 04/20/24 1715   Line Status Blood return noted;Specimen collected;Normal saline locked;Flushed 04/20/24 1715     Continuous Infusions:   Mobility: Requires assistance * 2  Toribio Fall Risk Score:        No data to display              Fall Interventions: Hourly Rounding, Side Rails x 2  Report given to: ERICA Izaguirre

## 2024-04-20 NOTE — ED NOTES
Received call from [employer] that patient had a hernia repair and colostomy procedure with Dr. Morales on the 18th.  Today, patient is febrile at 100.9, heart rate of 124, BP 88/68.  Abdomen is distended with redness around his ostomy site.  Ostomy was leaking in office.  Patient is a paraplegic and will present with his service dog.  Patient declined ambulance transport.

## 2024-04-20 NOTE — PROGRESS NOTES
Pharmacy Note - Extended Infusion Beta-Lactam Dose Adjustment    Piperacillin/Tazobactam 3375 mg x 1 intermittent infusion ordered for the treatment of Intra-abdominal Infection. Per [facility] Extended Infusion Beta-Lactam Policy, this will be changed to 4500 mg loading dose x 1     Estimated Creatinine Clearance: Estimated Creatinine Clearance: 51 mL/min (A) (based on SCr of 2.2 mg/dL (H)).    Dialysis Status, YADIRA, CKD: YADIRA    BMI: Body mass index is 26.92 kg/m².    Rationale for Adjustment: Dose adjusted per [facility] Extended Infusion Policy based on renal function and indication. The above medication is renally eliminated and demonstrates time-dependent effects on bacterial eradication. Extended-infusion dosing strategy aims to enhance microbiologic and clinical efficacy.     Pharmacy will monitor renal function daily and adjust dose as necessary.      Please call with any questions.    Thank you,  Elba Davalos, PharmD

## 2024-04-20 NOTE — OP NOTE
[address] [address]                            OPERATIVE REPORT      PATIENT NAME: KIRA AQUINO                 : 1990  MED REC NO: [id number]                       ROOM: [facility]                                               (General) POOL                                               RM    ACCOUNT NO: [id number]                       ADMIT DATE: 2024  PROVIDER: Andrade Morales DO      DATE OF PROCEDURE:  2024    SURGEON:  Andrade Morales DO    PREOPERATIVE DIAGNOSIS:  Recurrent parastomal hernia.    POSTOPERATIVE DIAGNOSIS:  Recurrent parastomal hernia.    PROCEDURE PERFORMED:  Open recurrent parastomal hernia repair.    ANESTHESIA:  General with local.    ESTIMATED BLOOD LOSS:  15 mL.    SPECIMENS:  None.    COMPLICATIONS:  None immediately appreciated.    DISCUSSION:  The patient is a 33-year-old male, who presented to my office and seen in evaluation, getting recurrent parastomal hernia.  After history and physical examination performed, potential diagnostic and therapeutic modalities were discussed with the patient; operative and nonoperative management discussed; risks, complications, and benefits were reviewed.  He was given the opportunity to ask questions and once answered and informed consent was obtained, the patient was brought to the operating room on 2024 for the procedure.    OPERATIVE FINDINGS:  At the time of exploration, the patient was found to have small bowel scarred into the hernia sac representing incarceration.  This was taken down and sutured repair performed as described below.    DESCRIPTION OF PROCEDURE:  The patient was brought to the operating room; placed in supine position; placed under continuous cardiac telemetry, blood pressure, and pulse oximeter monitoring; placed under general anesthesia by the Anesthesia Department.  The anterior abdominal wall was prepped and draped in  sterile fashion.  A vertical incision was made lateral to his colostomy site and carried down to the subcutaneous tissue.  Subcutaneous tissue dissection was carried out with electrocautery down to the hernia sac, which was entered safely.  There were adhesions within the hernia sac with small bowel and omentum.  These were easily taken down and reduced back within the abdomen.  The fascial edge was then grasped and elevated and approximated using 0 Ethibond suture with multiple single interrupted sutures.  Good approximation was appreciated.  No additional pathology identified.  Deep tissues approximated using 2-0 Vicryl suture.  Deep dermis approximated using 3-0 Vicryl suture and skin closed using a running absorbable suture.  Wounds were then cleansed.  Mastisol, Steri-Strips, and sterile dressings were applied.  The patient was brought out of anesthesia, transferred to the PACU in stable and satisfactory condition.  No immediate complications evident.  All sponge, instrument, and needle counts were correct at the completion of the procedure.    Postoperative findings were unable to be discussed with the patient's family as none were present.  He was given discharge instructions, prescriptions for analgesics and will follow up in my office in a 2-week period of time for re-evaluation.          AYSHA ARANA DO      D:  04/19/2024 13:37:36     T:  04/19/2024 21:03:16     JONATHAN/CHARMAINE  Job #:  [id number]     Doc#:  [id number]
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
## 2024-04-20 NOTE — ED NOTES
In to change bedding and assist patient to change ostomy bag.  Patient noted to be extremely warm to the touch, oral temperature now 103.5.  Patient is also complaining of a headache.  Stoma appears very swollen, patient states that this is not its usual appearance.  New stoma bag applied with some leakage which is cleaned up.  New steri strips applied to surgical site.    Dr. Pop updated.

## 2024-04-21 ENCOUNTER — ANESTHESIA (OUTPATIENT)
Dept: OPERATING ROOM | Age: 34
End: 2024-04-21
Payer: MEDICARE

## 2024-04-21 ENCOUNTER — ANESTHESIA EVENT (OUTPATIENT)
Dept: OPERATING ROOM | Age: 34
End: 2024-04-21
Payer: MEDICARE

## 2024-04-21 LAB
25(OH)D3 SERPL-MCNC: < 6 NG/ML (ref 30–100)
ACB COMPLEX DNA BLD POS QL NAA+NON-PROBE: NOT DETECTED
ANION GAP SERPL CALC-SCNC: 12 MEQ/L (ref 8–16)
B FRAGILIS DNA BLD POS QL NAA+NON-PROBE: NOT DETECTED
BLACTX-M ISLT/SPM QL: NORMAL
BLAIMP ISLT/SPM QL: NORMAL
BLAKPC ISLT/SPM QL: NORMAL
BLAOXA-48-LIKE ISLT/SPM QL: NORMAL
BLAVIM ISLT/SPM QL: NORMAL
BOTTLE TYPE: NORMAL
BUN SERPL-MCNC: 17 MG/DL (ref 7–22)
C ALBICANS DNA BLD POS QL NAA+NON-PROBE: NOT DETECTED
C AURIS DNA BLD POS QL NAA+NON-PROBE: NOT DETECTED
C GATTII+NEOFOR DNA BLD POS QL NAA+N-PRB: NOT DETECTED
C GLABRATA DNA BLD POS QL NAA+NON-PROBE: NOT DETECTED
C KRUSEI DNA BLD POS QL NAA+NON-PROBE: NOT DETECTED
C PARAP DNA BLD POS QL NAA+NON-PROBE: NOT DETECTED
C TROPICLS DNA BLD POS QL NAA+NON-PROBE: NOT DETECTED
CA-I BLD ISE-SCNC: 1.19 MMOL/L (ref 1.12–1.32)
CALCIUM SERPL-MCNC: 8 MG/DL (ref 8.5–10.5)
CHLORIDE SERPL-SCNC: 100 MEQ/L (ref 98–111)
CO2 SERPL-SCNC: 26 MEQ/L (ref 23–33)
COAG NEG STAPH DNA BLD QL NAA+PROBE: NOT DETECTED
COLISTIN RES MCR-1 ISLT/SPM QL: NORMAL
CREAT SERPL-MCNC: 1.4 MG/DL (ref 0.4–1.2)
DEPRECATED RDW RBC AUTO: 46.7 FL (ref 35–45)
E CLOAC COMP DNA BLD POS NAA+NON-PROBE: NOT DETECTED
E COLI DNA BLD POS QL NAA+NON-PROBE: NOT DETECTED
E FAECALIS DNA BLD POS QL NAA+NON-PROBE: NOT DETECTED
E FAECIUM DNA BLD POS QL NAA+NON-PROBE: NOT DETECTED
ENTEROBACTERALES DNA BLD POS NAA+N-PRB: NOT DETECTED
ERYTHROCYTE [DISTWIDTH] IN BLOOD BY AUTOMATED COUNT: 14.7 % (ref 11.5–14.5)
GFR SERPL CREATININE-BSD FRML MDRD: 68 ML/MIN/1.73M2
GLUCOSE SERPL-MCNC: 106 MG/DL (ref 70–108)
GP B STREP DNA SPEC QL NAA+PROBE: NOT DETECTED
GP B STREP DNA SPEC QL NAA+PROBE: NOT DETECTED
HAEM INFLU DNA BLD POS QL NAA+NON-PROBE: NOT DETECTED
HCT VFR BLD AUTO: 38.2 % (ref 42–52)
HGB BLD-MCNC: 12.3 GM/DL (ref 14–18)
K OXYTOCA DNA BLD POS QL NAA+NON-PROBE: NOT DETECTED
K OXYTOCA DNA BLD POS QL NAA+NON-PROBE: NOT DETECTED
KLEBSIELLA SP DNA BLD POS QL NAA+NON-PRB: NOT DETECTED
L MONOCYTOG DNA BLD POS QL NAA+NON-PROBE: NOT DETECTED
LACTIC ACID, SEPSIS: 1.6 MMOL/L (ref 0.5–1.9)
MCH RBC QN AUTO: 27.8 PG (ref 26–33)
MCHC RBC AUTO-ENTMCNC: 32.2 GM/DL (ref 32.2–35.5)
MCV RBC AUTO: 86.4 FL (ref 80–94)
MECA ISLT/SPM QL: NORMAL
MECA+MECC+MREJ ISLT/SPM QL: NORMAL
N MEN DNA BLD POS QL NAA+NON-PROBE: NOT DETECTED
NDM: NORMAL
OSMOLALITY SERPL CALC.SUM OF ELEC: 277.6 MOSMOL/KG (ref 275–300)
P AERUGINOSA DNA BLD POS NAA+NON-PROBE: NOT DETECTED
PLATELET # BLD AUTO: 196 THOU/MM3 (ref 130–400)
PMV BLD AUTO: 10.9 FL (ref 9.4–12.4)
POTASSIUM SERPL-SCNC: 3.8 MEQ/L (ref 3.5–5.2)
PROTEUS SPP: NOT DETECTED
RBC # BLD AUTO: 4.42 MILL/MM3 (ref 4.7–6.1)
S AUREUS DNA BLD POS QL NAA+NON-PROBE: NOT DETECTED
S EPIDERMIDIS DNA BLD POS QL NAA+NON-PRB: NOT DETECTED
S LUGDUNENSIS DNA BLD POS QL NAA+NON-PRB: NOT DETECTED
S MALTOPHILIA DNA BLD POS QL NAA+NON-PRB: NOT DETECTED
S MARCESCENS DNA BLD POS NAA+NON-PROBE: NOT DETECTED
S PYO DNA THROAT QL NAA+PROBE: NOT DETECTED
SALMONELLA DNA BLD POS QL NAA+NON-PROBE: NOT DETECTED
SODIUM SERPL-SCNC: 138 MEQ/L (ref 135–145)
SOURCE OF BLOOD CULTURE: NORMAL
STREPTOCOCCUS DNA BLD QL NAA+PROBE: NOT DETECTED
VANA+VANB ISLT/SPM QL: NORMAL
VANCOMYCIN SERPL-MCNC: < 4 UG/ML (ref 0.1–39.9)
WBC # BLD AUTO: 13.5 THOU/MM3 (ref 4.8–10.8)

## 2024-04-21 PROCEDURE — 0JN80ZZ RELEASE ABDOMEN SUBCUTANEOUS TISSUE AND FASCIA, OPEN APPROACH: ICD-10-PCS | Performed by: SURGERY

## 2024-04-21 PROCEDURE — 82330 ASSAY OF CALCIUM: CPT

## 2024-04-21 PROCEDURE — 80048 BASIC METABOLIC PNL TOTAL CA: CPT

## 2024-04-21 PROCEDURE — 7100000001 HC PACU RECOVERY - ADDTL 15 MIN: Performed by: SURGERY

## 2024-04-21 PROCEDURE — 2500000003 HC RX 250 WO HCPCS: Performed by: STUDENT IN AN ORGANIZED HEALTH CARE EDUCATION/TRAINING PROGRAM

## 2024-04-21 PROCEDURE — 6360000002 HC RX W HCPCS: Performed by: REGISTERED NURSE

## 2024-04-21 PROCEDURE — 3600000004 HC SURGERY LEVEL 4 BASE: Performed by: SURGERY

## 2024-04-21 PROCEDURE — 36415 COLL VENOUS BLD VENIPUNCTURE: CPT

## 2024-04-21 PROCEDURE — 2580000003 HC RX 258: Performed by: REGISTERED NURSE

## 2024-04-21 PROCEDURE — 3700000001 HC ADD 15 MINUTES (ANESTHESIA): Performed by: SURGERY

## 2024-04-21 PROCEDURE — 3600000014 HC SURGERY LEVEL 4 ADDTL 15MIN: Performed by: SURGERY

## 2024-04-21 PROCEDURE — 2580000003 HC RX 258: Performed by: INTERNAL MEDICINE

## 2024-04-21 PROCEDURE — 3700000000 HC ANESTHESIA ATTENDED CARE: Performed by: SURGERY

## 2024-04-21 PROCEDURE — 3E1038Z IRRIGATION OF SKIN AND MUCOUS MEMBRANES USING IRRIGATING SUBSTANCE, PERCUTANEOUS APPROACH: ICD-10-PCS | Performed by: SURGERY

## 2024-04-21 PROCEDURE — 6370000000 HC RX 637 (ALT 250 FOR IP): Performed by: SURGERY

## 2024-04-21 PROCEDURE — 6370000000 HC RX 637 (ALT 250 FOR IP): Performed by: INTERNAL MEDICINE

## 2024-04-21 PROCEDURE — 82306 VITAMIN D 25 HYDROXY: CPT

## 2024-04-21 PROCEDURE — 2580000003 HC RX 258: Performed by: SURGERY

## 2024-04-21 PROCEDURE — 2060000000 HC ICU INTERMEDIATE R&B

## 2024-04-21 PROCEDURE — 2709999900 HC NON-CHARGEABLE SUPPLY: Performed by: SURGERY

## 2024-04-21 PROCEDURE — 6360000002 HC RX W HCPCS: Performed by: STUDENT IN AN ORGANIZED HEALTH CARE EDUCATION/TRAINING PROGRAM

## 2024-04-21 PROCEDURE — 83605 ASSAY OF LACTIC ACID: CPT

## 2024-04-21 PROCEDURE — 2580000003 HC RX 258: Performed by: STUDENT IN AN ORGANIZED HEALTH CARE EDUCATION/TRAINING PROGRAM

## 2024-04-21 PROCEDURE — 80202 ASSAY OF VANCOMYCIN: CPT

## 2024-04-21 PROCEDURE — 2500000003 HC RX 250 WO HCPCS: Performed by: REGISTERED NURSE

## 2024-04-21 PROCEDURE — 7100000000 HC PACU RECOVERY - FIRST 15 MIN: Performed by: SURGERY

## 2024-04-21 PROCEDURE — 85027 COMPLETE CBC AUTOMATED: CPT

## 2024-04-21 PROCEDURE — 6370000000 HC RX 637 (ALT 250 FOR IP): Performed by: STUDENT IN AN ORGANIZED HEALTH CARE EDUCATION/TRAINING PROGRAM

## 2024-04-21 PROCEDURE — 6360000002 HC RX W HCPCS: Performed by: SURGERY

## 2024-04-21 RX ORDER — SODIUM CHLORIDE, SODIUM LACTATE, POTASSIUM CHLORIDE, CALCIUM CHLORIDE 600; 310; 30; 20 MG/100ML; MG/100ML; MG/100ML; MG/100ML
INJECTION, SOLUTION INTRAVENOUS CONTINUOUS PRN
Status: DISCONTINUED | OUTPATIENT
Start: 2024-04-21 | End: 2024-04-21 | Stop reason: SDUPTHER

## 2024-04-21 RX ORDER — CALCIUM GLUCONATE 20 MG/ML
2000 INJECTION, SOLUTION INTRAVENOUS PRN
Status: DISCONTINUED | OUTPATIENT
Start: 2024-04-21 | End: 2024-04-26 | Stop reason: HOSPADM

## 2024-04-21 RX ORDER — PHENYLEPHRINE HCL IN 0.9% NACL 1 MG/10 ML
SYRINGE (ML) INTRAVENOUS PRN
Status: DISCONTINUED | OUTPATIENT
Start: 2024-04-21 | End: 2024-04-21 | Stop reason: SDUPTHER

## 2024-04-21 RX ORDER — MIDAZOLAM HYDROCHLORIDE 1 MG/ML
INJECTION INTRAMUSCULAR; INTRAVENOUS PRN
Status: DISCONTINUED | OUTPATIENT
Start: 2024-04-21 | End: 2024-04-21 | Stop reason: SDUPTHER

## 2024-04-21 RX ORDER — ONDANSETRON 2 MG/ML
INJECTION INTRAMUSCULAR; INTRAVENOUS PRN
Status: DISCONTINUED | OUTPATIENT
Start: 2024-04-21 | End: 2024-04-21 | Stop reason: SDUPTHER

## 2024-04-21 RX ORDER — SODIUM CHLORIDE 9 MG/ML
INJECTION, SOLUTION INTRAVENOUS CONTINUOUS
Status: ACTIVE | OUTPATIENT
Start: 2024-04-21 | End: 2024-04-21

## 2024-04-21 RX ORDER — ROCURONIUM BROMIDE 10 MG/ML
INJECTION, SOLUTION INTRAVENOUS PRN
Status: DISCONTINUED | OUTPATIENT
Start: 2024-04-21 | End: 2024-04-21 | Stop reason: SDUPTHER

## 2024-04-21 RX ORDER — LABETALOL HYDROCHLORIDE 5 MG/ML
10 INJECTION, SOLUTION INTRAVENOUS
Status: DISCONTINUED | OUTPATIENT
Start: 2024-04-21 | End: 2024-04-21 | Stop reason: HOSPADM

## 2024-04-21 RX ORDER — METHOCARBAMOL 500 MG/1
500 TABLET, FILM COATED ORAL 4 TIMES DAILY
Status: DISCONTINUED | OUTPATIENT
Start: 2024-04-21 | End: 2024-04-26 | Stop reason: HOSPADM

## 2024-04-21 RX ORDER — LIDOCAINE HCL/PF 100 MG/5ML
SYRINGE (ML) INJECTION PRN
Status: DISCONTINUED | OUTPATIENT
Start: 2024-04-21 | End: 2024-04-21 | Stop reason: SDUPTHER

## 2024-04-21 RX ORDER — SODIUM CHLORIDE 9 MG/ML
INJECTION, SOLUTION INTRAVENOUS PRN
Status: DISCONTINUED | OUTPATIENT
Start: 2024-04-21 | End: 2024-04-21 | Stop reason: HOSPADM

## 2024-04-21 RX ORDER — FENTANYL CITRATE 50 UG/ML
INJECTION, SOLUTION INTRAMUSCULAR; INTRAVENOUS PRN
Status: DISCONTINUED | OUTPATIENT
Start: 2024-04-21 | End: 2024-04-21 | Stop reason: SDUPTHER

## 2024-04-21 RX ORDER — DIPHENHYDRAMINE HYDROCHLORIDE 50 MG/ML
INJECTION INTRAMUSCULAR; INTRAVENOUS PRN
Status: DISCONTINUED | OUTPATIENT
Start: 2024-04-21 | End: 2024-04-21 | Stop reason: SDUPTHER

## 2024-04-21 RX ORDER — NALOXONE HYDROCHLORIDE 0.4 MG/ML
INJECTION, SOLUTION INTRAMUSCULAR; INTRAVENOUS; SUBCUTANEOUS PRN
Status: DISCONTINUED | OUTPATIENT
Start: 2024-04-21 | End: 2024-04-21 | Stop reason: HOSPADM

## 2024-04-21 RX ORDER — SODIUM CHLORIDE 0.9 % (FLUSH) 0.9 %
5-40 SYRINGE (ML) INJECTION EVERY 12 HOURS SCHEDULED
Status: DISCONTINUED | OUTPATIENT
Start: 2024-04-21 | End: 2024-04-21 | Stop reason: HOSPADM

## 2024-04-21 RX ORDER — SODIUM CHLORIDE 0.9 % (FLUSH) 0.9 %
5-40 SYRINGE (ML) INJECTION PRN
Status: DISCONTINUED | OUTPATIENT
Start: 2024-04-21 | End: 2024-04-21 | Stop reason: HOSPADM

## 2024-04-21 RX ORDER — DEXAMETHASONE SODIUM PHOSPHATE 10 MG/ML
INJECTION, EMULSION INTRAMUSCULAR; INTRAVENOUS PRN
Status: DISCONTINUED | OUTPATIENT
Start: 2024-04-21 | End: 2024-04-21 | Stop reason: SDUPTHER

## 2024-04-21 RX ORDER — ONDANSETRON 2 MG/ML
4 INJECTION INTRAMUSCULAR; INTRAVENOUS
Status: DISCONTINUED | OUTPATIENT
Start: 2024-04-21 | End: 2024-04-21 | Stop reason: HOSPADM

## 2024-04-21 RX ORDER — ONDANSETRON 2 MG/ML
4 INJECTION INTRAMUSCULAR; INTRAVENOUS EVERY 6 HOURS PRN
Status: DISCONTINUED | OUTPATIENT
Start: 2024-04-21 | End: 2024-04-26 | Stop reason: HOSPADM

## 2024-04-21 RX ORDER — HYDRALAZINE HYDROCHLORIDE 20 MG/ML
10 INJECTION INTRAMUSCULAR; INTRAVENOUS
Status: DISCONTINUED | OUTPATIENT
Start: 2024-04-21 | End: 2024-04-21 | Stop reason: HOSPADM

## 2024-04-21 RX ORDER — PROPOFOL 10 MG/ML
INJECTION, EMULSION INTRAVENOUS PRN
Status: DISCONTINUED | OUTPATIENT
Start: 2024-04-21 | End: 2024-04-21 | Stop reason: SDUPTHER

## 2024-04-21 RX ADMIN — ONDANSETRON 4 MG: 2 INJECTION INTRAMUSCULAR; INTRAVENOUS at 21:12

## 2024-04-21 RX ADMIN — ROCURONIUM BROMIDE 20 MG: 10 INJECTION INTRAVENOUS at 14:52

## 2024-04-21 RX ADMIN — FENTANYL CITRATE 100 MCG: 50 INJECTION, SOLUTION INTRAMUSCULAR; INTRAVENOUS at 14:07

## 2024-04-21 RX ADMIN — ONDANSETRON 4 MG: 2 INJECTION INTRAMUSCULAR; INTRAVENOUS at 09:54

## 2024-04-21 RX ADMIN — GABAPENTIN 900 MG: 300 CAPSULE ORAL at 18:00

## 2024-04-21 RX ADMIN — MIDAZOLAM 2 MG: 1 INJECTION INTRAMUSCULAR; INTRAVENOUS at 14:01

## 2024-04-21 RX ADMIN — HYDROCODONE BITARTRATE AND ACETAMINOPHEN 1 TABLET: 5; 325 TABLET ORAL at 21:10

## 2024-04-21 RX ADMIN — PROPOFOL 200 MG: 10 INJECTION, EMULSION INTRAVENOUS at 14:07

## 2024-04-21 RX ADMIN — MEROPENEM 1000 MG: 1 INJECTION, POWDER, FOR SOLUTION INTRAVENOUS at 16:28

## 2024-04-21 RX ADMIN — ONDANSETRON 4 MG: 2 INJECTION INTRAMUSCULAR; INTRAVENOUS at 14:10

## 2024-04-21 RX ADMIN — SODIUM CHLORIDE: 9 INJECTION, SOLUTION INTRAVENOUS at 11:53

## 2024-04-21 RX ADMIN — Medication 100 MCG: at 14:18

## 2024-04-21 RX ADMIN — METHOCARBAMOL 500 MG: 500 TABLET ORAL at 18:00

## 2024-04-21 RX ADMIN — METHOCARBAMOL 500 MG: 500 TABLET ORAL at 12:27

## 2024-04-21 RX ADMIN — BACLOFEN 20 MG: 10 TABLET ORAL at 18:00

## 2024-04-21 RX ADMIN — ROCURONIUM BROMIDE 20 MG: 10 INJECTION INTRAVENOUS at 14:25

## 2024-04-21 RX ADMIN — DIPHENHYDRAMINE HYDROCHLORIDE 25 MG: 50 INJECTION, SOLUTION INTRAMUSCULAR; INTRAVENOUS at 14:10

## 2024-04-21 RX ADMIN — CALCIUM GLUCONATE 2000 MG: 20 INJECTION, SOLUTION INTRAVENOUS at 06:09

## 2024-04-21 RX ADMIN — Medication 100 MCG: at 14:27

## 2024-04-21 RX ADMIN — SODIUM CHLORIDE, POTASSIUM CHLORIDE, SODIUM LACTATE AND CALCIUM CHLORIDE: 600; 310; 30; 20 INJECTION, SOLUTION INTRAVENOUS at 14:39

## 2024-04-21 RX ADMIN — SODIUM CHLORIDE, PRESERVATIVE FREE 10 ML: 5 INJECTION INTRAVENOUS at 08:23

## 2024-04-21 RX ADMIN — MEROPENEM 1000 MG: 1 INJECTION, POWDER, FOR SOLUTION INTRAVENOUS at 08:29

## 2024-04-21 RX ADMIN — LAMOTRIGINE 50 MG: 25 TABLET ORAL at 08:23

## 2024-04-21 RX ADMIN — SUGAMMADEX 200 MG: 100 INJECTION, SOLUTION INTRAVENOUS at 15:09

## 2024-04-21 RX ADMIN — BACLOFEN 20 MG: 10 TABLET ORAL at 08:23

## 2024-04-21 RX ADMIN — SODIUM CHLORIDE: 9 INJECTION, SOLUTION INTRAVENOUS at 16:27

## 2024-04-21 RX ADMIN — Medication 60 MG: at 14:07

## 2024-04-21 RX ADMIN — ACETAMINOPHEN 650 MG: 325 TABLET ORAL at 11:41

## 2024-04-21 RX ADMIN — ACETAMINOPHEN 650 MG: 325 TABLET ORAL at 04:59

## 2024-04-21 RX ADMIN — GABAPENTIN 900 MG: 300 CAPSULE ORAL at 08:23

## 2024-04-21 RX ADMIN — ROCURONIUM BROMIDE 50 MG: 10 INJECTION INTRAVENOUS at 14:07

## 2024-04-21 RX ADMIN — Medication 200 MCG: at 14:39

## 2024-04-21 RX ADMIN — DEXAMETHASONE SODIUM PHOSPHATE 10 MG: 10 INJECTION, EMULSION INTRAMUSCULAR; INTRAVENOUS at 14:10

## 2024-04-21 RX ADMIN — GABAPENTIN 900 MG: 300 CAPSULE ORAL at 12:27

## 2024-04-21 RX ADMIN — PANTOPRAZOLE SODIUM 40 MG: 40 TABLET, DELAYED RELEASE ORAL at 08:23

## 2024-04-21 RX ADMIN — SODIUM CHLORIDE, POTASSIUM CHLORIDE, SODIUM LACTATE AND CALCIUM CHLORIDE: 600; 310; 30; 20 INJECTION, SOLUTION INTRAVENOUS at 15:03

## 2024-04-21 RX ADMIN — Medication 100 MCG: at 14:22

## 2024-04-21 ASSESSMENT — PAIN DESCRIPTION - FREQUENCY: FREQUENCY: CONTINUOUS

## 2024-04-21 ASSESSMENT — PAIN SCALES - GENERAL
PAINLEVEL_OUTOF10: 0
PAINLEVEL_OUTOF10: 5
PAINLEVEL_OUTOF10: 5
PAINLEVEL_OUTOF10: 7
PAINLEVEL_OUTOF10: 4
PAINLEVEL_OUTOF10: 0

## 2024-04-21 ASSESSMENT — PAIN DESCRIPTION - LOCATION
LOCATION: HEAD
LOCATION: GENERALIZED
LOCATION: HEAD;ABDOMEN
LOCATION: HEAD

## 2024-04-21 ASSESSMENT — PAIN DESCRIPTION - ONSET: ONSET: ON-GOING

## 2024-04-21 ASSESSMENT — PAIN - FUNCTIONAL ASSESSMENT: PAIN_FUNCTIONAL_ASSESSMENT: PREVENTS OR INTERFERES SOME ACTIVE ACTIVITIES AND ADLS

## 2024-04-21 ASSESSMENT — PAIN DESCRIPTION - PAIN TYPE: TYPE: ACUTE PAIN;SURGICAL PAIN

## 2024-04-21 ASSESSMENT — PAIN DESCRIPTION - ORIENTATION: ORIENTATION: RIGHT;LEFT;MID

## 2024-04-21 ASSESSMENT — PAIN DESCRIPTION - DESCRIPTORS
DESCRIPTORS: ACHING
DESCRIPTORS: ACHING;DISCOMFORT
DESCRIPTORS: ACHING
DESCRIPTORS: ACHING

## 2024-04-21 NOTE — ED NOTES
ED to inpatient nurses report      Chief Complaint:  Chief Complaint   Patient presents with    Fever    Bloated    Post-op Problem     Present to ED from: home    MOA:     LOC: alert and orientated to name, place, date  Mobility: Fully dependent  Oxygen Baseline: none    Current needs required: none     Code Status:   Prior    What abnormal results were found and what did you give/do to treat them? See labs, imaging.   Any procedures or intervention occur? See MAR.    Mental Status:  Level of Consciousness: Alert (0)    Psych Assessment:        Vitals:  Patient Vitals for the past 24 hrs:   BP Temp Temp src Pulse Resp SpO2 Height Weight   04/20/24 2024 -- -- -- -- -- 95 % -- --   04/20/24 2014 (!) 107/56 -- -- (!) 129 22 -- -- --   04/20/24 2010 -- 99.5 °F (37.5 °C) Oral -- -- -- -- --   04/20/24 2000 (!) 106/54 -- -- (!) 130 24 97 % -- --   04/20/24 1948 106/66 -- -- -- -- -- -- --   04/20/24 1940 106/66 -- -- (!) 139 26 -- -- --   04/20/24 1937 (!) 92/47 (!) 103.5 °F (39.7 °C) Oral (!) 135 27 93 % -- --   04/20/24 1915 (!) 103/59 -- -- (!) 135 13 -- -- --   04/20/24 1815 -- -- -- (!) 131 -- -- -- --   04/20/24 1648 101/67 98.3 °F (36.8 °C) Oral (!) 148 20 91 % 1.803 m (5' 11\") 87.5 kg (193 lb)        LDAs:   Peripheral IV 04/20/24 Right Forearm (Active)   Site Assessment Clean, dry & intact 04/20/24 1715   Line Status Blood return noted;Specimen collected;Normal saline locked;Flushed 04/20/24 1715       Ambulatory Status:  Presents to emergency department  because of falls (Syncope, seizure, or loss of consciousness): No, Age > 70: No, Altered Mental Status, Intoxication with alcohol or substance confusion (Disorientation, impaired judgment, poor safety awaremess, or inability to follow instructions): No, Impaired Mobility: Ambulates or transfers with assistive devices or assistance; Unable to ambulate or transer.: Yes, Nursing Judgement: Yes    Diagnosis:  DISPOSITION Decision To Admit 04/20/2024 07:24:15 PM    Final diagnoses:   Septicemia (HCC)   YADIRA (acute kidney injury) (HCC)   Colostomy complication (HCC)        Consults:  PHARMACY TO DOSE VANCOMYCIN     Pain Score:       C-SSRS:   Risk of Suicide: No Risk    Sepsis Screening:  Sepsis Risk Score: 11.9    Humble Fall Risk:  Humble 1 Fall Risk  Presents to emergency department  because of falls (Syncope, seizure, or loss of consciousness): No  Age > 70: No  Altered Mental Status, Intoxication with alcohol or substance confusion (Disorientation, impaired judgment, poor safety awaremess, or inability to follow instructions): No  Impaired Mobility: Ambulates or transfers with assistive devices or assistance; Unable to ambulate or transer.: Yes  Nursing Judgement: Yes    Swallow Screening        Preferred Language:   English      ALLERGIES     Bee venom, Silver, Zosyn [piperacillin sod-tazobactam so], and Doxycycline calcium    SURGICAL HISTORY       Past Surgical History:   Procedure Laterality Date    BACK SURGERY  06/2016    tyrell & pins --thoracic, [facility] - Dr. Fry    COLOSTOMY  07/16/2016    Debridement of sacral ulcer and diverting colostomy by Dr Morales    CYSTOSCOPY  01/16/2017    by Dr Mueller    DILATATION, ESOPHAGUS      FRACTURE SURGERY      back, right hand, left shoulder    HAND SURGERY Right 06/2016    [facility]    LAMINECTOMY      LAPAROTOMY N/A 6/12/2020    RELEASE SMALL BOWEL OBSTRUCTION performed by Andrade Morales DO at [facility] OR    MYRINGOTOMY AND TYMPANOSTOMY TUBE PLACEMENT Bilateral     as child    OTHER SURGICAL HISTORY  03/2020    ingrown toenails removed left foot Dr Menjivar    NM NDSC NJX IMPLT MATRL URT&/BLDR NCK N/A 4/24/2018    CYSTO WITH INJECTION BOTOX 200 UNITS performed by Esvin Gaspar MD at [facility] OR    SHOULDER SURGERY Left 06/2016    Dr. Pineda, [facility]    VENTRAL HERNIA REPAIR N/A 6/3/2020    PARASTOMAL HERNIA REPAIR WITH MESH performed by Andrade Morales DO at [facility] OR    VENTRAL HERNIA REPAIR N/A 4/18/2024    Open Recurrent Parastomal

## 2024-04-21 NOTE — ED NOTES
Pt transported in a bed to  room 011 in a stable condition. Nurse informed prior to taking pt up.
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
## 2024-04-21 NOTE — PLAN OF CARE
Patient seen on behalf of general surgery service. Patient with 3/10 abdominal pain post-operatively. Abdomen with some areas of moderate firmness, however does not appear to be an acute abdomen. Incision is noted laterally to patient's ostomy, with steri-strips in place. Mild serous drainage is noted superiorly. Patient denies nausea or vomiting. No acute need for surgery at this time. Patient to be admitted to medicine service. Formal general surgery consult to follow as warranted. Case discussed with Dr. Morrison.     Electronically signed by Josh Connor PA-C on 4/20/2024 at 8:17 PM

## 2024-04-21 NOTE — PROGRESS NOTES
1535: Pt taken over from OR staff at this time. Pt arrived to pacu at 1521. Pt currently on room air, respirations easy and unlabored. Pt sleeping at this time, easily awakens to voice.  Denies pain currently. VSS  1550: Pt sleeping, easily awakens to voice. Denies pain  1600: Report given to Stefani on 4A  1610: Pt transported back to  in stable condition. VSS

## 2024-04-21 NOTE — PLAN OF CARE
Problem: Discharge Planning  Goal: Discharge to home or other facility with appropriate resources  4/20/2024 2311 by Karissa Christianson RN  Outcome: Progressing  4/20/2024 2311 by Karissa Christianson RN  Outcome: Progressing     Problem: Neurosensory - Adult  Goal: Achieves stable or improved neurological status  4/20/2024 2311 by Karissa Christianson RN  Outcome: Progressing  4/20/2024 2311 by Karissa Christianson RN  Outcome: Progressing  Goal: Remains free of injury related to seizures activity  4/20/2024 2311 by Karissa Christianson RN  Outcome: Progressing  4/20/2024 2311 by Karissa Christianson RN  Outcome: Progressing  Goal: Achieves maximal functionality and self care  4/20/2024 2311 by Karissa Christianson RN  Outcome: Progressing  4/20/2024 2311 by Karissa Christianson RN  Outcome: Progressing     Problem: Respiratory - Adult  Goal: Achieves optimal ventilation and oxygenation  4/20/2024 2311 by Karissa Christianson RN  Outcome: Progressing  4/20/2024 2311 by Karissa Christianson RN  Outcome: Progressing     Problem: Cardiovascular - Adult  Goal: Maintains optimal cardiac output and hemodynamic stability  4/20/2024 2311 by Karissa Christianson RN  Outcome: Progressing  4/20/2024 2311 by Karissa Christianson RN  Outcome: Progressing  Goal: Absence of cardiac dysrhythmias or at baseline  4/20/2024 2311 by Karissa Christianson RN  Outcome: Progressing  4/20/2024 2311 by Karissa Christianson RN  Outcome: Progressing     Problem: Skin/Tissue Integrity - Adult  Goal: Skin integrity remains intact  4/20/2024 2311 by Karissa Christianson RN  Outcome: Progressing  4/20/2024 2311 by Karissa Christianson RN  Outcome: Progressing  Goal: Incisions, wounds, or drain sites healing without S/S of infection  4/20/2024 2311 by Karissa Christianson RN  Outcome: Progressing  4/20/2024 2311 by Karissa Christianson RN  Outcome: Progressing  Goal: Oral mucous membranes remain intact  4/20/2024 2311 by Karissa Christianson RN  Outcome: Progressing  4/20/2024 2311 by Karissa Christianson, RN  Outcome: Progressing      Problem: Musculoskeletal - Adult  Goal: Maintain proper alignment of affected body part  4/20/2024 2311 by Karissa Christianson RN  Outcome: Progressing  4/20/2024 2311 by Karissa Christianson RN  Outcome: Progressing  Goal: Return ADL status to a safe level of function  4/20/2024 2311 by Karissa Christianson RN  Outcome: Progressing  4/20/2024 2311 by Karissa Christianson RN  Outcome: Progressing     Problem: Gastrointestinal - Adult  Goal: Minimal or absence of nausea and vomiting  4/20/2024 2311 by Karissa Christianson RN  Outcome: Progressing  4/20/2024 2311 by Karissa Christianson RN  Outcome: Progressing  Goal: Maintains or returns to baseline bowel function  4/20/2024 2311 by Karissa Christianson RN  Outcome: Progressing  4/20/2024 2311 by Karissa Christianson RN  Outcome: Progressing  Goal: Maintains adequate nutritional intake  4/20/2024 2311 by Karissa Christianson RN  Outcome: Progressing  4/20/2024 2311 by Karissa Christianson RN  Outcome: Progressing  Goal: Establish and maintain optimal ostomy function  4/20/2024 2311 by Karissa Christianson RN  Outcome: Progressing  4/20/2024 2311 by Karissa Christianson RN  Outcome: Progressing     Problem: Genitourinary - Adult  Goal: Absence of urinary retention  4/20/2024 2311 by Karissa Christianson RN  Outcome: Progressing  4/20/2024 2311 by Karissa Christianson RN  Outcome: Progressing     Problem: Infection - Adult  Goal: Absence of infection at discharge  4/20/2024 2311 by Karissa Christianson RN  Outcome: Progressing  4/20/2024 2311 by Karissa Christianson RN  Outcome: Progressing  Goal: Absence of infection during hospitalization  4/20/2024 2311 by Karissa Christianson RN  Outcome: Progressing  4/20/2024 2311 by Karissa Christianson RN  Outcome: Progressing  Goal: Absence of fever/infection during anticipated neutropenic period  4/20/2024 2311 by Karissa Christianson RN  Outcome: Progressing  4/20/2024 2311 by Lunce, Alissha, RN  Outcome: Progressing     Problem: Metabolic/Fluid and Electrolytes - Adult  Goal: Electrolytes maintained within normal
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
## 2024-04-21 NOTE — CONSULTS
[place] [id number]                              CONSULTATION      PATIENT NAME: KIRA AQUINO                 : 1990  MED REC NO: [id number]                       ROOM: [facility]  ACCOUNT NO: [id number]                       ADMIT DATE: 2024  PROVIDER: Dutch Morrison MD      CONSULT DATE:  2024    CHIEF COMPLAINT:  Possible bowel injury post parastomal hernia repair.    HISTORY OF PRESENT ILLNESS:  The patient is a 33-year-old male, actually a patient of Dr. Livingston, whose abdominal surgery started back in  with a diverting end sigmoid colostomy performed per Dr. Morales with a debridement of a sacral ulcer.  The patient apparently then eventually had a flap closure of the ulcer, but then in , had developed a parastomal hernia and was taken to surgery by Dr. Morales on Myesha 3, 2020, undergoing a parastomal hernia repair with mesh.  The patient then unfortunately apparently 9 days after that had to be taken back to surgery for release of a small bowel obstruction.  The patient has had onset over the last year or so of recurrent hernia causing pain, and 3 days ago, Dr. Morales took him to surgery and did a re-repair of a recurrent parastomal hernia.  It was done with suture repair.  The note has been reviewed.  He made incision laterally to the ostomy.  Had small bowel that was apparently adherent in the sac, reduced, and closed primarily.  The patient went home, began having pain Friday, having fever and chills, and he did present to the emergency room last evening.  A CT scan was performed that initially shows some changes that would be expected, possibly post hernia repair.  He was seen by Bryan, the Trauma PA, and did not feel like he had an acute abdomen.  On rounds today, the patient is having onset of stool drainage from this lateral incision.  His abdomen is generally tender.  There is no peritonitis, but the  wound.    LABORATORY DATA:  The patient actually was admitted last night with a white count of 22,000, it is down to 13,000 today; hemoglobin is 12.3.  Sodium 138, potassium 3.8, BUN 17, creatinine 1.4.  The patient's CT scan last night was read as an ostomy in the left midline with extensive soft tissue swelling with a small amount of pneumoperitoneum with scattered small bowel pockets of air in the peritoneal cavity.  Also, there were some concerns for a fistulous tract going from the pilonidal area to the dorsal aspect of the sacrum, but this is chronic finding.    ASSESSMENT AND PLAN:  Possible bowel injury post repair of parastomal hernia.  Difficult to say if there could be an injury to the site of the colostomy or possibly to the small bowel that was freed up from the hernia sac and reduced.  It should be noted I did text Dr. Morales as it is his patient.  He has not texted back.  I discussed with the patient I believe he needs surgical intervention this afternoon.  We will proceed with abdominal exploration and repair of a bowel injury.        SALMA DRAKE MD    D:  04/21/2024 13:09:41     T:  04/21/2024 18:01:27     JEFF/CHARMAINE  Job #:  [id number]     Doc#:  [id number]

## 2024-04-21 NOTE — PROGRESS NOTES
Surg Pt had peristomal hernia repair per Dr Morales on thurs came back to ER last nite  pt has onset stool via incision lateral to ostomy needs rexplored texted Dr Morales he has not responded will proceed with OR

## 2024-04-21 NOTE — PLAN OF CARE
Monitor endotracheal (as able) and nasal secretions for changes in amount and color   [place] appropriate cooling/warming therapies per order   Administer medications as ordered   Instruct and encourage patient and family to use good hand hygiene technique   Identify and instruct in appropriate isolation precautions for identified infection/condition     Problem: Infection - Adult  Goal: Absence of fever/infection during anticipated neutropenic period  Outcome: Progressing  Flowsheets (Taken 4/21/2024 1328)  Absence of fever/infection during anticipated neutropenic period: Monitor white blood cell count     Problem: Metabolic/Fluid and Electrolytes - Adult  Goal: Electrolytes maintained within normal limits  Outcome: Progressing  Flowsheets (Taken 4/21/2024 1328)  Electrolytes maintained within normal limits:   Monitor labs and assess patient for signs and symptoms of electrolyte imbalances   Administer electrolyte replacement as ordered   Monitor response to electrolyte replacements, including repeat lab results as appropriate     Problem: Metabolic/Fluid and Electrolytes - Adult  Goal: Hemodynamic stability and optimal renal function maintained  Outcome: Progressing  Flowsheets (Taken 4/21/2024 1328)  Hemodynamic stability and optimal renal function maintained:   Monitor labs and assess for signs and symptoms of volume excess or deficit   Monitor intake, output and patient weight     Problem: Hematologic - Adult  Goal: Maintains hematologic stability  Outcome: Progressing  Flowsheets (Taken 4/21/2024 1328)  Maintains hematologic stability: Assess for signs and symptoms of bleeding or hemorrhage     Problem: Safety - Adult  Goal: Free from fall injury  Outcome: Progressing  Flowsheets (Taken 4/21/2024 1328)  Free From Fall Injury: Instruct family/caregiver on patient safety     Problem: ABCDS Injury Assessment  Goal: Absence of physical injury  Outcome: Progressing  Flowsheets (Taken 4/21/2024 1328)  Absence of  Physical Injury: Implement safety measures based on patient assessment     Problem: Pain  Goal: Verbalizes/displays adequate comfort level or baseline comfort level  Outcome: Progressing  Flowsheets (Taken 4/21/2024 0927)  Verbalizes/displays adequate comfort level or baseline comfort level:   Assess pain using appropriate pain scale   Encourage patient to monitor pain and request assistance   Administer analgesics based on type and severity of pain and evaluate response   Implement non-pharmacological measures as appropriate and evaluate response   Consider cultural and social influences on pain and pain management   Notify Licensed Independent Practitioner if interventions unsuccessful or patient reports new pain   Care plan reviewed with patient.  Patient verbalized understanding of the plan of care and contributed to goal setting.

## 2024-04-22 LAB
ANION GAP SERPL CALC-SCNC: 9 MEQ/L (ref 8–16)
BASOPHILS ABSOLUTE: 0 THOU/MM3 (ref 0–0.1)
BASOPHILS NFR BLD AUTO: 0.2 %
BUN SERPL-MCNC: 14 MG/DL (ref 7–22)
CALCIUM SERPL-MCNC: 8.5 MG/DL (ref 8.5–10.5)
CHLORIDE SERPL-SCNC: 106 MEQ/L (ref 98–111)
CO2 SERPL-SCNC: 24 MEQ/L (ref 23–33)
CREAT SERPL-MCNC: 0.8 MG/DL (ref 0.4–1.2)
DEPRECATED RDW RBC AUTO: 46.4 FL (ref 35–45)
EOSINOPHIL NFR BLD AUTO: 0 %
EOSINOPHILS ABSOLUTE: 0 THOU/MM3 (ref 0–0.4)
ERYTHROCYTE [DISTWIDTH] IN BLOOD BY AUTOMATED COUNT: 14.6 % (ref 11.5–14.5)
GFR SERPL CREATININE-BSD FRML MDRD: > 90 ML/MIN/1.73M2
GLUCOSE SERPL-MCNC: 132 MG/DL (ref 70–108)
HCT VFR BLD AUTO: 36.6 % (ref 42–52)
HGB BLD-MCNC: 11.9 GM/DL (ref 14–18)
IMM GRANULOCYTES # BLD AUTO: 0.11 THOU/MM3 (ref 0–0.07)
IMM GRANULOCYTES NFR BLD AUTO: 0.9 %
LYMPHOCYTES ABSOLUTE: 0.6 THOU/MM3 (ref 1–4.8)
LYMPHOCYTES NFR BLD AUTO: 4.8 %
MAGNESIUM SERPL-MCNC: 1.9 MG/DL (ref 1.6–2.4)
MCH RBC QN AUTO: 27.9 PG (ref 26–33)
MCHC RBC AUTO-ENTMCNC: 32.5 GM/DL (ref 32.2–35.5)
MCV RBC AUTO: 85.9 FL (ref 80–94)
MONOCYTES ABSOLUTE: 0.7 THOU/MM3 (ref 0.4–1.3)
MONOCYTES NFR BLD AUTO: 5.7 %
NEUTROPHILS NFR BLD AUTO: 88.4 %
NRBC BLD AUTO-RTO: 0 /100 WBC
PLATELET # BLD AUTO: 198 THOU/MM3 (ref 130–400)
PMV BLD AUTO: 11 FL (ref 9.4–12.4)
POTASSIUM SERPL-SCNC: 4 MEQ/L (ref 3.5–5.2)
RBC # BLD AUTO: 4.26 MILL/MM3 (ref 4.7–6.1)
SEGMENTED NEUTROPHILS ABSOLUTE COUNT: 11.4 THOU/MM3 (ref 1.8–7.7)
SODIUM SERPL-SCNC: 139 MEQ/L (ref 135–145)
VANCOMYCIN SERPL-MCNC: 7.1 UG/ML (ref 0.1–39.9)
WBC # BLD AUTO: 12.9 THOU/MM3 (ref 4.8–10.8)

## 2024-04-22 PROCEDURE — 83735 ASSAY OF MAGNESIUM: CPT

## 2024-04-22 PROCEDURE — 2580000003 HC RX 258

## 2024-04-22 PROCEDURE — 99232 SBSQ HOSP IP/OBS MODERATE 35: CPT | Performed by: INTERNAL MEDICINE

## 2024-04-22 PROCEDURE — 6370000000 HC RX 637 (ALT 250 FOR IP): Performed by: INTERNAL MEDICINE

## 2024-04-22 PROCEDURE — 6370000000 HC RX 637 (ALT 250 FOR IP)

## 2024-04-22 PROCEDURE — 6360000002 HC RX W HCPCS

## 2024-04-22 PROCEDURE — 6370000000 HC RX 637 (ALT 250 FOR IP): Performed by: SURGERY

## 2024-04-22 PROCEDURE — 6360000002 HC RX W HCPCS: Performed by: SURGERY

## 2024-04-22 PROCEDURE — 80048 BASIC METABOLIC PNL TOTAL CA: CPT

## 2024-04-22 PROCEDURE — 6360000002 HC RX W HCPCS: Performed by: INTERNAL MEDICINE

## 2024-04-22 PROCEDURE — 36415 COLL VENOUS BLD VENIPUNCTURE: CPT

## 2024-04-22 PROCEDURE — 99231 SBSQ HOSP IP/OBS SF/LOW 25: CPT | Performed by: SURGERY

## 2024-04-22 PROCEDURE — 2060000000 HC ICU INTERMEDIATE R&B

## 2024-04-22 PROCEDURE — 80202 ASSAY OF VANCOMYCIN: CPT

## 2024-04-22 PROCEDURE — 2580000003 HC RX 258: Performed by: SURGERY

## 2024-04-22 PROCEDURE — 93010 ELECTROCARDIOGRAM REPORT: CPT | Performed by: INTERNAL MEDICINE

## 2024-04-22 PROCEDURE — 2580000003 HC RX 258: Performed by: INTERNAL MEDICINE

## 2024-04-22 PROCEDURE — 85025 COMPLETE CBC W/AUTO DIFF WBC: CPT

## 2024-04-22 RX ORDER — VITAMIN B COMPLEX
1000 TABLET ORAL DAILY
Status: DISCONTINUED | OUTPATIENT
Start: 2024-04-22 | End: 2024-04-26 | Stop reason: HOSPADM

## 2024-04-22 RX ORDER — IBUPROFEN 200 MG
TABLET ORAL 2 TIMES DAILY
Status: DISCONTINUED | OUTPATIENT
Start: 2024-04-22 | End: 2024-04-26 | Stop reason: HOSPADM

## 2024-04-22 RX ORDER — LANOLIN ALCOHOL/MO/W.PET/CERES
3 CREAM (GRAM) TOPICAL NIGHTLY PRN
Status: DISCONTINUED | OUTPATIENT
Start: 2024-04-22 | End: 2024-04-26 | Stop reason: HOSPADM

## 2024-04-22 RX ORDER — DIPHENHYDRAMINE HCL 25 MG
25 TABLET ORAL EVERY 6 HOURS PRN
Status: DISCONTINUED | OUTPATIENT
Start: 2024-04-22 | End: 2024-04-26 | Stop reason: HOSPADM

## 2024-04-22 RX ADMIN — METHOCARBAMOL 500 MG: 500 TABLET ORAL at 00:03

## 2024-04-22 RX ADMIN — SODIUM CHLORIDE, PRESERVATIVE FREE 10 ML: 5 INJECTION INTRAVENOUS at 10:05

## 2024-04-22 RX ADMIN — LAMOTRIGINE 50 MG: 25 TABLET ORAL at 10:05

## 2024-04-22 RX ADMIN — LAMOTRIGINE 50 MG: 25 TABLET ORAL at 20:51

## 2024-04-22 RX ADMIN — METHOCARBAMOL 500 MG: 500 TABLET ORAL at 05:33

## 2024-04-22 RX ADMIN — HYDROCODONE BITARTRATE AND ACETAMINOPHEN 1 TABLET: 5; 325 TABLET ORAL at 01:31

## 2024-04-22 RX ADMIN — ACETAMINOPHEN 650 MG: 325 TABLET ORAL at 05:33

## 2024-04-22 RX ADMIN — GABAPENTIN 900 MG: 300 CAPSULE ORAL at 00:03

## 2024-04-22 RX ADMIN — Medication 3 MG: at 20:51

## 2024-04-22 RX ADMIN — METHOCARBAMOL 500 MG: 500 TABLET ORAL at 11:34

## 2024-04-22 RX ADMIN — GABAPENTIN 900 MG: 300 CAPSULE ORAL at 23:47

## 2024-04-22 RX ADMIN — SODIUM CHLORIDE, PRESERVATIVE FREE 10 ML: 5 INJECTION INTRAVENOUS at 20:52

## 2024-04-22 RX ADMIN — VANCOMYCIN HYDROCHLORIDE 1500 MG: 5 INJECTION, POWDER, LYOPHILIZED, FOR SOLUTION INTRAVENOUS at 17:35

## 2024-04-22 RX ADMIN — ONDANSETRON 4 MG: 2 INJECTION INTRAMUSCULAR; INTRAVENOUS at 11:41

## 2024-04-22 RX ADMIN — MEROPENEM 1000 MG: 1 INJECTION, POWDER, FOR SOLUTION INTRAVENOUS at 10:03

## 2024-04-22 RX ADMIN — LAMOTRIGINE 50 MG: 25 TABLET ORAL at 00:04

## 2024-04-22 RX ADMIN — GABAPENTIN 900 MG: 300 CAPSULE ORAL at 05:33

## 2024-04-22 RX ADMIN — BACLOFEN 20 MG: 10 TABLET ORAL at 00:03

## 2024-04-22 RX ADMIN — ACETAMINOPHEN 650 MG: 325 TABLET ORAL at 20:53

## 2024-04-22 RX ADMIN — BACITRACIN ZINC NEOMYCIN SULFATE POLYMYXIN B SULFATE: 400; 3.5; 5 OINTMENT TOPICAL at 20:51

## 2024-04-22 RX ADMIN — BACLOFEN 20 MG: 10 TABLET ORAL at 23:47

## 2024-04-22 RX ADMIN — ONDANSETRON 4 MG: 2 INJECTION INTRAMUSCULAR; INTRAVENOUS at 17:29

## 2024-04-22 RX ADMIN — GABAPENTIN 900 MG: 300 CAPSULE ORAL at 17:31

## 2024-04-22 RX ADMIN — METHOCARBAMOL 500 MG: 500 TABLET ORAL at 17:31

## 2024-04-22 RX ADMIN — GABAPENTIN 900 MG: 300 CAPSULE ORAL at 11:34

## 2024-04-22 RX ADMIN — MEROPENEM 1000 MG: 1 INJECTION, POWDER, FOR SOLUTION INTRAVENOUS at 16:27

## 2024-04-22 RX ADMIN — HYDROCODONE BITARTRATE AND ACETAMINOPHEN 1 TABLET: 5; 325 TABLET ORAL at 17:29

## 2024-04-22 RX ADMIN — METHOCARBAMOL 500 MG: 500 TABLET ORAL at 23:47

## 2024-04-22 RX ADMIN — DIPHENHYDRAMINE HYDROCHLORIDE 25 MG: 25 TABLET ORAL at 20:51

## 2024-04-22 RX ADMIN — MEROPENEM 1000 MG: 1 INJECTION, POWDER, FOR SOLUTION INTRAVENOUS at 00:02

## 2024-04-22 RX ADMIN — MEROPENEM 1000 MG: 1 INJECTION, POWDER, FOR SOLUTION INTRAVENOUS at 23:50

## 2024-04-22 RX ADMIN — BACLOFEN 20 MG: 10 TABLET ORAL at 11:34

## 2024-04-22 RX ADMIN — VANCOMYCIN HYDROCHLORIDE 1500 MG: 5 INJECTION, POWDER, LYOPHILIZED, FOR SOLUTION INTRAVENOUS at 03:58

## 2024-04-22 RX ADMIN — BACLOFEN 20 MG: 10 TABLET ORAL at 05:33

## 2024-04-22 RX ADMIN — BACLOFEN 20 MG: 10 TABLET ORAL at 17:30

## 2024-04-22 RX ADMIN — Medication 1000 UNITS: at 10:08

## 2024-04-22 ASSESSMENT — PAIN DESCRIPTION - FREQUENCY
FREQUENCY: CONTINUOUS

## 2024-04-22 ASSESSMENT — PAIN SCALES - GENERAL
PAINLEVEL_OUTOF10: 0
PAINLEVEL_OUTOF10: 2
PAINLEVEL_OUTOF10: 8
PAINLEVEL_OUTOF10: 0
PAINLEVEL_OUTOF10: 0
PAINLEVEL_OUTOF10: 5
PAINLEVEL_OUTOF10: 0
PAINLEVEL_OUTOF10: 0
PAINLEVEL_OUTOF10: 6
PAINLEVEL_OUTOF10: 2
PAINLEVEL_OUTOF10: 2
PAINLEVEL_OUTOF10: 0

## 2024-04-22 ASSESSMENT — PAIN DESCRIPTION - LOCATION
LOCATION: HEAD;ABDOMEN
LOCATION: HEAD
LOCATION: HEAD;ABDOMEN

## 2024-04-22 ASSESSMENT — PAIN - FUNCTIONAL ASSESSMENT
PAIN_FUNCTIONAL_ASSESSMENT: ACTIVITIES ARE NOT PREVENTED
PAIN_FUNCTIONAL_ASSESSMENT: PREVENTS OR INTERFERES SOME ACTIVE ACTIVITIES AND ADLS
PAIN_FUNCTIONAL_ASSESSMENT: PREVENTS OR INTERFERES SOME ACTIVE ACTIVITIES AND ADLS
PAIN_FUNCTIONAL_ASSESSMENT: ACTIVITIES ARE NOT PREVENTED

## 2024-04-22 ASSESSMENT — PAIN DESCRIPTION - PAIN TYPE
TYPE: ACUTE PAIN
TYPE: ACUTE PAIN;SURGICAL PAIN
TYPE: ACUTE PAIN;SURGICAL PAIN

## 2024-04-22 ASSESSMENT — PAIN DESCRIPTION - ORIENTATION
ORIENTATION: RIGHT;LEFT;MID
ORIENTATION: RIGHT;LEFT
ORIENTATION: RIGHT;LEFT;MID

## 2024-04-22 ASSESSMENT — PAIN DESCRIPTION - ONSET
ONSET: ON-GOING

## 2024-04-22 ASSESSMENT — PAIN DESCRIPTION - DESCRIPTORS
DESCRIPTORS: ACHING
DESCRIPTORS: ACHING
DESCRIPTORS: ACHING;DISCOMFORT
DESCRIPTORS: ACHING;DISCOMFORT

## 2024-04-22 ASSESSMENT — PAIN SCALES - WONG BAKER: WONGBAKER_NUMERICALRESPONSE: NO HURT

## 2024-04-22 NOTE — PROGRESS NOTES
[facility]   Pharmacy Pharmacokinetic Monitoring Service - Vancomycin     Noé Licea is a 33 y.o. male starting on vancomycin therapy for intra-abdominal infection. Pharmacy consulted by Dr Whitt for monitoring and adjustment.    Target Concentration:  Goal -500    Additional Antimicrobials: Merrem    Pertinent Laboratory Values:   Wt Readings from Last 1 Encounters:   04/20/24 90 kg (198 lb 6.6 oz)     Temp Readings from Last 1 Encounters:   04/21/24 98.4 °F (36.9 °C) (Oral)     Estimated Creatinine Clearance: 81 mL/min (A) (based on SCr of 1.4 mg/dL (H)).  Recent Labs     04/20/24  1715 04/21/24  0345   CREATININE 2.2* 1.4*   BUN 22 17   WBC 22.7* 13.5*     Pertinent Cultures:  Date Source Results   04/20/24 BC Gram negative bacilli-no bacteria identified on biofire   04/20/24 BC No growth to date   04/20/24 Sacral ulcer swab Gram positive cocci occurring singly/in pairs   MRSA Nasal Swab: N/A. Non-respiratory infection.    Plan:  Dosing recommendations based on Bayesian software  Start vancomycin 2000 mg iv load (already given) followed by 1500 mg every 18 hours  Anticipated AUC of 439 and trough concentration of 10.4 at steady state  Renal labs as indicated  Pharmacy will continue to monitor patient and adjust therapy as indicated    Thank you for the consult,  Dianne Chong, RPHPharmD  4/22/2024   12:07 AM   Name band;
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
## 2024-04-22 NOTE — CARE COORDINATION
DISCHARGE PLANNING EVALUATION  4/22/24, 3:32 PM EDT    Reason for Referral: current with [facility]  Decision Maker: Patient   Current Services: [facility] for wound care.  Verified services with Guadalupe at the agency.   New Services Requested: Would like an aid at discharge.   Family/ Social/ Home environment: From home alone.  Independent.  He is able to drive and was working full time at [employer].   Payment Source: Medicare (told SW that he makes too much money for Medicaid)  Transportation at Discharge: ambulette  Post-acute (PAC) provider list was provided to patient. Patient was informed of their freedom to choose PAC provider. Discussed and offered to show the patient the relevant PAC Providers quality and resource use measures on Medicare Compare web site via computer based on patient's goals of care and treatment preferences. Questions regarding selection process were answered.      Teach Back Method used with Noé regarding care plan and discharge planning.   Patient  verbalized understanding of the plan of care and contribute to goal setting.       Patient preferences and discharge plan: Noé plans to return home alone with [facility] and add aid services.     Electronically signed by ANDREA Bella on 4/22/2024 at 3:32 PM

## 2024-04-22 NOTE — OP NOTE
[address] [address]                            OPERATIVE REPORT      PATIENT NAME: KIRA AQUINO                 : 1990  MED REC NO: [id number]                       ROOM: [facility]  ACCOUNT NO: [id number]                       ADMIT DATE: 2024  PROVIDER: Dutch Morrison MD      DATE OF PROCEDURE:  2024    SURGEON:  Dutch Morrison MD    PREOPERATIVE DIAGNOSIS:  Possible colostomy wall leak.    POSTOPERATIVE DIAGNOSIS:  Possible colostomy wall leak.    OPERATION:    1. Abdominal exploration.     2. Lysis of adhesions.  3. Opening of previous surgical wound.  4. Irrigation of subcutaneous tissue and fascia.  5. Packing of wound with Dakin's.    ANESTHESIA:  General.    COMPLICATIONS:  Inability to clearly see probable area of colostomy wall leak secondary to severe inflammation.    BLOOD LOSS:  Estimated at 20 mL.    INDICATION FOR PROCEDURE:  Patient is a 33-year-old male, paraplegic, a patient of Dr. Morales who had a diverting sigmoid colostomy performed in  due to sacral decubitus ulcer he had at that time.  He subsequently in  developed a parastomal hernia and had a repair of same per Dr. Morales and then unfortunately 9 days after that operation in , he had a take-back surgery for small bowel obstruction.  The patient had developed a recurrent parastomal hernia and he was taken to surgery by Dr. Morales 3 days ago via an incision on the lateral aspect of the abdominal wall lateral to the ostomy for suture repair of the hernia.  The patient had presented back to the hospital last night with leukocytosis and tenderness in the abdominal wall.  This morning he had onset of obvious stool draining through the incision.  It certainly had the same consistency as the liquid that was in the colostomy bag.  It did not appear to be small bowel fluid, and he was brought to surgery.  It was my feeling based on the CT  abdomen was completely freed.  We could get our hand in.  However, as we started to do lysis of adhesions on the left, going to the left, it was completely adhesed.  There was small bowel I could remove, but then it was clear that it was going to be difficult to continue a dissection toward the colostomy due to the adhesions.  It did answer my question as I saw no evidence of stool intra-abdominal that I could see, but again I could not get really close to the ostomy on the peritoneal side.  For this reason, we covered up the midline incision first of which I irrigated with IrriSept.  We placed a towel over this and then I went laterally over the previously placed surgical incision as the stool was seeping out.  I made an incision and completely evacuated a significant amount of what appeared to be liquid stool.  The fascia was involved.  I was able to clean off a little bit of the fascia and the subcutaneous tissue that was severely infected.  I used a retractor and inferior to the colostomy I could see the sutures that was used to repair the defect.  There was a lot of inflammation of the colostomy wall.  I put my finger through the colostomy and verified that I was looking at wall of the colostomy and the subcutaneous tissue, but it still was not clear-cut where an opening was coming from.  The inflammation superiorly to the ostomy in the subcutaneous tissue and fat was very severe.  We irrigated this as mentioned.  We continued to observe.  Again, there was no ostomy function at this time, which may have helped to show where the perforation was.  I elected to then place an IrriSept soaked gauze into the wound as we left it open and then I went back to the midline, cautiously closed the fascia in the midline being care not to injure bowel with #1 Vicryl sutures and then the upper two.  I used a #1 Novafil for the final 2 closure of the upper incision.  We irrigated again with IrriSept and I then closed with

## 2024-04-22 NOTE — PLAN OF CARE
Problem: Discharge Planning  Goal: Discharge to home or other facility with appropriate resources  4/22/2024 1445 by Ira Fontenot RN  Outcome: Progressing  Flowsheets (Taken 4/22/2024 1445)  Discharge to home or other facility with appropriate resources:   Identify barriers to discharge with patient and caregiver   Arrange for needed discharge resources and transportation as appropriate   Identify discharge learning needs (meds, wound care, etc)   Refer to discharge planning if patient needs post-hospital services based on physician order or complex needs related to functional status, cognitive ability or social support system     Problem: Neurosensory - Adult  Goal: Achieves stable or improved neurological status  4/22/2024 1445 by Ira Fontenot RN  Outcome: Progressing  Flowsheets (Taken 4/22/2024 1445)  Achieves stable or improved neurological status:   Assess for and report changes in neurological status   Monitor temperature, glucose, and sodium. Initiate appropriate interventions as ordered     Problem: Respiratory - Adult  Goal: Achieves optimal ventilation and oxygenation  4/22/2024 1445 by Ira Fontenot RN  Outcome: Progressing  Flowsheets (Taken 4/22/2024 1445)  Achieves optimal ventilation and oxygenation:   Assess for changes in respiratory status   Assess for changes in mentation and behavior     Problem: Cardiovascular - Adult  Goal: Maintains optimal cardiac output and hemodynamic stability  4/22/2024 1445 by Ira Fontenot RN  Outcome: Progressing  Flowsheets (Taken 4/22/2024 1445)  Maintains optimal cardiac output and hemodynamic stability: Monitor blood pressure and heart rate     Problem: Cardiovascular - Adult  Goal: Absence of cardiac dysrhythmias or at baseline  4/22/2024 1445 by Ira Fontenot RN  Outcome: Progressing  Flowsheets (Taken 4/22/2024 1445)  Absence of cardiac dysrhythmias or at baseline:   Monitor cardiac rate and rhythm   Assess for signs of decreased cardiac  output     Problem: Skin/Tissue Integrity - Adult  Goal: Skin integrity remains intact  4/22/2024 1445 by Ira Fontenot RN  Outcome: Progressing  Flowsheets (Taken 4/22/2024 1445)  Skin Integrity Remains Intact: Monitor for areas of redness and/or skin breakdown     Problem: Skin/Tissue Integrity - Adult  Goal: Incisions, wounds, or drain sites healing without S/S of infection  4/22/2024 1445 by Ira Fontenot RN  Outcome: Progressing  Flowsheets (Taken 4/22/2024 1445)  Incisions, Wounds, or Drain Sites Healing Without Sign and Symptoms of Infection:   ADMISSION and DAILY: Assess and document risk factors for pressure ulcer development   TWICE DAILY: Assess and document skin integrity   TWICE DAILY: Assess and document dressing/incision, wound bed, drain sites and surrounding tissue   Implement wound care per orders     Problem: Skin/Tissue Integrity - Adult  Goal: Oral mucous membranes remain intact  4/22/2024 1445 by Ira Fotnenot RN  Outcome: Progressing  Flowsheets (Taken 4/22/2024 1445)  Oral Mucous Membranes Remain Intact: Assess oral mucosa and hygiene practices     Problem: Musculoskeletal - Adult  Goal: Maintain proper alignment of affected body part  4/22/2024 1445 by Ira Fontenot RN  Outcome: Progressing  Flowsheets (Taken 4/22/2024 1445)  Maintain proper alignment of affected body part: Support and protect limb and body alignment per provider's orders     Problem: Musculoskeletal - Adult  Goal: Return ADL status to a safe level of function  4/22/2024 1445 by Ira Fontenot RN  Outcome: Progressing  Flowsheets (Taken 4/22/2024 1445)  Return ADL Status to a Safe Level of Function:   Administer medication as ordered   Assess activities of daily living deficits and provide assistive devices as needed   Obtain physical therapy/occupational therapy consults as needed     Problem: Gastrointestinal - Adult  Goal: Minimal or absence of nausea and vomiting  4/22/2024 1445 by Ira Fontenot

## 2024-04-22 NOTE — PLAN OF CARE
Problem: Discharge Planning  Goal: Discharge to home or other facility with appropriate resources  4/22/2024 0048 by Shanelle Tompkins RN  Outcome: Progressing  Flowsheets (Taken 4/22/2024 0048)  Discharge to home or other facility with appropriate resources:   Identify barriers to discharge with patient and caregiver   Identify discharge learning needs (meds, wound care, etc)     Problem: Neurosensory - Adult  Goal: Achieves stable or improved neurological status  4/22/2024 0048 by Shanelle Tompkins RN  Outcome: Progressing  Flowsheets (Taken 4/22/2024 0048)  Achieves stable or improved neurological status:   Assess for and report changes in neurological status   Initiate measures to prevent increased intracranial pressure   Maintain blood pressure and fluid volume within ordered parameters to optimize cerebral perfusion and minimize risk of hemorrhage   Monitor temperature, glucose, and sodium. Initiate appropriate interventions as ordered     Problem: Respiratory - Adult  Goal: Achieves optimal ventilation and oxygenation  4/22/2024 0048 by Shanelle Tompkins RN  Outcome: Progressing  Flowsheets (Taken 4/22/2024 0048)  Achieves optimal ventilation and oxygenation:   Assess for changes in respiratory status   Assess for changes in mentation and behavior   Position to facilitate oxygenation and minimize respiratory effort   Oxygen supplementation based on oxygen saturation or arterial blood gases   Encourage broncho-pulmonary hygiene including cough, deep breathe, incentive spirometry   Assess the need for suctioning and aspirate as needed   Assess and instruct to report shortness of breath or any respiratory difficulty   Respiratory therapy support as indicated     Problem: Cardiovascular - Adult  Goal: Maintains optimal cardiac output and hemodynamic stability  4/22/2024 0048 by Shanelle Tompkins RN  Outcome: Progressing  Flowsheets (Taken 4/22/2024 0048)  Maintains optimal cardiac output and hemodynamic  therapy assess nares and determine need for appliance change or resting period.  4/22/2024 0048 by Shanelle Tompkins, RN  Outcome: Progressing  Note: No signs and/or symptoms of infection noted during this shift. Patient afebrile during this shift. No new skin breakdown noted during this shift. Patient able to turn self in bed; staff assistance provided as needed. Foot of bed elevated.      Care plan reviewed with patient. Patient verbalizes understanding of the plan of care and contributes to goal setting.

## 2024-04-22 NOTE — PROGRESS NOTES
Progress note: Infectious diseases    Patient - Noé Licea,  Age - 33 y.o.    - 1990      Room Number - 4A-11/011-A   MRN -  [id number]   [facility] # - [id number]  Date of Admission -  2024  4:44 PM    SUBJECTIVE:   He has no new complaints, he was taken to surgery and had exploration of the wound with lysis of adhesions irrigation of the wound  OBJECTIVE   VITALS    height is 1.803 m (5' 10.98\") and weight is 90 kg (198 lb 6.6 oz). His oral temperature is 97.7 °F (36.5 °C). His blood pressure is 101/57 (abnormal) and his pulse is 90. His respiration is 18 and oxygen saturation is 95%.       Wt Readings from Last 3 Encounters:   24 90 kg (198 lb 6.6 oz)   24 87.5 kg (193 lb)   24 81.6 kg (180 lb)       I/O (24 Hours)    Intake/Output Summary (Last 24 hours) at 2024 0849  Last data filed at 2024 0617  Gross per 24 hour   Intake 3823.5 ml   Output 3820 ml   Net 3.5 ml       General Appearance  Awake, alert, oriented,  not  In acute distress  HEENT - normocephalic, atraumatic, pink conjunctiva,  anicteric sclera  Neck - Supple, no mass  Lungs -  Bilateral   air entry, no rhonchi, no wheeze  Cardiovascular - Heart sounds are normal.    Abdomen - soft, pink ostomy, packed wound no active drainage. The abdominal redness is less  Neurologic -paraplegic  Skin - No bruising or bleeding  Extremities - No edema,      MEDICATIONS:      vancomycin (VANCOCIN) intermittent dosing (placeholder)   Other RX Placeholder    Vitamin D  1,000 Units Oral Daily    methocarbamol  500 mg Oral 4x Daily    vancomycin  1,500 mg IntraVENous Q18H    meropenem  1,000 mg IntraVENous Q8H    sodium chloride flush  5-40 mL IntraVENous 2 times per day    gabapentin  900 mg Oral 4x daily    baclofen  20 mg Oral 4x Daily    lamoTRIgine  50 mg Oral BID      sodium chloride       ondansetron, calcium gluconate, HYDROmorphone,  ([facility]) L89.324    Chronic osteomyelitis of coccyx ([facility]) M46.28    Chronic osteomyelitis, pelvis, left ([facility]) M86.652    Small bowel obstruction ([facility]) K56.609    Peristomal hernia K46.9    Adjustment disorder with mixed anxiety and depressed mood F43.23    Brain injury without open intracranial wound and with brief loss of consciousness (less than one hour) ([facility]) S06.9X9A    GERD without esophagitis K21.9    Neuropathic pain M79.2    Seizure disorder ([facility]) G40.909    Traumatic brain injury ([facility]) S06.9XAA    Fixation hardware in spine Z96.7    Heterotopic ossification M89.8X9    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

## 2024-04-22 NOTE — PROGRESS NOTES
[facility]   Pharmacy Pharmacokinetic Monitoring Service - Vancomycin    Indication: Intra-abdominal infection  Target Concentration: Goal AUC/PATT 400-600 mg*hr/L  Day of Therapy: 3  Additional Antimicrobials: meropenem    Pertinent Laboratory Values:   Wt Readings from Last 1 Encounters:   04/20/24 90 kg (198 lb 6.6 oz)     Temp Readings from Last 1 Encounters:   04/22/24 98.1 °F (36.7 °C) (Oral)     Estimated Creatinine Clearance: 141 mL/min (based on SCr of 0.8 mg/dL).  Recent Labs     04/21/24  0345 04/22/24  0407   CREATININE 1.4* 0.8   BUN 17 14   WBC 13.5* 12.9*     Pertinent Cultures:  Date Source Results   04/20/24 BC Gram negative bacilli-no bacteria identified on biofire   04/20/24 BC No growth to date   04/20/24 Sacral ulcer swab Gram positive cocci occurring singly/in pairs   MRSA Nasal Swab: N/A. Non-respiratory infection.    Assessment:  Date/Time Current Dose Concentration Timing of Concentration (h) AUC C trough,ss   4/22/24 1500 mg Q18H 7.1 12 h 1 min 307 5.6   Note: Serum concentrations collected for AUC dosing may appear elevated if collected in close proximity to the dose administered, this is not necessarily an indication of toxicity    Plan:  Current dosing regimen is sub-therapeutic due to significant improvements in SCr  Increase dose to vancomycin 1500 mg every 12 hours  Repeat vancomycin concentration ordered for 4/24 @ 0500   Pharmacy will continue to monitor patient and adjust therapy as indicated    Thank you for the consult,  Elba Davalos, RosanneD
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
## 2024-04-22 NOTE — PROGRESS NOTES
[facility] Nurse  Progress Note       Noé Licea  AGE: 33 y.o.   GENDER: male  : 1990  UNIT: 4A-11/011-A  TODAY'S DATE:  2024  ADMISSION DATE: 2024  4:44 PM    Subjective   Reason for C Evaluation and Assessment: abdominal wounds, sacral wound      Noé Licea is a 33 y.o. male referred by:   [] Physician/PA/APRN  [x] Nursing  [] Other:     Wound Identification:  Wound Type:pressure and non-healing surgical  Wound Location: coccyx, abdomen  Modifying factors:chronic pressure and decreased mobility    Objective     Deon Risk Score: Deon Scale Score: 14      Assessment     Encounter: Present to pt room for consult of multiple wound areas. Pt had surgery 24 with Dr. Morrison for abdominal exploration, lysis of adhesions, irrigation of subcutaneous tissue and fascia and packing of wound with Dakins. Pt surgical dressings noted to have strike through. Removed old dressings. Wound photos below. Repacked the left abdominal wound lightly with saline moist kerlix and covered with ABD pad. Covered surgical incision to right side of abdomen with ABD pad. Taped dressings in place. There are no orders for surgical wound dressings. Did speak to primary RN about touching base with Dr. Morrison or Dr. Morales for dressing orders. Pt turns to right side for assessment of sacral wound. States he has wound CNP following wound area with dressings ordered (iodoform and mepilex). States he is having a flap done on 24.  Cleansed wound with normal saline and gauze. Pat dry with clean gauze. Applied saline moist gauze to wound. Covered with sacral foam dressing. Placed pillow under left side. Extremities offloaded with pillow support. Pt colostomy bag in place without any evidence of leaking. Pt would like to keep it on and not change at this time. Stool liquid and brown. Pt has post op pouching system intact. If stool thickens up, will have to change to 1 piece lock and roll

## 2024-04-23 ENCOUNTER — ANESTHESIA (OUTPATIENT)
Dept: OPERATING ROOM | Age: 34
End: 2024-04-23
Payer: MEDICARE

## 2024-04-23 ENCOUNTER — ANESTHESIA EVENT (OUTPATIENT)
Dept: OPERATING ROOM | Age: 34
End: 2024-04-23
Payer: MEDICARE

## 2024-04-23 LAB
BACTERIA BLD AEROBE CULT: ABNORMAL
BACTERIA SPEC AEROBE CULT: ABNORMAL
BACTERIA SPEC AEROBE CULT: ABNORMAL
BACTERIA SPEC ANAEROBE CULT: ABNORMAL
BACTERIA UR CULT: ABNORMAL
BACTERIA UR CULT: ABNORMAL
GRAM STN SPEC: ABNORMAL
ORGANISM: ABNORMAL

## 2024-04-23 PROCEDURE — 3700000001 HC ADD 15 MINUTES (ANESTHESIA): Performed by: SURGERY

## 2024-04-23 PROCEDURE — 12032 INTMD RPR S/A/T/EXT 2.6-7.5: CPT | Performed by: SURGERY

## 2024-04-23 PROCEDURE — 3600000013 HC SURGERY LEVEL 3 ADDTL 15MIN: Performed by: SURGERY

## 2024-04-23 PROCEDURE — 3600000003 HC SURGERY LEVEL 3 BASE: Performed by: SURGERY

## 2024-04-23 PROCEDURE — 2580000003 HC RX 258: Performed by: SURGERY

## 2024-04-23 PROCEDURE — 2580000003 HC RX 258

## 2024-04-23 PROCEDURE — 6360000002 HC RX W HCPCS: Performed by: SURGERY

## 2024-04-23 PROCEDURE — 6370000000 HC RX 637 (ALT 250 FOR IP): Performed by: INTERNAL MEDICINE

## 2024-04-23 PROCEDURE — 6370000000 HC RX 637 (ALT 250 FOR IP)

## 2024-04-23 PROCEDURE — 7100000001 HC PACU RECOVERY - ADDTL 15 MIN: Performed by: SURGERY

## 2024-04-23 PROCEDURE — 6360000002 HC RX W HCPCS

## 2024-04-23 PROCEDURE — 99232 SBSQ HOSP IP/OBS MODERATE 35: CPT | Performed by: INTERNAL MEDICINE

## 2024-04-23 PROCEDURE — 6370000000 HC RX 637 (ALT 250 FOR IP): Performed by: SURGERY

## 2024-04-23 PROCEDURE — 6360000002 HC RX W HCPCS: Performed by: NURSE ANESTHETIST, CERTIFIED REGISTERED

## 2024-04-23 PROCEDURE — 7100000000 HC PACU RECOVERY - FIRST 15 MIN: Performed by: SURGERY

## 2024-04-23 PROCEDURE — 3700000000 HC ANESTHESIA ATTENDED CARE: Performed by: SURGERY

## 2024-04-23 PROCEDURE — 1200000000 HC SEMI PRIVATE

## 2024-04-23 PROCEDURE — 2709999900 HC NON-CHARGEABLE SUPPLY: Performed by: SURGERY

## 2024-04-23 PROCEDURE — 2580000003 HC RX 258: Performed by: NURSE ANESTHETIST, CERTIFIED REGISTERED

## 2024-04-23 PROCEDURE — 0DQG0ZZ REPAIR LEFT LARGE INTESTINE, OPEN APPROACH: ICD-10-PCS | Performed by: SURGERY

## 2024-04-23 RX ORDER — ONDANSETRON 2 MG/ML
INJECTION INTRAMUSCULAR; INTRAVENOUS PRN
Status: DISCONTINUED | OUTPATIENT
Start: 2024-04-23 | End: 2024-04-23 | Stop reason: SDUPTHER

## 2024-04-23 RX ORDER — PHENYLEPHRINE HCL IN 0.9% NACL 1 MG/10 ML
SYRINGE (ML) INTRAVENOUS PRN
Status: DISCONTINUED | OUTPATIENT
Start: 2024-04-23 | End: 2024-04-23 | Stop reason: SDUPTHER

## 2024-04-23 RX ORDER — PROPOFOL 10 MG/ML
INJECTION, EMULSION INTRAVENOUS PRN
Status: DISCONTINUED | OUTPATIENT
Start: 2024-04-23 | End: 2024-04-23 | Stop reason: SDUPTHER

## 2024-04-23 RX ORDER — DEXAMETHASONE SODIUM PHOSPHATE 10 MG/ML
INJECTION, EMULSION INTRAMUSCULAR; INTRAVENOUS PRN
Status: DISCONTINUED | OUTPATIENT
Start: 2024-04-23 | End: 2024-04-23 | Stop reason: SDUPTHER

## 2024-04-23 RX ORDER — FENTANYL CITRATE 50 UG/ML
INJECTION, SOLUTION INTRAMUSCULAR; INTRAVENOUS PRN
Status: DISCONTINUED | OUTPATIENT
Start: 2024-04-23 | End: 2024-04-23 | Stop reason: SDUPTHER

## 2024-04-23 RX ORDER — NALOXONE HYDROCHLORIDE 0.4 MG/ML
INJECTION, SOLUTION INTRAMUSCULAR; INTRAVENOUS; SUBCUTANEOUS PRN
Status: DISCONTINUED | OUTPATIENT
Start: 2024-04-23 | End: 2024-04-23 | Stop reason: HOSPADM

## 2024-04-23 RX ORDER — HEPARIN SODIUM 5000 [USP'U]/ML
5000 INJECTION, SOLUTION INTRAVENOUS; SUBCUTANEOUS EVERY 8 HOURS SCHEDULED
Status: DISCONTINUED | OUTPATIENT
Start: 2024-04-24 | End: 2024-04-26 | Stop reason: HOSPADM

## 2024-04-23 RX ORDER — SODIUM CHLORIDE 0.9 % (FLUSH) 0.9 %
5-40 SYRINGE (ML) INJECTION EVERY 12 HOURS SCHEDULED
Status: DISCONTINUED | OUTPATIENT
Start: 2024-04-23 | End: 2024-04-23 | Stop reason: HOSPADM

## 2024-04-23 RX ORDER — SODIUM CHLORIDE 0.9 % (FLUSH) 0.9 %
5-40 SYRINGE (ML) INJECTION PRN
Status: DISCONTINUED | OUTPATIENT
Start: 2024-04-23 | End: 2024-04-23 | Stop reason: HOSPADM

## 2024-04-23 RX ORDER — SODIUM CHLORIDE 9 MG/ML
INJECTION, SOLUTION INTRAVENOUS CONTINUOUS PRN
Status: DISCONTINUED | OUTPATIENT
Start: 2024-04-23 | End: 2024-04-23 | Stop reason: SDUPTHER

## 2024-04-23 RX ORDER — SODIUM CHLORIDE 9 MG/ML
INJECTION, SOLUTION INTRAVENOUS PRN
Status: DISCONTINUED | OUTPATIENT
Start: 2024-04-23 | End: 2024-04-23 | Stop reason: HOSPADM

## 2024-04-23 RX ORDER — MIDAZOLAM HYDROCHLORIDE 1 MG/ML
INJECTION INTRAMUSCULAR; INTRAVENOUS PRN
Status: DISCONTINUED | OUTPATIENT
Start: 2024-04-23 | End: 2024-04-23 | Stop reason: SDUPTHER

## 2024-04-23 RX ORDER — LIDOCAINE HCL/PF 100 MG/5ML
SYRINGE (ML) INJECTION PRN
Status: DISCONTINUED | OUTPATIENT
Start: 2024-04-23 | End: 2024-04-23 | Stop reason: SDUPTHER

## 2024-04-23 RX ADMIN — MIDAZOLAM 2 MG: 1 INJECTION INTRAMUSCULAR; INTRAVENOUS at 14:23

## 2024-04-23 RX ADMIN — MEROPENEM 1000 MG: 1 INJECTION, POWDER, FOR SOLUTION INTRAVENOUS at 09:33

## 2024-04-23 RX ADMIN — ONDANSETRON 4 MG: 2 INJECTION INTRAMUSCULAR; INTRAVENOUS at 14:30

## 2024-04-23 RX ADMIN — DIPHENHYDRAMINE HYDROCHLORIDE 25 MG: 25 TABLET ORAL at 05:05

## 2024-04-23 RX ADMIN — Medication 1000 UNITS: at 09:24

## 2024-04-23 RX ADMIN — SODIUM CHLORIDE: 9 INJECTION, SOLUTION INTRAVENOUS at 14:23

## 2024-04-23 RX ADMIN — METHOCARBAMOL 500 MG: 500 TABLET ORAL at 11:53

## 2024-04-23 RX ADMIN — GABAPENTIN 900 MG: 300 CAPSULE ORAL at 17:51

## 2024-04-23 RX ADMIN — VANCOMYCIN HYDROCHLORIDE 1500 MG: 5 INJECTION, POWDER, LYOPHILIZED, FOR SOLUTION INTRAVENOUS at 06:08

## 2024-04-23 RX ADMIN — METHOCARBAMOL 500 MG: 500 TABLET ORAL at 17:51

## 2024-04-23 RX ADMIN — LAMOTRIGINE 50 MG: 25 TABLET ORAL at 09:24

## 2024-04-23 RX ADMIN — Medication 60 MG: at 14:30

## 2024-04-23 RX ADMIN — SODIUM CHLORIDE, PRESERVATIVE FREE 10 ML: 5 INJECTION INTRAVENOUS at 23:54

## 2024-04-23 RX ADMIN — FENTANYL CITRATE 50 MCG: 50 INJECTION, SOLUTION INTRAMUSCULAR; INTRAVENOUS at 14:23

## 2024-04-23 RX ADMIN — Medication 200 MCG: at 15:16

## 2024-04-23 RX ADMIN — Medication 200 MCG: at 15:06

## 2024-04-23 RX ADMIN — METHOCARBAMOL 500 MG: 500 TABLET ORAL at 06:05

## 2024-04-23 RX ADMIN — BACITRACIN ZINC NEOMYCIN SULFATE POLYMYXIN B SULFATE: 400; 3.5; 5 OINTMENT TOPICAL at 09:22

## 2024-04-23 RX ADMIN — GABAPENTIN 900 MG: 300 CAPSULE ORAL at 11:53

## 2024-04-23 RX ADMIN — FENTANYL CITRATE 50 MCG: 50 INJECTION, SOLUTION INTRAMUSCULAR; INTRAVENOUS at 14:34

## 2024-04-23 RX ADMIN — ACETAMINOPHEN 650 MG: 325 TABLET ORAL at 05:05

## 2024-04-23 RX ADMIN — PROPOFOL 150 MG: 10 INJECTION, EMULSION INTRAVENOUS at 14:30

## 2024-04-23 RX ADMIN — LAMOTRIGINE 50 MG: 25 TABLET ORAL at 23:53

## 2024-04-23 RX ADMIN — BACLOFEN 20 MG: 10 TABLET ORAL at 23:54

## 2024-04-23 RX ADMIN — BACLOFEN 20 MG: 10 TABLET ORAL at 11:53

## 2024-04-23 RX ADMIN — GABAPENTIN 900 MG: 300 CAPSULE ORAL at 23:54

## 2024-04-23 RX ADMIN — GABAPENTIN 900 MG: 300 CAPSULE ORAL at 06:05

## 2024-04-23 RX ADMIN — MEROPENEM 1000 MG: 1 INJECTION, POWDER, FOR SOLUTION INTRAVENOUS at 16:21

## 2024-04-23 RX ADMIN — Medication 200 MCG: at 14:47

## 2024-04-23 RX ADMIN — DEXAMETHASONE SODIUM PHOSPHATE 8 MG: 10 INJECTION, EMULSION INTRAMUSCULAR; INTRAVENOUS at 14:30

## 2024-04-23 RX ADMIN — SODIUM CHLORIDE, PRESERVATIVE FREE 10 ML: 5 INJECTION INTRAVENOUS at 16:27

## 2024-04-23 RX ADMIN — BACLOFEN 20 MG: 10 TABLET ORAL at 06:05

## 2024-04-23 RX ADMIN — Medication 200 MCG: at 14:43

## 2024-04-23 RX ADMIN — BACITRACIN ZINC NEOMYCIN SULFATE POLYMYXIN B SULFATE: 400; 3.5; 5 OINTMENT TOPICAL at 23:54

## 2024-04-23 RX ADMIN — BACLOFEN 20 MG: 10 TABLET ORAL at 17:51

## 2024-04-23 RX ADMIN — ACETAMINOPHEN 650 MG: 325 TABLET ORAL at 11:53

## 2024-04-23 RX ADMIN — METHOCARBAMOL 500 MG: 500 TABLET ORAL at 23:54

## 2024-04-23 ASSESSMENT — PAIN DESCRIPTION - ORIENTATION
ORIENTATION: ANTERIOR
ORIENTATION: RIGHT;LEFT

## 2024-04-23 ASSESSMENT — PAIN DESCRIPTION - DESCRIPTORS
DESCRIPTORS: ACHING;DISCOMFORT
DESCRIPTORS: ACHING
DESCRIPTORS: ACHING;DISCOMFORT
DESCRIPTORS: ACHING;DISCOMFORT

## 2024-04-23 ASSESSMENT — PAIN SCALES - GENERAL
PAINLEVEL_OUTOF10: 3
PAINLEVEL_OUTOF10: 2
PAINLEVEL_OUTOF10: 3
PAINLEVEL_OUTOF10: 3

## 2024-04-23 ASSESSMENT — PAIN DESCRIPTION - PAIN TYPE
TYPE: ACUTE PAIN

## 2024-04-23 ASSESSMENT — PAIN DESCRIPTION - FREQUENCY
FREQUENCY: CONTINUOUS

## 2024-04-23 ASSESSMENT — PAIN - FUNCTIONAL ASSESSMENT
PAIN_FUNCTIONAL_ASSESSMENT: ACTIVITIES ARE NOT PREVENTED
PAIN_FUNCTIONAL_ASSESSMENT: FACE, LEGS, ACTIVITY, CRY, AND CONSOLABILITY (FLACC)

## 2024-04-23 ASSESSMENT — PAIN DESCRIPTION - LOCATION
LOCATION: HEAD
LOCATION: BACK;BUTTOCKS
LOCATION: HEAD
LOCATION: BACK;BUTTOCKS

## 2024-04-23 ASSESSMENT — PAIN DESCRIPTION - ONSET
ONSET: ON-GOING

## 2024-04-23 NOTE — PLAN OF CARE
Problem: Discharge Planning  Goal: Discharge to home or other facility with appropriate resources  Outcome: Progressing  Flowsheets  Discharge to home or other facility with appropriate resources:   Identify barriers to discharge with patient and caregiver   Identify discharge learning needs (meds, wound care, etc)     Problem: Neurosensory - Adult  Goal: Achieves stable or improved neurological status  Outcome: Progressing  Flowsheets   Achieves stable or improved neurological status:   Assess for and report changes in neurological status   Initiate measures to prevent increased intracranial pressure   Maintain blood pressure and fluid volume within ordered parameters to optimize cerebral perfusion and minimize risk of hemorrhage   Monitor temperature, glucose, and sodium. Initiate appropriate interventions as ordered     Problem: Respiratory - Adult  Goal: Achieves optimal ventilation and oxygenation  Outcome: Progressing  Flowsheets  Achieves optimal ventilation and oxygenation:   Assess for changes in respiratory status   Assess for changes in mentation and behavior   Position to facilitate oxygenation and minimize respiratory effort   Oxygen supplementation based on oxygen saturation or arterial blood gases   Encourage broncho-pulmonary hygiene including cough, deep breathe, incentive spirometry   Assess the need for suctioning and aspirate as needed   Assess and instruct to report shortness of breath or any respiratory difficulty   Respiratory therapy support as indicated     Problem: Cardiovascular - Adult  Goal: Maintains optimal cardiac output and hemodynamic stability  Outcome: Progressing  Flowsheets   Maintains optimal cardiac output and hemodynamic stability:   Monitor blood pressure and heart rate   Monitor urine output and notify Licensed Independent Practitioner for values outside of normal range   Assess for signs of decreased cardiac output   Administer fluid and/or volume expanders as ordered    of pain and evaluate response   Implement non-pharmacological measures as appropriate and evaluate response   Consider cultural and social influences on pain and pain management     Problem: Skin/Tissue Integrity  Goal: Absence of new skin breakdown  Description: 1.  Monitor for areas of redness and/or skin breakdown  2.  Assess vascular access sites hourly  3.  Every 4-6 hours minimum:  Change oxygen saturation probe site  4.  Every 4-6 hours:  If on nasal continuous positive airway pressure, respiratory therapy assess nares and determine need for appliance change or resting period.  Outcome: Progressing  Note: No signs and/or symptoms of infection noted during this shift. Patient afebrile during this shift. No new skin breakdown noted during this shift. Patient able to turn self in bed; staff assistance provided as needed. Foot of bed elevated.      Care plan reviewed with patient. Patient verbalizes understanding of the plan of care and contributes to goal setting.

## 2024-04-23 NOTE — ANESTHESIA PRE PROCEDURE
Department of Anesthesiology  Preprocedure Note       Name:  Noé Licea   Age:  33 y.o.  :  1990                                          MRN:  [id number]         Date:  2024      Surgeon: Surgeon(s):  Andrade Morales DO    Procedure: Procedure(s):  Exploratory Washout of Colostomy Wound    Medications prior to admission:   Prior to Admission medications    Medication Sig Start Date End Date Taking? Authorizing Provider   HYDROcodone-acetaminophen (NORCO) 5-325 MG per tablet Take 1 tablet by mouth every 4 hours as needed for Pain for up to 5 days. Intended supply: 5 days. Take lowest dose possible to manage pain Max Daily Amount: 6 tablets 24  Andrade Morales DO   ondansetron (ZOFRAN ODT) 4 MG disintegrating tablet Take 1 tablet by mouth every 8 hours as needed for Nausea 3/26/24   Lauren Marroquin APRN - CNP   omeprazole (PRILOSEC) 40 MG delayed release capsule take 1 capsule by mouth EVERY MORNING BEFORE BREAKFAST 3/11/24   Grisel Martinez APRN - CNP   sildenafil (VIAGRA) 50 MG tablet take 1 tablet by mouth once daily if needed for ERECTILE DYSFUNCTION 24   Grisel Martinez APRN - CNP   oxybutynin (DITROPAN-XL) 5 MG extended release tablet take 1 tablet by mouth once daily 5/15/23   Sia Ortega MD   baclofen (LIORESAL) 20 MG tablet Take 1 tablet by mouth 4 times daily    ProviderDonna MD   methocarbamol (ROBAXIN) 500 MG tablet Take 1 tablet by mouth 4 times daily    ProviderDonna MD   lamoTRIgine (LAMICTAL) 100 MG tablet take 1 tablet by mouth twice a day  Patient taking differently: Take 0.5 tablets by mouth 2 times daily 18   Nazia Barroso MD   gabapentin (NEURONTIN) 300 MG capsule Take 3 capsules by mouth 4 times daily.    Provider, MD Donna       Current medications:    Current Facility-Administered Medications   Medication Dose Route Frequency Provider Last Rate Last Admin   • [START ON 2024] heparin (porcine) injection

## 2024-04-23 NOTE — PROGRESS NOTES
Physician Progress Note      PATIENT:               KIRA AQUINO  [facility] #:                  [id number]  :                       1990  ADMIT DATE:       2024 4:44 PM  DISCH DATE:  RESPONDING  PROVIDER #:        Ge Ayala MD          QUERY TEXT:    Pt admitted with sepsis and underwent hernia repair surgery.? If possible,   please document in progress notes and discharge summary the relationship if   any between the sepsis and the surgery:    The medical record reflects the following:  Risk Factors: abd wall cellulitis, sepsis  Clinical Indicators: presented with abdominal wall cellulitis after having   hernia repair surgery, noted to have sepsis and ostomy leak on arrival that   was repaired in OR  Treatment: Labs, imaging, monitoring, Merrem, Vancomycin, surgery  Options provided:  -- Sepsis is due to the hernia repair procedure  -- Sepsis is not due to the procedure, but is due to other incidental risk   factor, Please specify other incidental risk factor.  -- Other - I will add my own diagnosis  -- Disagree - Not applicable / Not valid  -- Disagree - Clinically unable to determine / Unknown  -- Refer to Clinical Documentation Reviewer    PROVIDER RESPONSE TEXT:    Sepsis is not due to the procedure but due to bacterial translocation    Query created by: Radha Mejía on 2024 12:01 PM      QUERY TEXT:    Patient admitted with sepsis.  Noted documentation of pressure ulcer sacrum   stage 3 and pressure ulcer L buttock stage 4. ?Please document in progress   notes the clinical indicators to support this diagnosis on current admission   or document if the pressure ulcers are PMH only or have been ruled out after   study.?Please update active hospital problems appropriately to reflect   response.    The medical record reflects the following:  Risk Factors: paraplegia  Clinical Indicators: presented with sepsis, undated problem list shows   pressure ulcer of sacral region stage 3 and L buttock  stage 4  Treatment: Labs, imaging, monitoring, turn every 2 hours  Options provided:  -- Stage 3 pressure ulcer of sacrum and stage 4 pressure ulcer of L buttock   are currently being treated/evaluated  -- Pressure ulcers of sacrum and L buttock are PMH only  -- Other - I will add my own diagnosis  -- Disagree - Not applicable / Not valid  -- Disagree - Clinically unable to determine / Unknown  -- Refer to Clinical Documentation Reviewer    PROVIDER RESPONSE TEXT:    Pressure ulcers of sacrum and L buttocks are PMH only.    Query created by: Radha Mejía on 4/23/2024 12:05 PM      Electronically signed by:  Ge Ayala MD 4/23/2024 12:10 PM

## 2024-04-23 NOTE — BRIEF OP NOTE
Brief Postoperative Note      Patient: Noé Licea  YOB: 1990  MRN: [id number]    Date of Procedure: 4/21/2024    Pre-Op Diagnosis Codes:     * Perforated bowel (HCC) [K63.1]    Post-Op Diagnosis: same       Operation 1.Abd Exploration,Lysis Adhesions  2.Opening Previous Surgical Wound with Irrigation /Packing of wound    Surgeon(s):  Dutch Mrorison MD    Assistant:      Anesthesia: General    Estimated Blood Loss (mL): 20 ml    Complications: inability to find exact site of likely Colostomy Side Wall Leak    Specimens:       Implants:        Drains:   Colostomy LLQ (Active)   Stomal Appliance Clean, dry & intact 04/21/24 0815   Flange Size (inches) 50 Inches 04/21/24 0248   Stoma  Assessment Pink;Protrudes;Moist;Swelling 04/21/24 0815   Peristomal Assessment Clean, dry & intact 04/21/24 0815   Mucocutaneous Junction Intact 04/21/24 0744   Treatment Pouch change;Site care;Liquid skin barrier 04/21/24 0248   Stool Appearance Loose 04/21/24 0744   Stool Color Brown 04/21/24 0744   Stool Amount Large 04/21/24 0744   Output (mL) 200 ml 04/21/24 1252       Suprapubic Catheter (Active)   Urine Color Yellow 04/21/24 0000   Urine Appearance Clear 04/21/24 0000   Collection Container Standard 04/21/24 0000   Catheter Care  Soap and water 04/20/24 2254   Status Patent;Draining 04/21/24 0000   Output (mL) 1000 mL 04/21/24 1252       [REMOVED] Urinary Catheter 04/20/24 (Removed)       [REMOVED] Suprapubic Catheter (Removed)   Urine Color Yellow 04/18/24 1210   Collection Container Standard 04/18/24 1210       Findings:  Infection Present At Time Of Surgery (PATOS) (choose all levels that have infection present):  - Deep Infection (muscle/fascia) present as evidenced by green creamy fluid consistent with infection  Other Findings: see op note    Electronically signed by Dutch Morrison MD on 4/21/2024 at 3:19 PM  
1210       Findings:  Infection Present At Time Of Surgery (PATOS) (choose all levels that have infection present):  - Superficial Infection (skin/subcutaneous) present as evidenced by fluid consistent with infection    Electronically signed by Andrade Morales DO on 4/23/2024 at 4:26 PM

## 2024-04-23 NOTE — PROGRESS NOTES
Progress note: Infectious diseases    Patient - Noé Licea,  Age - 33 y.o.    - 1990      Room Number - 4A-11/011-A   MRN -  [id number]   [facility] # - [id number]  Date of Admission -  2024  4:44 PM    SUBJECTIVE:   He is having pain over the iv line and itching related to the vancomycin  OBJECTIVE   VITALS    height is 1.803 m (5' 10.98\") and weight is 85 kg (187 lb 6.3 oz). His oral temperature is 98.6 °F (37 °C). His blood pressure is 121/65 and his pulse is 78. His respiration is 14 and oxygen saturation is 96%.       Wt Readings from Last 3 Encounters:   24 85 kg (187 lb 6.3 oz)   24 87.5 kg (193 lb)   24 81.6 kg (180 lb)       I/O (24 Hours)    Intake/Output Summary (Last 24 hours) at 2024 0923  Last data filed at 2024 0500  Gross per 24 hour   Intake 707.63 ml   Output 2900 ml   Net -2192.37 ml         General Appearance  Awake, alert, oriented,  not  In acute distress  HEENT - normocephalic, atraumatic, pink conjunctiva,  anicteric sclera  Neck - Supple, no mass  Lungs -  Bilateral   air entry, no rhonchi, no wheeze  Cardiovascular - Heart sounds are normal.    Abdomen - soft, pink ostomy, packed wound the packing was greenish in color. No obvious leak noted, the wound is clean  Neurologic -paraplegic  Skin - No bruising or bleeding  Extremities - No edema,      MEDICATIONS:      vancomycin (VANCOCIN) intermittent dosing (placeholder)   Other RX Placeholder    Vitamin D  1,000 Units Oral Daily    vancomycin  1,500 mg IntraVENous Q12H    neomycin-bacitracin-polymyxin   Topical BID    methocarbamol  500 mg Oral 4x Daily    meropenem  1,000 mg IntraVENous Q8H    sodium chloride flush  5-40 mL IntraVENous 2 times per day    gabapentin  900 mg Oral 4x daily    baclofen  20 mg Oral 4x Daily    lamoTRIgine  50 mg Oral BID      sodium chloride       melatonin, diphenhydrAMINE, ondansetron,

## 2024-04-23 NOTE — PROGRESS NOTES
1522: Pt arrives to pacu, unresponsive post anesthesia. Pt on NC with opa in place, respirations easy and unlabored. VSS  1529: Pt awakens on his own, opa removed. Tolerated well  1545: Report given to Yanelis on 4A  1552: Pt meets criteria for discharge from pacu, awaiting transport  1604: Pt transported back to  in stable condition. VSS

## 2024-04-23 NOTE — PROGRESS NOTES
This nurse speaks with patient regarding exchanging his suprapubic cath. Per patient he states that he does not want it exchanged and that it was exchanged last on 4/17/24. This nurse explains/educates patient on the exchange. Patient continues to state that he does not want it exchanged.

## 2024-04-23 NOTE — ANESTHESIA POSTPROCEDURE EVALUATION
Department of Anesthesiology  Postprocedure Note    Patient: Noé Licea  MRN: [id number]  YOB: 1990  Date of evaluation: 4/23/2024    Procedure Summary       Date: 04/23/24 Room / [facility]  / [facility] OR    Anesthesia Start: 1423 Anesthesia Stop: 1525    Procedure: Exploratory Washout of Colostomy Wound, repair of colostomy Diagnosis:       Septicemia (HCC)      (Septicemia (HCC) [A41.9])    Surgeons: Andrade Morales DO Responsible Provider: Esvin Yanes MD    Anesthesia Type: general ASA Status: 3            Anesthesia Type: No value filed.    Alex Phase I: Alex Score: 10    Alex Phase II:      Anesthesia Post Evaluation    Patient location during evaluation: PACU  Patient participation: complete - patient participated  Level of consciousness: awake and alert  Airway patency: patent  Nausea & Vomiting: no nausea  Cardiovascular status: blood pressure returned to baseline and hemodynamically stable  Respiratory status: acceptable and spontaneous ventilation  Hydration status: euvolemic  Pain management: adequate    No notable events documented.
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
## 2024-04-24 LAB
ALBUMIN SERPL BCG-MCNC: 2.6 G/DL (ref 3.5–5.1)
ALP SERPL-CCNC: 60 U/L (ref 38–126)
ALT SERPL W/O P-5'-P-CCNC: 13 U/L (ref 11–66)
ANION GAP SERPL CALC-SCNC: 11 MEQ/L (ref 8–16)
AST SERPL-CCNC: 10 U/L (ref 5–40)
BILIRUB SERPL-MCNC: 0.3 MG/DL (ref 0.3–1.2)
BUN SERPL-MCNC: 15 MG/DL (ref 7–22)
CALCIUM SERPL-MCNC: 8.7 MG/DL (ref 8.5–10.5)
CHLORIDE SERPL-SCNC: 103 MEQ/L (ref 98–111)
CO2 SERPL-SCNC: 25 MEQ/L (ref 23–33)
CREAT SERPL-MCNC: 0.6 MG/DL (ref 0.4–1.2)
DEPRECATED RDW RBC AUTO: 43.9 FL (ref 35–45)
EKG ATRIAL RATE: 143 BPM
EKG P AXIS: 41 DEGREES
EKG P-R INTERVAL: 146 MS
EKG Q-T INTERVAL: 252 MS
EKG QRS DURATION: 70 MS
EKG QTC CALCULATION (BAZETT): 388 MS
EKG R AXIS: 43 DEGREES
EKG T AXIS: -36 DEGREES
EKG VENTRICULAR RATE: 143 BPM
ERYTHROCYTE [DISTWIDTH] IN BLOOD BY AUTOMATED COUNT: 14.4 % (ref 11.5–14.5)
GFR SERPL CREATININE-BSD FRML MDRD: > 90 ML/MIN/1.73M2
GLUCOSE SERPL-MCNC: 105 MG/DL (ref 70–108)
HCT VFR BLD AUTO: 38.6 % (ref 42–52)
HGB BLD-MCNC: 12.5 GM/DL (ref 14–18)
MCH RBC QN AUTO: 27.5 PG (ref 26–33)
MCHC RBC AUTO-ENTMCNC: 32.4 GM/DL (ref 32.2–35.5)
MCV RBC AUTO: 84.8 FL (ref 80–94)
PLATELET # BLD AUTO: 294 THOU/MM3 (ref 130–400)
PMV BLD AUTO: 10.6 FL (ref 9.4–12.4)
POTASSIUM SERPL-SCNC: 3.8 MEQ/L (ref 3.5–5.2)
PROT SERPL-MCNC: 7.1 G/DL (ref 6.1–8)
RBC # BLD AUTO: 4.55 MILL/MM3 (ref 4.7–6.1)
SODIUM SERPL-SCNC: 139 MEQ/L (ref 135–145)
WBC # BLD AUTO: 8.1 THOU/MM3 (ref 4.8–10.8)

## 2024-04-24 PROCEDURE — 6360000002 HC RX W HCPCS: Performed by: INTERNAL MEDICINE

## 2024-04-24 PROCEDURE — 2580000003 HC RX 258: Performed by: SURGERY

## 2024-04-24 PROCEDURE — 6360000002 HC RX W HCPCS: Performed by: SURGERY

## 2024-04-24 PROCEDURE — 6370000000 HC RX 637 (ALT 250 FOR IP): Performed by: SURGERY

## 2024-04-24 PROCEDURE — 36415 COLL VENOUS BLD VENIPUNCTURE: CPT

## 2024-04-24 PROCEDURE — 1200000000 HC SEMI PRIVATE

## 2024-04-24 PROCEDURE — 80053 COMPREHEN METABOLIC PANEL: CPT

## 2024-04-24 PROCEDURE — 6370000000 HC RX 637 (ALT 250 FOR IP): Performed by: INTERNAL MEDICINE

## 2024-04-24 PROCEDURE — 99233 SBSQ HOSP IP/OBS HIGH 50: CPT | Performed by: STUDENT IN AN ORGANIZED HEALTH CARE EDUCATION/TRAINING PROGRAM

## 2024-04-24 PROCEDURE — 85027 COMPLETE CBC AUTOMATED: CPT

## 2024-04-24 PROCEDURE — 99024 POSTOP FOLLOW-UP VISIT: CPT | Performed by: SURGERY

## 2024-04-24 RX ADMIN — SODIUM CHLORIDE, PRESERVATIVE FREE 10 ML: 5 INJECTION INTRAVENOUS at 09:43

## 2024-04-24 RX ADMIN — BACLOFEN 20 MG: 10 TABLET ORAL at 12:32

## 2024-04-24 RX ADMIN — MEROPENEM 1000 MG: 1 INJECTION, POWDER, FOR SOLUTION INTRAVENOUS at 18:01

## 2024-04-24 RX ADMIN — GABAPENTIN 900 MG: 300 CAPSULE ORAL at 18:02

## 2024-04-24 RX ADMIN — Medication 1000 UNITS: at 09:42

## 2024-04-24 RX ADMIN — BACITRACIN ZINC NEOMYCIN SULFATE POLYMYXIN B SULFATE: 400; 3.5; 5 OINTMENT TOPICAL at 20:34

## 2024-04-24 RX ADMIN — BACLOFEN 20 MG: 10 TABLET ORAL at 06:04

## 2024-04-24 RX ADMIN — SODIUM CHLORIDE, PRESERVATIVE FREE 10 ML: 5 INJECTION INTRAVENOUS at 20:34

## 2024-04-24 RX ADMIN — BACLOFEN 20 MG: 10 TABLET ORAL at 18:02

## 2024-04-24 RX ADMIN — LAMOTRIGINE 50 MG: 25 TABLET ORAL at 09:41

## 2024-04-24 RX ADMIN — METHOCARBAMOL 500 MG: 500 TABLET ORAL at 06:04

## 2024-04-24 RX ADMIN — HEPARIN SODIUM 5000 UNITS: 5000 INJECTION INTRAVENOUS; SUBCUTANEOUS at 14:51

## 2024-04-24 RX ADMIN — HEPARIN SODIUM 5000 UNITS: 5000 INJECTION INTRAVENOUS; SUBCUTANEOUS at 23:00

## 2024-04-24 RX ADMIN — MEROPENEM 1000 MG: 1 INJECTION, POWDER, FOR SOLUTION INTRAVENOUS at 09:39

## 2024-04-24 RX ADMIN — MEROPENEM 1000 MG: 1 INJECTION, POWDER, FOR SOLUTION INTRAVENOUS at 01:15

## 2024-04-24 RX ADMIN — GABAPENTIN 900 MG: 300 CAPSULE ORAL at 06:04

## 2024-04-24 RX ADMIN — METHOCARBAMOL 500 MG: 500 TABLET ORAL at 18:03

## 2024-04-24 RX ADMIN — METHOCARBAMOL 500 MG: 500 TABLET ORAL at 12:32

## 2024-04-24 RX ADMIN — BACITRACIN ZINC NEOMYCIN SULFATE POLYMYXIN B SULFATE: 400; 3.5; 5 OINTMENT TOPICAL at 09:42

## 2024-04-24 RX ADMIN — HEPARIN SODIUM 5000 UNITS: 5000 INJECTION INTRAVENOUS; SUBCUTANEOUS at 06:04

## 2024-04-24 RX ADMIN — GABAPENTIN 900 MG: 300 CAPSULE ORAL at 12:32

## 2024-04-24 ASSESSMENT — PAIN DESCRIPTION - FREQUENCY
FREQUENCY: CONTINUOUS
FREQUENCY: CONTINUOUS

## 2024-04-24 ASSESSMENT — PAIN SCALES - GENERAL
PAINLEVEL_OUTOF10: 0
PAINLEVEL_OUTOF10: 3
PAINLEVEL_OUTOF10: 0
PAINLEVEL_OUTOF10: 3
PAINLEVEL_OUTOF10: 0
PAINLEVEL_OUTOF10: 0

## 2024-04-24 ASSESSMENT — PAIN DESCRIPTION - DESCRIPTORS
DESCRIPTORS: ACHING;DISCOMFORT
DESCRIPTORS: ACHING;DISCOMFORT

## 2024-04-24 ASSESSMENT — PAIN DESCRIPTION - LOCATION
LOCATION: BACK;BUTTOCKS
LOCATION: BUTTOCKS

## 2024-04-24 ASSESSMENT — PAIN DESCRIPTION - ONSET
ONSET: ON-GOING
ONSET: ON-GOING

## 2024-04-24 ASSESSMENT — PAIN DESCRIPTION - PAIN TYPE
TYPE: ACUTE PAIN
TYPE: ACUTE PAIN
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
## 2024-04-24 NOTE — PROGRESS NOTES
Progress note: Infectious diseases    Patient - Noé Licea,  Age - 33 y.o.    - 1990      Room Number - 4A-11/011-A   MRN -  [id number]   [facility] # - [id number]  Date of Admission -  2024  4:44 PM    SUBJECTIVE:   No acute issues  OBJECTIVE   VITALS    height is 1.803 m (5' 10.98\") and weight is 85 kg (187 lb 6.3 oz). His oral temperature is 98.6 °F (37 °C). His blood pressure is 150/97 (abnormal) and his pulse is 70. His respiration is 16 and oxygen saturation is 100%.       Wt Readings from Last 3 Encounters:   24 85 kg (187 lb 6.3 oz)   24 87.5 kg (193 lb)   24 81.6 kg (180 lb)       I/O (24 Hours)    Intake/Output Summary (Last 24 hours) at 2024 1008  Last data filed at 2024 0933  Gross per 24 hour   Intake 1781.77 ml   Output 2075 ml   Net -293.23 ml       General Appearance  Awake, alert, oriented,  not  In acute distress  HEENT - normocephalic, atraumatic, pink conjunctiva,  anicteric sclera  Neck - Supple, no mass  Lungs -  Bilateral good air entry, no rhonchi, no wheeze  Cardiovascular - Heart sounds are normal.  Regular rate and rhythm without murmur, gallop or rub.  Abdomen - soft, not distended, nontender, ostomy present, no obvious leak seen.  Stump looks good.  Abdominal dressing present.  Dressing present on coccyx.  Neurologic -paraplegic  Skin - No bruising or bleeding  Extremities - No edema    MEDICATIONS:      heparin (porcine)  5,000 Units SubCUTAneous 3 times per day    Vitamin D  1,000 Units Oral Daily    neomycin-bacitracin-polymyxin   Topical BID    methocarbamol  500 mg Oral 4x Daily    meropenem  1,000 mg IntraVENous Q8H    sodium chloride flush  5-40 mL IntraVENous 2 times per day    gabapentin  900 mg Oral 4x daily    baclofen  20 mg Oral 4x Daily    lamoTRIgine  50 mg Oral BID      sodium chloride       melatonin, diphenhydrAMINE, ondansetron, calcium  gluconate, HYDROmorphone, sodium chloride flush, sodium chloride, acetaminophen **OR** acetaminophen, HYDROcodone-acetaminophen      LABS:     CBC:   Recent Labs     04/22/24  0407 04/24/24  0805   WBC 12.9* 8.1   HGB 11.9* 12.5*    294     BMP:    Recent Labs     04/22/24  0407 04/24/24  0805    139   K 4.0 3.8    103   CO2 24 25   BUN 14 15   CREATININE 0.8 0.6   GLUCOSE 132* 105     Calcium:  Recent Labs     04/24/24  0805   CALCIUM 8.7     Ionized Calcium:No results for input(s): \"IONCA\" in the last 72 hours.  Magnesium:  Recent Labs     04/22/24  0407   MG 1.9      Hepatic:   Recent Labs     04/24/24  0805   ALKPHOS 60   ALT 13   AST 10   PROT 7.1   BILITOT 0.3        CULTURES:   UA: No results for input(s): \"SPECGRAV\", \"PHUR\", \"COLORU\", \"CLARITYU\", \"MUCUS\", \"PROTEINU\", \"BLOODU\", \"RBCUA\", \"WBCUA\", \"BACTERIA\", \"NITRU\", \"GLUCOSEU\", \"BILIRUBINUR\", \"UROBILINOGEN\", \"KETUA\", \"LABCAST\", \"LABCASTTY\", \"AMORPHOS\" in the last 72 hours.    Invalid input(s): \"CRYSTALS\"  Micro:   Lab Results   Component Value Date/Time    BC No growth 24 hours. No growth 48 hours. 04/20/2024 05:22 PM       Problem list of patient:     Patient Active Problem List   Diagnosis Code    Urinary tract infection associated with catheterization of urinary tract ([facility]) T83.511A, N39.0    Moderate single current episode of major depressive disorder (HCC) F32.1    Severe malnutrition (HCC) E43    Sepsis with metabolic encephalopathy (HCC) A41.9, R65.20, G93.41    Decubitus ulcer of coccygeal region, stage 3 ([facility]) L89.153    Adjustment Disorder with Mixed Anxiety and Depressed Mood     Colostomy status ([facility]) Z93.3    Septicemia (HCC) A41.9    Neurogenic bladder N31.9    Colostomy care ([facility]) Z43.3    Peristomal skin complication [id number]    Overactive bladder N32.81    Osteomyelitis ([facility]) M86.9    Neutropenia ([facility]) D70.9    Hypernatremia E87.0    Candidemia ([facility]) B37.7    Candiduria B37.49    Pressure ulcer of ischium, left, stage IV

## 2024-04-24 NOTE — CARE COORDINATION
4/24/24, 3:23 PM EDT    DISCHARGE ON GOING EVALUATION    Noé CLAROS [facility] day: 4  Location: 4A-11/011-A Reason for admit: Septicemia (HCC) [A41.9]  YADIRA (acute kidney injury) (HCC) [N17.9]  Colostomy complication (HCC) [K94.00]  Sepsis (HCC) [A41.9]     Procedures:   4/21 OR:   1. Abdominal exploration.     2. Lysis of adhesions.  3. Opening of previous surgical wound.  4. Irrigation of subcutaneous tissue and fascia.  5. Packing of wound with Dakin's.  4/23 OR:  Exploratory Washout of Colostomy Wound w Dr Morales d/t septicemia.     Imaging since last note: n/a    Barriers to Discharge: Hospitalist, ID and general surgery following. IV Merrem Q8 with plans to transition to PO in 2-3 days per Dr Boyd.     PCP: Grisel Martinez APRN - CNP  Readmission Risk Score: 12.2    Patient Goals/Plan/Treatment Preferences: Noé is a paraplegic from home alone. He is independent with ADL's and colostomy care. He has DME, drives and works. Current with [facility] for nursing. Plans to return. SW on. Abx to transition to PO before dc. Estimated dc 4/26-4/27.

## 2024-04-24 NOTE — PLAN OF CARE
Problem: Discharge Planning  Goal: Discharge to home or other facility with appropriate resources  4/24/2024 1407 by Viviane Marina, RN  Outcome: Progressing  Flowsheets (Taken 4/24/2024 1407)  Discharge to home or other facility with appropriate resources: Identify barriers to discharge with patient and caregiver     Problem: Neurosensory - Adult  Goal: Achieves stable or improved neurological status  4/24/2024 1407 by Viviane Marina RN  Outcome: Progressing  Flowsheets (Taken 4/24/2024 1407)  Achieves stable or improved neurological status: Assess for and report changes in neurological status     Problem: Neurosensory - Adult  Goal: Remains free of injury related to seizures activity  4/24/2024 1407 by Viviane Marina RN  Outcome: Progressing  Flowsheets (Taken 4/24/2024 1407)  Remains free of injury related to seizure activity: Maintain airway, patient safety  and administer oxygen as ordered     Problem: Neurosensory - Adult  Goal: Achieves maximal functionality and self care  4/24/2024 1407 by Viviane Marina RN  Outcome: Progressing  Flowsheets (Taken 4/24/2024 1407)  Achieves maximal functionality and self care: Monitor swallowing and airway patency with patient fatigue and changes in neurological status     Problem: Respiratory - Adult  Goal: Achieves optimal ventilation and oxygenation  4/24/2024 1407 by Viviane Marina RN  Outcome: Progressing  Flowsheets (Taken 4/24/2024 1407)  Achieves optimal ventilation and oxygenation: Assess for changes in respiratory status     Problem: Cardiovascular - Adult  Goal: Maintains optimal cardiac output and hemodynamic stability  4/24/2024 1407 by Viviane Marina, RN  Outcome: Progressing  Flowsheets (Taken 4/24/2024 1407)  Maintains optimal cardiac output and hemodynamic stability:   Monitor blood pressure and heart rate   Assess for signs of decreased cardiac output  Note:   Vitals:    04/23/24 2319 04/24/24 0336 04/24/24 0930 04/24/24 1109   BP: 121/68 117/73 (!) 150/97
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
## 2024-04-24 NOTE — PLAN OF CARE
Problem: Discharge Planning  Goal: Discharge to home or other facility with appropriate resources  Outcome: Progressing  Flowsheets (Taken 4/23/2024 2107)  Discharge to home or other facility with appropriate resources:   Identify barriers to discharge with patient and caregiver   Arrange for needed discharge resources and transportation as appropriate   Identify discharge learning needs (meds, wound care, etc)     Problem: Neurosensory - Adult  Goal: Achieves stable or improved neurological status  Outcome: Progressing  Flowsheets (Taken 4/23/2024 2107)  Achieves stable or improved neurological status: Assess for and report changes in neurological status     Problem: Neurosensory - Adult  Goal: Remains free of injury related to seizures activity  Outcome: Progressing  Flowsheets (Taken 4/23/2024 2107)  Remains free of injury related to seizure activity:   Maintain airway, patient safety  and administer oxygen as ordered   Monitor patient for seizure activity, document and report duration and description of seizure to Licensed Independent Practitioner   If seizure occurs, turn patient to side and suction secretions as needed   Reorient patient post seizure   Seizure pads on all 4 side rails   Instruct patient/family to notify RN of any seizure activity   Instruct patient/family to call for assistance with activity based on assessment     Problem: Neurosensory - Adult  Goal: Achieves maximal functionality and self care  Outcome: Progressing  Flowsheets (Taken 4/23/2024 2107)  Achieves maximal functionality and self care:   Monitor swallowing and airway patency with patient fatigue and changes in neurological status   Encourage and assist patient to increase activity and self care with guidance from physical therapy/occupational therapy   Encourage visually impaired, hearing impaired and aphasic patients to use assistive/communication devices     Problem: Respiratory - Adult  Goal: Achieves optimal ventilation and  of urinary retention:   Assess patient’s ability to void and empty bladder   Monitor intake/output and perform bladder scan as needed     Problem: Infection - Adult  Goal: Absence of infection at discharge  Outcome: Progressing  Flowsheets (Taken 4/23/2024 2107)  Absence of infection at discharge:   Assess and monitor for signs and symptoms of infection   Monitor lab/diagnostic results   Monitor all insertion sites i.e., indwelling lines, tubes and drains   Administer medications as ordered   Instruct and encourage patient and family to use good hand hygiene technique   Identify and instruct in appropriate isolation precautions for identified infection/condition     Problem: Infection - Adult  Goal: Absence of infection during hospitalization  Outcome: Progressing  Flowsheets (Taken 4/23/2024 2107)  Absence of infection during hospitalization:   Assess and monitor for signs and symptoms of infection   Monitor lab/diagnostic results   Monitor all insertion sites i.e., indwelling lines, tubes and drains   Administer medications as ordered   Instruct and encourage patient and family to use good hand hygiene technique   Identify and instruct in appropriate isolation precautions for identified infection/condition     Problem: Infection - Adult  Goal: Absence of fever/infection during anticipated neutropenic period  Outcome: Progressing  Flowsheets (Taken 4/23/2024 2107)  Absence of fever/infection during anticipated neutropenic period: Monitor white blood cell count     Problem: Metabolic/Fluid and Electrolytes - Adult  Goal: Electrolytes maintained within normal limits  Outcome: Progressing  Flowsheets (Taken 4/23/2024 2107)  Electrolytes maintained within normal limits:   Monitor labs and assess patient for signs and symptoms of electrolyte imbalances   Administer electrolyte replacement as ordered   Monitor response to electrolyte replacements, including repeat lab results as appropriate     Problem: Metabolic/Fluid  and Electrolytes - Adult  Goal: Hemodynamic stability and optimal renal function maintained  Outcome: Progressing  Flowsheets (Taken 4/23/2024 2107)  Hemodynamic stability and optimal renal function maintained:   Monitor labs and assess for signs and symptoms of volume excess or deficit   Monitor intake, output and patient weight   Monitor urine specific gravity, serum osmolarity and serum sodium as indicated or ordered   Monitor response to interventions for patient's volume status, including labs, urine output, blood pressure (other measures as available)     Problem: Hematologic - Adult  Goal: Maintains hematologic stability  Outcome: Progressing  Flowsheets (Taken 4/23/2024 2107)  Maintains hematologic stability: Assess for signs and symptoms of bleeding or hemorrhage     Problem: Safety - Adult  Goal: Free from fall injury  Outcome: Progressing  Flowsheets (Taken 4/24/2024 0134)  Free From Fall Injury: Instruct family/caregiver on patient safety     Problem: ABCDS Injury Assessment  Goal: Absence of physical injury  Outcome: Progressing  Flowsheets (Taken 4/24/2024 0134)  Absence of Physical Injury: Implement safety measures based on patient assessment     Problem: Pain  Goal: Verbalizes/displays adequate comfort level or baseline comfort level  Outcome: Progressing  Flowsheets (Taken 4/23/2024 2107)  Verbalizes/displays adequate comfort level or baseline comfort level:   Encourage patient to monitor pain and request assistance   Assess pain using appropriate pain scale   Administer analgesics based on type and severity of pain and evaluate response   Implement non-pharmacological measures as appropriate and evaluate response     Problem: Skin/Tissue Integrity  Goal: Absence of new skin breakdown  Description: 1.  Monitor for areas of redness and/or skin breakdown  2.  Assess vascular access sites hourly  3.  Every 4-6 hours minimum:  Change oxygen saturation probe site  4.  Every 4-6 hours:  If on nasal

## 2024-04-25 LAB
ALBUMIN SERPL BCG-MCNC: 2.7 G/DL (ref 3.5–5.1)
ALP SERPL-CCNC: 55 U/L (ref 38–126)
ALT SERPL W/O P-5'-P-CCNC: 20 U/L (ref 11–66)
ANION GAP SERPL CALC-SCNC: 11 MEQ/L (ref 8–16)
AST SERPL-CCNC: 18 U/L (ref 5–40)
BACTERIA BLD AEROBE CULT: NORMAL
BILIRUB SERPL-MCNC: 0.2 MG/DL (ref 0.3–1.2)
BUN SERPL-MCNC: 21 MG/DL (ref 7–22)
CALCIUM SERPL-MCNC: 8.4 MG/DL (ref 8.5–10.5)
CHLORIDE SERPL-SCNC: 103 MEQ/L (ref 98–111)
CO2 SERPL-SCNC: 26 MEQ/L (ref 23–33)
CREAT SERPL-MCNC: 0.7 MG/DL (ref 0.4–1.2)
DEPRECATED RDW RBC AUTO: 44.6 FL (ref 35–45)
ERYTHROCYTE [DISTWIDTH] IN BLOOD BY AUTOMATED COUNT: 14.1 % (ref 11.5–14.5)
GFR SERPL CREATININE-BSD FRML MDRD: > 90 ML/MIN/1.73M2
GLUCOSE SERPL-MCNC: 117 MG/DL (ref 70–108)
HCT VFR BLD AUTO: 37.7 % (ref 42–52)
HGB BLD-MCNC: 12.4 GM/DL (ref 14–18)
MAGNESIUM SERPL-MCNC: 1.8 MG/DL (ref 1.6–2.4)
MCH RBC QN AUTO: 28.3 PG (ref 26–33)
MCHC RBC AUTO-ENTMCNC: 32.9 GM/DL (ref 32.2–35.5)
MCV RBC AUTO: 86.1 FL (ref 80–94)
PLATELET # BLD AUTO: 298 THOU/MM3 (ref 130–400)
PMV BLD AUTO: 10.7 FL (ref 9.4–12.4)
POTASSIUM SERPL-SCNC: 3.4 MEQ/L (ref 3.5–5.2)
PROT SERPL-MCNC: 6.4 G/DL (ref 6.1–8)
RBC # BLD AUTO: 4.38 MILL/MM3 (ref 4.7–6.1)
SODIUM SERPL-SCNC: 140 MEQ/L (ref 135–145)
WBC # BLD AUTO: 9.5 THOU/MM3 (ref 4.8–10.8)

## 2024-04-25 PROCEDURE — 6360000002 HC RX W HCPCS: Performed by: SURGERY

## 2024-04-25 PROCEDURE — 99024 POSTOP FOLLOW-UP VISIT: CPT | Performed by: SURGERY

## 2024-04-25 PROCEDURE — 97530 THERAPEUTIC ACTIVITIES: CPT

## 2024-04-25 PROCEDURE — 97162 PT EVAL MOD COMPLEX 30 MIN: CPT

## 2024-04-25 PROCEDURE — 80053 COMPREHEN METABOLIC PANEL: CPT

## 2024-04-25 PROCEDURE — 36415 COLL VENOUS BLD VENIPUNCTURE: CPT

## 2024-04-25 PROCEDURE — 97542 WHEELCHAIR MNGMENT TRAINING: CPT

## 2024-04-25 PROCEDURE — 6360000002 HC RX W HCPCS: Performed by: INTERNAL MEDICINE

## 2024-04-25 PROCEDURE — 6370000000 HC RX 637 (ALT 250 FOR IP): Performed by: SURGERY

## 2024-04-25 PROCEDURE — 6370000000 HC RX 637 (ALT 250 FOR IP)

## 2024-04-25 PROCEDURE — 99233 SBSQ HOSP IP/OBS HIGH 50: CPT | Performed by: STUDENT IN AN ORGANIZED HEALTH CARE EDUCATION/TRAINING PROGRAM

## 2024-04-25 PROCEDURE — 85027 COMPLETE CBC AUTOMATED: CPT

## 2024-04-25 PROCEDURE — 1200000000 HC SEMI PRIVATE

## 2024-04-25 PROCEDURE — 83735 ASSAY OF MAGNESIUM: CPT

## 2024-04-25 PROCEDURE — 97166 OT EVAL MOD COMPLEX 45 MIN: CPT

## 2024-04-25 PROCEDURE — 2580000003 HC RX 258: Performed by: SURGERY

## 2024-04-25 PROCEDURE — 6370000000 HC RX 637 (ALT 250 FOR IP): Performed by: INTERNAL MEDICINE

## 2024-04-25 PROCEDURE — 97535 SELF CARE MNGMENT TRAINING: CPT

## 2024-04-25 RX ORDER — POTASSIUM CHLORIDE 7.45 MG/ML
10 INJECTION INTRAVENOUS PRN
Status: DISCONTINUED | OUTPATIENT
Start: 2024-04-25 | End: 2024-04-26 | Stop reason: HOSPADM

## 2024-04-25 RX ORDER — MAGNESIUM SULFATE IN WATER 40 MG/ML
2000 INJECTION, SOLUTION INTRAVENOUS PRN
Status: DISCONTINUED | OUTPATIENT
Start: 2024-04-25 | End: 2024-04-26 | Stop reason: HOSPADM

## 2024-04-25 RX ORDER — POTASSIUM CHLORIDE 20 MEQ/1
40 TABLET, EXTENDED RELEASE ORAL PRN
Status: DISCONTINUED | OUTPATIENT
Start: 2024-04-25 | End: 2024-04-26 | Stop reason: HOSPADM

## 2024-04-25 RX ADMIN — GABAPENTIN 900 MG: 300 CAPSULE ORAL at 12:28

## 2024-04-25 RX ADMIN — HEPARIN SODIUM 5000 UNITS: 5000 INJECTION INTRAVENOUS; SUBCUTANEOUS at 12:28

## 2024-04-25 RX ADMIN — BACLOFEN 20 MG: 10 TABLET ORAL at 12:28

## 2024-04-25 RX ADMIN — GABAPENTIN 900 MG: 300 CAPSULE ORAL at 00:13

## 2024-04-25 RX ADMIN — METHOCARBAMOL 500 MG: 500 TABLET ORAL at 16:45

## 2024-04-25 RX ADMIN — BACITRACIN ZINC NEOMYCIN SULFATE POLYMYXIN B SULFATE: 400; 3.5; 5 OINTMENT TOPICAL at 09:25

## 2024-04-25 RX ADMIN — HEPARIN SODIUM 5000 UNITS: 5000 INJECTION INTRAVENOUS; SUBCUTANEOUS at 06:29

## 2024-04-25 RX ADMIN — GABAPENTIN 900 MG: 300 CAPSULE ORAL at 16:45

## 2024-04-25 RX ADMIN — POTASSIUM CHLORIDE 40 MEQ: 1500 TABLET, EXTENDED RELEASE ORAL at 16:45

## 2024-04-25 RX ADMIN — BACLOFEN 20 MG: 10 TABLET ORAL at 06:29

## 2024-04-25 RX ADMIN — Medication 1000 UNITS: at 09:25

## 2024-04-25 RX ADMIN — MEROPENEM 1000 MG: 1 INJECTION, POWDER, FOR SOLUTION INTRAVENOUS at 09:23

## 2024-04-25 RX ADMIN — MEROPENEM 1000 MG: 1 INJECTION, POWDER, FOR SOLUTION INTRAVENOUS at 16:48

## 2024-04-25 RX ADMIN — LAMOTRIGINE 50 MG: 25 TABLET ORAL at 00:13

## 2024-04-25 RX ADMIN — BACLOFEN 20 MG: 10 TABLET ORAL at 00:13

## 2024-04-25 RX ADMIN — METHOCARBAMOL 500 MG: 500 TABLET ORAL at 12:28

## 2024-04-25 RX ADMIN — METHOCARBAMOL 500 MG: 500 TABLET ORAL at 06:30

## 2024-04-25 RX ADMIN — HEPARIN SODIUM 5000 UNITS: 5000 INJECTION INTRAVENOUS; SUBCUTANEOUS at 21:44

## 2024-04-25 RX ADMIN — LAMOTRIGINE 50 MG: 25 TABLET ORAL at 09:25

## 2024-04-25 RX ADMIN — GABAPENTIN 900 MG: 300 CAPSULE ORAL at 06:29

## 2024-04-25 RX ADMIN — MEROPENEM 1000 MG: 1 INJECTION, POWDER, FOR SOLUTION INTRAVENOUS at 00:18

## 2024-04-25 RX ADMIN — METHOCARBAMOL 500 MG: 500 TABLET ORAL at 00:13

## 2024-04-25 RX ADMIN — BACLOFEN 20 MG: 10 TABLET ORAL at 16:45

## 2024-04-25 ASSESSMENT — PAIN SCALES - GENERAL: PAINLEVEL_OUTOF10: 0
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
## 2024-04-25 NOTE — PLAN OF CARE
Problem: Discharge Planning  Goal: Discharge to home or other facility with appropriate resources  4/24/2024 2229 by Alfonzo Yeung, RN  Outcome: Progressing  Flowsheets (Taken 4/24/2024 2021)  Discharge to home or other facility with appropriate resources:   Identify barriers to discharge with patient and caregiver   Arrange for needed discharge resources and transportation as appropriate   Identify discharge learning needs (meds, wound care, etc)     Problem: Neurosensory - Adult  Goal: Achieves stable or improved neurological status  4/24/2024 2229 by Alfonzo Yeung, RN  Outcome: Progressing  Flowsheets (Taken 4/24/2024 2021)  Achieves stable or improved neurological status: Assess for and report changes in neurological status     Problem: Neurosensory - Adult  Goal: Remains free of injury related to seizures activity  4/24/2024 2229 by Alfonzo Yeung, RN  Outcome: Progressing  Flowsheets (Taken 4/24/2024 2021)  Remains free of injury related to seizure activity:   Maintain airway, patient safety  and administer oxygen as ordered   Monitor patient for seizure activity, document and report duration and description of seizure to Licensed Independent Practitioner   If seizure occurs, turn patient to side and suction secretions as needed   Reorient patient post seizure   Seizure pads on all 4 side rails   Instruct patient/family to notify RN of any seizure activity   Instruct patient/family to call for assistance with activity based on assessment     Problem: Neurosensory - Adult  Goal: Achieves maximal functionality and self care  4/24/2024 2229 by Alfonzo Yeung, RN  Outcome: Progressing  Flowsheets (Taken 4/24/2024 2021)  Achieves maximal functionality and self care:   Monitor swallowing and airway patency with patient fatigue and changes in neurological status   Encourage and assist patient to increase activity and self care with guidance from physical therapy/occupational therapy   Encourage visually

## 2024-04-25 NOTE — OP NOTE
[address] [address]                            OPERATIVE REPORT      PATIENT NAME: KIRA AQUINO                 : 1990  MED REC NO: [id number]                       ROOM: [facility]  ACCOUNT NO: [id number]                       ADMIT DATE: 2024  PROVIDER: Andrade Morales DO      DATE OF PROCEDURE:  2024    SURGEON:  Andrade Morales DO    PREOPERATIVE DIAGNOSES:  Septicemia.    POSTOPERATIVE DIAGNOSIS:  Colotomy.    PROCEDURE PERFORMED:  Exploratory/exploration of pericolostomy tissue repair of colostomy, washout and closure over drain.    ANESTHESIA:  General with local.    ESTIMATED BLOOD LOSS:  10 mL.    SPECIMENS:  None.    COMPLICATIONS:  None immediately appreciated.    DISCUSSION:  The patient is a 33-year-old male with a permanent colostomy secondary to paraplegia, who had undergone a peristomal hernia repair and presented with stool spillage from the incision.  Previous washout performed by another surgeon has not revealed the source of the infection.  As a result, potential diagnostic and therapeutic modalities were discussed with the patient; operative and nonoperative management discussed; risks, complications, and benefits reviewed.  He was given the opportunity to ask questions and once they were answered, informed consent was obtained.  The patient was brought to the operating room on 2024 for the procedure.    DESCRIPTION OF PROCEDURE:  The patient was brought to the operating room, placed in supine position, placed under continuous cardiac telemetry, blood pressure, and pulse oximeter monitoring; placed under general anesthesia by the Anesthesia Department.  The anterior abdominal wall was prepped and draped in the sterile fashion.  The packing was removed and had some bilious/stool type staining present on the dressing itself, but due to the amount of saturation, this was not helpful in identifying  what the source was.  After his abdominal wall had been prepped and draped in sterile fashion, exploration of the wound was undertaken, identified a 7 mm colotomy present at the 7 o'clock position, oriented from vertical to inferior of his colostomy site between the fascia and the skin.  Attempts to deliver more colon through this for recreation of the ostomy were unsuccessful due to dense adhesions and then in this particular case, we chose to do a primary repair.  The primary repair was performed using a 3-0 Vicryl suture in single interrupted fashion and then oversewing using 2-0 silk suture in continuous fashion.  I did free up a portion of the previous hernia sac and used it as a patch over the top of this, which was also secured in place using 2-0 silk suture.  The wound was then copiously irrigated.  Excess irrigation fluid removed via suction.  A Penrose drain was delivered around the ostomy inferiorly to provide adequate drainage, and the skin was closed loosely using 3-0 nylon suture.  The wound was then cleansed.  Sterile dressings were applied.  The patient was brought out of anesthesia, transferred to the PACU in stable and satisfactory condition.  No immediate complication evident.  All sponge, instrument, and needle counts were correct at the completion of procedure.    Postoperative findings were unable to be discussed with the patient's family as none were present.  He will be admitted to the hospital for postoperative management.  Discharge pending his progression.          AYSHA ARANA DO      D:  04/24/2024 13:13:53     T:  04/24/2024 20:07:54     JONATHAN/CHARMAINE  Job #:  [id number]     Doc#:  [id number]

## 2024-04-25 NOTE — PROGRESS NOTES
[facility]   PROGRESS NOTE      Patient: Noé Licea  Room #: 4A-11/011-A            YOB: 1990  Age: 33 y.o.  Gender: male            Admit Date & Time: 4/20/2024  4:44 PM    Assessment:    The patient declined a visit today. The patient shared he was keeping in contact with his friends via texting.     Interventions:  The patient was provided information about Spiritual Care being available.     Outcomes:  The  wished the patient a positive day.     Plan:  1.Spiritual care will continue to follow the patient according to [facility] spiritual care SOP.       Electronically signed by Chaplain Aurea, on 4/25/2024 at 12:20 PM.  Spiritual Care Department  [facility]  [phone]     04/25/24 1219   Encounter Summary   Encounter Overview/Reason Initial Encounter   Service Provided For Patient   [facility]   Support System Friends/neighbors   Last Encounter  04/25/24   Complexity of Encounter Low   Begin Time 0955   End Time  1000   Total Time Calculated 5 min   Spiritual/Emotional needs   Type Spiritual Support   Assessment/Intervention/Outcome   Assessment Calm;Coping   Intervention Active listening;Empowerment   Outcome Coping;Refused/Declined
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
## 2024-04-25 NOTE — CARE COORDINATION
4/25/24, 1:25 PM EDT    DISCHARGE ON GOING EVALUATION    Noé CLAROS [facility] day: 5  Location: -11/011-A Reason for admit: Septicemia (HCC) [A41.9]  YADIRA (acute kidney injury) (HCC) [N17.9]  Colostomy complication (HCC) [K94.00]  Sepsis (HCC) [A41.9]     Procedures:   4/21 1. Abdominal exploration.     2. Lysis of adhesions.  3. Opening of previous surgical wound.  4. Irrigation of subcutaneous tissue and fascia.  5. Packing of wound with Dakin's.    4/24 Exploratory/exploration of pericolostomy tissue repair of colostomy, washout and closure over drain.     Imaging since last note: none    Barriers to Discharge: Pain and nausea control, PT/OT, General Surgery following. SQ Heparin, IV Merrem, electrolyte replacement protocols.     PCP: Grisel Martinez APRN - CNP  Readmission Risk Score: 12.8    Patient Goals/Plan/Treatment Preferences:  Noé is a paraplegic from home alone. He is independent with ADL's and colostomy care. He has DME, drives and works. Current with [facility] for nursing. Plans to return. SW on. Abx to transition to PO before dc. Estimated dc 4/26-4/27.

## 2024-04-25 NOTE — PROGRESS NOTES
Progress note: Infectious diseases    Patient - Noé Licea,  Age - 33 y.o.    - 1990      Room Number - 4A-11/011-A   MRN -  [id number]   [facility] # - [id number]  Date of Admission -  2024  4:44 PM    SUBJECTIVE:   He has no new complaints. He wants to sit on his wheelchair  OBJECTIVE   VITALS    height is 1.803 m (5' 10.98\") and weight is 85 kg (187 lb 6.3 oz). His oral temperature is 98.4 °F (36.9 °C). His blood pressure is 120/47 (abnormal) and his pulse is 75. His respiration is 16 and oxygen saturation is 96%.       Wt Readings from Last 3 Encounters:   24 85 kg (187 lb 6.3 oz)   24 87.5 kg (193 lb)   24 81.6 kg (180 lb)       I/O (24 Hours)    Intake/Output Summary (Last 24 hours) at 2024 0950  Last data filed at 2024 0609  Gross per 24 hour   Intake 940.02 ml   Output 1200 ml   Net -259.98 ml         General Appearance  Awake, alert, oriented,  not  In acute distress  HEENT - normocephalic, atraumatic, pink conjunctiva,  anicteric sclera  Neck - Supple, no mass  Lungs -  Bilateral   air entry, no rhonchi, no wheeze  Cardiovascular - Heart sounds are normal.  Regular rate and rhythm without murmur, gallop or rub.  Abdomen - soft, not distended, nontender, ostomy present, no obvious leak seen.   Abdominal dressing present.  Dressing present on coccyx.  Neurologic -paraplegic  Skin - No bruising or bleeding  Extremities - No edema    MEDICATIONS:      heparin (porcine)  5,000 Units SubCUTAneous 3 times per day    Vitamin D  1,000 Units Oral Daily    neomycin-bacitracin-polymyxin   Topical BID    methocarbamol  500 mg Oral 4x Daily    meropenem  1,000 mg IntraVENous Q8H    sodium chloride flush  5-40 mL IntraVENous 2 times per day    gabapentin  900 mg Oral 4x daily    baclofen  20 mg Oral 4x Daily    lamoTRIgine  50 mg Oral BID      sodium chloride       melatonin, diphenhydrAMINE,  ondansetron, calcium gluconate, HYDROmorphone, sodium chloride flush, sodium chloride, acetaminophen **OR** acetaminophen, HYDROcodone-acetaminophen      LABS:     CBC:   Recent Labs     04/24/24  0805 04/25/24  0400   WBC 8.1 9.5   HGB 12.5* 12.4*    298       BMP:    Recent Labs     04/24/24  0805 04/25/24  0400    140   K 3.8 3.4*    103   CO2 25 26   BUN 15 21   CREATININE 0.6 0.7   GLUCOSE 105 117*       Calcium:  Recent Labs     04/25/24  0400   CALCIUM 8.4*        Hepatic:   Recent Labs     04/24/24  0805 04/25/24  0400   ALKPHOS 60 55   ALT 13 20   AST 10 18   PROT 7.1 6.4   BILITOT 0.3 0.2*          CULTURES:   UA: No results for input(s): \"SPECGRAV\", \"PHUR\", \"COLORU\", \"CLARITYU\", \"MUCUS\", \"PROTEINU\", \"BLOODU\", \"RBCUA\", \"WBCUA\", \"BACTERIA\", \"NITRU\", \"GLUCOSEU\", \"BILIRUBINUR\", \"UROBILINOGEN\", \"KETUA\", \"LABCAST\", \"LABCASTTY\", \"AMORPHOS\" in the last 72 hours.    Invalid input(s): \"CRYSTALS\"  Micro:   Lab Results   Component Value Date/Time    BC No growth 24 hours. No growth 48 hours. 04/20/2024 05:22 PM       Problem list of patient:     Patient Active Problem List   Diagnosis Code    Urinary tract infection associated with catheterization of urinary tract ([facility]) T83.511A, N39.0    Moderate single current episode of major depressive disorder ([facility]) F32.1    Severe malnutrition ([facility]) E43    Sepsis with metabolic encephalopathy ([facility]) A41.9, R65.20, G93.41    Decubitus ulcer of coccygeal region, stage 3 ([facility]) L89.153    Adjustment Disorder with Mixed Anxiety and Depressed Mood     Colostomy status ([facility]) Z93.3    Septicemia ([facility]) A41.9    Neurogenic bladder N31.9    Colostomy care ([facility]) Z43.3    Peristomal skin complication [id number]    Overactive bladder N32.81    Osteomyelitis ([facility]) M86.9    Neutropenia ([facility]) D70.9    Hypernatremia E87.0    Candidemia ([facility]) B37.7    Candiduria B37.49    Pressure ulcer of ischium, left, stage IV ([facility]) L89.324    Chronic osteomyelitis of coccyx ([facility]) M46.28
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
## 2024-04-26 VITALS
BODY MASS INDEX: 26.23 KG/M2 | TEMPERATURE: 97.9 F | OXYGEN SATURATION: 98 % | DIASTOLIC BLOOD PRESSURE: 91 MMHG | HEART RATE: 78 BPM | SYSTOLIC BLOOD PRESSURE: 135 MMHG | WEIGHT: 187.39 LBS | HEIGHT: 71 IN | RESPIRATION RATE: 16 BRPM

## 2024-04-26 LAB
ALBUMIN SERPL BCG-MCNC: 2.4 G/DL (ref 3.5–5.1)
ALP SERPL-CCNC: 63 U/L (ref 38–126)
ALT SERPL W/O P-5'-P-CCNC: 24 U/L (ref 11–66)
ANION GAP SERPL CALC-SCNC: 11 MEQ/L (ref 8–16)
AST SERPL-CCNC: 31 U/L (ref 5–40)
BILIRUB SERPL-MCNC: 0.4 MG/DL (ref 0.3–1.2)
BUN SERPL-MCNC: 13 MG/DL (ref 7–22)
CALCIUM SERPL-MCNC: 8.7 MG/DL (ref 8.5–10.5)
CHLORIDE SERPL-SCNC: 103 MEQ/L (ref 98–111)
CO2 SERPL-SCNC: 21 MEQ/L (ref 23–33)
CREAT SERPL-MCNC: 0.6 MG/DL (ref 0.4–1.2)
DEPRECATED RDW RBC AUTO: 47.9 FL (ref 35–45)
ERYTHROCYTE [DISTWIDTH] IN BLOOD BY AUTOMATED COUNT: 14.4 % (ref 11.5–14.5)
GFR SERPL CREATININE-BSD FRML MDRD: > 90 ML/MIN/1.73M2
GLUCOSE SERPL-MCNC: 83 MG/DL (ref 70–108)
HCT VFR BLD AUTO: 43.3 % (ref 42–52)
HGB BLD-MCNC: 13 GM/DL (ref 14–18)
MCH RBC QN AUTO: 27.6 PG (ref 26–33)
MCHC RBC AUTO-ENTMCNC: 30 GM/DL (ref 32.2–35.5)
MCV RBC AUTO: 91.9 FL (ref 80–94)
PLATELET # BLD AUTO: 240 THOU/MM3 (ref 130–400)
PMV BLD AUTO: 11.7 FL (ref 9.4–12.4)
POTASSIUM SERPL-SCNC: 4.3 MEQ/L (ref 3.5–5.2)
PROT SERPL-MCNC: 6.9 G/DL (ref 6.1–8)
RBC # BLD AUTO: 4.71 MILL/MM3 (ref 4.7–6.1)
SODIUM SERPL-SCNC: 135 MEQ/L (ref 135–145)
WBC # BLD AUTO: 7.9 THOU/MM3 (ref 4.8–10.8)

## 2024-04-26 PROCEDURE — 6370000000 HC RX 637 (ALT 250 FOR IP)

## 2024-04-26 PROCEDURE — 6360000002 HC RX W HCPCS: Performed by: SURGERY

## 2024-04-26 PROCEDURE — 36415 COLL VENOUS BLD VENIPUNCTURE: CPT

## 2024-04-26 PROCEDURE — 97535 SELF CARE MNGMENT TRAINING: CPT

## 2024-04-26 PROCEDURE — 6370000000 HC RX 637 (ALT 250 FOR IP): Performed by: SURGERY

## 2024-04-26 PROCEDURE — 6360000002 HC RX W HCPCS: Performed by: INTERNAL MEDICINE

## 2024-04-26 PROCEDURE — 99239 HOSP IP/OBS DSCHRG MGMT >30: CPT | Performed by: STUDENT IN AN ORGANIZED HEALTH CARE EDUCATION/TRAINING PROGRAM

## 2024-04-26 PROCEDURE — 6370000000 HC RX 637 (ALT 250 FOR IP): Performed by: INTERNAL MEDICINE

## 2024-04-26 PROCEDURE — 2580000003 HC RX 258: Performed by: SURGERY

## 2024-04-26 PROCEDURE — 97110 THERAPEUTIC EXERCISES: CPT

## 2024-04-26 PROCEDURE — 80053 COMPREHEN METABOLIC PANEL: CPT

## 2024-04-26 PROCEDURE — 85027 COMPLETE CBC AUTOMATED: CPT

## 2024-04-26 RX ORDER — AMOXICILLIN AND CLAVULANATE POTASSIUM 875; 125 MG/1; MG/1
1 TABLET, FILM COATED ORAL EVERY 12 HOURS SCHEDULED
Status: DISCONTINUED | OUTPATIENT
Start: 2024-04-26 | End: 2024-04-26 | Stop reason: HOSPADM

## 2024-04-26 RX ORDER — AMOXICILLIN AND CLAVULANATE POTASSIUM 875; 125 MG/1; MG/1
1 TABLET, FILM COATED ORAL EVERY 12 HOURS SCHEDULED
Qty: 13 TABLET | Refills: 0 | Status: SHIPPED | OUTPATIENT
Start: 2024-04-26 | End: 2024-05-03

## 2024-04-26 RX ADMIN — METHOCARBAMOL 500 MG: 500 TABLET ORAL at 05:50

## 2024-04-26 RX ADMIN — HYDROCODONE BITARTRATE AND ACETAMINOPHEN 1 TABLET: 5; 325 TABLET ORAL at 10:36

## 2024-04-26 RX ADMIN — Medication 1000 UNITS: at 10:36

## 2024-04-26 RX ADMIN — GABAPENTIN 900 MG: 300 CAPSULE ORAL at 00:03

## 2024-04-26 RX ADMIN — MEROPENEM 1000 MG: 1 INJECTION, POWDER, FOR SOLUTION INTRAVENOUS at 10:26

## 2024-04-26 RX ADMIN — GABAPENTIN 900 MG: 300 CAPSULE ORAL at 12:09

## 2024-04-26 RX ADMIN — METHOCARBAMOL 500 MG: 500 TABLET ORAL at 00:03

## 2024-04-26 RX ADMIN — BACLOFEN 20 MG: 10 TABLET ORAL at 12:09

## 2024-04-26 RX ADMIN — BACLOFEN 20 MG: 10 TABLET ORAL at 00:03

## 2024-04-26 RX ADMIN — GABAPENTIN 900 MG: 300 CAPSULE ORAL at 05:50

## 2024-04-26 RX ADMIN — AMOXICILLIN AND CLAVULANATE POTASSIUM 1 TABLET: 875; 125 TABLET, FILM COATED ORAL at 12:09

## 2024-04-26 RX ADMIN — MEROPENEM 1000 MG: 1 INJECTION, POWDER, FOR SOLUTION INTRAVENOUS at 00:02

## 2024-04-26 RX ADMIN — METHOCARBAMOL 500 MG: 500 TABLET ORAL at 12:09

## 2024-04-26 RX ADMIN — BACLOFEN 20 MG: 10 TABLET ORAL at 05:50

## 2024-04-26 RX ADMIN — LAMOTRIGINE 50 MG: 25 TABLET ORAL at 00:03

## 2024-04-26 RX ADMIN — LAMOTRIGINE 50 MG: 25 TABLET ORAL at 10:36

## 2024-04-26 RX ADMIN — HEPARIN SODIUM 5000 UNITS: 5000 INJECTION INTRAVENOUS; SUBCUTANEOUS at 05:50

## 2024-04-26 ASSESSMENT — PAIN DESCRIPTION - ORIENTATION: ORIENTATION: LOWER

## 2024-04-26 ASSESSMENT — PAIN DESCRIPTION - LOCATION: LOCATION: ABDOMEN

## 2024-04-26 ASSESSMENT — PAIN SCALES - GENERAL: PAINLEVEL_OUTOF10: 6

## 2024-04-26 ASSESSMENT — PAIN DESCRIPTION - DESCRIPTORS: DESCRIPTORS: ACHING;DISCOMFORT

## 2024-04-26 ASSESSMENT — PAIN DESCRIPTION - PAIN TYPE: TYPE: ACUTE PAIN

## 2024-04-26 NOTE — DISCHARGE SUMMARY
Hospital Medicine Discharge Summary      Patient Identification:   Noé Licea   : 1990  MRN: [id number]   Account: [id number]      Patient's PCP: Grisel Martinez APRN - CNP    Admit Date: 2024     Discharge Date:   24    Admitting Physician: No admitting provider for patient encounter.     Discharge Physician: Chava Mercedes MD     Hospital Course:   Noé Licea is a 33 y.o. male admitted to [facility] on 2024 with a PMH of paraplegia and recent open recurrent parastomal hernia repair on 24 w/ Dr. Andarde Morales DO, General Surgery. Presented 2024 w/ septic shock and abdominal pain. CTAP demonstrated a loculated pocket of air and fluid along the medial aspect of the ostomy and another area of soft tissue fullness along the lateral aspect of the ostomy concerning for colotomy. 24, pt had an exploratory laparotomy w/ Dr. Dutch Morrison MD. General Surgery complicated by inability to find exact site of likely colotomy side wall leak. 24 s/p exploratory washout of colostomy and repair of colotomy by Dr. Andrade Morales DO, General Surgery. 24 - pt feeling much better after most recent procedure.     The patient was stable for discharge - all consultants were contacted and in agreement with plan for discharge.  Appropriate follow up appointment was arranged prior to discharge.     Please see below or view chart for more details from hospital course.     Discharge Diagnoses:    #Sepsis (PoA) 2/2  Intra-abdominal Colotomy / Abdominal Wall Cellulitis  with shock: Resolving  Source: Abdominal Wall Cellulitis. Initial diagnosis and treatment plan made by prior provider. 24 s/p Colotomy Repair w/ Drain Placement.    Plan:   -Antimicrobials; adjust as appropriate    +PO Augmentin (24-24)    +IV Meropenem (24-now) - plan to switch to Augmentin on discharge    +IV Vancomycin (24-24)  -BCx x2 obtained      Consults:     PHARMACY TO DOSE VANCOMYCIN  IP CONSULT TO PHARMACY  IP CONSULT TO GENERAL SURGERY  IP CONSULT TO PHARMACY  IP CONSULT TO SOCIAL WORK    Disposition:    [x] Home        [] TCU       [] Rehab       [] Psych       [] SNF       [] Long Term Care Facility       [] Other-    Condition at Discharge: Stable    Code Status:  Full Code     Patient Instructions:    Discharge lab work: CBC + BMP in 1 week; f/u w/ PCP + Specialists  Activity: activity as tolerated  Diet: ADULT DIET; Regular      Follow-up visits:   No follow-up provider specified.       Discharge Medications:        Medication List        START taking these medications      amoxicillin-clavulanate 875-125 MG per tablet  Commonly known as: AUGMENTIN  Take 1 tablet by mouth every 12 hours for 13 doses            CHANGE how you take these medications      lamoTRIgine 100 MG tablet  Commonly known as: LAMICTAL  take 1 tablet by mouth twice a day  What changed: how much to take            CONTINUE taking these medications      baclofen 20 MG tablet  Commonly known as: LIORESAL     gabapentin 300 MG capsule  Commonly known as: NEURONTIN     methocarbamol 500 MG tablet  Commonly known as: ROBAXIN     omeprazole 40 MG delayed release capsule  Commonly known as: PRILOSEC  take 1 capsule by mouth EVERY MORNING BEFORE BREAKFAST     ondansetron 4 MG disintegrating tablet  Commonly known as: Zofran ODT  Take 1 tablet by mouth every 8 hours as needed for Nausea     oxyBUTYnin 5 MG extended release tablet  Commonly known as: DITROPAN-XL  take 1 tablet by mouth once daily     sildenafil 50 MG tablet  Commonly known as: VIAGRA  take 1 tablet by mouth once daily if needed for ERECTILE DYSFUNCTION            STOP taking these medications      HYDROcodone-acetaminophen 5-325 MG per tablet  Commonly known as: Norco               Where to Get Your Medications        These medications were sent to [facility] - [place], [place] - [address]
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
## 2024-04-26 NOTE — PLAN OF CARE
Problem: Discharge Planning  Goal: Discharge to home or other facility with appropriate resources  Outcome: Progressing  Flowsheets (Taken 4/26/2024 0027)  Discharge to home or other facility with appropriate resources:   Identify barriers to discharge with patient and caregiver   Identify discharge learning needs (meds, wound care, etc)   Refer to discharge planning if patient needs post-hospital services based on physician order or complex needs related to functional status, cognitive ability or social support system   Arrange for needed discharge resources and transportation as appropriate     Problem: Skin/Tissue Integrity - Adult  Goal: Skin integrity remains intact  Outcome: Progressing  Flowsheets (Taken 4/26/2024 0027)  Skin Integrity Remains Intact:   Monitor for areas of redness and/or skin breakdown   Assess vascular access sites hourly  Goal: Incisions, wounds, or drain sites healing without S/S of infection  Outcome: Progressing  Flowsheets (Taken 4/26/2024 0027)  Incisions, Wounds, or Drain Sites Healing Without Sign and Symptoms of Infection:   ADMISSION and DAILY: Assess and document risk factors for pressure ulcer development   TWICE DAILY: Assess and document skin integrity   TWICE DAILY: Assess and document dressing/incision, wound bed, drain sites and surrounding tissue   Implement wound care per orders   Initiate pressure ulcer prevention bundle as indicated   Initiate isolation precautions as appropriate  Goal: Oral mucous membranes remain intact  Outcome: Progressing  Flowsheets (Taken 4/26/2024 0027)  Oral Mucous Membranes Remain Intact: Assess oral mucosa and hygiene practices     Problem: Musculoskeletal - Adult  Goal: Maintain proper alignment of affected body part  Outcome: Progressing  Flowsheets (Taken 4/26/2024 0027)  Maintain proper alignment of affected body part:   Support and protect limb and body alignment per provider's orders   Instruct and reinforce with patient and family  use of appropriate assistive device and precautions (e.g. spinal or hip dislocation precautions)  Goal: Return ADL status to a safe level of function  Outcome: Progressing  Flowsheets (Taken 4/26/2024 0027)  Return ADL Status to a Safe Level of Function:   Administer medication as ordered   Obtain physical therapy/occupational therapy consults as needed   Assess activities of daily living deficits and provide assistive devices as needed   Assist and instruct patient to increase activity and self care as tolerated     Problem: Gastrointestinal - Adult  Goal: Maintains or returns to baseline bowel function  Outcome: Progressing  Flowsheets (Taken 4/26/2024 0027)  Maintains or returns to baseline bowel function:   Assess bowel function   Encourage oral fluids to ensure adequate hydration   Administer ordered medications as needed  Goal: Maintains adequate nutritional intake  Outcome: Progressing  Flowsheets (Taken 4/26/2024 0027)  Maintains adequate nutritional intake:   Monitor percentage of each meal consumed   Monitor intake and output, weight and lab values   Identify factors contributing to decreased intake, treat as appropriate  Goal: Establish and maintain optimal ostomy function  Outcome: Progressing  Flowsheets (Taken 4/26/2024 0027)  Establish and maintain optimal ostomy function: Monitor output from ostomies     Problem: Genitourinary - Adult  Goal: Absence of urinary retention  Outcome: Progressing  Flowsheets (Taken 4/26/2024 0027)  Absence of urinary retention:   Assess patient’s ability to void and empty bladder   Monitor intake/output and perform bladder scan as needed     Problem: Infection - Adult  Goal: Absence of infection at discharge  Outcome: Progressing  Flowsheets (Taken 4/26/2024 0027)  Absence of infection at discharge:   Assess and monitor for signs and symptoms of infection   Monitor lab/diagnostic results   Monitor all insertion sites i.e., indwelling lines, tubes and drains   Monitor  appliance change or resting period.  Outcome: Progressing   Care plan reviewed with patient.  Patient verbalized understanding of the plan of care and contribute to goal setting.

## 2024-04-26 NOTE — CARE COORDINATION
4/26/24, 7:24 AM EDT    DISCHARGE BARRIERS        Patient transferred to -10. Report given to unit SW, Bren, regarding discharge plan for this patient.

## 2024-04-26 NOTE — CARE COORDINATION
4/26/24, 2:32 PM EDT    DISCHARGE PLANNING EVALUATION    Spoke with [facility] health, , they would like notified at discharge.

## 2024-04-26 NOTE — PLAN OF CARE
Problem: Discharge Planning  Goal: Discharge to home or other facility with appropriate resources  Outcome: Adequate for Discharge     Problem: Skin/Tissue Integrity - Adult  Goal: Skin integrity remains intact  Outcome: Adequate for Discharge     Problem: Skin/Tissue Integrity - Adult  Goal: Incisions, wounds, or drain sites healing without S/S of infection  Outcome: Adequate for Discharge     Problem: Skin/Tissue Integrity - Adult  Goal: Oral mucous membranes remain intact  Outcome: Adequate for Discharge     Problem: Musculoskeletal - Adult  Goal: Maintain proper alignment of affected body part  Outcome: Adequate for Discharge     Problem: Musculoskeletal - Adult  Goal: Return ADL status to a safe level of function  Outcome: Adequate for Discharge     Problem: Gastrointestinal - Adult  Goal: Maintains or returns to baseline bowel function  Outcome: Adequate for Discharge     Problem: Gastrointestinal - Adult  Goal: Maintains adequate nutritional intake  Outcome: Adequate for Discharge     Problem: Gastrointestinal - Adult  Goal: Establish and maintain optimal ostomy function  Outcome: Adequate for Discharge     Problem: Genitourinary - Adult  Goal: Absence of urinary retention  Outcome: Adequate for Discharge     Problem: Infection - Adult  Goal: Absence of infection at discharge  Outcome: Adequate for Discharge     Problem: Infection - Adult  Goal: Absence of infection during hospitalization  Outcome: Adequate for Discharge     Problem: Infection - Adult  Goal: Absence of fever/infection during anticipated neutropenic period  Outcome: Adequate for Discharge     Problem: Metabolic/Fluid and Electrolytes - Adult  Goal: Electrolytes maintained within normal limits  Outcome: Adequate for Discharge     Problem: Metabolic/Fluid and Electrolytes - Adult  Goal: Hemodynamic stability and optimal renal function maintained  Outcome: Adequate for Discharge     Problem: Hematologic - Adult  Goal: Maintains hematologic

## 2024-04-26 NOTE — PROGRESS NOTES
Progress note: Infectious diseases    Patient - Noé Licea,  Age - 33 y.o.    - 1990      Room Number - 6A-10/010-A   MRN -  [id number]   [id number]  Date of Admission -  2024  4:44 PM    SUBJECTIVE:   No new issues.  OBJECTIVE   VITALS    height is 1.803 m (5' 10.98\") and weight is 85 kg (187 lb 6.3 oz). His oral temperature is 97.9 °F (36.6 °C). His blood pressure is 135/91 (abnormal) and his pulse is 78. His respiration is 16 and oxygen saturation is 98%.       Wt Readings from Last 3 Encounters:   24 85 kg (187 lb 6.3 oz)   24 87.5 kg (193 lb)   24 81.6 kg (180 lb)       I/O (24 Hours)    Intake/Output Summary (Last 24 hours) at 2024 1126  Last data filed at 2024 1016  Gross per 24 hour   Intake 1456.83 ml   Output 2525 ml   Net -1068.17 ml         General Appearance  Awake, alert, oriented,  not  In acute distress  HEENT - normocephalic, atraumatic, pink conjunctiva,  anicteric sclera  Neck - Supple, no mass  Lungs -  Bilateral   air entry, no rhonchi, no wheeze  Cardiovascular - Heart sounds are normal.  Regular rate and rhythm without murmur, gallop or rub.  Abdomen - soft, not distended, nontender, functioning ostomy clean surgical wound  Dressing present on coccyx.  Neurologic -paraplegic  Skin - No bruising or bleeding  Extremities - No edema    MEDICATIONS:      heparin (porcine)  5,000 Units SubCUTAneous 3 times per day    Vitamin D  1,000 Units Oral Daily    neomycin-bacitracin-polymyxin   Topical BID    methocarbamol  500 mg Oral 4x Daily    meropenem  1,000 mg IntraVENous Q8H    sodium chloride flush  5-40 mL IntraVENous 2 times per day    gabapentin  900 mg Oral 4x daily    baclofen  20 mg Oral 4x Daily    lamoTRIgine  50 mg Oral BID      sodium chloride       potassium chloride **OR** potassium alternative oral replacement **OR** potassium chloride, magnesium
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
## 2024-04-29 ENCOUNTER — PATIENT MESSAGE (OUTPATIENT)
Dept: FAMILY MEDICINE CLINIC | Age: 34
End: 2024-04-29

## 2024-04-29 ENCOUNTER — TELEPHONE (OUTPATIENT)
Dept: FAMILY MEDICINE CLINIC | Age: 34
End: 2024-04-29

## 2024-04-29 NOTE — CARE COORDINATION
4/29/24, 9:11 AM EDT    DISCHARGE PLANNING EVALUATION    Notified FABRICIO Soni of discharge Friday evening 4/26, FABRICIO Soni will continue to follow.    4/29/24, 9:11 AM EDT    Patient goals/plan/ treatment preferences discussed by  and .  Patient goals/plan/ treatment preferences reviewed with patient/ family.  Patient/ family verbalize understanding of discharge plan and are in agreement with goal/plan/treatment preferences.  Understanding was demonstrated using the teach back method.  AVS provided by RN at time of discharge, which includes all necessary medical information pertaining to the patients current course of illness, treatment, post-discharge goals of care, and treatment preferences.     Services At/After Discharge: Home Health

## 2024-04-29 NOTE — TELEPHONE ENCOUNTER
From: Noé Licea  To: Grisel Martinez  Sent: 4/29/2024 2:21 PM EDT  Subject: Appointment    I would like to schedule an appointment to bring Grisel up to date on my recent surgeries

## 2024-04-29 NOTE — TELEPHONE ENCOUNTER
----- Message from Price Oakley sent at 4/29/2024 12:43 PM EDT -----  Regarding: [facility] Appointment Request  E[facility] Appointment Request    Patient needs appointment for E[facility] Appointment Type: Hospital Follow Up.    Reason for Appointment Request: Other surgery update   --------------------------------------------------------------------------------------------------------------------------    Relationship to Patient: Self     Call Back Information: OK to leave message on voicemail  Preferred Call Back Number: Phone [phone]

## 2024-04-29 NOTE — TELEPHONE ENCOUNTER
Care Transitions Initial Follow Up Call    Call within 2 business days of discharge: Yes     Patient: Noé Licea Patient : 1990 MRN: [id number]        RARS: Readmission Risk Score: 12.8       Spoke with: patient    Discharge department/facility: [facility]    Non-face-to-face services provided:  Scheduled appointment with PCP-patient scheduled with TS later this week. Will discuss concerns at that time.     Follow Up  Future Appointments   Date Time Provider Department Center   2024 11:30 AM Grisel Martinez APRN - CNP Fam Medicine [facility]   5/3/2024 11:45 AM Andrade Morales, DO N Adv Surg [facility]       JAELYN YADAV RN

## 2024-05-01 ENCOUNTER — OFFICE VISIT (OUTPATIENT)
Dept: FAMILY MEDICINE CLINIC | Age: 34
End: 2024-05-01

## 2024-05-01 ENCOUNTER — TELEPHONE (OUTPATIENT)
Dept: SURGERY | Age: 34
End: 2024-05-01

## 2024-05-01 VITALS
RESPIRATION RATE: 16 BRPM | SYSTOLIC BLOOD PRESSURE: 102 MMHG | HEIGHT: 71 IN | DIASTOLIC BLOOD PRESSURE: 72 MMHG | HEART RATE: 107 BPM | WEIGHT: 200.62 LBS | BODY MASS INDEX: 28.09 KG/M2

## 2024-05-01 DIAGNOSIS — K94.00 COLOSTOMY COMPLICATION (HCC): ICD-10-CM

## 2024-05-01 DIAGNOSIS — R68.83 CHILLS: ICD-10-CM

## 2024-05-01 DIAGNOSIS — Z98.890 S/P HERNIA REPAIR: ICD-10-CM

## 2024-05-01 DIAGNOSIS — Z87.19 S/P HERNIA REPAIR: ICD-10-CM

## 2024-05-01 DIAGNOSIS — R10.84 GENERALIZED ABDOMINAL PAIN: ICD-10-CM

## 2024-05-01 DIAGNOSIS — Z09 HOSPITAL DISCHARGE FOLLOW-UP: Primary | ICD-10-CM

## 2024-05-01 SDOH — ECONOMIC STABILITY: HOUSING INSECURITY
IN THE LAST 12 MONTHS, WAS THERE A TIME WHEN YOU DID NOT HAVE A STEADY PLACE TO SLEEP OR SLEPT IN A SHELTER (INCLUDING NOW)?: NO

## 2024-05-01 SDOH — ECONOMIC STABILITY: FOOD INSECURITY: WITHIN THE PAST 12 MONTHS, YOU WORRIED THAT YOUR FOOD WOULD RUN OUT BEFORE YOU GOT MONEY TO BUY MORE.: NEVER TRUE

## 2024-05-01 SDOH — ECONOMIC STABILITY: INCOME INSECURITY: HOW HARD IS IT FOR YOU TO PAY FOR THE VERY BASICS LIKE FOOD, HOUSING, MEDICAL CARE, AND HEATING?: NOT HARD AT ALL

## 2024-05-01 SDOH — ECONOMIC STABILITY: FOOD INSECURITY: WITHIN THE PAST 12 MONTHS, THE FOOD YOU BOUGHT JUST DIDN'T LAST AND YOU DIDN'T HAVE MONEY TO GET MORE.: NEVER TRUE

## 2024-05-01 NOTE — TELEPHONE ENCOUNTER
Pt called asking to be off work until his appt with Dr Morales on 5/14/24.  Ok for note to be sent to [facility]yl?  Please advise.

## 2024-05-01 NOTE — PROGRESS NOTES
Eyes:      Conjunctiva/sclera: Conjunctivae normal.      Pupils: Pupils are equal, round, and reactive to light.   Cardiovascular:      Rate and Rhythm: Normal rate and regular rhythm.   Pulmonary:      Effort: Pulmonary effort is normal.      Breath sounds: Normal breath sounds.   Abdominal:      General: Bowel sounds are normal.      Palpations: Abdomen is soft.   Musculoskeletal:         General: Normal range of motion.      Cervical back: Normal range of motion and neck supple.   Skin:     General: Skin is warm and dry.      Comments: Abdominal dressing in place. +small amount of drainage to dressing. Visible part of incision is well approximated and healing.   Neurological:      Mental Status: He is alert and oriented to person, place, and time.      Deep Tendon Reflexes: Reflexes are normal and symmetric.   Psychiatric:         Behavior: Behavior normal.         Thought Content: Thought content normal.         Judgment: Judgment normal.       Future Appointments   Date Time Provider Department Center   5/14/2024  1:00 PM Andrade Morales DO N Adv Surg [facility]         ASSESSMENT/PLAN         Noé was seen today for follow-up from hospital.    Diagnoses and all orders for this visit:    Hospital discharge follow-up  -     OR DISCHARGE MEDS RECONCILED W/ CURRENT OUTPATIENT MED LIST    Colostomy complication (HCC)  -     CBC with Auto Differential; Future  -     Basic Metabolic Panel; Future    Chills  -     CBC with Auto Differential; Future  -     Basic Metabolic Panel; Future    Generalized abdominal pain  -     CBC with Auto Differential; Future  -     Basic Metabolic Panel; Future    S/P hernia repair    Other orders  -     Basic Metabolic Panel  -     CBC        Keep follow-up appointments  Repeat labs ordered  Follow-up in 3 to 6 months or sooner as needed    Initial post-discharge communication occurred between nurse and patient on 4/29/24- see documentation in chart: telephone encounter.    Date

## 2024-05-03 ENCOUNTER — TELEPHONE (OUTPATIENT)
Dept: FAMILY MEDICINE CLINIC | Age: 34
End: 2024-05-03

## 2024-05-03 DIAGNOSIS — E87.5 SERUM POTASSIUM ELEVATED: Primary | ICD-10-CM

## 2024-05-03 LAB
ANION GAP SERPL CALCULATED.3IONS-SCNC: 11 MEQ/L (ref 7–16)
BASOPHILS ABSOLUTE: 0.07 K/UL (ref 0–0.2)
BASOPHILS RELATIVE PERCENT: 1 %
BUN BLDV-MCNC: 13 MG/DL (ref 6–20)
CALCIUM SERPL-MCNC: 9.6 MG/DL (ref 8.5–10.5)
CHLORIDE BLD-SCNC: 101 MEQ/L (ref 95–107)
CO2: 25 MEQ/L (ref 19–31)
CREAT SERPL-MCNC: 0.79 MG/DL (ref 0.8–1.4)
EGFR IF NONAFRICAN AMERICAN: 121 ML/MIN/1.73
EOSINOPHILS ABSOLUTE: 0.09 K/UL (ref 0–0.5)
EOSINOPHILS RELATIVE PERCENT: 1.3 %
GLUCOSE: 108 MG/DL (ref 70–99)
HCT VFR BLD CALC: 39 % (ref 40–51)
HEMOGLOBIN: 13.3 G/DL (ref 13.5–17)
IMMATURE GRANS (ABS): 0.08
IMMATURE GRANULOCYTES %: 1.2 %
LYMPHOCYTES ABSOLUTE: 1.57 K/UL (ref 1–4)
LYMPHOCYTES RELATIVE PERCENT: 22.8 %
MCH RBC QN AUTO: 27.8 PG (ref 25–33)
MCHC RBC AUTO-ENTMCNC: 34.1 G/DL (ref 31–36)
MCV RBC AUTO: 81.4 FL (ref 80–99)
MONOCYTES ABSOLUTE: 0.79 K/UL (ref 0.2–1)
MONOCYTES RELATIVE PERCENT: 11.5 %
NEUTROPHILS ABSOLUTE: 4.28 K/UL (ref 1.5–7.5)
NEUTROPHILS RELATIVE PERCENT: 62.2 %
PDW BLD-RTO: 13.9 % (ref 11.5–15)
PLATELET # BLD: 422 K/UL (ref 130–400)
PMV BLD AUTO: 11.3 FL (ref 9.3–13)
POTASSIUM SERPL-SCNC: 5.6 MEQ/L (ref 3.5–5.4)
RBC # BLD: 4.79 M/UL (ref 4.5–6.1)
SODIUM BLD-SCNC: 137 MEQ/L (ref 133–146)
WBC # BLD: 6.9 K/UL (ref 3.5–11)

## 2024-05-03 NOTE — TELEPHONE ENCOUNTER
----- Message from RADHA Sanchez - CNP sent at 5/3/2024  8:12 AM EDT -----  I do not believe he was fasting, so I am not concerned with the blood sugar.  However, his potassium is a little elevated.  Please have him watch his potassium intake and recheck BMP in 1 week.  Thanks, TS

## 2024-05-03 NOTE — TELEPHONE ENCOUNTER
Pt notified and wa snot fasting. He will complete in one week. Lab order faxed to [facility] as they do home draws.

## 2024-05-08 ENCOUNTER — TELEPHONE (OUTPATIENT)
Dept: FAMILY MEDICINE CLINIC | Age: 34
End: 2024-05-08

## 2024-05-08 ASSESSMENT — ENCOUNTER SYMPTOMS
EYES NEGATIVE: 1
CHEST TIGHTNESS: 0
BLOOD IN STOOL: 0
SHORTNESS OF BREATH: 0
ABDOMINAL PAIN: 0

## 2024-05-08 NOTE — TELEPHONE ENCOUNTER
----- Message from Grisel Martinez, RADHA - CNP sent at 5/5/2024 10:16 PM EDT -----  CBC continues to show mild improvement.  Platelet count is a little elevated at 422.  Lets recheck in 1 month.  Thanks, TS

## 2024-05-08 NOTE — TELEPHONE ENCOUNTER
Spoke to the pt and notified him of the lab result and TS recommendation for recheck in 1 month. Pt states he is having skin flap surgery on 5/24/24 and will be bed bound in a nursing home for at least a month so isn't sure when he would be able to get the lab rechecked. Please advise.

## 2024-05-08 NOTE — TELEPHONE ENCOUNTER
Noted. Can hold on repeat at this time as they will likely order labs when he is in the hospital following his surgery. Thanks, TS

## 2024-05-09 ENCOUNTER — TELEPHONE (OUTPATIENT)
Dept: SURGERY | Age: 34
End: 2024-05-09

## 2024-05-09 NOTE — TELEPHONE ENCOUNTER
Pt called stating our office was to send a TORY form back to Sheryl.  Pt state gary contacted him asking for updated TORY updated.

## 2024-05-14 ENCOUNTER — OFFICE VISIT (OUTPATIENT)
Dept: SURGERY | Age: 34
End: 2024-05-14

## 2024-05-14 VITALS
SYSTOLIC BLOOD PRESSURE: 108 MMHG | DIASTOLIC BLOOD PRESSURE: 60 MMHG | BODY MASS INDEX: 27.17 KG/M2 | OXYGEN SATURATION: 95 % | HEIGHT: 72 IN | TEMPERATURE: 98.7 F | HEART RATE: 101 BPM

## 2024-05-14 DIAGNOSIS — K43.5 PARASTOMAL HERNIA WITHOUT OBSTRUCTION OR GANGRENE: Primary | ICD-10-CM

## 2024-05-14 PROCEDURE — 99024 POSTOP FOLLOW-UP VISIT: CPT | Performed by: SURGERY
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
## 2024-05-16 ENCOUNTER — TELEPHONE (OUTPATIENT)
Dept: SURGERY | Age: 34
End: 2024-05-16

## 2024-05-16 NOTE — TELEPHONE ENCOUNTER
Pt called in and states that since surgery his energy level is low.  He spends more time in bed.  His also complains of headache and sciatic pain.  He thinks this is from the surgery.  I tried to tell him to contact his neurologist in regards to this, but he seems to think it has something to do w/his surgery.  Your thoughts?

## 2024-05-17 NOTE — TELEPHONE ENCOUNTER
Pt called the office and was given this information.  He states he is having more drainage and it has an odor to it.  Do you want to put him on an antibiotic?

## 2024-05-20 NOTE — TELEPHONE ENCOUNTER
I called pt to inform him of this.  He states he went to the ER at [facility] on Saturday.  They are going to refer him to wound care.

## 2024-06-06 RX ORDER — OMEPRAZOLE 40 MG/1
40 CAPSULE, DELAYED RELEASE ORAL
Qty: 90 CAPSULE | Refills: 3 | Status: SHIPPED | OUTPATIENT
Start: 2024-06-06

## 2024-06-06 NOTE — TELEPHONE ENCOUNTER
This medication refill is regarding a electronic request. Refill requested by  Maynor Alves .    Requested Prescriptions     Pending Prescriptions Disp Refills    omeprazole (PRILOSEC) 40 MG delayed release capsule [Pharmacy Med Name: OMEPRAZOLE DR 40 MG CAPSULE] 90 capsule 3     Sig: take 1 capsule by mouth EVERY MORNING BEFORE BREAKFAST     Date of last visit: 5/1/2024   Date of next visit: None  Date of last refill: 3/11/24 #30/1    Rx verified, ordered and set to EP.

## 2024-06-20 ENCOUNTER — TELEPHONE (OUTPATIENT)
Dept: FAMILY MEDICINE CLINIC | Age: 34
End: 2024-06-20

## 2024-06-20 NOTE — TELEPHONE ENCOUNTER
Call received from Chrissie at [facility] home services.  Wanted to inform provider that patient is being discharged from their services, effective immediately due to patient being rude and disrespectful with staff and other issues pertaining to orders and plan of care.  They will be reaching out to the patient to notify him of this.    Of note, pt is on your schedule next week for his annual visit.   No need to call Chrissie back unless questions.

## 2024-07-12 ENCOUNTER — OFFICE VISIT (OUTPATIENT)
Dept: FAMILY MEDICINE CLINIC | Age: 34
End: 2024-07-12
Payer: MEDICARE

## 2024-07-12 ENCOUNTER — HOSPITAL ENCOUNTER (OUTPATIENT)
Age: 34
Discharge: HOME | End: 2024-07-12
Payer: MEDICARE

## 2024-07-12 VITALS
RESPIRATION RATE: 20 BRPM | HEART RATE: 126 BPM | SYSTOLIC BLOOD PRESSURE: 124 MMHG | DIASTOLIC BLOOD PRESSURE: 68 MMHG | TEMPERATURE: 98.4 F | OXYGEN SATURATION: 97 %

## 2024-07-12 DIAGNOSIS — E16.2 LOW BLOOD SUGAR: ICD-10-CM

## 2024-07-12 DIAGNOSIS — R19.7 DIARRHEA, UNSPECIFIED TYPE: ICD-10-CM

## 2024-07-12 DIAGNOSIS — F41.9 ANXIETY: ICD-10-CM

## 2024-07-12 DIAGNOSIS — Z87.440 HISTORY OF UTI: ICD-10-CM

## 2024-07-12 DIAGNOSIS — K90.41 GLUTEN INTOLERANCE: ICD-10-CM

## 2024-07-12 DIAGNOSIS — R06.83 SNORING: ICD-10-CM

## 2024-07-12 DIAGNOSIS — R10.84 GENERALIZED ABDOMINAL PAIN: ICD-10-CM

## 2024-07-12 DIAGNOSIS — R00.0 TACHYCARDIA: ICD-10-CM

## 2024-07-12 DIAGNOSIS — R53.83 FATIGUE, UNSPECIFIED TYPE: ICD-10-CM

## 2024-07-12 DIAGNOSIS — K94.00 COLOSTOMY COMPLICATION (HCC): ICD-10-CM

## 2024-07-12 DIAGNOSIS — E66.3 OVERWEIGHT: ICD-10-CM

## 2024-07-12 DIAGNOSIS — R00.0 TACHYCARDIA: Primary | ICD-10-CM

## 2024-07-12 DIAGNOSIS — R68.83 CHILLS: ICD-10-CM

## 2024-07-12 DIAGNOSIS — E63.9 NUTRITION DISORDER: ICD-10-CM

## 2024-07-12 DIAGNOSIS — F90.9 ATTENTION DEFICIT HYPERACTIVITY DISORDER (ADHD), UNSPECIFIED ADHD TYPE: ICD-10-CM

## 2024-07-12 DIAGNOSIS — E87.5 SERUM POTASSIUM ELEVATED: ICD-10-CM

## 2024-07-12 LAB
ANION GAP SERPL CALC-SCNC: 11 MEQ/L (ref 8–16)
BACTERIA URNS QL MICRO: ABNORMAL /HPF
BASOPHILS ABSOLUTE: 0 THOU/MM3 (ref 0–0.1)
BASOPHILS NFR BLD AUTO: 0.5 %
BILIRUB UR QL STRIP.AUTO: NEGATIVE
BUN SERPL-MCNC: 7 MG/DL (ref 7–22)
CALCIUM SERPL-MCNC: 9.3 MG/DL (ref 8.5–10.5)
CASTS #/AREA URNS LPF: ABNORMAL /LPF
CASTS 2: ABNORMAL /LPF
CHARACTER UR: ABNORMAL
CHLORIDE SERPL-SCNC: 101 MEQ/L (ref 98–111)
CO2 SERPL-SCNC: 27 MEQ/L (ref 23–33)
COLOR, UA: YELLOW
CREAT SERPL-MCNC: 0.9 MG/DL (ref 0.4–1.2)
CRYSTALS URNS MICRO: ABNORMAL
DEPRECATED MEAN GLUCOSE BLD GHB EST-ACNC: 93 MG/DL (ref 70–126)
DEPRECATED RDW RBC AUTO: 43.2 FL (ref 35–45)
EOSINOPHIL NFR BLD AUTO: 0.3 %
EOSINOPHILS ABSOLUTE: 0 THOU/MM3 (ref 0–0.4)
EPITHELIAL CELLS, UA: ABNORMAL /HPF
ERYTHROCYTE [DISTWIDTH] IN BLOOD BY AUTOMATED COUNT: 13.8 % (ref 11.5–14.5)
GFR SERPL CREATININE-BSD FRML MDRD: > 90 ML/MIN/1.73M2
GLUCOSE SERPL-MCNC: 95 MG/DL (ref 70–108)
GLUCOSE UR QL STRIP.AUTO: NEGATIVE MG/DL
HBA1C MFR BLD HPLC: 5.1 % (ref 4.4–6.4)
HCT VFR BLD AUTO: 44 % (ref 42–52)
HGB BLD-MCNC: 14.1 GM/DL (ref 14–18)
HGB UR QL STRIP.AUTO: NEGATIVE
IMM GRANULOCYTES # BLD AUTO: 0.02 THOU/MM3 (ref 0–0.07)
IMM GRANULOCYTES NFR BLD AUTO: 0.3 %
KETONES UR QL STRIP.AUTO: NEGATIVE
LYMPHOCYTES ABSOLUTE: 1.2 THOU/MM3 (ref 1–4.8)
LYMPHOCYTES NFR BLD AUTO: 19.8 %
MCH RBC QN AUTO: 27.5 PG (ref 26–33)
MCHC RBC AUTO-ENTMCNC: 32 GM/DL (ref 32.2–35.5)
MCV RBC AUTO: 85.9 FL (ref 80–94)
MISCELLANEOUS 2: ABNORMAL
MONOCYTES ABSOLUTE: 0.5 THOU/MM3 (ref 0.4–1.3)
MONOCYTES NFR BLD AUTO: 7.7 %
NEUTROPHILS ABSOLUTE: 4.4 THOU/MM3 (ref 1.8–7.7)
NEUTROPHILS NFR BLD AUTO: 71.4 %
NITRITE UR QL STRIP: POSITIVE
NRBC BLD AUTO-RTO: 0 /100 WBC
PH UR STRIP.AUTO: 7 [PH] (ref 5–9)
PLATELET # BLD AUTO: 283 THOU/MM3 (ref 130–400)
PMV BLD AUTO: 11.3 FL (ref 9.4–12.4)
POTASSIUM SERPL-SCNC: 4.1 MEQ/L (ref 3.5–5.2)
PROT UR STRIP.AUTO-MCNC: NEGATIVE MG/DL
RBC # BLD AUTO: 5.12 MILL/MM3 (ref 4.7–6.1)
RBC URINE: ABNORMAL /HPF
RENAL EPI CELLS #/AREA URNS HPF: ABNORMAL /[HPF]
SODIUM SERPL-SCNC: 139 MEQ/L (ref 135–145)
SP GR UR REFRACT.AUTO: 1.01 (ref 1–1.03)
UROBILINOGEN, URINE: 0.2 EU/DL (ref 0–1)
WBC # BLD AUTO: 6.2 THOU/MM3 (ref 4.8–10.8)
WBC #/AREA URNS HPF: ABNORMAL /HPF
WBC #/AREA URNS HPF: ABNORMAL /[HPF]
YEAST LIKE FUNGI URNS QL MICRO: ABNORMAL

## 2024-07-12 PROCEDURE — G8419 CALC BMI OUT NRM PARAM NOF/U: HCPCS | Performed by: NURSE PRACTITIONER

## 2024-07-12 PROCEDURE — 85025 COMPLETE CBC W/AUTO DIFF WBC: CPT

## 2024-07-12 PROCEDURE — G8427 DOCREV CUR MEDS BY ELIG CLIN: HCPCS | Performed by: NURSE PRACTITIONER

## 2024-07-12 PROCEDURE — 99214 OFFICE O/P EST MOD 30 MIN: CPT | Performed by: NURSE PRACTITIONER

## 2024-07-12 PROCEDURE — 87086 URINE CULTURE/COLONY COUNT: CPT

## 2024-07-12 PROCEDURE — 81001 URINALYSIS AUTO W/SCOPE: CPT

## 2024-07-12 PROCEDURE — 83036 HEMOGLOBIN GLYCOSYLATED A1C: CPT

## 2024-07-12 PROCEDURE — 80048 BASIC METABOLIC PNL TOTAL CA: CPT

## 2024-07-12 PROCEDURE — 87186 SC STD MICRODIL/AGAR DIL: CPT

## 2024-07-12 PROCEDURE — 1036F TOBACCO NON-USER: CPT | Performed by: NURSE PRACTITIONER

## 2024-07-12 PROCEDURE — 36415 COLL VENOUS BLD VENIPUNCTURE: CPT

## 2024-07-12 PROCEDURE — 87077 CULTURE AEROBIC IDENTIFY: CPT

## 2024-07-12 RX ORDER — ESCITALOPRAM OXALATE 10 MG/1
10 TABLET ORAL DAILY
Qty: 30 TABLET | Refills: 2 | Status: SHIPPED | OUTPATIENT
Start: 2024-07-12

## 2024-07-12 RX ORDER — CIPROFLOXACIN 500 MG/1
500 TABLET, FILM COATED ORAL 2 TIMES DAILY
Qty: 20 TABLET | Refills: 0 | Status: SHIPPED | OUTPATIENT
Start: 2024-07-12 | End: 2024-07-22

## 2024-07-14 LAB
BACTERIA UR CULT: ABNORMAL
BACTERIA UR CULT: ABNORMAL
ORGANISM: ABNORMAL
ORGANISM: ABNORMAL

## 2024-07-17 ASSESSMENT — ENCOUNTER SYMPTOMS
ABDOMINAL PAIN: 0
EYES NEGATIVE: 1
SHORTNESS OF BREATH: 0
BLOOD IN STOOL: 0
CHEST TIGHTNESS: 0

## 2024-07-29 ENCOUNTER — TELEPHONE (OUTPATIENT)
Dept: FAMILY MEDICINE CLINIC | Age: 34
End: 2024-07-29

## 2024-07-29 NOTE — TELEPHONE ENCOUNTER
I called and spoke to the patient. I let him know Dr. Mulligan's response. He voiced understanding.

## 2024-07-29 NOTE — TELEPHONE ENCOUNTER
----- Message from Christi Corona sent at 7/29/2024  4:08 PM EDT -----  Regarding: ECC Appointment Request  ECC Appointment Request    Patient needs appointment for ECC Appointment Type: New to Provider.    Patient Requested Dates(s):  Anytime  Patient Requested Time: Anytime  Provider Name: Beba Mulligan DO      Reason for Appointment Request: New Patient - No appointments available during search  --------------------------------------------------------------------------------------------------------------------------    Relationship to Patient: Self     Call Back Information: OK to leave message on voicemail  Preferred Call Back Number: Phone: [phone]

## 2024-08-08 ENCOUNTER — OFFICE VISIT (OUTPATIENT)
Dept: FAMILY MEDICINE CLINIC | Age: 34
End: 2024-08-08
Payer: MEDICARE

## 2024-08-08 VITALS — SYSTOLIC BLOOD PRESSURE: 118 MMHG | DIASTOLIC BLOOD PRESSURE: 62 MMHG | HEART RATE: 74 BPM | TEMPERATURE: 98.3 F

## 2024-08-08 DIAGNOSIS — K90.41 NON-CELIAC GLUTEN SENSITIVITY: ICD-10-CM

## 2024-08-08 DIAGNOSIS — G82.20 PARAPLEGIA (HCC): ICD-10-CM

## 2024-08-08 DIAGNOSIS — G40.909 SEIZURE DISORDER (HCC): ICD-10-CM

## 2024-08-08 DIAGNOSIS — F32.1 MODERATE SINGLE CURRENT EPISODE OF MAJOR DEPRESSIVE DISORDER (HCC): ICD-10-CM

## 2024-08-08 DIAGNOSIS — F41.9 ANXIETY: Primary | ICD-10-CM

## 2024-08-08 PROCEDURE — 99213 OFFICE O/P EST LOW 20 MIN: CPT | Performed by: NURSE PRACTITIONER

## 2024-08-08 PROCEDURE — G8417 CALC BMI ABV UP PARAM F/U: HCPCS | Performed by: NURSE PRACTITIONER

## 2024-08-08 PROCEDURE — G8427 DOCREV CUR MEDS BY ELIG CLIN: HCPCS | Performed by: NURSE PRACTITIONER

## 2024-08-08 PROCEDURE — 1036F TOBACCO NON-USER: CPT | Performed by: NURSE PRACTITIONER

## 2024-08-08 NOTE — PROGRESS NOTES
Chief Complaint   Patient presents with    Discuss Medications     Would like to discuss side effects.        SUBJECTIVE     Noé Licea is a 34 y.o.male      Pt complains of side effects from the Lexapro. Having headaches, moodiness, changes in urination, increased thirst, decrease urine output. He is still having anxiety but the ticks have decreased. Feels overwhelmed at times and gets irritated with others more easily. He did stop taking it about 6 days ago. He takes edibles and this helps with the ticks as well. He was not taking the edibles with the lexapro. Questions if the edibles are causing him to sleep walk.     May need another surgery for his stomach. He is following with [facility] for this currently.     Takes Neurontin that his neurologist prescribes. He would like to get this switched over to PCP as it is difficult to get a hold of them.     Review of Systems   Constitutional:  Negative for chills, fatigue, fever and unexpected weight change.   HENT: Negative.     Eyes: Negative.    Respiratory:  Negative for chest tightness and shortness of breath.    Cardiovascular:  Negative for chest pain, palpitations and leg swelling.   Gastrointestinal:  Negative for abdominal pain and blood in stool.   Genitourinary:  Negative for dysuria.   Musculoskeletal:  Negative for joint swelling.   Skin:  Negative for rash.   Neurological:  Positive for headaches. Negative for dizziness.   Psychiatric/Behavioral:  Positive for agitation. The patient is nervous/anxious.    All other systems reviewed and are negative.        OBJECTIVE     /62   Pulse 74   Temp 98.3 °F (36.8 °C) (Oral)     Physical Exam  Vitals and nursing note reviewed.   Constitutional:       Appearance: He is well-developed.   HENT:      Head: Normocephalic and atraumatic.      Right Ear: External ear normal.      Left Ear: External ear normal.      Nose: Nose normal.   Eyes:      Conjunctiva/sclera: Conjunctivae normal.      Pupils: Pupils are

## 2024-08-16 ENCOUNTER — HOSPITAL ENCOUNTER (OUTPATIENT)
Age: 34
Discharge: HOME | End: 2024-08-16
Payer: MEDICARE

## 2024-08-16 DIAGNOSIS — K90.41 GLUTEN INTOLERANCE: ICD-10-CM

## 2024-08-16 PROCEDURE — 86364 TISS TRNSGLTMNASE EA IG CLAS: CPT

## 2024-08-16 PROCEDURE — 36415 COLL VENOUS BLD VENIPUNCTURE: CPT

## 2024-08-18 PROBLEM — S06.9X9A: Status: RESOLVED | Noted: 2017-06-16 | Resolved: 2024-08-18

## 2024-08-18 PROBLEM — L89.324 PRESSURE ULCER OF ISCHIUM, LEFT, STAGE IV (HCC): Status: RESOLVED | Noted: 2019-12-12 | Resolved: 2024-08-18

## 2024-08-19 ENCOUNTER — OFFICE VISIT (OUTPATIENT)
Dept: INTERNAL MEDICINE CLINIC | Age: 34
End: 2024-08-19
Payer: MEDICARE

## 2024-08-19 DIAGNOSIS — K90.41 NON-CELIAC GLUTEN SENSITIVITY: Primary | ICD-10-CM

## 2024-08-19 PROCEDURE — 97802 MEDICAL NUTRITION INDIV IN: CPT | Performed by: DIETITIAN, REGISTERED

## 2024-08-19 NOTE — PROGRESS NOTES
[facility]  [facility]  [address] [place] [address]  [phone] (phone)  [phone] (fax)    Patient Name: Noé Licea. Date of Birth: [id number]. MRN: [id number]      Assessment: Patient is a 34 y.o. male seen for Initial MNT visit for Gluten Intolerance.     -Nutritionally relevant labs:   Lab Results   Component Value Date/Time    LABA1C 5.1 07/12/2024 01:59 PM    GLUCOSE 95 07/12/2024 01:59 PM    GLUCOSE 108 (H) 05/02/2024 11:30 AM    GLUCOSE 83 04/26/2024 06:32 AM    CHOL 184 11/15/2022 12:51 PM    HDL 30 11/15/2022 12:51 PM    TRIG 173 11/15/2022 12:51 PM       -Food recall:   Breakfast: overnight oats (goats milk).   Lunch: rice,chicken/pizza/pasta   Dinner: frozen dinner.   Snacks: trail mix, pretzel bites       -  Current Outpatient Medications on File Prior to Visit   Medication Sig Dispense Refill    sertraline (ZOLOFT) 50 MG tablet Take 1 tablet by mouth daily 30 tablet 2    omeprazole (PRILOSEC) 40 MG delayed release capsule take 1 capsule by mouth EVERY MORNING BEFORE BREAKFAST 90 capsule 3    ondansetron (ZOFRAN ODT) 4 MG disintegrating tablet Take 1 tablet by mouth every 8 hours as needed for Nausea 20 tablet 1    sildenafil (VIAGRA) 50 MG tablet take 1 tablet by mouth once daily if needed for ERECTILE DYSFUNCTION 30 tablet 1    oxybutynin (DITROPAN-XL) 5 MG extended release tablet take 1 tablet by mouth once daily 30 tablet 5    baclofen (LIORESAL) 20 MG tablet Take 1 tablet by mouth 4 times daily      methocarbamol (ROBAXIN) 500 MG tablet Take 1 tablet by mouth 4 times daily      lamoTRIgine (LAMICTAL) 100 MG tablet take 1 tablet by mouth twice a day (Patient taking differently: Take 0.5 tablets by mouth 2 times daily) 60 tablet 3    gabapentin (NEURONTIN) 300 MG capsule Take 3 capsules by mouth 4 times daily.       Current Facility-Administered Medications on File Prior to Visit   Medication Dose Route Frequency Provider Last

## 2024-08-20 LAB — TTG IGA SER IA-ACNC: 1.27 FLU (ref 0–4.99)

## 2024-08-29 ENCOUNTER — TELEPHONE (OUTPATIENT)
Dept: FAMILY MEDICINE CLINIC | Age: 34
End: 2024-08-29

## 2024-08-29 ENCOUNTER — LAB (OUTPATIENT)
Dept: LAB | Age: 34
End: 2024-08-29

## 2024-08-29 DIAGNOSIS — R34 DECREASED URINE OUTPUT: ICD-10-CM

## 2024-08-29 DIAGNOSIS — R34 DECREASED URINE OUTPUT: Primary | ICD-10-CM

## 2024-08-29 LAB
BACTERIA URNS QL MICRO: ABNORMAL /HPF
BILIRUB UR QL STRIP.AUTO: NEGATIVE
CASTS #/AREA URNS LPF: ABNORMAL /LPF
CASTS 2: ABNORMAL /LPF
CHARACTER UR: CLEAR
COLOR, UA: ABNORMAL
CRYSTALS URNS MICRO: ABNORMAL
EPITHELIAL CELLS, UA: ABNORMAL /HPF
GLUCOSE UR QL STRIP.AUTO: NEGATIVE MG/DL
HGB UR QL STRIP.AUTO: ABNORMAL
KETONES UR QL STRIP.AUTO: ABNORMAL
MISCELLANEOUS 2: ABNORMAL
NITRITE UR QL STRIP: POSITIVE
PH UR STRIP.AUTO: 7 [PH] (ref 5–9)
PROT UR STRIP.AUTO-MCNC: NEGATIVE MG/DL
RBC URINE: ABNORMAL /HPF
RENAL EPI CELLS #/AREA URNS HPF: ABNORMAL /[HPF]
SP GR UR REFRACT.AUTO: 1.01 (ref 1–1.03)
UROBILINOGEN, URINE: 0.2 EU/DL (ref 0–1)
WBC #/AREA URNS HPF: ABNORMAL /HPF
WBC #/AREA URNS HPF: ABNORMAL /[HPF]
YEAST LIKE FUNGI URNS QL MICRO: ABNORMAL

## 2024-08-29 NOTE — TELEPHONE ENCOUNTER
Having urinary symptoms since last UA performed with positive urine culture on 7/12/24 that have worsened in the past few days. C/O chills/sweats, kidney pain and decreased urine output. Requesting order to check UA at [facility]. Please advise.

## 2024-08-30 ENCOUNTER — TELEPHONE (OUTPATIENT)
Dept: FAMILY MEDICINE CLINIC | Age: 34
End: 2024-08-30

## 2024-08-30 RX ORDER — NITROFURANTOIN 25; 75 MG/1; MG/1
100 CAPSULE ORAL 2 TIMES DAILY
Qty: 28 CAPSULE | Refills: 0 | Status: SHIPPED | OUTPATIENT
Start: 2024-08-30 | End: 2024-09-13

## 2024-08-30 NOTE — TELEPHONE ENCOUNTER
----- Message from RADHA Betts CNP sent at 8/30/2024  8:44 AM EDT -----  Urine is showing another infection. Lets start him on macrobid 100mg BID x14 days. TS

## 2024-09-04 ENCOUNTER — OFFICE VISIT (OUTPATIENT)
Dept: FAMILY MEDICINE CLINIC | Age: 34
End: 2024-09-04
Payer: COMMERCIAL

## 2024-09-04 VITALS
HEART RATE: 112 BPM | RESPIRATION RATE: 16 BRPM | WEIGHT: 200 LBS | BODY MASS INDEX: 27.09 KG/M2 | SYSTOLIC BLOOD PRESSURE: 102 MMHG | TEMPERATURE: 98.3 F | DIASTOLIC BLOOD PRESSURE: 64 MMHG

## 2024-09-04 DIAGNOSIS — F32.A DEPRESSION, UNSPECIFIED DEPRESSION TYPE: ICD-10-CM

## 2024-09-04 DIAGNOSIS — G82.20 PARAPLEGIA (HCC): ICD-10-CM

## 2024-09-04 DIAGNOSIS — F41.9 ANXIETY: Primary | ICD-10-CM

## 2024-09-04 DIAGNOSIS — Z99.3 WHEELCHAIR DEPENDENCE: ICD-10-CM

## 2024-09-04 PROCEDURE — 99213 OFFICE O/P EST LOW 20 MIN: CPT | Performed by: NURSE PRACTITIONER

## 2024-09-04 RX ORDER — SERTRALINE HYDROCHLORIDE 100 MG/1
100 TABLET, FILM COATED ORAL DAILY
Qty: 30 TABLET | Refills: 2 | Status: SHIPPED | OUTPATIENT
Start: 2024-09-04

## 2024-09-04 ASSESSMENT — ENCOUNTER SYMPTOMS
SHORTNESS OF BREATH: 0
ABDOMINAL PAIN: 0
BLOOD IN STOOL: 0
CHEST TIGHTNESS: 0
EYES NEGATIVE: 1

## 2024-09-04 NOTE — PROGRESS NOTES
Chief Complaint   Patient presents with    Other     Pt here for depression and anxiety         SUBJECTIVE     Noé Licea is a 34 y.o.male      Pt was switched to zoloft 1 month ago from lexapro as this was causing him side effects. He is feeling better with zoloft but does not feel like the dose is high enough. Continues with depression that causes him to avoid certain activities. Denies feeling suicidal.    Gets assessment for ADHD tomorrow.     Started on macrobid a couple days ago. This is starting to hep urinary symptoms.       Review of Systems   Constitutional:  Negative for chills, fatigue, fever and unexpected weight change.   HENT: Negative.     Eyes: Negative.    Respiratory:  Negative for chest tightness and shortness of breath.    Cardiovascular:  Negative for chest pain, palpitations and leg swelling.   Gastrointestinal:  Negative for abdominal pain and blood in stool.   Genitourinary:  Negative for dysuria.   Musculoskeletal:  Negative for joint swelling.   Skin:  Negative for rash.   Neurological:  Negative for dizziness.   Psychiatric/Behavioral:  Positive for decreased concentration and dysphoric mood. The patient is nervous/anxious.    All other systems reviewed and are negative.        OBJECTIVE     /64 (Site: Left Upper Arm, Position: Sitting)   Pulse (!) 112   Temp 98.3 °F (36.8 °C)   Resp 16   Wt 90.7 kg (200 lb)   BMI 27.09 kg/m²     Physical Exam  Vitals and nursing note reviewed.   Constitutional:       Appearance: He is well-developed.   HENT:      Head: Normocephalic and atraumatic.   Cardiovascular:      Rate and Rhythm: Normal rate and regular rhythm.   Pulmonary:      Effort: Pulmonary effort is normal.      Breath sounds: Normal breath sounds.   Musculoskeletal:         General: Normal range of motion.      Cervical back: Normal range of motion and neck supple.   Skin:     General: Skin is warm and dry.   Neurological:      Mental Status: He is alert and oriented to

## 2024-09-20 ENCOUNTER — TELEPHONE (OUTPATIENT)
Dept: FAMILY MEDICINE CLINIC | Age: 34
End: 2024-09-20

## 2024-09-20 DIAGNOSIS — F32.A DEPRESSION, UNSPECIFIED DEPRESSION TYPE: ICD-10-CM

## 2024-09-20 DIAGNOSIS — R30.0 DYSURIA: Primary | ICD-10-CM

## 2024-09-20 RX ORDER — BUPROPION HYDROCHLORIDE 150 MG/1
150 TABLET ORAL EVERY MORNING
Qty: 30 TABLET | Refills: 3 | Status: SHIPPED | OUTPATIENT
Start: 2024-09-20

## 2024-09-23 ENCOUNTER — HOSPITAL ENCOUNTER (OUTPATIENT)
Age: 34
Discharge: HOME | End: 2024-09-23
Payer: MEDICARE

## 2024-09-23 DIAGNOSIS — R30.0 DYSURIA: ICD-10-CM

## 2024-09-23 LAB
AMORPH SED URNS QL MICRO: ABNORMAL
BACTERIA URNS QL MICRO: ABNORMAL /HPF
BILIRUB UR QL STRIP.AUTO: NEGATIVE
CASTS #/AREA URNS LPF: ABNORMAL /LPF
CHARACTER UR: ABNORMAL
COLOR, UA: YELLOW
CRYSTALS URNS MICRO: ABNORMAL
EPITHELIAL CELLS, UA: ABNORMAL /HPF
GLUCOSE UR QL STRIP.AUTO: NEGATIVE MG/DL
HGB UR QL STRIP.AUTO: NEGATIVE
KETONES UR QL STRIP.AUTO: NEGATIVE
MISCELLANEOUS LAB TEST RESULT: ABNORMAL
NITRITE UR QL STRIP: POSITIVE
PH UR STRIP.AUTO: 7 [PH] (ref 5–9)
PROT UR STRIP.AUTO-MCNC: NEGATIVE MG/DL
RBC URINE: ABNORMAL /HPF
SP GR UR REFRACT.AUTO: 1 (ref 1–1.03)
UROBILINOGEN, URINE: 0.2 EU/DL (ref 0–1)
WBC #/AREA URNS HPF: ABNORMAL /HPF
WBC #/AREA URNS HPF: ABNORMAL /[HPF]

## 2024-09-23 PROCEDURE — 87086 URINE CULTURE/COLONY COUNT: CPT

## 2024-09-23 PROCEDURE — 81001 URINALYSIS AUTO W/SCOPE: CPT

## 2024-09-23 RX ORDER — CIPROFLOXACIN 500 MG/1
500 TABLET, FILM COATED ORAL 2 TIMES DAILY
Qty: 28 TABLET | Refills: 0 | Status: SHIPPED | OUTPATIENT
Start: 2024-09-23 | End: 2024-10-07

## 2024-09-24 ENCOUNTER — TELEPHONE (OUTPATIENT)
Dept: FAMILY MEDICINE CLINIC | Age: 34
End: 2024-09-24

## 2024-09-25 LAB
BACTERIA UR CULT: ABNORMAL
ORGANISM: ABNORMAL

## 2024-10-21 ENCOUNTER — OFFICE VISIT (OUTPATIENT)
Dept: FAMILY MEDICINE CLINIC | Age: 34
End: 2024-10-21
Payer: MEDICARE

## 2024-10-21 VITALS
RESPIRATION RATE: 16 BRPM | DIASTOLIC BLOOD PRESSURE: 60 MMHG | SYSTOLIC BLOOD PRESSURE: 90 MMHG | HEART RATE: 90 BPM | TEMPERATURE: 98.4 F | WEIGHT: 200 LBS | BODY MASS INDEX: 27.09 KG/M2

## 2024-10-21 DIAGNOSIS — S06.9X9S TRAUMATIC BRAIN INJURY WITH LOSS OF CONSCIOUSNESS, SEQUELA: ICD-10-CM

## 2024-10-21 DIAGNOSIS — M54.50 ACUTE BILATERAL LOW BACK PAIN, UNSPECIFIED WHETHER SCIATICA PRESENT: ICD-10-CM

## 2024-10-21 DIAGNOSIS — R50.9 LOW GRADE FEVER: ICD-10-CM

## 2024-10-21 DIAGNOSIS — L89.153 DECUBITUS ULCER OF COCCYGEAL REGION, STAGE 3 (HCC): ICD-10-CM

## 2024-10-21 DIAGNOSIS — D70.9 NEUTROPENIA, UNSPECIFIED TYPE (HCC): ICD-10-CM

## 2024-10-21 DIAGNOSIS — F43.23 ADJUSTMENT DISORDER WITH MIXED ANXIETY AND DEPRESSED MOOD: Primary | ICD-10-CM

## 2024-10-21 PROCEDURE — 99213 OFFICE O/P EST LOW 20 MIN: CPT | Performed by: NURSE PRACTITIONER

## 2024-10-21 PROCEDURE — 1036F TOBACCO NON-USER: CPT | Performed by: NURSE PRACTITIONER

## 2024-10-21 PROCEDURE — G8417 CALC BMI ABV UP PARAM F/U: HCPCS | Performed by: NURSE PRACTITIONER

## 2024-10-21 PROCEDURE — G8484 FLU IMMUNIZE NO ADMIN: HCPCS | Performed by: NURSE PRACTITIONER

## 2024-10-21 PROCEDURE — G8427 DOCREV CUR MEDS BY ELIG CLIN: HCPCS | Performed by: NURSE PRACTITIONER

## 2024-10-21 RX ORDER — BUPROPION HYDROCHLORIDE 100 MG/1
100 TABLET, EXTENDED RELEASE ORAL 2 TIMES DAILY
COMMUNITY
Start: 2024-09-30

## 2024-10-21 RX ORDER — CIPROFLOXACIN 500 MG/1
TABLET, FILM COATED ORAL
Qty: 28 TABLET | Refills: 0 | OUTPATIENT
Start: 2024-10-21

## 2024-10-21 NOTE — PROGRESS NOTES
Chief Complaint   Patient presents with    Follow-up     Pt presents for Medication f/u         SUBJECTIVE     Noé HARLAN Licea is a 34 y.o.male      Pt presents for medication follow up. Zoloft was increased to 100 mg daily at last visit. Wellbutrin was changed from Wellbutrin XL to Wellbutrin  mg to try and help with ADHD symptoms. Has more energy but not sure about motivation. He does feel like his anxiety and depression are doing better.    Notes headaches and low grade fevers for the last month. Also notes a low a BP. He did go to [facility] ED for the ongoing fevers. He thinks they felt it was related to his hernia. Tmax 101.2.  Notes less drainage today from the stomach wound but it has been draining a lot.  Notes back pain for the last 5 days but does not feel like he has another UTI.  Service dog has been more attentive lately.     Review of Systems   Constitutional:  Positive for fever. Negative for chills, fatigue and unexpected weight change.   HENT: Negative.     Eyes: Negative.    Respiratory:  Negative for chest tightness and shortness of breath.    Cardiovascular:  Negative for chest pain, palpitations and leg swelling.   Gastrointestinal:  Negative for abdominal pain and blood in stool.   Genitourinary:  Negative for dysuria.   Musculoskeletal:  Positive for back pain. Negative for joint swelling.   Skin:  Negative for rash.   Neurological:  Positive for headaches. Negative for dizziness.   Psychiatric/Behavioral: Negative.          Decreased motivation   All other systems reviewed and are negative.        OBJECTIVE     BP 90/60 (Site: Left Upper Arm, Position: Sitting)   Pulse 90   Temp 98.4 °F (36.9 °C)   Resp 16   Wt 90.7 kg (200 lb)   BMI 27.09 kg/m²     Physical Exam  Vitals and nursing note reviewed.   Constitutional:       Appearance: He is well-developed.   HENT:      Head: Normocephalic and atraumatic.      Right Ear: External ear normal.      Left Ear: External ear normal.      Nose: Nose

## 2024-10-22 ASSESSMENT — ENCOUNTER SYMPTOMS
BLOOD IN STOOL: 0
BACK PAIN: 1
ABDOMINAL PAIN: 0
CHEST TIGHTNESS: 0
SHORTNESS OF BREATH: 0
EYES NEGATIVE: 1

## 2024-10-25 ENCOUNTER — HOSPITAL ENCOUNTER (OUTPATIENT)
Age: 34
Discharge: HOME | End: 2024-10-25
Payer: MEDICARE

## 2024-10-25 DIAGNOSIS — L89.153 DECUBITUS ULCER OF COCCYGEAL REGION, STAGE 3 (HCC): ICD-10-CM

## 2024-10-25 DIAGNOSIS — M54.50 ACUTE BILATERAL LOW BACK PAIN, UNSPECIFIED WHETHER SCIATICA PRESENT: ICD-10-CM

## 2024-10-25 DIAGNOSIS — R50.9 LOW GRADE FEVER: ICD-10-CM

## 2024-10-25 LAB
ALBUMIN SERPL BCG-MCNC: 3.7 G/DL (ref 3.5–5.1)
ALP SERPL-CCNC: 114 U/L (ref 38–126)
ALT SERPL W/O P-5'-P-CCNC: 19 U/L (ref 11–66)
ANION GAP SERPL CALC-SCNC: 14 MEQ/L (ref 8–16)
AST SERPL-CCNC: 16 U/L (ref 5–40)
BACTERIA URNS QL MICRO: ABNORMAL /HPF
BASOPHILS ABSOLUTE: 0.1 THOU/MM3 (ref 0–0.1)
BASOPHILS NFR BLD AUTO: 0.6 %
BILIRUB SERPL-MCNC: 0.3 MG/DL (ref 0.3–1.2)
BILIRUB UR QL STRIP.AUTO: NEGATIVE
BUN SERPL-MCNC: 9 MG/DL (ref 7–22)
CALCIUM SERPL-MCNC: 9 MG/DL (ref 8.5–10.5)
CASTS #/AREA URNS LPF: ABNORMAL /LPF
CASTS 2: ABNORMAL /LPF
CHARACTER UR: CLEAR
CHLORIDE SERPL-SCNC: 91 MEQ/L (ref 98–111)
CO2 SERPL-SCNC: 27 MEQ/L (ref 23–33)
COLOR, UA: YELLOW
CREAT SERPL-MCNC: 0.7 MG/DL (ref 0.4–1.2)
CRYSTALS URNS MICRO: ABNORMAL
DEPRECATED RDW RBC AUTO: 40.7 FL (ref 35–45)
EOSINOPHIL NFR BLD AUTO: 0.1 %
EOSINOPHILS ABSOLUTE: 0 THOU/MM3 (ref 0–0.4)
EPITHELIAL CELLS, UA: ABNORMAL /HPF
ERYTHROCYTE [DISTWIDTH] IN BLOOD BY AUTOMATED COUNT: 13.7 % (ref 11.5–14.5)
GFR SERPL CREATININE-BSD FRML MDRD: > 90 ML/MIN/1.73M2
GLUCOSE SERPL-MCNC: 62 MG/DL (ref 70–108)
GLUCOSE UR QL STRIP.AUTO: NEGATIVE MG/DL
HCT VFR BLD AUTO: 39.7 % (ref 42–52)
HGB BLD-MCNC: 12.9 GM/DL (ref 14–18)
HGB UR QL STRIP.AUTO: NEGATIVE
IMM GRANULOCYTES # BLD AUTO: 0.06 THOU/MM3 (ref 0–0.07)
IMM GRANULOCYTES NFR BLD AUTO: 0.7 %
KETONES UR QL STRIP.AUTO: NEGATIVE
LYMPHOCYTES ABSOLUTE: 1 THOU/MM3 (ref 1–4.8)
LYMPHOCYTES NFR BLD AUTO: 11.3 %
MCH RBC QN AUTO: 26.7 PG (ref 26–33)
MCHC RBC AUTO-ENTMCNC: 32.5 GM/DL (ref 32.2–35.5)
MCV RBC AUTO: 82.2 FL (ref 80–94)
MISCELLANEOUS 2: ABNORMAL
MONOCYTES ABSOLUTE: 0.8 THOU/MM3 (ref 0.4–1.3)
MONOCYTES NFR BLD AUTO: 9.3 %
NEUTROPHILS ABSOLUTE: 6.7 THOU/MM3 (ref 1.8–7.7)
NEUTROPHILS NFR BLD AUTO: 78 %
NITRITE UR QL STRIP: NEGATIVE
NRBC BLD AUTO-RTO: 0 /100 WBC
PH UR STRIP.AUTO: 6.5 [PH] (ref 5–9)
PLATELET # BLD AUTO: 299 THOU/MM3 (ref 130–400)
PMV BLD AUTO: 10.1 FL (ref 9.4–12.4)
POTASSIUM SERPL-SCNC: 4.1 MEQ/L (ref 3.5–5.2)
PROT SERPL-MCNC: 8.9 G/DL (ref 6.1–8)
PROT UR STRIP.AUTO-MCNC: NEGATIVE MG/DL
RBC # BLD AUTO: 4.83 MILL/MM3 (ref 4.7–6.1)
RBC URINE: ABNORMAL /HPF
RENAL EPI CELLS #/AREA URNS HPF: ABNORMAL /[HPF]
SODIUM SERPL-SCNC: 132 MEQ/L (ref 135–145)
SP GR UR REFRACT.AUTO: 1 (ref 1–1.03)
UROBILINOGEN, URINE: 0.2 EU/DL (ref 0–1)
WBC # BLD AUTO: 8.6 THOU/MM3 (ref 4.8–10.8)
WBC #/AREA URNS HPF: ABNORMAL /HPF
WBC #/AREA URNS HPF: ABNORMAL /[HPF]
YEAST LIKE FUNGI URNS QL MICRO: ABNORMAL

## 2024-10-25 PROCEDURE — 80053 COMPREHEN METABOLIC PANEL: CPT

## 2024-10-25 PROCEDURE — 87086 URINE CULTURE/COLONY COUNT: CPT

## 2024-10-25 PROCEDURE — 87077 CULTURE AEROBIC IDENTIFY: CPT

## 2024-10-25 PROCEDURE — 81001 URINALYSIS AUTO W/SCOPE: CPT

## 2024-10-25 PROCEDURE — 36415 COLL VENOUS BLD VENIPUNCTURE: CPT

## 2024-10-25 PROCEDURE — 85025 COMPLETE CBC W/AUTO DIFF WBC: CPT

## 2024-10-27 LAB
BACTERIA UR CULT: ABNORMAL
ORGANISM: ABNORMAL

## 2024-10-28 RX ORDER — AMOXICILLIN 500 MG/1
500 CAPSULE ORAL 3 TIMES DAILY
Qty: 42 CAPSULE | Refills: 0 | Status: SHIPPED | OUTPATIENT
Start: 2024-10-28 | End: 2024-11-11

## 2024-10-28 NOTE — TELEPHONE ENCOUNTER
Other labs do show slightly low blood sugar, chloride, and sodium level.  Is he drinking many fluids and eating?   Mild anemia.  This is stable from previous.   Thanks, TS

## 2024-10-28 NOTE — TELEPHONE ENCOUNTER
----- Message from RADHA Betts CNP sent at 10/28/2024  9:48 AM EDT -----  Urine does show Streptococcus agalactiae.  Lets send in amoxicillin 500 mg 3 times daily x 14 days.  Repeat urine 2 to 3 days after antibiotic complete.  Thanks, TS

## 2024-11-01 ENCOUNTER — CARE COORDINATION (OUTPATIENT)
Dept: CARE COORDINATION | Age: 34
End: 2024-11-01

## 2024-11-01 NOTE — CARE COORDINATION
Ambulatory Care Coordination Note     11/1/2024 10:11 AM     Patient outreach attempt by this ACM today to offer care management services. ACM was unable to reach the patient by telephone today; left voice message requesting a return phone call to this ACM.     ACM: Erin Colorado RN     Care Summary Note: enrollment attempt    PCP/Specialist follow up:       Follow Up:   Plan for next ACM outreach in approximately 1 week to complete:  Enrollment attempt .

## 2024-11-07 NOTE — CARE COORDINATION
Ambulatory Care Coordination Note     2024 8:52 AM     Patient Current Location:  Home: [address] [place] [address]     This patient was received as a referral from [facility] [employer] [facility] .    ACM contacted the patient by telephone. Verified name and  with patient as identifiers. Provided introduction to self, and explanation of the ACM role.   Patient accepted care management services at this time.          ACM: Erin Colorado RN     Challenges to be reviewed by the provider   Additional needs identified to be addressed with provider No  none               Method of communication with provider: none.    Utilization: N/A - Initial Call     Care Summary Note: Spoke with Noé  Introduced self/role  Discussed barriers to health  Currently has decubitus ulcer and working with wound clinic  States having difficulty getting supplies covered by insurance and he is saturating through current supplies  Also wanting to see if briefs will be covered under insurance  Will complete SBAR and send to insurance company to see if a  can be established to help with these questions and barriers  Was agreeable to ACM follow up  Has all other equipment needed    Plan  Complete SBAR and collaborate with CM to get appropriate supplies  Provide support for any additional needs  Reinforce importance of early symptom recognition and reporting to prevent exacerbation and unnecessary hospitalization    Offered patient enrollment in the Remote Patient Monitoring (RPM) program for in-home monitoring: Patient is not eligible for RPM program because: patient does not have qualifying diagnosis.     Assessments Completed:   General Assessment    Do you have any symptoms that are causing concern?: Yes  Progression since Onset: Gradually Improving  Reported Symptoms: Other (Comment: decubitus)          Medications Reviewed:   Patient denies any changes with medications and reports taking all medications as

## 2024-11-07 NOTE — CARE COORDINATION
Grisel,      I have enrolled Noé into Care Coordination for support to help manage his health.   I am also attempted to get a CM from the insurance side to see if he can get additional supplies needed.  Please approve Plan of Care using smart phrase . [email or web address].  Thank you!

## 2024-11-20 ENCOUNTER — CARE COORDINATION (OUTPATIENT)
Dept: CARE COORDINATION | Age: 34
End: 2024-11-20

## 2024-11-20 NOTE — CARE COORDINATION
Ambulatory Care Coordination Note     11/20/2024 12:31 PM     Patient outreach attempt by this ACM today to perform care management follow up . ACM was unable to reach the patient by telephone today;   left voice message requesting a return phone call to this ACM.     ACM: Erin Colorado RN     Care Summary Note:     PCP/Specialist follow up:       Follow Up:   Plan for next ACM outreach in approximately 1 week to complete:  - disease specific assessments  - medication review  - goal progression  - education .              Goal Outcome Evaluation:  Plan of Care Reviewed With: patient      No changes overnight. A&Ox4. Aflutter  on tele. PO cardizem and eliquis given as ordered. Eliquis now on hold but did receive last night's dose around 2030. NPO since midnight. Safety maintained.           Problem: Adult Inpatient Plan of Care  Goal: Plan of Care Review  Outcome: Ongoing, Progressing  Flowsheets (Taken 7/25/2024 0337)  Plan of Care Reviewed With: patient  Goal: Patient-Specific Goal (Individualized)  Outcome: Ongoing, Progressing  Goal: Absence of Hospital-Acquired Illness or Injury  Outcome: Ongoing, Progressing  Intervention: Identify and Manage Fall Risk  Recent Flowsheet Documentation  Taken 7/25/2024 0200 by Wilma Solis RN  Safety Promotion/Fall Prevention: safety round/check completed  Taken 7/25/2024 0000 by Wilma Solis RN  Safety Promotion/Fall Prevention: safety round/check completed  Taken 7/24/2024 2200 by Wilma Solis RN  Safety Promotion/Fall Prevention: safety round/check completed  Taken 7/24/2024 2100 by Wilma Solis RN  Safety Promotion/Fall Prevention: safety round/check completed  Taken 7/24/2024 2000 by Wilma Solis RN  Safety Promotion/Fall Prevention: safety round/check completed  Intervention: Prevent Skin Injury  Recent Flowsheet Documentation  Taken 7/24/2024 2000 by Wilma Solis RN  Body Position: position changed independently  Goal: Optimal Comfort and Wellbeing  Outcome: Ongoing, Progressing  Intervention: Provide Person-Centered Care  Recent Flowsheet Documentation  Taken 7/24/2024 2000 by Wilma Solis RN  Trust Relationship/Rapport:   care explained   choices provided   questions answered   questions encouraged   reassurance provided  Goal: Readiness for Transition of Care  Outcome: Ongoing, Progressing     Problem: Pain Acute  Goal: Acceptable Pain Control and Functional Ability  Outcome: Ongoing, Progressing     Problem: Fall Injury Risk  Goal: Absence of Fall and  Fall-Related Injury  Outcome: Ongoing, Progressing  Intervention: Promote Injury-Free Environment  Recent Flowsheet Documentation  Taken 7/25/2024 0200 by Wilma Solis RN  Safety Promotion/Fall Prevention: safety round/check completed  Taken 7/25/2024 0000 by Wilma Solis RN  Safety Promotion/Fall Prevention: safety round/check completed  Taken 7/24/2024 2200 by Wilma Solis RN  Safety Promotion/Fall Prevention: safety round/check completed  Taken 7/24/2024 2100 by Wilma Solis RN  Safety Promotion/Fall Prevention: safety round/check completed  Taken 7/24/2024 2000 by Wilma Solis RN  Safety Promotion/Fall Prevention: safety round/check completed     Problem: Dysrhythmia  Goal: Normalized Cardiac Rhythm  Outcome: Ongoing, Progressing     Problem: Asthma Comorbidity  Goal: Maintenance of Asthma Control  Outcome: Ongoing, Progressing     Problem: Hypertension Comorbidity  Goal: Blood Pressure in Desired Range  Outcome: Ongoing, Progressing

## 2024-11-20 NOTE — CARE COORDINATION
Ambulatory Care Coordination Note     2024 12:39 PM     Patient Current Location:  Home: [address] [place] [address]     ACM contacted the patient by telephone. Verified name and  with patient as identifiers.         ACM: Erin Colorado RN     Challenges to be reviewed by the provider   Additional needs identified to be addressed with provider No  none               Method of communication with provider: none.    Utilization: Patient has not had any utilization since our last call.     Care Summary Note: Spoke with Noé  Following up on SBAR completed and sent to insurance company on  for additional support for needed supplies  Noé states he has not heard from anyone from insurance side  ACM has re emailed the SBAR completed to Marlene Pham to assist with getting the SBAR to the appropriate person with [employer] to see if a CM can be assigned  Noé states he is stable and denies additional needs at this time    Plan  Continue to work on getting CM from insurance side involved to additional support needed with supply coverage  Reinforce importance of early symptom recognition and reporting to prevent exacerbation and unnecessary hospitalization    Offered patient enrollment in the Remote Patient Monitoring (RPM) program for in-home monitoring: Yes, but did not enroll at this time: controlled chronic disease management.     Assessments Completed:   No changes since last call    Medications Reviewed:   Patient denies any changes with medications and reports taking all medications as prescribed.    Advance Care Planning:   Not reviewed during this call     Care Planning:    Goals Addressed                      This Visit's Progress      Conditions and Symptoms (pt-stated)   No change      I will schedule office visits, as directed by my provider.  I will keep my appointment or reschedule if I have to cancel.  I will notify my provider of any barriers to my plan of care.  I will notify

## 2024-11-26 ENCOUNTER — PATIENT MESSAGE (OUTPATIENT)
Dept: FAMILY MEDICINE CLINIC | Age: 34
End: 2024-11-26

## 2024-12-04 ENCOUNTER — CARE COORDINATION (OUTPATIENT)
Dept: CARE COORDINATION | Age: 34
End: 2024-12-04

## 2024-12-04 NOTE — CARE COORDINATION
Ambulatory Care Coordination Note     2024 1:15 PM     Patient Current Location:  Home: [address] [place] [address]     ACM contacted the patient by telephone. Verified name and  with patient as identifiers.         ACM: Erin Colorado RN     Challenges to be reviewed by the provider   Additional needs identified to be addressed with provider No  none               Method of communication with provider: none.    Utilization: Patient has not had any utilization since our last call.     Care Summary Note: Spoke with Noé for continued follow up and support  States he has still not heard anything from [facility] Care Management  I reached out to Marlene Echevarriaorestes to provide support and assistance with getting SBAR sent to appropriate person at [facility] to provide support  Looking for better dressing supplies to help manage draining to wound as he has barriers getting needed supplies due to insurance barriers  Discussed UTI sx's which he discussed via MyChart with PCP  He states he has made appt with infection control to address these sx's    Plan  Continue to work on getting CM from insurance side involved  Reinforce importance of early symptom reporting and recognition     Offered patient enrollment in the Remote Patient Monitoring (RPM) program for in-home monitoring: Yes, but did not enroll at this time: controlled chronic disease management.     Assessments Completed:   No changes since last call    Medications Reviewed:   Patient denies any changes with medications and reports taking all medications as prescribed.    Advance Care Planning:   Not reviewed during this call     Care Planning:    Goals Addressed                      This Visit's Progress      Conditions and Symptoms (pt-stated)   No change      I will schedule office visits, as directed by my provider.  I will keep my appointment or reschedule if I have to cancel.  I will notify my provider of any barriers to my plan of care.  I will notify my

## 2024-12-04 NOTE — CARE COORDINATION
[facility] is following up on payer referral and additional support needed from payer side.  [facility] has been working with this patient since Nov 7th  and has been trying to reach Case Management from [facility] to assist with barriers patient is having.      [facility] sent emails including SBAR to [email or web address] and sent additional email to [email or web address].      [facility] contacted [facility]  Line at [phone] for additional support.  I spoke with staff member, gave her contact information.  States a case was opened on Nov 25th.  States they will call the Noé back this week.      States they called patient Nov 26th and was not able to reach him.    Staff gave me the contact name of Sammie from [facility] who is the CM involved. [facility] left her a detailed message to return call so we can collaborate for this patients needs/barriers.

## 2024-12-05 NOTE — CARE COORDINATION
12-4-24, [facility] received a call from Sammie from [facility].  Discussed needs and support needed by Noé.  States she will call Noé to provide support.  Needs additional information from [facility] for care.  Will call back tomorrow morning to provide those details.  Will also call and inform Noé that Sammie will be calling and it will be from an 800 number.

## 2024-12-05 NOTE — CARE COORDINATION
COY left detailed message with HUAN Cochran from [facility].  She informed me to leave the information on her confidential voicemail.    Attempted to reach Noé, unable to reach and unable to leave voicemail.  Left MyChart message.

## 2024-12-06 ENCOUNTER — CARE COORDINATION (OUTPATIENT)
Dept: CARE COORDINATION | Age: 34
End: 2024-12-06

## 2024-12-06 NOTE — CARE COORDINATION
COY received a call from Sammie from [employer].  States she has tried to contact Noé x 4 attempts.  COY left voicemail with Sammie.  Sammie states that per their protocol, they will have to end the case after today if they don't hear back from him.

## 2024-12-06 NOTE — CARE COORDINATION
COY contacted Noé and left very detailed message regarding Sammie, HUAN from [facility] attempted to reach him X 4 attempts.  I left [place] contact information on Noé's voicemail including her number and extension which is [phone].  I informed him on voicemail that he needs to make contact with her today or the case will be closed.

## 2024-12-18 ENCOUNTER — CARE COORDINATION (OUTPATIENT)
Dept: CARE COORDINATION | Age: 34
End: 2024-12-18

## 2025-01-08 ENCOUNTER — CARE COORDINATION (OUTPATIENT)
Dept: CARE COORDINATION | Age: 35
End: 2025-01-08

## 2025-01-08 NOTE — CARE COORDINATION
Grisel,      I have been working with Noé on Care Coordination program to provide support and education to help manage chronic conditions.  He has now been established with a Care Manager from his insurance side for support needed.  I would like to graduate pt from Care Coordination at this time pending PCP approval.  Do you approve of this discharge?  Please advise. Thank you.

## 2025-01-13 ENCOUNTER — HOSPITAL ENCOUNTER (OUTPATIENT)
Age: 35
Discharge: HOME | End: 2025-01-13

## 2025-01-14 ENCOUNTER — LAB (OUTPATIENT)
Dept: LAB | Age: 35
End: 2025-01-14

## 2025-01-14 LAB
BILIRUB UR QL STRIP.AUTO: NEGATIVE
CHARACTER UR: CLEAR
COLOR, UA: YELLOW
GLUCOSE UR QL STRIP.AUTO: NEGATIVE MG/DL
HGB UR QL STRIP.AUTO: NEGATIVE
KETONES UR QL STRIP.AUTO: NEGATIVE
NITRITE UR QL STRIP: NEGATIVE
PH UR STRIP.AUTO: 7 [PH] (ref 5–9)
PROT UR STRIP.AUTO-MCNC: NEGATIVE MG/DL
SP GR UR REFRACT.AUTO: < 1.005 (ref 1–1.03)
UROBILINOGEN, URINE: 0.2 EU/DL (ref 0–1)
WBC #/AREA URNS HPF: NEGATIVE /[HPF]

## 2025-02-03 ENCOUNTER — TELEMEDICINE (OUTPATIENT)
Dept: FAMILY MEDICINE CLINIC | Age: 35
End: 2025-02-03

## 2025-02-03 DIAGNOSIS — F41.9 ANXIETY: Primary | ICD-10-CM

## 2025-02-03 DIAGNOSIS — K94.00 COLOSTOMY COMPLICATION (HCC): ICD-10-CM

## 2025-02-03 DIAGNOSIS — Z87.820 HISTORY OF TRAUMATIC BRAIN INJURY: ICD-10-CM

## 2025-02-03 DIAGNOSIS — L89.153 DECUBITUS ULCER OF COCCYGEAL REGION, STAGE 3 (HCC): ICD-10-CM

## 2025-02-03 DIAGNOSIS — G40.909 SEIZURE DISORDER (HCC): ICD-10-CM

## 2025-02-03 DIAGNOSIS — R11.0 NAUSEA: ICD-10-CM

## 2025-02-03 DIAGNOSIS — F32.1 MODERATE SINGLE CURRENT EPISODE OF MAJOR DEPRESSIVE DISORDER (HCC): ICD-10-CM

## 2025-02-03 DIAGNOSIS — G82.20 PARAPLEGIA (HCC): ICD-10-CM

## 2025-02-03 NOTE — PROGRESS NOTES
Neck: [x] No visualized mass     Pulmonary/Chest: [x] Respiratory effort normal.  [x] No visualized signs of difficulty breathing or respiratory distress        [] Abnormal-      Musculoskeletal:   [x] Normal gait with no signs of ataxia         [x] Normal range of motion of neck        [] Abnormal-       Neurological:        [x] No Facial Asymmetry (Cranial nerve 7 motor function) (limited exam to video visit)          [x] No gaze palsy        [] Abnormal-         Skin:        [x] No significant exanthematous lesions or discoloration noted on facial skin         [] Abnormal-            Psychiatric:       [x] Normal Affect [x] No Hallucinations        [] Abnormal-       Lab Results   Component Value Date    LABA1C 5.1 07/12/2024     Lab Results   Component Value Date    EAG 93 07/12/2024       Lab Results   Component Value Date    CHOL 184 11/15/2022    TRIG 173 11/15/2022    HDL 30 11/15/2022       Lab Results   Component Value Date     (L) 10/25/2024    K 4.1 10/25/2024    CL 91 (L) 10/25/2024    CO2 27 10/25/2024    BUN 9 10/25/2024    CREATININE 0.7 10/25/2024    GLUCOSE 62 (L) 10/25/2024    CALCIUM 9.0 10/25/2024    BILITOT 0.3 10/25/2024    ALKPHOS 114 10/25/2024    AST 16 10/25/2024    ALT 19 10/25/2024    LABGLOM > 90 10/25/2024       No components found for: \"LABMICR\", \"[id number]\"    Lab Results   Component Value Date    TSH 0.788 04/04/2018       Lab Results   Component Value Date    WBC 8.6 10/25/2024    HGB 12.9 (L) 10/25/2024    HCT 39.7 (L) 10/25/2024    MCV 82.2 10/25/2024     10/25/2024       No results found for: \"PSA\"      Immunization History   Administered Date(s) Administered    DTP 1991, 1991, 02/19/1992, 11/18/1992    Hib vaccine 1991, 1991, 02/19/1992, 11/18/1992    Influenza Vaccine, unspecified formulation 10/02/2016    MMR, PRIORIX, M-M-R II, (age 12m+), SC, 0.5mL 11/18/1992, 05/13/2004    PPD Test 06/20/2016, 06/27/2016    Polio OPV 1991,

## 2025-02-07 RX ORDER — OMEPRAZOLE 40 MG/1
40 CAPSULE, DELAYED RELEASE ORAL
Qty: 90 CAPSULE | Refills: 3 | Status: SHIPPED | OUTPATIENT
Start: 2025-02-07

## 2025-02-07 RX ORDER — PROMETHAZINE HYDROCHLORIDE 25 MG/1
25 TABLET ORAL 4 TIMES DAILY PRN
Qty: 20 TABLET | Refills: 0 | Status: SHIPPED | OUTPATIENT
Start: 2025-02-07 | End: 2025-02-14

## 2025-02-07 ASSESSMENT — ENCOUNTER SYMPTOMS
NAUSEA: 1
ABDOMINAL PAIN: 1
EYES NEGATIVE: 1
SHORTNESS OF BREATH: 0
CHEST TIGHTNESS: 0
BLOOD IN STOOL: 0
ABDOMINAL DISTENTION: 1

## 2025-02-20 ENCOUNTER — TELEMEDICINE (OUTPATIENT)
Dept: FAMILY MEDICINE CLINIC | Age: 35
End: 2025-02-20

## 2025-02-20 DIAGNOSIS — L89.153 DECUBITUS ULCER OF COCCYGEAL REGION, STAGE 3 (HCC): ICD-10-CM

## 2025-02-20 DIAGNOSIS — Z99.3 WHEELCHAIR DEPENDENCE: ICD-10-CM

## 2025-02-20 DIAGNOSIS — R42 DIZZINESS: Primary | ICD-10-CM

## 2025-02-20 DIAGNOSIS — G82.20 PARAPLEGIA (HCC): ICD-10-CM

## 2025-02-20 DIAGNOSIS — Z87.820 HISTORY OF TRAUMATIC BRAIN INJURY: ICD-10-CM

## 2025-02-20 DIAGNOSIS — R51.9 NONINTRACTABLE HEADACHE, UNSPECIFIED CHRONICITY PATTERN, UNSPECIFIED HEADACHE TYPE: ICD-10-CM

## 2025-02-20 NOTE — PROGRESS NOTES
[facility]  [address]  [place] [address]  Dept: [phone]  Dept Fax: [phone]      TELEHEALTH EVALUATION -- Audio/Visual (During COVID-19 public health emergency)     Noé Licea, was evaluated through a synchronous (real-time) audio-video encounter. The patient (or guardian if applicable) is aware that this is a billable service, which includes applicable co-pays. This Virtual Visit was conducted with patient's (and/or legal guardian's) consent. The visit was conducted pursuant to the emergency declaration under the [place]anna Act and the National Emergencies Act, 1135 waiver authority and the Coronavirus Preparedness and Response Supplemental Appropriations Act. Patient identification was verified, and a caregiver was present when appropriate. The patient was located in a state where the provider was licensed to provide care.     SUBJECTIVE     Noé Licea is a 34 y.o. male    Pt presents for hospital follow up.  Went to [facility] on 2/9/24 for hematuria. Pt reports he ended up having surgery after his CT showed an abnormality. ?abscess? He was discharged home on the 13th.   On 15th he blacked out and called EMS. Went back to [facility] for overnight admission. Was given fluids and an outpt echo was ordered. Pt is still not feeling well. Notes Headache, stomach hurts. He has finished antibiotic. appetite is better. States he lost \"a lot of weight\" during his admissions.  Using tylenol prn for headache.  PT not really dizzy now but still having headaches. Left eye feels like it \"spinning\". Positional changes to not make it worse.  BP was low in the hospital. 80's/40's.  Surgeon follow up later this month  Supposed to see ID - Dr Boyd as well.    Patient Active Problem List   Diagnosis    Urinary tract infection associated with catheterization of urinary tract    Paraplegia (HCC)    Moderate single current episode of major depressive disorder (HCC)    Sepsis with metabolic encephalopathy (HCC)

## 2025-02-23 PROBLEM — L98.429 CHRONIC ULCER OF SACRAL REGION (HCC): Status: ACTIVE | Noted: 2025-02-23

## 2025-03-08 ENCOUNTER — LAB (OUTPATIENT)
Dept: LAB | Age: 35
End: 2025-03-08

## 2025-03-09 LAB
BACTERIA UR CULT: ABNORMAL
ORGANISM: ABNORMAL

## 2025-03-10 LAB
BACTERIA UR CULT: ABNORMAL
ORGANISM: ABNORMAL

## 2025-04-24 DIAGNOSIS — K21.9 GERD WITHOUT ESOPHAGITIS: Primary | ICD-10-CM

## 2025-04-24 DIAGNOSIS — N52.8 OTHER MALE ERECTILE DYSFUNCTION: ICD-10-CM

## 2025-04-25 RX ORDER — SILDENAFIL 50 MG/1
50 TABLET, FILM COATED ORAL PRN
Qty: 90 TABLET | Refills: 0 | Status: SHIPPED | OUTPATIENT
Start: 2025-04-25

## 2025-04-25 RX ORDER — PROMETHAZINE HYDROCHLORIDE 25 MG/1
TABLET ORAL
Qty: 90 TABLET | Refills: 0 | OUTPATIENT
Start: 2025-04-25

## 2025-04-25 RX ORDER — BACLOFEN 20 MG/1
TABLET ORAL
Qty: 360 TABLET | Refills: 0 | OUTPATIENT
Start: 2025-04-25

## 2025-04-25 RX ORDER — METHOCARBAMOL 500 MG/1
TABLET, FILM COATED ORAL
Qty: 360 TABLET | Refills: 0 | OUTPATIENT
Start: 2025-04-25

## 2025-04-25 RX ORDER — OMEPRAZOLE 40 MG/1
40 CAPSULE, DELAYED RELEASE ORAL DAILY
Qty: 90 CAPSULE | Refills: 3 | Status: SHIPPED | OUTPATIENT
Start: 2025-04-25

## 2025-04-25 RX ORDER — ZINC SULFATE 50(220)MG
CAPSULE ORAL
Qty: 90 CAPSULE | Refills: 0 | OUTPATIENT
Start: 2025-04-25

## 2025-04-25 RX ORDER — GABAPENTIN 300 MG/1
CAPSULE ORAL
Refills: 0 | OUTPATIENT
Start: 2025-04-25

## 2025-04-25 NOTE — TELEPHONE ENCOUNTER
Request from [facility] for multiple medications that TS does not prescribe asking for new prescriptions. TS has only prescribed the Omeprazole and Sildenafil prior out of the list that was requested. Please advise.

## 2025-05-07 ENCOUNTER — TELEPHONE (OUTPATIENT)
Dept: FAMILY MEDICINE CLINIC | Age: 35
End: 2025-05-07

## 2025-05-07 RX ORDER — NYSTATIN 100000 [USP'U]/G
POWDER TOPICAL
Qty: 60 G | Refills: 2 | Status: SHIPPED | OUTPATIENT
Start: 2025-05-07

## 2025-05-07 NOTE — TELEPHONE ENCOUNTER
Gabe a nurse from [facility] left message on nurse VM stating that she saw patient and he is bed bound since having a muscle flap completed. Gabe is asking TS to send a prescription for nystatin powder to [facility] for patient. Any questions or concerns we can call Gabe back at [phone]

## 2025-05-29 ENCOUNTER — TELEPHONE (OUTPATIENT)
Dept: FAMILY MEDICINE CLINIC | Age: 35
End: 2025-05-29

## 2025-05-29 DIAGNOSIS — N39.0 FREQUENT UTI: Primary | ICD-10-CM

## 2025-05-29 DIAGNOSIS — N31.9 NEUROGENIC BLADDER: ICD-10-CM

## 2025-05-29 NOTE — TELEPHONE ENCOUNTER
Patient called to inform that he went to [facility] ED, he stated that he completed a U/A and was asking if we got the results. He stated that his urologist at [facility] has been unable to receive those results. Patient is asking if you could place an order for [facility] for him to complete a U/A at home. Please advise.

## 2025-05-29 NOTE — TELEPHONE ENCOUNTER
Attempted to contact pt but call does not ring out. Will follow up on this tomorrow.  Order faxed to [facility] at [phone]. (Faxed through [facility])

## 2025-06-02 ENCOUNTER — RESULTS FOLLOW-UP (OUTPATIENT)
Dept: FAMILY MEDICINE CLINIC | Age: 35
End: 2025-06-02

## 2025-06-10 DIAGNOSIS — K21.9 GERD WITHOUT ESOPHAGITIS: ICD-10-CM

## 2025-06-10 RX ORDER — OMEPRAZOLE 40 MG/1
40 CAPSULE, DELAYED RELEASE ORAL
Qty: 90 CAPSULE | Refills: 0 | Status: SHIPPED | OUTPATIENT
Start: 2025-06-10

## 2025-06-10 NOTE — TELEPHONE ENCOUNTER
This medication refill is regarding a electronic request. Refill requested by patient.    Requested Prescriptions     Pending Prescriptions Disp Refills    omeprazole (PRILOSEC) 40 MG delayed release capsule [Pharmacy Med Name: OMEPRAZOLE 40MG CAPSULES] 270 capsule      Sig: TAKE 1 CAPSULE BY MOUTH EVERY MORNING BEFORE BREAKFAST     Date of last visit: 2/20/2025   Date of next visit: Visit date not found  Date of last refill: 4/25/25  [id number] - [place], [place] - [address] - P [phone] - F [phone]     Last Lipid Panel:    Lab Results   Component Value Date/Time    CHOL 184 11/15/2022 12:51 PM    TRIG 173 11/15/2022 12:51 PM    HDL 30 11/15/2022 12:51 PM     Last CMP:   Lab Results   Component Value Date     (L) 10/25/2024    K 4.1 10/25/2024    CL 91 (L) 10/25/2024    CO2 27 10/25/2024    BUN 9 10/25/2024    CREATININE 0.7 10/25/2024    GLUCOSE 62 (L) 10/25/2024    CALCIUM 9.0 10/25/2024    BILITOT 0.3 10/25/2024    ALKPHOS 114 10/25/2024    AST 16 10/25/2024    ALT 19 10/25/2024    LABGLOM > 90 10/25/2024       Last Thyroid:    Lab Results   Component Value Date    TSH 0.788 04/04/2018     Last Hemoglobin A1C:    Lab Results   Component Value Date/Time    LABA1C 5.1 07/12/2024 01:59 PM       Rx verified, ordered and set to EP.

## 2025-06-23 ENCOUNTER — TELEPHONE (OUTPATIENT)
Dept: FAMILY MEDICINE CLINIC | Age: 35
End: 2025-06-23

## 2025-06-23 RX ORDER — GABAPENTIN 300 MG/1
900 CAPSULE ORAL
Qty: 192 CAPSULE | Refills: 0 | Status: SHIPPED | OUTPATIENT
Start: 2025-06-23 | End: 2025-07-09

## 2025-06-23 NOTE — TELEPHONE ENCOUNTER
Short term sent. TS    Controlled Substance Monitoring:    Acute and Chronic Pain Monitoring:   RX Monitoring Periodic Controlled Substance Monitoring   6/23/2025   3:33 PM No signs of potential drug abuse or diversion identified.

## 2025-06-23 NOTE — TELEPHONE ENCOUNTER
Patient left message on nurse VM.  Patient's neurologist is out of town through July 9th and he is in need of a refill of gabapentin.  Would you consider a short term refill? (Jean-Claude Baltazar)

## 2025-06-26 ENCOUNTER — PATIENT MESSAGE (OUTPATIENT)
Dept: FAMILY MEDICINE CLINIC | Age: 35
End: 2025-06-26

## 2025-07-21 ENCOUNTER — TELEPHONE (OUTPATIENT)
Dept: FAMILY MEDICINE CLINIC | Age: 35
End: 2025-07-21

## 2025-07-21 DIAGNOSIS — Z13.1 SCREENING FOR DIABETES MELLITUS: ICD-10-CM

## 2025-07-21 DIAGNOSIS — N31.9 NEUROGENIC BLADDER: ICD-10-CM

## 2025-07-21 DIAGNOSIS — M79.2 NEUROPATHIC PAIN: Primary | ICD-10-CM

## 2025-07-21 DIAGNOSIS — N39.0 FREQUENT UTI: ICD-10-CM

## 2025-07-21 DIAGNOSIS — Z13.220 SCREENING, LIPID: ICD-10-CM

## 2025-07-21 DIAGNOSIS — G40.909 SEIZURE DISORDER (HCC): ICD-10-CM

## 2025-07-21 NOTE — TELEPHONE ENCOUNTER
Pt calls in requesting a UA and his annual labs done prior to his upcoming appt. He will go to [facility],

## 2025-07-22 ENCOUNTER — LAB (OUTPATIENT)
Dept: LAB | Age: 35
End: 2025-07-22

## 2025-07-22 DIAGNOSIS — N31.9 NEUROGENIC BLADDER: ICD-10-CM

## 2025-07-22 DIAGNOSIS — N39.0 FREQUENT UTI: ICD-10-CM

## 2025-07-22 DIAGNOSIS — G40.909 SEIZURE DISORDER (HCC): ICD-10-CM

## 2025-07-22 DIAGNOSIS — Z13.220 SCREENING, LIPID: ICD-10-CM

## 2025-07-22 DIAGNOSIS — M79.2 NEUROPATHIC PAIN: ICD-10-CM

## 2025-07-22 LAB
ALBUMIN SERPL BCG-MCNC: 4 G/DL (ref 3.4–4.9)
ALP SERPL-CCNC: 126 U/L (ref 40–129)
ALT SERPL W/O P-5'-P-CCNC: 20 U/L (ref 10–50)
AMORPH SED URNS QL MICRO: ABNORMAL
ANION GAP SERPL CALC-SCNC: 16 MEQ/L (ref 8–16)
AST SERPL-CCNC: 19 U/L (ref 10–50)
BACTERIA URNS QL MICRO: ABNORMAL /HPF
BASOPHILS ABSOLUTE: 0 THOU/MM3 (ref 0–0.1)
BASOPHILS NFR BLD AUTO: 0.8 %
BILIRUB SERPL-MCNC: 0.3 MG/DL (ref 0.3–1.2)
BILIRUB UR QL STRIP.AUTO: NEGATIVE
BUN SERPL-MCNC: 7 MG/DL (ref 8–23)
CALCIUM SERPL-MCNC: 9.1 MG/DL (ref 8.6–10)
CASTS #/AREA URNS LPF: ABNORMAL /LPF
CASTS 2: ABNORMAL /LPF
CHARACTER UR: ABNORMAL
CHLORIDE SERPL-SCNC: 102 MEQ/L (ref 98–111)
CHOLEST SERPL-MCNC: 182 MG/DL (ref 100–199)
CO2 SERPL-SCNC: 20 MEQ/L (ref 22–29)
COLOR, UA: YELLOW
CREAT SERPL-MCNC: 0.7 MG/DL (ref 0.7–1.2)
CRYSTALS URNS MICRO: ABNORMAL
DEPRECATED RDW RBC AUTO: 50.5 FL (ref 35–45)
EOSINOPHIL NFR BLD AUTO: 1.2 %
EOSINOPHILS ABSOLUTE: 0.1 THOU/MM3 (ref 0–0.4)
EPITHELIAL CELLS, UA: ABNORMAL /HPF
ERYTHROCYTE [DISTWIDTH] IN BLOOD BY AUTOMATED COUNT: 18.1 % (ref 11.5–14.5)
GFR SERPL CREATININE-BSD FRML MDRD: > 90 ML/MIN/1.73M2
GLUCOSE SERPL-MCNC: 130 MG/DL (ref 74–109)
GLUCOSE UR QL STRIP.AUTO: NEGATIVE MG/DL
HCT VFR BLD AUTO: 43.6 % (ref 42–52)
HDLC SERPL-MCNC: 21 MG/DL
HGB BLD-MCNC: 14.3 GM/DL (ref 14–18)
HGB UR QL STRIP.AUTO: NEGATIVE
IMM GRANULOCYTES # BLD AUTO: 0.01 THOU/MM3 (ref 0–0.07)
IMM GRANULOCYTES NFR BLD AUTO: 0.2 %
KETONES UR QL STRIP.AUTO: NEGATIVE
LDLC SERPL CALC-MCNC: 95 MG/DL
LYMPHOCYTES ABSOLUTE: 1.4 THOU/MM3 (ref 1–4.8)
LYMPHOCYTES NFR BLD AUTO: 29.2 %
MCH RBC QN AUTO: 25.9 PG (ref 26–33)
MCHC RBC AUTO-ENTMCNC: 32.8 GM/DL (ref 32.2–35.5)
MCV RBC AUTO: 79 FL (ref 80–94)
MISCELLANEOUS 2: ABNORMAL
MONOCYTES ABSOLUTE: 0.3 THOU/MM3 (ref 0.4–1.3)
MONOCYTES NFR BLD AUTO: 7.1 %
MUCOUS THREADS URNS QL MICRO: ABNORMAL
NEUTROPHILS ABSOLUTE: 3 THOU/MM3 (ref 1.8–7.7)
NEUTROPHILS NFR BLD AUTO: 61.5 %
NITRITE UR QL STRIP: POSITIVE
NRBC BLD AUTO-RTO: 0 /100 WBC
PH UR STRIP.AUTO: 6.5 [PH] (ref 5–9)
PLATELET # BLD AUTO: 246 THOU/MM3 (ref 130–400)
PMV BLD AUTO: 12.2 FL (ref 9.4–12.4)
POTASSIUM SERPL-SCNC: 3.9 MEQ/L (ref 3.5–5.2)
PROT SERPL-MCNC: 8.1 G/DL (ref 6.4–8.3)
PROT UR STRIP.AUTO-MCNC: NEGATIVE MG/DL
RBC # BLD AUTO: 5.52 MILL/MM3 (ref 4.7–6.1)
RBC URINE: ABNORMAL /HPF
RENAL EPI CELLS #/AREA URNS HPF: ABNORMAL /[HPF]
SODIUM SERPL-SCNC: 138 MEQ/L (ref 135–145)
SP GR UR REFRACT.AUTO: < 1.005 (ref 1–1.03)
T4 FREE SERPL-MCNC: 1.1 NG/DL (ref 0.92–1.68)
TRIGL SERPL-MCNC: 330 MG/DL (ref 0–199)
TSH SERPL DL<=0.05 MIU/L-ACNC: 0.36 UIU/ML (ref 0.27–4.2)
UROBILINOGEN, URINE: 0.2 EU/DL (ref 0–1)
WBC # BLD AUTO: 4.9 THOU/MM3 (ref 4.8–10.8)
WBC #/AREA URNS HPF: ABNORMAL /HPF
WBC #/AREA URNS HPF: NEGATIVE /[HPF]
YEAST LIKE FUNGI URNS QL MICRO: ABNORMAL

## 2025-07-24 ENCOUNTER — TELEPHONE (OUTPATIENT)
Dept: FAMILY MEDICINE CLINIC | Age: 35
End: 2025-07-24

## 2025-07-29 ENCOUNTER — OFFICE VISIT (OUTPATIENT)
Dept: FAMILY MEDICINE CLINIC | Age: 35
End: 2025-07-29
Payer: MEDICARE

## 2025-07-29 VITALS
WEIGHT: 170 LBS | BODY MASS INDEX: 23.03 KG/M2 | RESPIRATION RATE: 16 BRPM | SYSTOLIC BLOOD PRESSURE: 118 MMHG | DIASTOLIC BLOOD PRESSURE: 72 MMHG | TEMPERATURE: 97.1 F | HEIGHT: 72 IN | HEART RATE: 100 BPM

## 2025-07-29 DIAGNOSIS — R41.89 BRAIN FOG: ICD-10-CM

## 2025-07-29 DIAGNOSIS — F41.9 ANXIETY: ICD-10-CM

## 2025-07-29 DIAGNOSIS — E78.1 HIGH TRIGLYCERIDES: ICD-10-CM

## 2025-07-29 DIAGNOSIS — R51.9 WORSENING HEADACHES: ICD-10-CM

## 2025-07-29 DIAGNOSIS — S06.9XAS TRAUMATIC BRAIN INJURY, WITH UNKNOWN LOSS OF CONSCIOUSNESS STATUS, SEQUELA: ICD-10-CM

## 2025-07-29 DIAGNOSIS — Z13.1 SCREENING FOR DIABETES MELLITUS: ICD-10-CM

## 2025-07-29 DIAGNOSIS — N31.9 NEUROGENIC BLADDER: ICD-10-CM

## 2025-07-29 DIAGNOSIS — F32.A DEPRESSION, UNSPECIFIED DEPRESSION TYPE: ICD-10-CM

## 2025-07-29 DIAGNOSIS — N39.0 FREQUENT UTI: ICD-10-CM

## 2025-07-29 DIAGNOSIS — G82.20 PARAPLEGIA (HCC): Primary | ICD-10-CM

## 2025-07-29 DIAGNOSIS — Z87.820 HISTORY OF TRAUMATIC BRAIN INJURY: ICD-10-CM

## 2025-07-29 LAB — HBA1C MFR BLD: 5.2 %

## 2025-07-29 PROCEDURE — 83036 HEMOGLOBIN GLYCOSYLATED A1C: CPT | Performed by: NURSE PRACTITIONER

## 2025-07-29 PROCEDURE — 99214 OFFICE O/P EST MOD 30 MIN: CPT | Performed by: NURSE PRACTITIONER

## 2025-07-29 PROCEDURE — 1036F TOBACCO NON-USER: CPT | Performed by: NURSE PRACTITIONER

## 2025-07-29 PROCEDURE — G8420 CALC BMI NORM PARAMETERS: HCPCS | Performed by: NURSE PRACTITIONER

## 2025-07-29 PROCEDURE — G2211 COMPLEX E/M VISIT ADD ON: HCPCS | Performed by: NURSE PRACTITIONER

## 2025-07-29 PROCEDURE — G8427 DOCREV CUR MEDS BY ELIG CLIN: HCPCS | Performed by: NURSE PRACTITIONER

## 2025-07-29 RX ORDER — SILDENAFIL 50 MG/1
50 TABLET, FILM COATED ORAL PRN
Qty: 90 TABLET | Refills: 0 | Status: CANCELLED | OUTPATIENT
Start: 2025-07-29

## 2025-07-29 RX ORDER — OXYBUTYNIN CHLORIDE 5 MG/1
5 TABLET, EXTENDED RELEASE ORAL DAILY
Qty: 30 TABLET | Refills: 5 | Status: CANCELLED | OUTPATIENT
Start: 2025-07-29

## 2025-07-29 RX ORDER — SERTRALINE HYDROCHLORIDE 100 MG/1
100 TABLET, FILM COATED ORAL DAILY
Qty: 30 TABLET | Refills: 2 | Status: CANCELLED | OUTPATIENT
Start: 2025-07-29

## 2025-07-29 RX ORDER — BUPROPION HYDROCHLORIDE 100 MG/1
100 TABLET, EXTENDED RELEASE ORAL 2 TIMES DAILY
Qty: 180 TABLET | Refills: 3 | Status: SHIPPED | OUTPATIENT
Start: 2025-07-29

## 2025-07-29 SDOH — ECONOMIC STABILITY: FOOD INSECURITY: WITHIN THE PAST 12 MONTHS, THE FOOD YOU BOUGHT JUST DIDN'T LAST AND YOU DIDN'T HAVE MONEY TO GET MORE.: NEVER TRUE

## 2025-07-29 SDOH — ECONOMIC STABILITY: FOOD INSECURITY: WITHIN THE PAST 12 MONTHS, YOU WORRIED THAT YOUR FOOD WOULD RUN OUT BEFORE YOU GOT MONEY TO BUY MORE.: NEVER TRUE

## 2025-07-29 ASSESSMENT — PATIENT HEALTH QUESTIONNAIRE - PHQ9
1. LITTLE INTEREST OR PLEASURE IN DOING THINGS: NOT AT ALL
5. POOR APPETITE OR OVEREATING: NOT AT ALL
SUM OF ALL RESPONSES TO PHQ QUESTIONS 1-9: 0
6. FEELING BAD ABOUT YOURSELF - OR THAT YOU ARE A FAILURE OR HAVE LET YOURSELF OR YOUR FAMILY DOWN: NOT AT ALL
2. FEELING DOWN, DEPRESSED OR HOPELESS: NOT AT ALL
7. TROUBLE CONCENTRATING ON THINGS, SUCH AS READING THE NEWSPAPER OR WATCHING TELEVISION: NOT AT ALL
SUM OF ALL RESPONSES TO PHQ QUESTIONS 1-9: 0
10. IF YOU CHECKED OFF ANY PROBLEMS, HOW DIFFICULT HAVE THESE PROBLEMS MADE IT FOR YOU TO DO YOUR WORK, TAKE CARE OF THINGS AT HOME, OR GET ALONG WITH OTHER PEOPLE: NOT DIFFICULT AT ALL
9. THOUGHTS THAT YOU WOULD BE BETTER OFF DEAD, OR OF HURTING YOURSELF: NOT AT ALL
8. MOVING OR SPEAKING SO SLOWLY THAT OTHER PEOPLE COULD HAVE NOTICED. OR THE OPPOSITE, BEING SO FIGETY OR RESTLESS THAT YOU HAVE BEEN MOVING AROUND A LOT MORE THAN USUAL: NOT AT ALL
3. TROUBLE FALLING OR STAYING ASLEEP: NOT AT ALL
4. FEELING TIRED OR HAVING LITTLE ENERGY: NOT AT ALL

## 2025-07-29 NOTE — PROGRESS NOTES
Health Maintenance Due   Topic Date Due    Polio vaccine (4 of 4 - 4-dose series) 08/15/1995    Hepatitis B vaccine (1 of 3 - 19+ 3-dose series) Never done    COVID-19 Vaccine (1 - 2024-25 season) Never done    Annual Wellness Visit (Medicare Advantage)  Never done    Depression Monitoring  04/03/2025

## 2025-07-29 NOTE — PROGRESS NOTES
Chief Complaint   Patient presents with    Follow-up     F/u after surgery    Discuss Labs     Discuss UA results        SUBJECTIVE     Noé Licea is a 34 y.o.male      Here for follow up after surgery.  Had surgery on April 7th. He reports he is doing well and the incision is healing. Has HH coming in regularly.     Sees Dr Schwab for neurology. Has been having issues with forgetting things mid sentence as well as worsening frontal headaches/migraines. Happens about once weekly. This is more than he has had in the past. The intensity is more intense than normal at times. Pt had a CT head end on May due to headache. It was unremarkable.   Has not been using his cpap since March or April. This was related to the surgery.     Pt recently had a urine completed but staff was unable to contact him for results. States he has not been feeling well lately and that is usually a sign that he has a UTI.       Review of Systems   Constitutional:  Negative for chills, fatigue, fever and unexpected weight change.   HENT:  Positive for tinnitus (left>right).    Eyes: Negative.    Respiratory:  Negative for chest tightness and shortness of breath.    Cardiovascular:  Negative for chest pain, palpitations and leg swelling.   Gastrointestinal:  Negative for abdominal pain and blood in stool.   Genitourinary:  Negative for dysuria.   Musculoskeletal:  Negative for joint swelling.   Skin:  Negative for rash.   Neurological:  Positive for headaches. Negative for dizziness.   Psychiatric/Behavioral:  Positive for sleep disturbance.    All other systems reviewed and are negative.      OBJECTIVE     /72   Pulse 100   Temp 97.1 °F (36.2 °C)   Resp 16   Ht 1.83 m (6' 0.05\")   Wt 77.1 kg (170 lb) Comment: pt hasnt been on a scale in awhile.  BMI 23.03 kg/m²     Wt Readings from Last 3 Encounters:   07/29/25 77.1 kg (170 lb)   10/21/24 90.7 kg (200 lb)   09/04/24 90.7 kg (200 lb)       Physical Exam  Vitals and nursing note

## 2025-07-30 DIAGNOSIS — N39.0 URINARY TRACT INFECTION WITHOUT HEMATURIA, SITE UNSPECIFIED: ICD-10-CM

## 2025-07-30 RX ORDER — NITROFURANTOIN 25; 75 MG/1; MG/1
100 CAPSULE ORAL 2 TIMES DAILY
Qty: 28 CAPSULE | Refills: 0 | Status: SHIPPED | OUTPATIENT
Start: 2025-07-30 | End: 2025-08-13

## 2025-07-30 NOTE — TELEPHONE ENCOUNTER
This medication refill is regarding a telephone request. Refill requested by patient.    Requested Prescriptions     Pending Prescriptions Disp Refills    nitrofurantoin, macrocrystal-monohydrate, (MACROBID) 100 MG capsule 28 capsule 0     Sig: Take 1 capsule by mouth 2 times daily for 14 days     Date of last visit: 7/29/2025   Date of next visit: 8/25/2025  Date of last refill: 7/23/25 #28/0  Pharmacy Name: [facility]    Rx verified, ordered and set to EP.      Pt states that he had this medication canceled by accident and would like a new prescription sent over.

## 2025-08-08 ENCOUNTER — HOSPITAL ENCOUNTER (OUTPATIENT)
Dept: MRI IMAGING | Age: 35
Discharge: HOME | End: 2025-08-08
Payer: MEDICARE

## 2025-08-08 DIAGNOSIS — R41.89 BRAIN FOG: ICD-10-CM

## 2025-08-08 DIAGNOSIS — R51.9 WORSENING HEADACHES: ICD-10-CM

## 2025-08-08 PROCEDURE — 70551 MRI BRAIN STEM W/O DYE: CPT

## 2025-08-10 ENCOUNTER — RESULTS FOLLOW-UP (OUTPATIENT)
Dept: FAMILY MEDICINE CLINIC | Age: 35
End: 2025-08-10

## 2025-08-19 ENCOUNTER — LAB (OUTPATIENT)
Dept: LAB | Age: 35
End: 2025-08-19

## 2025-08-19 DIAGNOSIS — N39.0 FREQUENT UTI: ICD-10-CM

## 2025-08-19 LAB
BACTERIA URNS QL MICRO: ABNORMAL /HPF
BILIRUB UR QL STRIP.AUTO: NEGATIVE
CASTS #/AREA URNS LPF: ABNORMAL /LPF
CASTS 2: ABNORMAL /LPF
CHARACTER UR: CLEAR
COLOR, UA: YELLOW
CRYSTALS URNS MICRO: ABNORMAL
EPITHELIAL CELLS, UA: ABNORMAL /HPF
GLUCOSE UR QL STRIP.AUTO: NEGATIVE MG/DL
HGB UR QL STRIP.AUTO: NEGATIVE
KETONES UR QL STRIP.AUTO: NEGATIVE
MISCELLANEOUS 2: ABNORMAL
NITRITE UR QL STRIP: POSITIVE
PH UR STRIP.AUTO: 7.5 [PH] (ref 5–9)
PROT UR STRIP.AUTO-MCNC: NEGATIVE MG/DL
RBC URINE: ABNORMAL /HPF
RENAL EPI CELLS #/AREA URNS HPF: ABNORMAL /[HPF]
SP GR UR REFRACT.AUTO: < 1.005 (ref 1–1.03)
UROBILINOGEN, URINE: 0.2 EU/DL (ref 0–1)
WBC #/AREA URNS HPF: ABNORMAL /HPF
WBC #/AREA URNS HPF: ABNORMAL /[HPF]
YEAST LIKE FUNGI URNS QL MICRO: ABNORMAL

## 2025-08-20 ENCOUNTER — PATIENT MESSAGE (OUTPATIENT)
Dept: FAMILY MEDICINE CLINIC | Age: 35
End: 2025-08-20

## 2025-08-25 ENCOUNTER — TELEMEDICINE (OUTPATIENT)
Dept: FAMILY MEDICINE CLINIC | Age: 35
End: 2025-08-25
Payer: MEDICARE

## 2025-08-25 DIAGNOSIS — N39.0 FREQUENT UTI: ICD-10-CM

## 2025-08-25 DIAGNOSIS — S06.9X9S TRAUMATIC BRAIN INJURY WITH LOSS OF CONSCIOUSNESS, SEQUELA: ICD-10-CM

## 2025-08-25 DIAGNOSIS — Z99.3 WHEELCHAIR DEPENDENCE: ICD-10-CM

## 2025-08-25 DIAGNOSIS — M79.2 NEUROPATHIC PAIN: ICD-10-CM

## 2025-08-25 DIAGNOSIS — G82.20 PARAPLEGIA (HCC): ICD-10-CM

## 2025-08-25 DIAGNOSIS — Z00.00 WELCOME TO MEDICARE PREVENTIVE VISIT: Primary | ICD-10-CM

## 2025-08-25 DIAGNOSIS — R21 RASH AND OTHER NONSPECIFIC SKIN ERUPTION: ICD-10-CM

## 2025-08-25 DIAGNOSIS — R03.0 ELEVATED BP WITHOUT DIAGNOSIS OF HYPERTENSION: ICD-10-CM

## 2025-08-25 DIAGNOSIS — Z93.59 SUPRAPUBIC CATHETER (HCC): ICD-10-CM

## 2025-08-25 DIAGNOSIS — N31.9 NEUROGENIC BLADDER: ICD-10-CM

## 2025-08-25 DIAGNOSIS — G90.4 AUTONOMIC DYSREFLEXIA: ICD-10-CM

## 2025-08-25 PROCEDURE — 99214 OFFICE O/P EST MOD 30 MIN: CPT | Performed by: NURSE PRACTITIONER

## 2025-08-25 PROCEDURE — G8428 CUR MEDS NOT DOCUMENT: HCPCS | Performed by: NURSE PRACTITIONER

## 2025-08-25 PROCEDURE — G0438 PPPS, INITIAL VISIT: HCPCS | Performed by: NURSE PRACTITIONER

## 2025-08-25 PROCEDURE — G2211 COMPLEX E/M VISIT ADD ON: HCPCS | Performed by: NURSE PRACTITIONER

## 2025-08-25 PROCEDURE — 1036F TOBACCO NON-USER: CPT | Performed by: NURSE PRACTITIONER

## 2025-08-25 PROCEDURE — G8420 CALC BMI NORM PARAMETERS: HCPCS | Performed by: NURSE PRACTITIONER

## 2025-08-25 RX ORDER — CLONIDINE HYDROCHLORIDE 0.1 MG/1
TABLET ORAL
Qty: 60 TABLET | Refills: 0 | Status: SHIPPED | OUTPATIENT
Start: 2025-08-25

## 2025-08-25 RX ORDER — GABAPENTIN 300 MG/1
900 CAPSULE ORAL
Qty: 120 CAPSULE | Refills: 0 | Status: SHIPPED | OUTPATIENT
Start: 2025-08-25 | End: 2025-09-04

## 2025-08-25 ASSESSMENT — PATIENT HEALTH QUESTIONNAIRE - PHQ9
8. MOVING OR SPEAKING SO SLOWLY THAT OTHER PEOPLE COULD HAVE NOTICED. OR THE OPPOSITE, BEING SO FIGETY OR RESTLESS THAT YOU HAVE BEEN MOVING AROUND A LOT MORE THAN USUAL: SEVERAL DAYS
SUM OF ALL RESPONSES TO PHQ QUESTIONS 1-9: 6
7. TROUBLE CONCENTRATING ON THINGS, SUCH AS READING THE NEWSPAPER OR WATCHING TELEVISION: NEARLY EVERY DAY
1. LITTLE INTEREST OR PLEASURE IN DOING THINGS: SEVERAL DAYS
3. TROUBLE FALLING OR STAYING ASLEEP: NOT AT ALL
SUM OF ALL RESPONSES TO PHQ QUESTIONS 1-9: 6
SUM OF ALL RESPONSES TO PHQ QUESTIONS 1-9: 6
4. FEELING TIRED OR HAVING LITTLE ENERGY: SEVERAL DAYS
10. IF YOU CHECKED OFF ANY PROBLEMS, HOW DIFFICULT HAVE THESE PROBLEMS MADE IT FOR YOU TO DO YOUR WORK, TAKE CARE OF THINGS AT HOME, OR GET ALONG WITH OTHER PEOPLE: SOMEWHAT DIFFICULT
5. POOR APPETITE OR OVEREATING: NOT AT ALL
9. THOUGHTS THAT YOU WOULD BE BETTER OFF DEAD, OR OF HURTING YOURSELF: NOT AT ALL
2. FEELING DOWN, DEPRESSED OR HOPELESS: NOT AT ALL
SUM OF ALL RESPONSES TO PHQ QUESTIONS 1-9: 6
6. FEELING BAD ABOUT YOURSELF - OR THAT YOU ARE A FAILURE OR HAVE LET YOURSELF OR YOUR FAMILY DOWN: NOT AT ALL

## 2025-08-25 ASSESSMENT — LIFESTYLE VARIABLES
HOW MANY STANDARD DRINKS CONTAINING ALCOHOL DO YOU HAVE ON A TYPICAL DAY: 1 OR 2
HOW OFTEN DO YOU HAVE A DRINK CONTAINING ALCOHOL: MONTHLY OR LESS

## 2025-09-03 ASSESSMENT — ENCOUNTER SYMPTOMS
NAUSEA: 1
VOMITING: 1

## 2025-09-05 ENCOUNTER — LAB (OUTPATIENT)
Dept: LAB | Age: 35
End: 2025-09-05

## 2025-09-05 DIAGNOSIS — N39.0 FREQUENT UTI: ICD-10-CM

## 2025-09-05 LAB
BACTERIA: ABNORMAL
BILIRUB UR QL STRIP: NEGATIVE
CASTS #/AREA URNS LPF: ABNORMAL /LPF
CASTS #/AREA URNS LPF: ABNORMAL /LPF
CHARACTER UR: CLEAR
CHARCOAL URNS QL MICRO: ABNORMAL
COLOR UR: YELLOW
CRYSTALS URNS QL MICRO: ABNORMAL
EPITHELIAL CELLS, UA: ABNORMAL /HPF
GLUCOSE UR QL STRIP.AUTO: NEGATIVE MG/DL
HGB UR QL STRIP.AUTO: NEGATIVE
KETONES UR QL STRIP.AUTO: NEGATIVE
LEUKOCYTE ESTERASE UR QL STRIP.AUTO: ABNORMAL
NITRITE UR QL STRIP.AUTO: NEGATIVE
PH UR STRIP.AUTO: 7 [PH] (ref 5–9)
PROT UR STRIP.AUTO-MCNC: NEGATIVE MG/DL
RBC #/AREA URNS HPF: ABNORMAL /HPF
RENAL EPI CELLS #/AREA URNS HPF: ABNORMAL /[HPF]
SP GR UR REFRACT.AUTO: < 1.005 (ref 1–1.03)
UROBILINOGEN UR QL STRIP.AUTO: 0.2 EU/DL (ref 0–1)
WBC #/AREA URNS HPF: ABNORMAL /HPF
YEAST LIKE FUNGI URNS QL MICRO: ABNORMAL

## 2025-09-07 LAB
BACTERIA UR CULT: ABNORMAL
ORGANISM: ABNORMAL

## (undated) DEVICE — Y-TYPE TUR/BLADDER IRRIGATION SET, REGULATING CLAMP

## (undated) DEVICE — BREAST HERNIA PACK: Brand: MEDLINE INDUSTRIES, INC.

## (undated) DEVICE — PENCIL SMK EVAC ALL IN 1 DSGN ENH VISIBILITY IMPROVED AIR

## (undated) DEVICE — GOWN,AURORA,NON-REINFORCED,2XL: Brand: MEDLINE

## (undated) DEVICE — SUTURE VCRL SZ 3-0 L27IN ABSRB UD FS-2 L19MM 1/2 CIR J423H

## (undated) DEVICE — SUTURE VICRYL + SZ 2-0 L27IN ABSRB CLR CT-1 1/2 CIR TAPERCUT VCP259H

## (undated) DEVICE — GLOVE SURG SZ 65 THK91MIL LTX FREE SYN POLYISOPRENE

## (undated) DEVICE — Device

## (undated) DEVICE — SUTURE VICRYL + 1 L18IN ABSRB UD CTX L48MM 1/2 CIR TAPERPOINT

## (undated) DEVICE — SUTURE VICRYL + SZ 3-0 L27IN ABSRB UD L26MM SH 1/2 CIR VCP416H

## (undated) DEVICE — CYSTO PACK: Brand: MEDLINE INDUSTRIES, INC.

## (undated) DEVICE — ADHESIVE SKIN CLSR 0.7ML TOP DERMBND ADV

## (undated) DEVICE — SUTURE ABSORBABLE BRAIDED 2-0 CT-1 27 IN UD VICRYL J259H

## (undated) DEVICE — Device: Brand: SENSURA MIO

## (undated) DEVICE — BLADE CLIPPER GEN PURP NS

## (undated) DEVICE — TOWEL,OR,DSP,ST,WHITE,DLX,XR,4/PK,20PK/C: Brand: MEDLINE

## (undated) DEVICE — SUTURE VCRL SZ 3-0 L27IN ABSRB UD L26MM SH 1/2 CIR J416H

## (undated) DEVICE — GLOVE ORANGE PI 7 1/2   MSG9075

## (undated) DEVICE — SUTURE VCRL SZ 4-0 L27IN ABSRB UD L19MM FS-2 3/8 CIR REV J422H

## (undated) DEVICE — PAD,NON-ADHERENT,3X8,STERILE,LF,1/PK: Brand: MEDLINE

## (undated) DEVICE — 3M™ STERI-STRIP™ COMPOUND BENZOIN TINCTURE 40 BAGS/CARTON 4 CARTONS/CASE C1544: Brand: 3M™ STERI-STRIP™

## (undated) DEVICE — COVER ARMBRD W13XL28.5IN IMPERV BLU FOR OP RM

## (undated) DEVICE — PENCIL SMK EVAC 10 FT BLADE ELECTRD ROCKER FOR TELSCP

## (undated) DEVICE — SUTURE VICRYL SZ 3-0 L18IN ABSRB VLT L26MM SH 1/2 CIR J774D

## (undated) DEVICE — BREAST HERNIA: Brand: MEDLINE INDUSTRIES, INC.

## (undated) DEVICE — SPONGE GZ W4XL4IN COT 12 PLY TYP VII WVN C FLD DSGN

## (undated) DEVICE — SUTURE PROL SZ 0 L30IN NONABSORBABLE BLU L36MM CT-1 1/2 CIR 8424H

## (undated) DEVICE — GLOVE ORANGE PI 7   MSG9070

## (undated) DEVICE — TAPE ADH W2INXL10YD PLAS TRNSPAR H2O RESIST HYPOALRG CURAD

## (undated) DEVICE — YANKAUER,POOLE TIP,STERILE,50/CS: Brand: MEDLINE

## (undated) DEVICE — CLEAN CLOSURE PACK: Brand: MEDLINE INDUSTRIES, INC.

## (undated) DEVICE — CLEANSER WND CLN DEB SYS IRRISEPT

## (undated) DEVICE — GLOVE SURG SZ 7.5 L11.73IN FNGR THK9.8MIL STRW LTX POLYMER

## (undated) DEVICE — YANKAUER,BULB TIP,W/O VENT,RIGID,STERILE: Brand: MEDLINE

## (undated) DEVICE — SYSTEM FIX 30 ABSRB FAST OPTIFIX

## (undated) DEVICE — PAD,ABDOMINAL,5"X9",ST,LF,25/BX: Brand: MEDLINE INDUSTRIES, INC.

## (undated) DEVICE — TUBING, SUCTION, 1/4" X 20', STRAIGHT: Brand: MEDLINE INDUSTRIES, INC.

## (undated) DEVICE — SYRINGE MED 10ML LUERLOCK TIP W/O SFTY DISP

## (undated) DEVICE — SPONGE LAP W18XL18IN WHT COT 4 PLY FLD STRUNG RADPQ DISP ST 2 PER PACK

## (undated) DEVICE — SPONGE LAP W18XL18IN WHT COT 4 PLY FLD STRUNG RADPQ DISP ST

## (undated) DEVICE — GLOVE SURG SZ 75 L12IN FNGR THK94MIL TRNSLUC YEL LTX

## (undated) DEVICE — SPONGE GZ W4XL4IN COT 12 PLY TYP VII WVN C FLD DSGN STERILE

## (undated) DEVICE — PACK,UNIVERSAL,NO GOWNS: Brand: MEDLINE

## (undated) DEVICE — SUTURE VICRYL + SZ 0 L18IN ABSRB TIE VCP106G

## (undated) DEVICE — JELLY,LUBE,STERILE,FLIP TOP,TUBE,2-OZ: Brand: MEDLINE

## (undated) DEVICE — 450 ML BOTTLE OF 0.05% CHLORHEXIDINE GLUCONATE IN 99.95% STERILE WATER FOR IRRIGATION, USP AND APPLICATOR.: Brand: IRRISEPT ANTIMICROBIAL WOUND LAVAGE

## (undated) DEVICE — BASIC SINGLE BASIN BTC-LF: Brand: MEDLINE INDUSTRIES, INC.

## (undated) DEVICE — GLOVE ORANGE PI 8 1/2   MSG9085

## (undated) DEVICE — 4-PORT MANIFOLD: Brand: NEPTUNE 2